# Patient Record
Sex: MALE | Race: WHITE | Employment: OTHER | URBAN - METROPOLITAN AREA
[De-identification: names, ages, dates, MRNs, and addresses within clinical notes are randomized per-mention and may not be internally consistent; named-entity substitution may affect disease eponyms.]

---

## 2017-03-29 PROBLEM — M86.672 CHRONIC OSTEOMYELITIS OF LEFT FOOT (HCC): Chronic | Status: ACTIVE | Noted: 2017-03-29

## 2017-03-29 PROBLEM — L97.524 SKIN ULCER OF LEFT FOOT WITH NECROSIS OF BONE (HCC): Chronic | Status: ACTIVE | Noted: 2017-03-29

## 2017-03-29 PROBLEM — L97.529 DIABETIC ULCER OF LEFT FOOT ASSOCIATED WITH TYPE 2 DIABETES MELLITUS (HCC): Chronic | Status: ACTIVE | Noted: 2017-03-29

## 2017-03-29 PROBLEM — I70.25 ATHEROSCLEROSIS OF NATIVE ARTERY OF LOWER EXTREMITY WITH ULCERATION OF MIDFOOT (HCC): Chronic | Status: ACTIVE | Noted: 2017-03-29

## 2017-03-29 PROBLEM — E11.621 DIABETIC ULCER OF LEFT FOOT ASSOCIATED WITH TYPE 2 DIABETES MELLITUS (HCC): Chronic | Status: ACTIVE | Noted: 2017-03-29

## 2017-05-02 PROBLEM — I70.244 ATHEROSCLEROSIS OF NATIVE ARTERY OF LEFT LOWER EXTREMITY WITH ULCERATION OF MIDFOOT (HCC): Chronic | Status: ACTIVE | Noted: 2017-03-29

## 2018-03-20 ENCOUNTER — TELEPHONE (OUTPATIENT)
Dept: FAMILY MEDICINE CLINIC | Age: 73
End: 2018-03-20

## 2018-03-20 DIAGNOSIS — L97.529 DIABETIC ULCER OF OTHER PART OF LEFT FOOT ASSOCIATED WITH TYPE 2 DIABETES MELLITUS, UNSPECIFIED ULCER STAGE (HCC): Primary | Chronic | ICD-10-CM

## 2018-03-20 DIAGNOSIS — E11.621 DIABETIC ULCER OF OTHER PART OF LEFT FOOT ASSOCIATED WITH TYPE 2 DIABETES MELLITUS, UNSPECIFIED ULCER STAGE (HCC): Primary | Chronic | ICD-10-CM

## 2018-03-21 RX ORDER — LANCETS 30 GAUGE
EACH MISCELLANEOUS
Qty: 100 EACH | Refills: 3 | Status: SHIPPED | OUTPATIENT
Start: 2018-03-21 | End: 2019-06-18 | Stop reason: SDUPTHER

## 2018-03-21 RX ORDER — BLOOD-GLUCOSE METER
1 KIT MISCELLANEOUS DAILY
Qty: 1 KIT | Refills: 0 | Status: SHIPPED
Start: 2018-03-21 | End: 2020-06-10

## 2018-03-21 RX ORDER — GLUCOSAMINE HCL/CHONDROITIN SU 500-400 MG
CAPSULE ORAL
Qty: 100 STRIP | Refills: 11 | Status: SHIPPED | OUTPATIENT
Start: 2018-03-21 | End: 2018-11-12 | Stop reason: SDUPTHER

## 2018-03-22 RX ORDER — PEN NEEDLE, DIABETIC 30 GX5/16"
1 NEEDLE, DISPOSABLE MISCELLANEOUS 2 TIMES DAILY
Qty: 200 EACH | Refills: 3 | Status: SHIPPED
Start: 2018-03-22 | End: 2020-05-20 | Stop reason: SDUPTHER

## 2018-03-27 DIAGNOSIS — G47.00 INSOMNIA, UNSPECIFIED TYPE: ICD-10-CM

## 2018-03-27 RX ORDER — TRAZODONE HYDROCHLORIDE 50 MG/1
50 TABLET ORAL NIGHTLY
Qty: 90 TABLET | Refills: 0 | Status: SHIPPED | OUTPATIENT
Start: 2018-03-27 | End: 2018-08-03 | Stop reason: SDUPTHER

## 2018-03-28 RX ORDER — GLIPIZIDE 10 MG/1
10 TABLET ORAL
Qty: 180 TABLET | Refills: 2 | Status: SHIPPED | OUTPATIENT
Start: 2018-03-28 | End: 2018-11-12 | Stop reason: SDUPTHER

## 2018-05-11 DIAGNOSIS — Z79.4 TYPE 2 DIABETES MELLITUS WITH OTHER CIRCULATORY COMPLICATION, WITH LONG-TERM CURRENT USE OF INSULIN (HCC): ICD-10-CM

## 2018-05-11 DIAGNOSIS — Z11.59 NEED FOR HEPATITIS C SCREENING TEST: ICD-10-CM

## 2018-05-11 DIAGNOSIS — E11.59 TYPE 2 DIABETES MELLITUS WITH OTHER CIRCULATORY COMPLICATION, WITH LONG-TERM CURRENT USE OF INSULIN (HCC): ICD-10-CM

## 2018-05-17 LAB
ALBUMIN SERPL-MCNC: 3.7 G/DL
ALP BLD-CCNC: 84 U/L
ALT SERPL-CCNC: 15 U/L
ANION GAP SERPL CALCULATED.3IONS-SCNC: 16 MMOL/L
AST SERPL-CCNC: 24 U/L
AVERAGE GLUCOSE: 174.3
BASOPHILS ABSOLUTE: NORMAL /ΜL
BASOPHILS RELATIVE PERCENT: NORMAL %
BILIRUB SERPL-MCNC: 0.7 MG/DL (ref 0.1–1.4)
BUN BLDV-MCNC: 24 MG/DL
CALCIUM SERPL-MCNC: 9.9 MG/DL
CHLORIDE BLD-SCNC: 105 MMOL/L
CHOLESTEROL, TOTAL: 161 MG/DL
CHOLESTEROL/HDL RATIO: 3.2
CO2: 27 MMOL/L
CREAT SERPL-MCNC: 1.08 MG/DL
EOSINOPHILS ABSOLUTE: NORMAL /ΜL
EOSINOPHILS RELATIVE PERCENT: NORMAL %
GFR CALCULATED: >60
GLUCOSE BLD-MCNC: 121 MG/DL
HBA1C MFR BLD: 7.7 %
HCT VFR BLD CALC: 41.5 % (ref 41–53)
HDLC SERPL-MCNC: 50 MG/DL (ref 35–70)
HEMOGLOBIN: 13.8 G/DL (ref 13.5–17.5)
LDL CHOLESTEROL CALCULATED: 94 MG/DL (ref 0–160)
LYMPHOCYTES ABSOLUTE: NORMAL /ΜL
LYMPHOCYTES RELATIVE PERCENT: NORMAL %
MCH RBC QN AUTO: 33.3 PG
MCHC RBC AUTO-ENTMCNC: 92.2 G/DL
MCV RBC AUTO: 30.7 FL
MONOCYTES ABSOLUTE: NORMAL /ΜL
MONOCYTES RELATIVE PERCENT: NORMAL %
NEUTROPHILS ABSOLUTE: NORMAL /ΜL
NEUTROPHILS RELATIVE PERCENT: NORMAL %
PLATELET # BLD: 195 K/ΜL
PMV BLD AUTO: 11.3 FL
POTASSIUM SERPL-SCNC: 4.6 MMOL/L
RBC # BLD: 4.5 10^6/ΜL
SODIUM BLD-SCNC: 143 MMOL/L
TOTAL PROTEIN: 7.2
TRIGL SERPL-MCNC: 84 MG/DL
TSH SERPL DL<=0.05 MIU/L-ACNC: 2.13 UIU/ML
VLDLC SERPL CALC-MCNC: NORMAL MG/DL
WBC # BLD: 8 10^3/ML

## 2018-06-11 ENCOUNTER — OFFICE VISIT (OUTPATIENT)
Dept: FAMILY MEDICINE CLINIC | Age: 73
End: 2018-06-11
Payer: COMMERCIAL

## 2018-06-11 ENCOUNTER — TELEPHONE (OUTPATIENT)
Dept: FAMILY MEDICINE CLINIC | Age: 73
End: 2018-06-11

## 2018-06-11 VITALS
OXYGEN SATURATION: 97 % | RESPIRATION RATE: 18 BRPM | HEIGHT: 69 IN | DIASTOLIC BLOOD PRESSURE: 78 MMHG | BODY MASS INDEX: 22.36 KG/M2 | HEART RATE: 68 BPM | SYSTOLIC BLOOD PRESSURE: 138 MMHG | WEIGHT: 151 LBS

## 2018-06-11 DIAGNOSIS — Z79.4 DRUG OR CHEMICAL INDUCED DIABETES MELLITUS WITH HYPERGLYCEMIA, WITH LONG-TERM CURRENT USE OF INSULIN (HCC): Primary | ICD-10-CM

## 2018-06-11 DIAGNOSIS — Z23 NEED FOR TDAP VACCINATION: ICD-10-CM

## 2018-06-11 DIAGNOSIS — N52.9 ERECTILE DYSFUNCTION, UNSPECIFIED ERECTILE DYSFUNCTION TYPE: ICD-10-CM

## 2018-06-11 DIAGNOSIS — E09.65 DRUG OR CHEMICAL INDUCED DIABETES MELLITUS WITH HYPERGLYCEMIA, WITH LONG-TERM CURRENT USE OF INSULIN (HCC): Primary | ICD-10-CM

## 2018-06-11 DIAGNOSIS — I10 HYPERTENSION, UNSPECIFIED TYPE: ICD-10-CM

## 2018-06-11 DIAGNOSIS — L98.9 SKIN LESION: ICD-10-CM

## 2018-06-11 DIAGNOSIS — Z23 NEED FOR SHINGLES VACCINE: ICD-10-CM

## 2018-06-11 PROCEDURE — 99214 OFFICE O/P EST MOD 30 MIN: CPT | Performed by: FAMILY MEDICINE

## 2018-08-03 ENCOUNTER — HOSPITAL ENCOUNTER (OUTPATIENT)
Age: 73
Discharge: HOME | End: 2018-08-05
Payer: COMMERCIAL

## 2018-08-03 DIAGNOSIS — G47.00 INSOMNIA, UNSPECIFIED TYPE: ICD-10-CM

## 2018-08-03 LAB — PROSTATE SPECIFIC ANTIGEN: 16.13 NG/ML (ref 0–4)

## 2018-08-03 PROCEDURE — G0103 PSA SCREENING: HCPCS

## 2018-08-03 RX ORDER — TRAZODONE HYDROCHLORIDE 50 MG/1
50 TABLET ORAL NIGHTLY
Qty: 90 TABLET | Refills: 0 | Status: SHIPPED | OUTPATIENT
Start: 2018-08-03 | End: 2018-09-26 | Stop reason: SDUPTHER

## 2018-09-24 ENCOUNTER — TELEPHONE (OUTPATIENT)
Dept: FAMILY MEDICINE CLINIC | Age: 73
End: 2018-09-24

## 2018-09-24 NOTE — TELEPHONE ENCOUNTER
appt scheduled 10/19. Offered sooner appt with one of the residents but wanted one with Dr. Raoul Brito.

## 2018-09-24 NOTE — TELEPHONE ENCOUNTER
Pt states he has had several episodes of vomiting over the last 5-6 months, seems to be getting more frequent. No appts available in the next  3-4 weeks.   Pt call was transferred to clinical line

## 2018-09-26 DIAGNOSIS — G47.00 INSOMNIA, UNSPECIFIED TYPE: ICD-10-CM

## 2018-09-26 RX ORDER — TRAZODONE HYDROCHLORIDE 50 MG/1
50 TABLET ORAL NIGHTLY
Qty: 90 TABLET | Refills: 0 | Status: SHIPPED | OUTPATIENT
Start: 2018-09-26 | End: 2019-03-26 | Stop reason: SDUPTHER

## 2018-10-01 ENCOUNTER — TELEPHONE (OUTPATIENT)
Dept: FAMILY MEDICINE CLINIC | Age: 73
End: 2018-10-01

## 2018-10-01 ENCOUNTER — APPOINTMENT (OUTPATIENT)
Dept: GENERAL RADIOLOGY | Age: 73
DRG: 293 | End: 2018-10-01
Payer: MEDICARE

## 2018-10-01 ENCOUNTER — HOSPITAL ENCOUNTER (INPATIENT)
Age: 73
LOS: 2 days | Discharge: HOME | DRG: 293 | End: 2018-10-03
Attending: EMERGENCY MEDICINE | Admitting: FAMILY MEDICINE
Payer: MEDICARE

## 2018-10-01 DIAGNOSIS — I50.9 ACUTE CONGESTIVE HEART FAILURE, UNSPECIFIED HEART FAILURE TYPE (HCC): Primary | ICD-10-CM

## 2018-10-01 DIAGNOSIS — R06.01 ORTHOPNEA: ICD-10-CM

## 2018-10-01 DIAGNOSIS — Z79.4 TYPE 2 DIABETES MELLITUS WITH DIABETIC PERIPHERAL ANGIOPATHY WITHOUT GANGRENE, WITH LONG-TERM CURRENT USE OF INSULIN (HCC): ICD-10-CM

## 2018-10-01 DIAGNOSIS — I25.810 CORONARY ARTERY DISEASE INVOLVING CORONARY BYPASS GRAFT OF NATIVE HEART WITHOUT ANGINA PECTORIS: ICD-10-CM

## 2018-10-01 DIAGNOSIS — E11.51 TYPE 2 DIABETES MELLITUS WITH DIABETIC PERIPHERAL ANGIOPATHY WITHOUT GANGRENE, WITH LONG-TERM CURRENT USE OF INSULIN (HCC): ICD-10-CM

## 2018-10-01 PROBLEM — I50.33 ACUTE ON CHRONIC CONGESTIVE HEART FAILURE WITH LEFT VENTRICULAR DIASTOLIC DYSFUNCTION (HCC): Status: ACTIVE | Noted: 2018-10-01

## 2018-10-01 PROBLEM — E11.9 TYPE 2 DIABETES MELLITUS, WITH LONG-TERM CURRENT USE OF INSULIN (HCC): Status: ACTIVE | Noted: 2018-10-01

## 2018-10-01 LAB
ALBUMIN SERPL-MCNC: 3.6 G/DL (ref 3.5–5.2)
ALP BLD-CCNC: 104 U/L (ref 40–129)
ALT SERPL-CCNC: 14 U/L (ref 0–40)
ANION GAP SERPL CALCULATED.3IONS-SCNC: 15 MMOL/L (ref 7–16)
APTT: 29 SEC (ref 24.5–35.1)
AST SERPL-CCNC: 35 U/L (ref 0–39)
BILIRUB SERPL-MCNC: 1.2 MG/DL (ref 0–1.2)
BUN BLDV-MCNC: 24 MG/DL (ref 8–23)
CALCIUM SERPL-MCNC: 8.9 MG/DL (ref 8.6–10.2)
CHLORIDE BLD-SCNC: 101 MMOL/L (ref 98–107)
CO2: 21 MMOL/L (ref 22–29)
CREAT SERPL-MCNC: 1 MG/DL (ref 0.7–1.2)
GFR AFRICAN AMERICAN: >60
GFR NON-AFRICAN AMERICAN: >60 ML/MIN/1.73
GLUCOSE BLD-MCNC: 232 MG/DL (ref 74–109)
HCT VFR BLD CALC: 41.6 % (ref 37–54)
HEMOGLOBIN: 13.6 G/DL (ref 12.5–16.5)
INR BLD: 1.2
LIPASE: 28 U/L (ref 13–60)
MCH RBC QN AUTO: 30.4 PG (ref 26–35)
MCHC RBC AUTO-ENTMCNC: 32.7 % (ref 32–34.5)
MCV RBC AUTO: 93.1 FL (ref 80–99.9)
METER GLUCOSE: 162 MG/DL (ref 70–110)
METER GLUCOSE: 281 MG/DL (ref 70–110)
PDW BLD-RTO: 13.4 FL (ref 11.5–15)
PLATELET # BLD: 178 E9/L (ref 130–450)
PMV BLD AUTO: 11.4 FL (ref 7–12)
POTASSIUM SERPL-SCNC: 4.3 MMOL/L (ref 3.5–5)
PRO-BNP: 6660 PG/ML (ref 0–125)
PROTHROMBIN TIME: 13.2 SEC (ref 9.3–12.4)
RBC # BLD: 4.47 E12/L (ref 3.8–5.8)
SODIUM BLD-SCNC: 137 MMOL/L (ref 132–146)
TOTAL PROTEIN: 7.3 G/DL (ref 6.4–8.3)
TROPONIN: 0.01 NG/ML (ref 0–0.03)
WBC # BLD: 7.9 E9/L (ref 4.5–11.5)

## 2018-10-01 PROCEDURE — 80053 COMPREHEN METABOLIC PANEL: CPT

## 2018-10-01 PROCEDURE — 85610 PROTHROMBIN TIME: CPT

## 2018-10-01 PROCEDURE — 82962 GLUCOSE BLOOD TEST: CPT

## 2018-10-01 PROCEDURE — 6360000002 HC RX W HCPCS: Performed by: FAMILY MEDICINE

## 2018-10-01 PROCEDURE — 71045 X-RAY EXAM CHEST 1 VIEW: CPT

## 2018-10-01 PROCEDURE — 99285 EMERGENCY DEPT VISIT HI MDM: CPT

## 2018-10-01 PROCEDURE — 6370000000 HC RX 637 (ALT 250 FOR IP): Performed by: FAMILY MEDICINE

## 2018-10-01 PROCEDURE — 83690 ASSAY OF LIPASE: CPT

## 2018-10-01 PROCEDURE — 2580000003 HC RX 258: Performed by: FAMILY MEDICINE

## 2018-10-01 PROCEDURE — 94761 N-INVAS EAR/PLS OXIMETRY MLT: CPT

## 2018-10-01 PROCEDURE — 6360000002 HC RX W HCPCS: Performed by: EMERGENCY MEDICINE

## 2018-10-01 PROCEDURE — 85730 THROMBOPLASTIN TIME PARTIAL: CPT

## 2018-10-01 PROCEDURE — 1200000000 HC SEMI PRIVATE

## 2018-10-01 PROCEDURE — 99222 1ST HOSP IP/OBS MODERATE 55: CPT | Performed by: FAMILY MEDICINE

## 2018-10-01 PROCEDURE — 83880 ASSAY OF NATRIURETIC PEPTIDE: CPT

## 2018-10-01 PROCEDURE — 84484 ASSAY OF TROPONIN QUANT: CPT

## 2018-10-01 PROCEDURE — 85027 COMPLETE CBC AUTOMATED: CPT

## 2018-10-01 RX ORDER — DEXTROSE MONOHYDRATE 25 G/50ML
12.5 INJECTION, SOLUTION INTRAVENOUS PRN
Status: DISCONTINUED | OUTPATIENT
Start: 2018-10-01 | End: 2018-10-03 | Stop reason: HOSPADM

## 2018-10-01 RX ORDER — TRAZODONE HYDROCHLORIDE 50 MG/1
50 TABLET ORAL NIGHTLY
Status: DISCONTINUED | OUTPATIENT
Start: 2018-10-01 | End: 2018-10-03 | Stop reason: HOSPADM

## 2018-10-01 RX ORDER — FUROSEMIDE 10 MG/ML
40 INJECTION INTRAMUSCULAR; INTRAVENOUS DAILY
Status: DISCONTINUED | OUTPATIENT
Start: 2018-10-02 | End: 2018-10-03 | Stop reason: HOSPADM

## 2018-10-01 RX ORDER — HYDRALAZINE HYDROCHLORIDE 20 MG/ML
10 INJECTION INTRAMUSCULAR; INTRAVENOUS EVERY 6 HOURS PRN
Status: CANCELLED | OUTPATIENT
Start: 2018-10-01

## 2018-10-01 RX ORDER — INSULIN GLARGINE 100 [IU]/ML
5 INJECTION, SOLUTION SUBCUTANEOUS 2 TIMES DAILY
Status: DISCONTINUED | OUTPATIENT
Start: 2018-10-01 | End: 2018-10-03 | Stop reason: HOSPADM

## 2018-10-01 RX ORDER — GLIPIZIDE 5 MG/1
10 TABLET ORAL
Status: DISCONTINUED | OUTPATIENT
Start: 2018-10-01 | End: 2018-10-02

## 2018-10-01 RX ORDER — DEXTROSE MONOHYDRATE 50 MG/ML
100 INJECTION, SOLUTION INTRAVENOUS PRN
Status: DISCONTINUED | OUTPATIENT
Start: 2018-10-01 | End: 2018-10-03 | Stop reason: HOSPADM

## 2018-10-01 RX ORDER — ONDANSETRON 2 MG/ML
4 INJECTION INTRAMUSCULAR; INTRAVENOUS EVERY 6 HOURS PRN
Status: DISCONTINUED | OUTPATIENT
Start: 2018-10-01 | End: 2018-10-03 | Stop reason: HOSPADM

## 2018-10-01 RX ORDER — NICOTINE POLACRILEX 4 MG
15 LOZENGE BUCCAL PRN
Status: DISCONTINUED | OUTPATIENT
Start: 2018-10-01 | End: 2018-10-03 | Stop reason: HOSPADM

## 2018-10-01 RX ORDER — SODIUM CHLORIDE 9 MG/ML
INJECTION, SOLUTION INTRAVENOUS CONTINUOUS
Status: DISCONTINUED | OUTPATIENT
Start: 2018-10-01 | End: 2018-10-01

## 2018-10-01 RX ORDER — FUROSEMIDE 10 MG/ML
40 INJECTION INTRAMUSCULAR; INTRAVENOUS ONCE
Status: COMPLETED | OUTPATIENT
Start: 2018-10-01 | End: 2018-10-01

## 2018-10-01 RX ORDER — VALSARTAN 80 MG/1
80 TABLET ORAL EVERY MORNING
Status: DISCONTINUED | OUTPATIENT
Start: 2018-10-02 | End: 2018-10-02

## 2018-10-01 RX ADMIN — INSULIN LISPRO 1 UNITS: 100 INJECTION, SOLUTION INTRAVENOUS; SUBCUTANEOUS at 20:44

## 2018-10-01 RX ADMIN — SODIUM CHLORIDE: 9 INJECTION, SOLUTION INTRAVENOUS at 16:49

## 2018-10-01 RX ADMIN — INSULIN LISPRO 6 UNITS: 100 INJECTION, SOLUTION INTRAVENOUS; SUBCUTANEOUS at 17:28

## 2018-10-01 RX ADMIN — FUROSEMIDE 40 MG: 10 INJECTION, SOLUTION INTRAMUSCULAR; INTRAVENOUS at 13:30

## 2018-10-01 RX ADMIN — TRAZODONE HYDROCHLORIDE 50 MG: 50 TABLET ORAL at 22:30

## 2018-10-01 RX ADMIN — LINAGLIPTIN 5 MG: 5 TABLET, FILM COATED ORAL at 16:55

## 2018-10-01 RX ADMIN — INSULIN GLARGINE 5 UNITS: 100 INJECTION, SOLUTION SUBCUTANEOUS at 20:44

## 2018-10-01 RX ADMIN — GLIPIZIDE 10 MG: 5 TABLET ORAL at 16:55

## 2018-10-01 ASSESSMENT — PAIN SCALES - GENERAL
PAINLEVEL_OUTOF10: 0
PAINLEVEL_OUTOF10: 0

## 2018-10-01 NOTE — PLAN OF CARE
Problem: Fluid Volume - Excess  Goal: Control of fluid volume excess will improve  Control of fluid volume excess will improve    Outcome: Ongoing

## 2018-10-02 LAB
ANION GAP SERPL CALCULATED.3IONS-SCNC: 12 MMOL/L (ref 7–16)
BASOPHILS ABSOLUTE: 0.09 E9/L (ref 0–0.2)
BASOPHILS RELATIVE PERCENT: 1.4 % (ref 0–2)
BUN BLDV-MCNC: 23 MG/DL (ref 8–23)
CALCIUM SERPL-MCNC: 8.6 MG/DL (ref 8.6–10.2)
CHLORIDE BLD-SCNC: 104 MMOL/L (ref 98–107)
CO2: 25 MMOL/L (ref 22–29)
CREAT SERPL-MCNC: 1.2 MG/DL (ref 0.7–1.2)
EOSINOPHILS ABSOLUTE: 0.34 E9/L (ref 0.05–0.5)
EOSINOPHILS RELATIVE PERCENT: 5.2 % (ref 0–6)
GFR AFRICAN AMERICAN: >60
GFR NON-AFRICAN AMERICAN: 59 ML/MIN/1.73
GLUCOSE BLD-MCNC: 70 MG/DL (ref 74–109)
HCT VFR BLD CALC: 40.2 % (ref 37–54)
HEMOGLOBIN: 13.2 G/DL (ref 12.5–16.5)
IMMATURE GRANULOCYTES #: 0.01 E9/L
IMMATURE GRANULOCYTES %: 0.2 % (ref 0–5)
LV EF: 35 %
LVEF MODALITY: NORMAL
LYMPHOCYTES ABSOLUTE: 1.37 E9/L (ref 1.5–4)
LYMPHOCYTES RELATIVE PERCENT: 20.8 % (ref 20–42)
MCH RBC QN AUTO: 30.2 PG (ref 26–35)
MCHC RBC AUTO-ENTMCNC: 32.8 % (ref 32–34.5)
MCV RBC AUTO: 92 FL (ref 80–99.9)
METER GLUCOSE: 279 MG/DL (ref 70–110)
METER GLUCOSE: 307 MG/DL (ref 70–110)
METER GLUCOSE: 74 MG/DL (ref 70–110)
METER GLUCOSE: 95 MG/DL (ref 70–110)
MONOCYTES ABSOLUTE: 0.59 E9/L (ref 0.1–0.95)
MONOCYTES RELATIVE PERCENT: 9 % (ref 2–12)
NEUTROPHILS ABSOLUTE: 4.19 E9/L (ref 1.8–7.3)
NEUTROPHILS RELATIVE PERCENT: 63.4 % (ref 43–80)
PDW BLD-RTO: 13.3 FL (ref 11.5–15)
PLATELET # BLD: 158 E9/L (ref 130–450)
PMV BLD AUTO: 12.1 FL (ref 7–12)
POTASSIUM REFLEX MAGNESIUM: 3.6 MMOL/L (ref 3.5–5)
RBC # BLD: 4.37 E12/L (ref 3.8–5.8)
SODIUM BLD-SCNC: 141 MMOL/L (ref 132–146)
WBC # BLD: 6.6 E9/L (ref 4.5–11.5)

## 2018-10-02 PROCEDURE — 6370000000 HC RX 637 (ALT 250 FOR IP): Performed by: FAMILY MEDICINE

## 2018-10-02 PROCEDURE — 1200000000 HC SEMI PRIVATE

## 2018-10-02 PROCEDURE — 85025 COMPLETE CBC W/AUTO DIFF WBC: CPT

## 2018-10-02 PROCEDURE — 82962 GLUCOSE BLOOD TEST: CPT

## 2018-10-02 PROCEDURE — 99224 PR SBSQ OBSERVATION CARE/DAY 15 MINUTES: CPT | Performed by: FAMILY MEDICINE

## 2018-10-02 PROCEDURE — 6360000002 HC RX W HCPCS: Performed by: FAMILY MEDICINE

## 2018-10-02 PROCEDURE — 80048 BASIC METABOLIC PNL TOTAL CA: CPT

## 2018-10-02 PROCEDURE — 36415 COLL VENOUS BLD VENIPUNCTURE: CPT

## 2018-10-02 PROCEDURE — 93306 TTE W/DOPPLER COMPLETE: CPT

## 2018-10-02 RX ORDER — LOSARTAN POTASSIUM 50 MG/1
50 TABLET ORAL DAILY
Status: DISCONTINUED | OUTPATIENT
Start: 2018-10-02 | End: 2018-10-03 | Stop reason: HOSPADM

## 2018-10-02 RX ORDER — GLIPIZIDE 5 MG/1
5 TABLET ORAL
Status: DISCONTINUED | OUTPATIENT
Start: 2018-10-02 | End: 2018-10-03 | Stop reason: HOSPADM

## 2018-10-02 RX ORDER — METOPROLOL SUCCINATE 25 MG/1
25 TABLET, EXTENDED RELEASE ORAL DAILY
Status: DISCONTINUED | OUTPATIENT
Start: 2018-10-02 | End: 2018-10-03 | Stop reason: HOSPADM

## 2018-10-02 RX ADMIN — METOPROLOL SUCCINATE 25 MG: 25 TABLET, EXTENDED RELEASE ORAL at 09:35

## 2018-10-02 RX ADMIN — LOSARTAN POTASSIUM 50 MG: 50 TABLET, FILM COATED ORAL at 09:35

## 2018-10-02 RX ADMIN — INSULIN GLARGINE 5 UNITS: 100 INJECTION, SOLUTION SUBCUTANEOUS at 14:34

## 2018-10-02 RX ADMIN — TRAZODONE HYDROCHLORIDE 50 MG: 50 TABLET ORAL at 20:45

## 2018-10-02 RX ADMIN — INSULIN LISPRO 6 UNITS: 100 INJECTION, SOLUTION INTRAVENOUS; SUBCUTANEOUS at 14:34

## 2018-10-02 RX ADMIN — GLIPIZIDE 5 MG: 5 TABLET ORAL at 18:07

## 2018-10-02 RX ADMIN — INSULIN LISPRO 8 UNITS: 100 INJECTION, SOLUTION INTRAVENOUS; SUBCUTANEOUS at 18:07

## 2018-10-02 RX ADMIN — FUROSEMIDE 40 MG: 10 INJECTION, SOLUTION INTRAMUSCULAR; INTRAVENOUS at 09:41

## 2018-10-02 RX ADMIN — LINAGLIPTIN 5 MG: 5 TABLET, FILM COATED ORAL at 09:35

## 2018-10-02 ASSESSMENT — PAIN SCALES - GENERAL
PAINLEVEL_OUTOF10: 0
PAINLEVEL_OUTOF10: 0

## 2018-10-02 NOTE — PROGRESS NOTES
[facility]  Progress Note    Subjective:    Pt feels markedly better. Was able to lay down a little flatter in his bed with less perceived shortness of breath. No dyspnea with ambulation. Denies chest pain, pressure or heart palpitations. Past medical, surgical, family and social history were reviewed, non-contributory, and unchanged unless otherwise stated. Objective:    BP (!) 145/74   Pulse 81   Temp 98 °F (36.7 °C) (Oral)   Resp 16   Ht 5' 9\" (1.753 m)   Wt 153 lb 12.8 oz (69.8 kg)   SpO2 94%   BMI 22.71 kg/m²     Heart:  RRR, no murmurs, gallops, or rubs. Lungs:  CTA bilaterally, no wheeze, rales or rhonchi  Abd: bowel sounds present, nontender, nondistended, no masses  Extrem:  No clubbing, cyanosis. Lower extremity edema decreased.  Trace edema today    Recent Results (from the past 24 hour(s))   EKG 12 Lead    Collection Time: 10/01/18  9:44 AM   Result Value Ref Range    Ventricular Rate 85 BPM    Atrial Rate 85 BPM    P-R Interval 202 ms    QRS Duration 148 ms    Q-T Interval 428 ms    QTc Calculation (Bazett) 509 ms    P Axis -2 degrees    R Axis 174 degrees    T Axis -15 degrees   Troponin    Collection Time: 10/01/18 10:01 AM   Result Value Ref Range    Troponin 0.01 0.00 - 0.03 ng/mL   Brain Natriuretic Peptide    Collection Time: 10/01/18 10:01 AM   Result Value Ref Range    Pro-BNP 6,660 (H) 0 - 125 pg/mL   CBC    Collection Time: 10/01/18 10:01 AM   Result Value Ref Range    WBC 7.9 4.5 - 11.5 E9/L    RBC 4.47 3.80 - 5.80 E12/L    Hemoglobin 13.6 12.5 - 16.5 g/dL    Hematocrit 41.6 37.0 - 54.0 %    MCV 93.1 80.0 - 99.9 fL    MCH 30.4 26.0 - 35.0 pg    MCHC 32.7 32.0 - 34.5 %    RDW 13.4 11.5 - 15.0 fL    Platelets 901 261 - 049 E9/L    MPV 11.4 7.0 - 12.0 fL   Comprehensive Metabolic Panel    Collection Time: 10/01/18 10:01 AM   Result Value Ref Range    Sodium 137 132 - 146 mmol/L    Potassium 4.3 3.5 - 5.0 mmol/L    Chloride 101 98 - 107 mmol/L

## 2018-10-03 ENCOUNTER — TELEPHONE (OUTPATIENT)
Dept: ADMINISTRATIVE | Age: 73
End: 2018-10-03

## 2018-10-03 VITALS
TEMPERATURE: 97.8 F | BODY MASS INDEX: 21.88 KG/M2 | HEIGHT: 69 IN | OXYGEN SATURATION: 95 % | RESPIRATION RATE: 16 BRPM | HEART RATE: 70 BPM | WEIGHT: 147.7 LBS | SYSTOLIC BLOOD PRESSURE: 131 MMHG | DIASTOLIC BLOOD PRESSURE: 66 MMHG

## 2018-10-03 PROBLEM — I25.10 CAD (CORONARY ARTERY DISEASE): Status: ACTIVE | Noted: 2018-10-03

## 2018-10-03 LAB — METER GLUCOSE: 122 MG/DL (ref 70–110)

## 2018-10-03 PROCEDURE — 6360000002 HC RX W HCPCS: Performed by: FAMILY MEDICINE

## 2018-10-03 PROCEDURE — 6370000000 HC RX 637 (ALT 250 FOR IP): Performed by: FAMILY MEDICINE

## 2018-10-03 PROCEDURE — 99217 PR OBSERVATION CARE DISCHARGE MANAGEMENT: CPT | Performed by: FAMILY MEDICINE

## 2018-10-03 PROCEDURE — 82962 GLUCOSE BLOOD TEST: CPT

## 2018-10-03 RX ORDER — LOSARTAN POTASSIUM 50 MG/1
25 TABLET ORAL DAILY
Qty: 30 TABLET | Refills: 3 | Status: SHIPPED | OUTPATIENT
Start: 2018-10-03 | End: 2018-10-03

## 2018-10-03 RX ORDER — ATORVASTATIN CALCIUM 40 MG/1
40 TABLET, FILM COATED ORAL NIGHTLY
Status: DISCONTINUED | OUTPATIENT
Start: 2018-10-03 | End: 2018-10-03 | Stop reason: HOSPADM

## 2018-10-03 RX ORDER — ATORVASTATIN CALCIUM 40 MG/1
40 TABLET, FILM COATED ORAL NIGHTLY
Qty: 30 TABLET | Refills: 3 | Status: SHIPPED | OUTPATIENT
Start: 2018-10-03 | End: 2018-10-08 | Stop reason: SINTOL

## 2018-10-03 RX ORDER — LOSARTAN POTASSIUM 25 MG/1
25 TABLET ORAL DAILY
Qty: 30 TABLET | Refills: 1 | Status: SHIPPED | OUTPATIENT
Start: 2018-10-03 | End: 2018-11-12 | Stop reason: SDUPTHER

## 2018-10-03 RX ORDER — SPIRONOLACTONE 25 MG/1
25 TABLET ORAL DAILY
Qty: 30 TABLET | Refills: 3 | Status: SHIPPED | OUTPATIENT
Start: 2018-10-03 | End: 2018-10-08 | Stop reason: SDUPTHER

## 2018-10-03 RX ORDER — FUROSEMIDE 20 MG/1
20 TABLET ORAL DAILY
Qty: 30 TABLET | Refills: 3 | Status: SHIPPED | OUTPATIENT
Start: 2018-10-03 | End: 2018-11-12 | Stop reason: SDUPTHER

## 2018-10-03 RX ORDER — METOPROLOL SUCCINATE 25 MG/1
25 TABLET, EXTENDED RELEASE ORAL DAILY
Qty: 30 TABLET | Refills: 3 | Status: SHIPPED | OUTPATIENT
Start: 2018-10-03 | End: 2018-11-12 | Stop reason: SDUPTHER

## 2018-10-03 RX ADMIN — GLIPIZIDE 5 MG: 5 TABLET ORAL at 06:27

## 2018-10-03 RX ADMIN — FUROSEMIDE 40 MG: 10 INJECTION, SOLUTION INTRAMUSCULAR; INTRAVENOUS at 09:21

## 2018-10-03 RX ADMIN — LOSARTAN POTASSIUM 50 MG: 50 TABLET, FILM COATED ORAL at 09:21

## 2018-10-03 RX ADMIN — INSULIN GLARGINE 5 UNITS: 100 INJECTION, SOLUTION SUBCUTANEOUS at 09:21

## 2018-10-03 RX ADMIN — METOPROLOL SUCCINATE 25 MG: 25 TABLET, EXTENDED RELEASE ORAL at 09:20

## 2018-10-03 RX ADMIN — LINAGLIPTIN 5 MG: 5 TABLET, FILM COATED ORAL at 09:20

## 2018-10-03 ASSESSMENT — PAIN SCALES - GENERAL
PAINLEVEL_OUTOF10: 0
PAINLEVEL_OUTOF10: 0

## 2018-10-03 NOTE — PROGRESS NOTES
[facility]  [facility]    Subjective:    Pt has no complaints this morning. He is ready to go home. Family wanted results of the echo before discharge. Past medical, surgical, family and social history were reviewed, non-contributory, and unchanged unless otherwise stated. Objective:    /70   Pulse 71   Temp 98 °F (36.7 °C) (Oral)   Resp 14   Ht 5' 9\" (1.753 m)   Wt 147 lb 11.2 oz (67 kg)   SpO2 95%   BMI 21.81 kg/m²     Heart:  RRR, no murmurs, gallops, or rubs. Lungs:  CTA bilaterally, no wheeze, rales or rhonchi  Abd: bowel sounds present, nontender, nondistended, no masses  Extrem:  No clubbing, cyanosis.  Edema of the bilateral lower extremities has completely resolved    Recent Results (from the past 24 hour(s))   POCT Glucose    Collection Time: 10/02/18 12:13 PM   Result Value Ref Range    Meter Glucose 279 (H) 70 - 110 mg/dL   POCT Glucose    Collection Time: 10/02/18  6:06 PM   Result Value Ref Range    Meter Glucose 307 (H) 70 - 110 mg/dL   POCT Glucose    Collection Time: 10/02/18  8:43 PM   Result Value Ref Range    Meter Glucose 95 70 - 110 mg/dL   POCT Glucose    Collection Time: 10/03/18  6:27 AM   Result Value Ref Range    Meter Glucose 122 (H) 70 - 110 mg/dL         The 10-year ASCVD risk score (Angel Jon et al., 2013) is: 40.2%    Values used to calculate the score:      Age: 68 years      Sex: Male      Is Non- : No      Diabetic: Yes      Tobacco smoker: No      Systolic Blood Pressure: 331 mmHg      Is BP treated: Yes      HDL Cholesterol: 50 mg/dL      Total Cholesterol: 161 mg/dL      Assessment:    Active Hospital Problems    Diagnosis Date Noted    CAD (coronary artery disease) [I25.10] 10/03/2018    Acute congestive heart failure ([address] [I50.9] 10/01/2018    Type 2 diabetes mellitus, with long-term current use of insulin ([facility] [address] [E11.9, Z79.4] 10/01/2018    Hypertension [I10] 06/11/2018

## 2018-10-04 ENCOUNTER — CARE COORDINATION (OUTPATIENT)
Dept: CASE MANAGEMENT | Age: 73
End: 2018-10-04

## 2018-10-04 DIAGNOSIS — I50.9 ACUTE CONGESTIVE HEART FAILURE, UNSPECIFIED HEART FAILURE TYPE (HCC): Primary | ICD-10-CM

## 2018-10-05 ENCOUNTER — TELEPHONE (OUTPATIENT)
Dept: FAMILY MEDICINE CLINIC | Age: 73
End: 2018-10-05

## 2018-10-05 ENCOUNTER — HOSPITAL ENCOUNTER (OUTPATIENT)
Age: 73
Discharge: HOME | End: 2018-10-05
Payer: MEDICARE

## 2018-10-05 DIAGNOSIS — E11.51 TYPE 2 DIABETES MELLITUS WITH DIABETIC PERIPHERAL ANGIOPATHY WITHOUT GANGRENE, WITH LONG-TERM CURRENT USE OF INSULIN (HCC): ICD-10-CM

## 2018-10-05 DIAGNOSIS — I50.9 ACUTE CONGESTIVE HEART FAILURE, UNSPECIFIED HEART FAILURE TYPE (HCC): ICD-10-CM

## 2018-10-05 DIAGNOSIS — Z79.4 TYPE 2 DIABETES MELLITUS WITH DIABETIC PERIPHERAL ANGIOPATHY WITHOUT GANGRENE, WITH LONG-TERM CURRENT USE OF INSULIN (HCC): ICD-10-CM

## 2018-10-05 LAB
ANION GAP SERPL CALCULATED.3IONS-SCNC: 9 MMOL/L (ref 7–16)
BUN BLDV-MCNC: 27 MG/DL (ref 8–23)
CALCIUM SERPL-MCNC: 9.4 MG/DL (ref 8.6–10.2)
CHLORIDE BLD-SCNC: 103 MMOL/L (ref 98–107)
CO2: 26 MMOL/L (ref 22–29)
CREAT SERPL-MCNC: 1.2 MG/DL (ref 0.7–1.2)
EKG ATRIAL RATE: 85 BPM
EKG P AXIS: -2 DEGREES
EKG P-R INTERVAL: 202 MS
EKG Q-T INTERVAL: 428 MS
EKG QRS DURATION: 148 MS
EKG QTC CALCULATION (BAZETT): 509 MS
EKG R AXIS: 174 DEGREES
EKG T AXIS: -15 DEGREES
EKG VENTRICULAR RATE: 85 BPM
GFR AFRICAN AMERICAN: >60
GFR NON-AFRICAN AMERICAN: 59 ML/MIN/1.73
GLUCOSE BLD-MCNC: 168 MG/DL (ref 74–109)
POTASSIUM SERPL-SCNC: 4.4 MMOL/L (ref 3.5–5)
SODIUM BLD-SCNC: 138 MMOL/L (ref 132–146)

## 2018-10-05 PROCEDURE — 80048 BASIC METABOLIC PNL TOTAL CA: CPT

## 2018-10-05 PROCEDURE — 36415 COLL VENOUS BLD VENIPUNCTURE: CPT

## 2018-10-06 PROBLEM — I51.89 LEFT VENTRICULAR SYSTOLIC DYSFUNCTION: Status: ACTIVE | Noted: 2018-10-06

## 2018-10-06 PROBLEM — I51.9 LEFT VENTRICULAR SYSTOLIC DYSFUNCTION: Status: ACTIVE | Noted: 2018-10-06

## 2018-10-06 PROBLEM — E78.2 MIXED HYPERLIPIDEMIA: Status: ACTIVE | Noted: 2018-10-06

## 2018-10-08 ENCOUNTER — OFFICE VISIT (OUTPATIENT)
Dept: FAMILY MEDICINE CLINIC | Age: 73
End: 2018-10-08
Payer: MEDICARE

## 2018-10-08 ENCOUNTER — HOSPITAL ENCOUNTER (OUTPATIENT)
Age: 73
Discharge: HOME | End: 2018-10-08
Payer: MEDICARE

## 2018-10-08 VITALS
WEIGHT: 149 LBS | RESPIRATION RATE: 18 BRPM | HEIGHT: 69 IN | BODY MASS INDEX: 22.07 KG/M2 | SYSTOLIC BLOOD PRESSURE: 118 MMHG | OXYGEN SATURATION: 95 % | DIASTOLIC BLOOD PRESSURE: 56 MMHG | HEART RATE: 83 BPM

## 2018-10-08 DIAGNOSIS — I50.43 ACUTE ON CHRONIC COMBINED SYSTOLIC AND DIASTOLIC CONGESTIVE HEART FAILURE (HCC): ICD-10-CM

## 2018-10-08 DIAGNOSIS — M79.10 MYALGIA: ICD-10-CM

## 2018-10-08 DIAGNOSIS — Z09 HOSPITAL DISCHARGE FOLLOW-UP: Primary | ICD-10-CM

## 2018-10-08 DIAGNOSIS — I95.2 HYPOTENSION DUE TO DRUGS: ICD-10-CM

## 2018-10-08 LAB
ANION GAP SERPL CALCULATED.3IONS-SCNC: 13 MMOL/L (ref 7–16)
BUN BLDV-MCNC: 30 MG/DL (ref 8–23)
CALCIUM SERPL-MCNC: 9.5 MG/DL (ref 8.6–10.2)
CHLORIDE BLD-SCNC: 94 MMOL/L (ref 98–107)
CO2: 26 MMOL/L (ref 22–29)
CREAT SERPL-MCNC: 1.2 MG/DL (ref 0.7–1.2)
GFR AFRICAN AMERICAN: >60
GFR NON-AFRICAN AMERICAN: 59 ML/MIN/1.73
GLUCOSE BLD-MCNC: 200 MG/DL (ref 74–109)
POTASSIUM SERPL-SCNC: 4.5 MMOL/L (ref 3.5–5)
SODIUM BLD-SCNC: 133 MMOL/L (ref 132–146)
TOTAL CK: 207 U/L (ref 20–200)

## 2018-10-08 PROCEDURE — 82550 ASSAY OF CK (CPK): CPT

## 2018-10-08 PROCEDURE — 36415 COLL VENOUS BLD VENIPUNCTURE: CPT

## 2018-10-08 PROCEDURE — 80048 BASIC METABOLIC PNL TOTAL CA: CPT

## 2018-10-08 PROCEDURE — 99495 TRANSJ CARE MGMT MOD F2F 14D: CPT | Performed by: FAMILY MEDICINE

## 2018-10-08 PROCEDURE — 1111F DSCHRG MED/CURRENT MED MERGE: CPT | Performed by: FAMILY MEDICINE

## 2018-10-08 RX ORDER — SPIRONOLACTONE 25 MG/1
12.5 TABLET ORAL DAILY
Qty: 30 TABLET | Refills: 3 | Status: SHIPPED | OUTPATIENT
Start: 2018-10-08 | End: 2018-11-12 | Stop reason: SDUPTHER

## 2018-10-08 RX ORDER — PRAVASTATIN SODIUM 40 MG
40 TABLET ORAL DAILY
Qty: 30 TABLET | Refills: 3 | Status: SHIPPED | OUTPATIENT
Start: 2018-10-08 | End: 2018-11-12 | Stop reason: SDUPTHER

## 2018-10-08 ASSESSMENT — PATIENT HEALTH QUESTIONNAIRE - PHQ9
SUM OF ALL RESPONSES TO PHQ QUESTIONS 1-9: 0
2. FEELING DOWN, DEPRESSED OR HOPELESS: 0
SUM OF ALL RESPONSES TO PHQ QUESTIONS 1-9: 0
SUM OF ALL RESPONSES TO PHQ9 QUESTIONS 1 & 2: 0
1. LITTLE INTEREST OR PLEASURE IN DOING THINGS: 0

## 2018-10-08 ASSESSMENT — ENCOUNTER SYMPTOMS
SINUS PRESSURE: 0
SHORTNESS OF BREATH: 0
EYE REDNESS: 0
DIARRHEA: 0
ABDOMINAL PAIN: 0
COUGH: 0
VOMITING: 0
EYE PAIN: 0
SORE THROAT: 0
BACK PAIN: 0
EYE DISCHARGE: 0
WHEEZING: 0
NAUSEA: 0

## 2018-10-08 NOTE — PROGRESS NOTES
Transition of Care:  hospital follow-up visit with Primary Care Physician   Patient is here today for follow up after recent hospitalization. Date of face-to-face encounter:  10/8/2018    Date of Nurse Care Coordination interactive contact (within 2 business days of discharge):  Yes  Contact made by:  See transitional care telephone encounter. Pt is complaining of diarrhea and myalgia.     Reports no sob, no palpitations. Taking all meds as prescribed. Cardiology appt is tomorrow, with Dr. Lemuel Kanner.      Reports BM x 2 loose watery stools. No fever, chills. No abdominal cramping associated with it. Denies weakness, dizziness. BP has been 115/55-65, asymptomatic.      Persons joining patient at todays visit: Wife    Discharge date: 10/3/2018    Primary diagnosis on discharge: CHF exacerbation    Other associated diagnoses:  Hyperlipidemia, Coronary Artery Disease, HTN, Congestive Heart Failure, Diabetes Mellitus    Discharge medications:     Gerhard Huizar   Home Medication Instructions SHAILESH:    Printed on:10/08/18 9775   Medication Information                      Bromelains 500 MG TABS  Take 1 tablet by mouth every morning              furosemide (LASIX) 20 MG tablet  Take 1 tablet by mouth daily             glipiZIDE (GLUCOTROL) 10 MG tablet  Take 1 tablet by mouth 2 times daily (before meals)             Glucose Blood (BLOOD GLUCOSE TEST STRIPS) STRP  Four timed daily             glucose monitoring kit (FREESTYLE) monitoring kit  1 kit by Does not apply route daily             Insulin Pen Needle (PEN NEEDLES 3/16\") 31G X 5 MM MISC  1 each by Does not apply route 2 times daily             Insulin Pen Needle 31G X 5 MM MISC  1 each by Does not apply route daily             Lancets MISC  Four times daily             LANTUS SOLOSTAR 100 UNIT/ML injection pen  INJECT 5 UNITS SUB-Q TWICE A DAY (TO CHECK PM INSULIN DOSE WITH PCP)             losartan (COZAAR) 25 MG tablet  Take 1 tablet by mouth (PRAVACHOL) 40 MG tablet; Take 1 tablet by mouth daily  -     spironolactone (ALDACTONE) 25 MG tablet; Take 0.5 tablets by mouth daily    Myalgia  -     pravastatin (PRAVACHOL) 40 MG tablet; Take 1 tablet by mouth daily  -     CK; Future  -     Basic Metabolic Panel; Future    Hypotension due to drugs  -     spironolactone (ALDACTONE) 25 MG tablet;  Take 0.5 tablets by mouth daily      Transitional Care Management Level of Service:  [x]    43226 Moderate complexity    [x]    09367 High complexity

## 2018-10-08 NOTE — PROGRESS NOTES
SOLOSTAR 100 UNIT/ML injection pen INJECT 5 UNITS SUB-Q TWICE A DAY (TO CHECK PM INSULIN DOSE WITH PCP)  Patient taking differently: INJECT 5 UNITS SUB-Q TWICE A DAY Yes Meenakshi Crane MD   metFORMIN (GLUCOPHAGE) 1000 MG tablet Take 1 tablet by mouth 2 times daily (with meals) Yes Meenakshi Crane MD   glipiZIDE (GLUCOTROL) 10 MG tablet Take 1 tablet by mouth 2 times daily (before meals) Yes Nora Livingston MD   Insulin Pen Needle (PEN NEEDLES 3/16\") 31G X 5 MM MISC 1 each by Does not apply route 2 times daily Yes Meenakshi Crane MD   Glucose Blood (BLOOD GLUCOSE TEST STRIPS) STRP Four timed daily Yes Meenakshi Crane MD   Lancets MISC Four times daily Yes Meenakshi Crane MD   Magnesium Oxide 500 MG TABS Take 0.5 tablets by mouth every morning  Yes Historical Provider, MD   SITagliptin (JANUVIA) 100 MG tablet Take 1 tablet by mouth daily  Patient taking differently: Take 100 mg by mouth every morning  Yes Meenakshi Crane MD   Insulin Pen Needle 31G X 5 MM MISC 1 each by Does not apply route daily Yes Meenakshi Crane MD   Bromelains 500 MG TABS Take 1 tablet by mouth every morning  Yes Historical Provider, MD   Specialty Vitamins Products (COLLAGEN ULTRA PO) Take 2 tablets by mouth every morning  Yes Historical Provider, MD   glucose monitoring kit (FREESTYLE) monitoring kit 1 kit by Does not apply route daily  Meenakshi Crane MD        Allergies   Allergen Reactions    Pcn [Penicillins] Hives    Sulfa Antibiotics Hives       Past Medical History:   Diagnosis Date    Atherosclerosis of native artery of lower extremity with ulceration of [facility]foot [facility] 3/29/2017    CAD (coronary artery disease) 2009    history of    Chronic osteomyelitis of left foot ([facility] 3/29/2017    Diabetes mellitus ([facility]     Diabetic neuropathy ([facility]     Diabetic ulcer of left foot associated with type 2 diabetes mellitus ([facility] [facility] 03/29/2017    healed 6-2017 per pt    Skin

## 2018-10-08 NOTE — PROGRESS NOTES
[facility]  Precepting Note    Subjective:  Transitional visit  Admitted for CHF exacerbation  Has been having diarrhea  No fever, chills  No abdominal cramping  Also has myalgias    ROS otherwise negative     Past medical, surgical, family and social history were reviewed, non-contributory, and unchanged unless otherwise stated. Objective:    BP (!) 118/56   Pulse 83   Resp 18   Ht 5' 9\" (1.753 m)   Wt 149 lb (67.6 kg)   SpO2 95%   BMI 22.00 kg/m²     Exam is as noted by resident with the following changes, additions or corrections:    General:  NAD; alert & oriented x 3   Heart:  RRR, no murmurs, gallops, or rubs. Lungs:  CTA bilaterally, no wheeze, rales or rhonchi  Abd: bowel sounds present, nontender, nondistended, no masses  Extrem:  No clubbing, cyanosis, or edema    Assessment/Plan:    CHF: stable, follow with cardio  Hyperlipidemia: change to Pravachol, check CK  HTN: decrease dose of Aldactone  F/u as instructed     Attending Physician Statement  I have reviewed the chart, including any radiology or labs. I have discussed the case, including pertinent history and exam findings with the resident. I agree with the assessment, plan and orders as documented by the resident. Please refer to the resident note for additional information.       Electronically signed by Ravin Donald MD on 10/8/2018 at 2:48 PM

## 2018-10-09 ENCOUNTER — OFFICE VISIT (OUTPATIENT)
Dept: CARDIOLOGY CLINIC | Age: 73
End: 2018-10-09
Payer: MEDICARE

## 2018-10-09 VITALS
BODY MASS INDEX: 21.62 KG/M2 | SYSTOLIC BLOOD PRESSURE: 142 MMHG | HEIGHT: 69 IN | WEIGHT: 146 LBS | DIASTOLIC BLOOD PRESSURE: 80 MMHG | RESPIRATION RATE: 16 BRPM | HEART RATE: 77 BPM

## 2018-10-09 DIAGNOSIS — I51.9 LEFT VENTRICULAR SYSTOLIC DYSFUNCTION: Primary | ICD-10-CM

## 2018-10-09 DIAGNOSIS — I25.10 CORONARY ARTERY DISEASE INVOLVING NATIVE CORONARY ARTERY OF NATIVE HEART WITHOUT ANGINA PECTORIS: ICD-10-CM

## 2018-10-09 DIAGNOSIS — E78.2 MIXED HYPERLIPIDEMIA: ICD-10-CM

## 2018-10-09 DIAGNOSIS — I45.10 RBBB: ICD-10-CM

## 2018-10-09 PROBLEM — I50.43 ACUTE ON CHRONIC COMBINED SYSTOLIC AND DIASTOLIC CONGESTIVE HEART FAILURE (HCC): Status: ACTIVE | Noted: 2018-10-09

## 2018-10-09 PROCEDURE — 3017F COLORECTAL CA SCREEN DOC REV: CPT | Performed by: INTERNAL MEDICINE

## 2018-10-09 PROCEDURE — 93000 ELECTROCARDIOGRAM COMPLETE: CPT | Performed by: INTERNAL MEDICINE

## 2018-10-09 PROCEDURE — 1036F TOBACCO NON-USER: CPT | Performed by: INTERNAL MEDICINE

## 2018-10-09 PROCEDURE — 99205 OFFICE O/P NEW HI 60 MIN: CPT | Performed by: INTERNAL MEDICINE

## 2018-10-09 PROCEDURE — 4040F PNEUMOC VAC/ADMIN/RCVD: CPT | Performed by: INTERNAL MEDICINE

## 2018-10-09 PROCEDURE — G8427 DOCREV CUR MEDS BY ELIG CLIN: HCPCS | Performed by: INTERNAL MEDICINE

## 2018-10-09 PROCEDURE — G8598 ASA/ANTIPLAT THER USED: HCPCS | Performed by: INTERNAL MEDICINE

## 2018-10-09 PROCEDURE — 1111F DSCHRG MED/CURRENT MED MERGE: CPT | Performed by: INTERNAL MEDICINE

## 2018-10-09 PROCEDURE — 1101F PT FALLS ASSESS-DOCD LE1/YR: CPT | Performed by: INTERNAL MEDICINE

## 2018-10-09 PROCEDURE — G8420 CALC BMI NORM PARAMETERS: HCPCS | Performed by: INTERNAL MEDICINE

## 2018-10-09 PROCEDURE — 1123F ACP DISCUSS/DSCN MKR DOCD: CPT | Performed by: INTERNAL MEDICINE

## 2018-10-09 PROCEDURE — G8482 FLU IMMUNIZE ORDER/ADMIN: HCPCS | Performed by: INTERNAL MEDICINE

## 2018-10-09 ASSESSMENT — ENCOUNTER SYMPTOMS
RESPIRATORY NEGATIVE: 1
NAUSEA: 0
VOMITING: 0
BACK PAIN: 1
ABDOMINAL PAIN: 0
DIARRHEA: 1
BLOOD IN STOOL: 0

## 2018-10-09 NOTE — PROGRESS NOTES
kit 1 kit by Does not apply route daily 1 kit 0    Glucose Blood (BLOOD GLUCOSE TEST STRIPS) STRP Four timed daily 100 strip 11    Lancets MISC Four times daily 100 each 3    Magnesium Oxide 500 MG TABS Take 0.5 tablets by mouth every morning       SITagliptin (JANUVIA) 100 MG tablet Take 1 tablet by mouth daily (Patient taking differently: Take 100 mg by mouth every morning ) 90 tablet 1    Insulin Pen Needle 31G X 5 MM MISC 1 each by Does not apply route daily 100 each 3    Bromelains 500 MG TABS Take 1 tablet by mouth every morning       Specialty Vitamins Products (COLLAGEN ULTRA PO) Take 2 tablets by mouth every morning        No current facility-administered medications for this visit. Allergies   Allergen Reactions    Pcn [Penicillins] Hives    Sulfa Antibiotics Hives       Vitals:    10/09/18 1134   BP: (!) 142/80   Pulse: 77   Resp: 16   Weight: 146 lb (66.2 kg)   Height: 5' 9\" (1.753 m)       Social History     Social History    Marital status:      Spouse name: N/A    Number of children: N/A    Years of education: N/A     Occupational History    Not on file. Social History Main Topics    Smoking status: Never Smoker    Smokeless tobacco: Never Used    Alcohol use 1.8 oz/week     1 Standard drinks or equivalent, 2 Shots of liquor per week    Drug use: No    Sexual activity: Not Currently     Partners: Female     Other Topics Concern    Not on file     Social History Narrative    No narrative on file       Family History   Problem Relation Age of Onset    High Blood Pressure Mother     COPD Father     Diabetes Father     Heart Disease Brother         chf    Seizures Brother          SUBJECTIVE: Lex Leonard presents to the office today for consult for new chf. Hx ACB. I reviewed old records and recent hospital stay. Recently hospitalized for chf , no cards involvement.   He complains of no symtpoms now and denies   chest pain, chest pressure/discomfort,

## 2018-10-12 ENCOUNTER — CARE COORDINATION (OUTPATIENT)
Dept: CASE MANAGEMENT | Age: 73
End: 2018-10-12

## 2018-10-18 DIAGNOSIS — Z12.11 COLON CANCER SCREENING: Primary | ICD-10-CM

## 2018-10-18 LAB
CONTROL: NORMAL
HEMOCCULT STL QL: NEGATIVE

## 2018-10-18 PROCEDURE — 82274 ASSAY TEST FOR BLOOD FECAL: CPT | Performed by: FAMILY MEDICINE

## 2018-10-19 ENCOUNTER — OFFICE VISIT (OUTPATIENT)
Dept: FAMILY MEDICINE CLINIC | Age: 73
End: 2018-10-19
Payer: MEDICARE

## 2018-10-19 VITALS
DIASTOLIC BLOOD PRESSURE: 64 MMHG | RESPIRATION RATE: 18 BRPM | HEIGHT: 69 IN | BODY MASS INDEX: 21.77 KG/M2 | OXYGEN SATURATION: 95 % | SYSTOLIC BLOOD PRESSURE: 126 MMHG | HEART RATE: 65 BPM | WEIGHT: 147 LBS

## 2018-10-19 DIAGNOSIS — R19.7 DIARRHEA, UNSPECIFIED TYPE: ICD-10-CM

## 2018-10-19 DIAGNOSIS — I70.244 ATHEROSCLEROSIS OF NATIVE ARTERY OF LEFT LOWER EXTREMITY WITH ULCERATION OF MIDFOOT (HCC): ICD-10-CM

## 2018-10-19 DIAGNOSIS — Z79.4 TYPE 2 DIABETES MELLITUS WITH DIABETIC PERIPHERAL ANGIOPATHY WITHOUT GANGRENE, WITH LONG-TERM CURRENT USE OF INSULIN (HCC): Primary | ICD-10-CM

## 2018-10-19 DIAGNOSIS — I10 HYPERTENSION, UNSPECIFIED TYPE: ICD-10-CM

## 2018-10-19 DIAGNOSIS — E78.2 MIXED HYPERLIPIDEMIA: ICD-10-CM

## 2018-10-19 DIAGNOSIS — E11.51 TYPE 2 DIABETES MELLITUS WITH DIABETIC PERIPHERAL ANGIOPATHY WITHOUT GANGRENE, WITH LONG-TERM CURRENT USE OF INSULIN (HCC): Primary | ICD-10-CM

## 2018-10-19 DIAGNOSIS — I50.42 CHRONIC COMBINED SYSTOLIC AND DIASTOLIC CONGESTIVE HEART FAILURE (HCC): ICD-10-CM

## 2018-10-19 LAB — HBA1C MFR BLD: 8.5 %

## 2018-10-19 PROCEDURE — 1036F TOBACCO NON-USER: CPT | Performed by: FAMILY MEDICINE

## 2018-10-19 PROCEDURE — 4040F PNEUMOC VAC/ADMIN/RCVD: CPT | Performed by: FAMILY MEDICINE

## 2018-10-19 PROCEDURE — G8420 CALC BMI NORM PARAMETERS: HCPCS | Performed by: FAMILY MEDICINE

## 2018-10-19 PROCEDURE — G8482 FLU IMMUNIZE ORDER/ADMIN: HCPCS | Performed by: FAMILY MEDICINE

## 2018-10-19 PROCEDURE — 3017F COLORECTAL CA SCREEN DOC REV: CPT | Performed by: FAMILY MEDICINE

## 2018-10-19 PROCEDURE — G8427 DOCREV CUR MEDS BY ELIG CLIN: HCPCS | Performed by: FAMILY MEDICINE

## 2018-10-19 PROCEDURE — 1111F DSCHRG MED/CURRENT MED MERGE: CPT | Performed by: FAMILY MEDICINE

## 2018-10-19 PROCEDURE — 3045F PR MOST RECENT HEMOGLOBIN A1C LEVEL 7.0-9.0%: CPT | Performed by: FAMILY MEDICINE

## 2018-10-19 PROCEDURE — 2022F DILAT RTA XM EVC RTNOPTHY: CPT | Performed by: FAMILY MEDICINE

## 2018-10-19 PROCEDURE — G8598 ASA/ANTIPLAT THER USED: HCPCS | Performed by: FAMILY MEDICINE

## 2018-10-19 PROCEDURE — 83036 HEMOGLOBIN GLYCOSYLATED A1C: CPT | Performed by: FAMILY MEDICINE

## 2018-10-19 PROCEDURE — 1123F ACP DISCUSS/DSCN MKR DOCD: CPT | Performed by: FAMILY MEDICINE

## 2018-10-19 PROCEDURE — 1101F PT FALLS ASSESS-DOCD LE1/YR: CPT | Performed by: FAMILY MEDICINE

## 2018-10-19 PROCEDURE — 99214 OFFICE O/P EST MOD 30 MIN: CPT | Performed by: FAMILY MEDICINE

## 2018-10-19 NOTE — PROGRESS NOTES
100 each, Rfl: 3    Magnesium Oxide 500 MG TABS, Take 0.5 tablets by mouth every morning , Disp: , Rfl:     SITagliptin (JANUVIA) 100 MG tablet, Take 1 tablet by mouth daily (Patient taking differently: Take 100 mg by mouth every morning ), Disp: 90 tablet, Rfl: 1    Insulin Pen Needle 31G X 5 MM MISC, 1 each by Does not apply route daily, Disp: 100 each, Rfl: 3    Bromelains 500 MG TABS, Take 1 tablet by mouth every morning , Disp: , Rfl:     Specialty Vitamins Products (COLLAGEN ULTRA PO), Take 2 tablets by mouth every morning , Disp: , Rfl:     glucose monitoring kit (FREESTYLE) monitoring kit, 1 kit by Does not apply route daily, Disp: 1 kit, Rfl: 0    FHX:   Family History   Problem Relation Age of Onset    High Blood Pressure Mother     COPD Father     Diabetes Father     Heart Disease Brother         chf    Seizures Brother      PHX:  reports that he has never smoked. He has never used smokeless tobacco. He reports that he drinks about 1.8 oz of alcohol per week . He reports that he does not use drugs. Review Of Systems:    Constitutional: No fever, chills   HEENT: no sore throat, no running nose. Respiratory: no pleuritic chest pain, negative for shortness of breath   Cardiology: no palpitation, no leg swelling. Gastroenterology: no constipation or diarrhea. No blood in stool. Genitourinary: No dysuria, no frequency. Musculoskeletal:no myalgia   Neurology: no focal weakness in extremities, no slurred speech.    Endocrinology: no temperature intolerance, no polyphagia   Hematology: no bruising, no lymphadenopathy     Physical Exam:  Vitals:    10/19/18 1343   BP: 126/64   Pulse: 65   Resp: 18   SpO2: 95%   Weight: 147 lb (66.7 kg)   Height: 5' 9\" (1.753 m)     General:  Patient alert and oriented x 3, NAD, pleasant  HEENT:  Atraumatic, normocephalic, clear conjunctiva,nose-clear, throat - no erythema  Neck:  Supple, no goiter,   Lungs:  CTA BL  Heart:  RRR,  murmurs, gallops or

## 2018-10-23 ENCOUNTER — CARE COORDINATION (OUTPATIENT)
Dept: CASE MANAGEMENT | Age: 73
End: 2018-10-23

## 2018-11-06 ENCOUNTER — CARE COORDINATION (OUTPATIENT)
Dept: CARE COORDINATION | Age: 73
End: 2018-11-06

## 2018-11-12 DIAGNOSIS — I50.43 ACUTE ON CHRONIC COMBINED SYSTOLIC AND DIASTOLIC CONGESTIVE HEART FAILURE (HCC): ICD-10-CM

## 2018-11-12 DIAGNOSIS — E11.621 DIABETIC ULCER OF OTHER PART OF LEFT FOOT ASSOCIATED WITH TYPE 2 DIABETES MELLITUS, UNSPECIFIED ULCER STAGE (HCC): Chronic | ICD-10-CM

## 2018-11-12 DIAGNOSIS — E09.65 DRUG OR CHEMICAL INDUCED DIABETES MELLITUS WITH HYPERGLYCEMIA, WITH LONG-TERM CURRENT USE OF INSULIN (HCC): ICD-10-CM

## 2018-11-12 DIAGNOSIS — I95.2 HYPOTENSION DUE TO DRUGS: ICD-10-CM

## 2018-11-12 DIAGNOSIS — M79.10 MYALGIA: ICD-10-CM

## 2018-11-12 DIAGNOSIS — L97.529 DIABETIC ULCER OF OTHER PART OF LEFT FOOT ASSOCIATED WITH TYPE 2 DIABETES MELLITUS, UNSPECIFIED ULCER STAGE (HCC): Chronic | ICD-10-CM

## 2018-11-12 DIAGNOSIS — Z79.4 DRUG OR CHEMICAL INDUCED DIABETES MELLITUS WITH HYPERGLYCEMIA, WITH LONG-TERM CURRENT USE OF INSULIN (HCC): ICD-10-CM

## 2018-11-12 RX ORDER — GLIPIZIDE 10 MG/1
10 TABLET ORAL
Qty: 180 TABLET | Refills: 2 | Status: SHIPPED | OUTPATIENT
Start: 2018-11-12 | End: 2019-07-28 | Stop reason: SDUPTHER

## 2018-11-12 RX ORDER — LOSARTAN POTASSIUM 25 MG/1
25 TABLET ORAL 2 TIMES DAILY
Qty: 180 TABLET | Refills: 1 | Status: SHIPPED | OUTPATIENT
Start: 2018-11-12 | End: 2019-02-15 | Stop reason: SDUPTHER

## 2018-11-12 RX ORDER — PRAVASTATIN SODIUM 40 MG
40 TABLET ORAL DAILY
Qty: 90 TABLET | Refills: 3 | Status: SHIPPED | OUTPATIENT
Start: 2018-11-12 | End: 2019-02-15 | Stop reason: SDUPTHER

## 2018-11-12 RX ORDER — GLUCOSAMINE HCL/CHONDROITIN SU 500-400 MG
CAPSULE ORAL
Qty: 100 STRIP | Refills: 11 | Status: SHIPPED | OUTPATIENT
Start: 2018-11-12 | End: 2019-06-26 | Stop reason: SDUPTHER

## 2018-11-12 RX ORDER — SPIRONOLACTONE 25 MG/1
12.5 TABLET ORAL DAILY
Qty: 45 TABLET | Refills: 1 | Status: SHIPPED | OUTPATIENT
Start: 2018-11-12 | End: 2019-05-09 | Stop reason: SDUPTHER

## 2018-11-12 RX ORDER — FUROSEMIDE 20 MG/1
20 TABLET ORAL DAILY
Qty: 90 TABLET | Refills: 1 | Status: SHIPPED | OUTPATIENT
Start: 2018-11-12 | End: 2019-05-09 | Stop reason: SDUPTHER

## 2018-11-12 RX ORDER — METOPROLOL SUCCINATE 25 MG/1
25 TABLET, EXTENDED RELEASE ORAL 2 TIMES DAILY
Qty: 180 TABLET | Refills: 1 | Status: SHIPPED | OUTPATIENT
Start: 2018-11-12 | End: 2019-04-29 | Stop reason: SDUPTHER

## 2018-11-21 ENCOUNTER — CARE COORDINATION (OUTPATIENT)
Dept: CASE MANAGEMENT | Age: 73
End: 2018-11-21

## 2018-11-21 RX ORDER — LOSARTAN POTASSIUM 25 MG/1
TABLET ORAL
Qty: 30 TABLET | Refills: 1 | Status: SHIPPED | OUTPATIENT
Start: 2018-11-21 | End: 2019-04-29 | Stop reason: SDUPTHER

## 2018-12-26 ENCOUNTER — CARE COORDINATION (OUTPATIENT)
Dept: CASE MANAGEMENT | Age: 73
End: 2018-12-26

## 2019-01-14 ENCOUNTER — TELEPHONE (OUTPATIENT)
Dept: FAMILY MEDICINE CLINIC | Age: 74
End: 2019-01-14

## 2019-01-14 ENCOUNTER — OFFICE VISIT (OUTPATIENT)
Dept: CARDIOLOGY CLINIC | Age: 74
End: 2019-01-14
Payer: MEDICARE

## 2019-01-14 VITALS
RESPIRATION RATE: 16 BRPM | HEIGHT: 69 IN | WEIGHT: 150 LBS | DIASTOLIC BLOOD PRESSURE: 70 MMHG | HEART RATE: 67 BPM | BODY MASS INDEX: 22.22 KG/M2 | SYSTOLIC BLOOD PRESSURE: 122 MMHG

## 2019-01-14 DIAGNOSIS — I70.244 ATHEROSCLEROSIS OF NATIVE ARTERY OF LEFT LOWER EXTREMITY WITH ULCERATION OF MIDFOOT (HCC): Primary | Chronic | ICD-10-CM

## 2019-01-14 PROCEDURE — 93000 ELECTROCARDIOGRAM COMPLETE: CPT | Performed by: INTERNAL MEDICINE

## 2019-01-14 PROCEDURE — 99214 OFFICE O/P EST MOD 30 MIN: CPT | Performed by: INTERNAL MEDICINE

## 2019-01-18 ENCOUNTER — CARE COORDINATION (OUTPATIENT)
Dept: CARE COORDINATION | Age: 74
End: 2019-01-18

## 2019-02-15 ENCOUNTER — OFFICE VISIT (OUTPATIENT)
Dept: FAMILY MEDICINE CLINIC | Age: 74
End: 2019-02-15
Payer: MEDICARE

## 2019-02-15 VITALS
SYSTOLIC BLOOD PRESSURE: 120 MMHG | RESPIRATION RATE: 16 BRPM | HEIGHT: 69 IN | BODY MASS INDEX: 22.81 KG/M2 | DIASTOLIC BLOOD PRESSURE: 60 MMHG | WEIGHT: 154 LBS | HEART RATE: 63 BPM

## 2019-02-15 DIAGNOSIS — M79.10 MYALGIA: ICD-10-CM

## 2019-02-15 DIAGNOSIS — E11.51 TYPE 2 DIABETES MELLITUS WITH DIABETIC PERIPHERAL ANGIOPATHY WITHOUT GANGRENE, WITH LONG-TERM CURRENT USE OF INSULIN (HCC): Primary | ICD-10-CM

## 2019-02-15 DIAGNOSIS — I50.43 ACUTE ON CHRONIC COMBINED SYSTOLIC AND DIASTOLIC CONGESTIVE HEART FAILURE (HCC): ICD-10-CM

## 2019-02-15 DIAGNOSIS — E55.9 VITAMIN D INSUFFICIENCY: ICD-10-CM

## 2019-02-15 DIAGNOSIS — Z79.4 TYPE 2 DIABETES MELLITUS WITH DIABETIC PERIPHERAL ANGIOPATHY WITHOUT GANGRENE, WITH LONG-TERM CURRENT USE OF INSULIN (HCC): Primary | ICD-10-CM

## 2019-02-15 LAB — HBA1C MFR BLD: 9.9 %

## 2019-02-15 PROCEDURE — 83036 HEMOGLOBIN GLYCOSYLATED A1C: CPT | Performed by: FAMILY MEDICINE

## 2019-02-15 PROCEDURE — 99214 OFFICE O/P EST MOD 30 MIN: CPT | Performed by: FAMILY MEDICINE

## 2019-02-15 RX ORDER — PRAVASTATIN SODIUM 40 MG
40 TABLET ORAL DAILY
Qty: 90 TABLET | Refills: 3 | Status: SHIPPED
Start: 2019-02-15 | End: 2020-03-11

## 2019-02-15 ASSESSMENT — PATIENT HEALTH QUESTIONNAIRE - PHQ9
2. FEELING DOWN, DEPRESSED OR HOPELESS: 0
SUM OF ALL RESPONSES TO PHQ9 QUESTIONS 1 & 2: 0
SUM OF ALL RESPONSES TO PHQ QUESTIONS 1-9: 0
SUM OF ALL RESPONSES TO PHQ QUESTIONS 1-9: 0
1. LITTLE INTEREST OR PLEASURE IN DOING THINGS: 0

## 2019-03-26 DIAGNOSIS — G47.00 INSOMNIA, UNSPECIFIED TYPE: ICD-10-CM

## 2019-03-26 RX ORDER — TRAZODONE HYDROCHLORIDE 50 MG/1
50 TABLET ORAL NIGHTLY
Qty: 90 TABLET | Refills: 0 | Status: SHIPPED | OUTPATIENT
Start: 2019-03-26 | End: 2019-06-19 | Stop reason: SDUPTHER

## 2019-04-29 RX ORDER — METOPROLOL SUCCINATE 25 MG/1
TABLET, EXTENDED RELEASE ORAL
Qty: 180 TABLET | Refills: 1 | Status: SHIPPED | OUTPATIENT
Start: 2019-04-29 | End: 2019-10-26 | Stop reason: SDUPTHER

## 2019-04-29 RX ORDER — LOSARTAN POTASSIUM 25 MG/1
TABLET ORAL
Qty: 180 TABLET | Refills: 1 | Status: SHIPPED | OUTPATIENT
Start: 2019-04-29 | End: 2019-10-26 | Stop reason: SDUPTHER

## 2019-05-09 DIAGNOSIS — I50.43 ACUTE ON CHRONIC COMBINED SYSTOLIC AND DIASTOLIC CONGESTIVE HEART FAILURE (HCC): ICD-10-CM

## 2019-05-09 DIAGNOSIS — I95.2 HYPOTENSION DUE TO DRUGS: ICD-10-CM

## 2019-05-09 RX ORDER — SPIRONOLACTONE 25 MG/1
TABLET ORAL
Qty: 45 TABLET | Refills: 1 | Status: SHIPPED | OUTPATIENT
Start: 2019-05-09 | End: 2019-11-05 | Stop reason: SDUPTHER

## 2019-05-09 RX ORDER — FUROSEMIDE 20 MG/1
TABLET ORAL
Qty: 90 TABLET | Refills: 1 | Status: SHIPPED | OUTPATIENT
Start: 2019-05-09 | End: 2019-11-05 | Stop reason: SDUPTHER

## 2019-06-14 ENCOUNTER — OFFICE VISIT (OUTPATIENT)
Dept: FAMILY MEDICINE CLINIC | Age: 74
End: 2019-06-14
Payer: MEDICARE

## 2019-06-14 VITALS
BODY MASS INDEX: 22.07 KG/M2 | OXYGEN SATURATION: 95 % | WEIGHT: 149 LBS | DIASTOLIC BLOOD PRESSURE: 65 MMHG | HEIGHT: 69 IN | HEART RATE: 72 BPM | RESPIRATION RATE: 18 BRPM | SYSTOLIC BLOOD PRESSURE: 138 MMHG

## 2019-06-14 DIAGNOSIS — E11.51 TYPE 2 DIABETES MELLITUS WITH DIABETIC PERIPHERAL ANGIOPATHY WITHOUT GANGRENE, WITH LONG-TERM CURRENT USE OF INSULIN (HCC): Primary | ICD-10-CM

## 2019-06-14 DIAGNOSIS — M62.838 MUSCLE SPASM: ICD-10-CM

## 2019-06-14 DIAGNOSIS — Z79.4 TYPE 2 DIABETES MELLITUS WITH DIABETIC PERIPHERAL ANGIOPATHY WITHOUT GANGRENE, WITH LONG-TERM CURRENT USE OF INSULIN (HCC): Primary | ICD-10-CM

## 2019-06-14 LAB — HBA1C MFR BLD: 12.5 %

## 2019-06-14 PROCEDURE — 99214 OFFICE O/P EST MOD 30 MIN: CPT | Performed by: FAMILY MEDICINE

## 2019-06-14 PROCEDURE — 83036 HEMOGLOBIN GLYCOSYLATED A1C: CPT | Performed by: FAMILY MEDICINE

## 2019-06-14 RX ORDER — CYCLOBENZAPRINE HCL 5 MG
5 TABLET ORAL 2 TIMES DAILY PRN
Qty: 30 TABLET | Refills: 0 | Status: SHIPPED | OUTPATIENT
Start: 2019-06-14 | End: 2019-06-24

## 2019-06-14 NOTE — PROGRESS NOTES
Chief Complaint   Patient presents with    Diabetes    Back Pain    Other     will obtain eye and foot appt 6/20; declined tdap, shingles       HPI:  The patient is a 69 y.o. male    Diabetes Mellitus:  A1C is elevated. States he has not been taking his meds as prescribed. He has not been taking metformin, Glipizide and lantus 20 mg at night. Hx of toe amputations and calcaneal/MTH resection. hx of osteomyelitis of L foot. Hemoglobin A1C (%)   Date Value   06/14/2019 12.5     CHF: stable on current meds. Denies any cp, sob, leg edema    Hyperlipidemia:  on Pravastatin    Back pain x 2 weeks, lower back, had mechanical fall few weeks ago. 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 Lia Barrientos Left 2009    2nd and 5th toe    TONSILLECTOMY         ALLERGIES: Pcn [penicillins] and Sulfa antibiotics  Prior to Admission medications    Medication Sig Start Date End Date Taking?  Authorizing Provider   spironolactone (ALDACTONE) 25 MG tablet TAKE ONE-HALF (1/2) TABLET DAILY 5/9/19   Abilio Montiel MD   furosemide (LASIX) 20 MG tablet TAKE 1 TABLET DAILY 5/9/19   Abilio Montiel MD   losartan (COZAAR) 25 MG tablet TAKE 1 TABLET TWICE A DAY 4/29/19   Abilio Montiel MD   metoprolol succinate (TOPROL XL) 25 MG extended release tablet TAKE 1 TABLET TWICE A DAY 4/29/19   Abilio Montiel MD   traZODone (DESYREL) 50 MG tablet Take 1 tablet by mouth nightly 3/26/19   Abilio Montiel MD   SITagliptin (JANUVIA) 100 MG tablet Take 1 tablet by mouth daily 3/18/19   Abilio Montiel MD   pravastatin (PRAVACHOL) 40 MG tablet Take 1 tablet by mouth daily 2/15/19   Abilio Montiel MD   insulin glargine (LANTUS SOLOSTAR) 100 UNIT/ML injection pen 20 units at night 2/15/19   Abilio Montiel MD   NONFORMULARY     Historical Provider, MD   glipiZIDE (GLUCOTROL) 10 MG tablet Take 1 tablet by mouth 2 times daily (before meals) 11/12/18   Abilio Montiel MD   blood glucose monitor strips Four timed daily 11/12/18   Abilio Montiel MD   metFORMIN (GLUCOPHAGE) 1000 MG tablet Take 1 tablet by mouth 2 times daily (with meals) 11/12/18   Abilio Montiel MD   Insulin Pen Needle (PEN NEEDLES 3/16\") 31G X 5 MM MISC 1 each by Does not apply route 2 times daily 3/22/18   Abilio Montiel MD   glucose monitoring kit (FREESTYLE) monitoring kit 1 kit by Does not apply route daily 3/21/18   Abilio Montiel MD   Lancets MISC Four times daily 3/21/18   Abilio Montiel MD   Magnesium Oxide 500 MG TABS Take 0.5 tablets by mouth every morning     Historical Provider, MD   Insulin Pen Needle 31G X 5 MM MISC 1 each by Does not apply route daily 8/29/17   Abilio Montiel MD   Bromelains 500 MG TABS Take 1 tablet by mouth every morning     Historical Provider, MD   Specialty Vitamins Products (COLLAGEN ULTRA PO) Take 2 tablets by mouth every morning Historical Provider, MD       Current Outpatient Medications:     spironolactone (ALDACTONE) 25 MG tablet, TAKE ONE-HALF (1/2) TABLET DAILY, Disp: 45 tablet, Rfl: 1    furosemide (LASIX) 20 MG tablet, TAKE 1 TABLET DAILY, Disp: 90 tablet, Rfl: 1    losartan (COZAAR) 25 MG tablet, TAKE 1 TABLET TWICE A DAY, Disp: 180 tablet, Rfl: 1    metoprolol succinate (TOPROL XL) 25 MG extended release tablet, TAKE 1 TABLET TWICE A DAY, Disp: 180 tablet, Rfl: 1    traZODone (DESYREL) 50 MG tablet, Take 1 tablet by mouth nightly, Disp: 90 tablet, Rfl: 0    SITagliptin (JANUVIA) 100 MG tablet, Take 1 tablet by mouth daily, Disp: 90 tablet, Rfl: 1    pravastatin (PRAVACHOL) 40 MG tablet, Take 1 tablet by mouth daily, Disp: 90 tablet, Rfl: 3    insulin glargine (LANTUS SOLOSTAR) 100 UNIT/ML injection pen, 20 units at night, Disp: 5 pen, Rfl: 1    NONFORMULARY, , Disp: , Rfl:     glipiZIDE (GLUCOTROL) 10 MG tablet, Take 1 tablet by mouth 2 times daily (before meals), Disp: 180 tablet, Rfl: 2    blood glucose monitor strips, Four timed daily, Disp: 100 strip, Rfl: 11    metFORMIN (GLUCOPHAGE) 1000 MG tablet, Take 1 tablet by mouth 2 times daily (with meals), Disp: 180 tablet, Rfl: 3    Insulin Pen Needle (PEN NEEDLES 3/16\") 31G X 5 MM MISC, 1 each by Does not apply route 2 times daily, Disp: 200 each, Rfl: 3    glucose monitoring kit (FREESTYLE) monitoring kit, 1 kit by Does not apply route daily, Disp: 1 kit, Rfl: 0    Lancets MISC, Four times daily, Disp: 100 each, Rfl: 3    Magnesium Oxide 500 MG TABS, Take 0.5 tablets by mouth every morning , Disp: , Rfl:     Insulin Pen Needle 31G X 5 MM MISC, 1 each by Does not apply route daily, Disp: 100 each, Rfl: 3    Bromelains 500 MG TABS, Take 1 tablet by mouth every morning , Disp: , Rfl:     Specialty Vitamins Products (COLLAGEN ULTRA PO), Take 2 tablets by mouth every morning , Disp: , Rfl:     FHX:   Family History   Problem Relation Age of Onset    High Blood prn for pain    Counseled regarding above diagnosis, including possible risks and complications,  especially if left uncontrolled. Counseled regarding the possible side effects, risks, benefits and alternatives to treatment; patient and/or guardian verbalizes understanding, agrees, feels comfortable with and wishes to proceed with above treatment plan. Discussed any signs and symptoms that would warrant immediate ED evaluation    Deena Christina was instructed to call if any new symptoms develop prior to next visit.        Gonzalo Chappell M.D

## 2019-06-18 DIAGNOSIS — E11.621 DIABETIC ULCER OF OTHER PART OF LEFT FOOT ASSOCIATED WITH TYPE 2 DIABETES MELLITUS, UNSPECIFIED ULCER STAGE (HCC): Chronic | ICD-10-CM

## 2019-06-18 DIAGNOSIS — L97.529 DIABETIC ULCER OF OTHER PART OF LEFT FOOT ASSOCIATED WITH TYPE 2 DIABETES MELLITUS, UNSPECIFIED ULCER STAGE (HCC): Chronic | ICD-10-CM

## 2019-06-18 RX ORDER — LANCETS 28 GAUGE
EACH MISCELLANEOUS
Qty: 100 EACH | Refills: 3 | Status: SHIPPED
Start: 2019-06-18 | End: 2020-06-10

## 2019-06-19 DIAGNOSIS — G47.00 INSOMNIA, UNSPECIFIED TYPE: ICD-10-CM

## 2019-06-19 RX ORDER — TRAZODONE HYDROCHLORIDE 50 MG/1
TABLET ORAL
Qty: 90 TABLET | Refills: 0 | Status: SHIPPED | OUTPATIENT
Start: 2019-06-19 | End: 2019-09-11 | Stop reason: SDUPTHER

## 2019-07-19 ENCOUNTER — OFFICE VISIT (OUTPATIENT)
Dept: FAMILY MEDICINE CLINIC | Age: 74
End: 2019-07-19
Payer: MEDICARE

## 2019-07-19 VITALS
BODY MASS INDEX: 21.77 KG/M2 | WEIGHT: 147 LBS | DIASTOLIC BLOOD PRESSURE: 66 MMHG | SYSTOLIC BLOOD PRESSURE: 121 MMHG | RESPIRATION RATE: 14 BRPM | HEART RATE: 69 BPM | HEIGHT: 69 IN

## 2019-07-19 DIAGNOSIS — E11.51 TYPE 2 DIABETES MELLITUS WITH DIABETIC PERIPHERAL ANGIOPATHY WITHOUT GANGRENE, WITH LONG-TERM CURRENT USE OF INSULIN (HCC): Primary | ICD-10-CM

## 2019-07-19 DIAGNOSIS — Z79.4 TYPE 2 DIABETES MELLITUS WITH DIABETIC PERIPHERAL ANGIOPATHY WITHOUT GANGRENE, WITH LONG-TERM CURRENT USE OF INSULIN (HCC): Primary | ICD-10-CM

## 2019-07-19 LAB
CHP ED QC CHECK: NORMAL
GLUCOSE BLD-MCNC: 163 MG/DL

## 2019-07-19 PROCEDURE — 99213 OFFICE O/P EST LOW 20 MIN: CPT | Performed by: FAMILY MEDICINE

## 2019-07-19 PROCEDURE — 82962 GLUCOSE BLOOD TEST: CPT | Performed by: FAMILY MEDICINE

## 2019-07-19 NOTE — PROGRESS NOTES
SUBJECTIVE:        Patient ID: Sanam Gonzales is a 69 y.o. male who presents for   Chief Complaint   Patient presents with    Diabetes     F/u DM. She is now compliant with taking his meds. Tolerating his meds. Blood sugar readings have improved. No visual changes. No significant weight changes. No lightheadedness or syncope. No exertional chest pain or dyspnea. No intermittent claudication. No foot pain or numbness.         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 ([address] 3/29/2017       Patient Active Problem List   Diagnosis    Chronic osteomyelitis of left foot ([address]    Hypertension    Type 2 diabetes mellitus, with long-term current use of insulin ([address]    Left ventricular systolic dysfunction    Mixed hyperlipidemia    RBBB    Coronary artery disease involving native coronary artery of native heart without angina pectoris    Chronic combined systolic and diastolic congestive heart failure ([address]    Atherosclerosis of native artery of left lower extremity with ulceration of midfoot ([facility])    Type 2 diabetes mellitus with diabetic peripheral angiopathy without gangrene, with long-term current use of insulin ([address]       Past Surgical History:   Procedure Laterality Date    CARDIAC SURGERY  2004    double bypass    ENDOSCOPY, COLON, DIAGNOSTIC Bilateral     cataracts    FOOT SURGERY Left     multiple    OTHER SURGICAL HISTORY  03/10/2017    left foot application integra graft, application of wound vac, delayed primax closure    OTHER SURGICAL HISTORY      fracture of left temporal

## 2019-07-26 DIAGNOSIS — Z79.4 DRUG OR CHEMICAL INDUCED DIABETES MELLITUS WITH HYPERGLYCEMIA, WITH LONG-TERM CURRENT USE OF INSULIN (HCC): ICD-10-CM

## 2019-07-26 DIAGNOSIS — E09.65 DRUG OR CHEMICAL INDUCED DIABETES MELLITUS WITH HYPERGLYCEMIA, WITH LONG-TERM CURRENT USE OF INSULIN (HCC): ICD-10-CM

## 2019-07-29 RX ORDER — GLIPIZIDE 10 MG/1
TABLET ORAL
Qty: 180 TABLET | Refills: 2 | Status: SHIPPED
Start: 2019-07-29 | End: 2020-09-14

## 2019-07-30 ENCOUNTER — OFFICE VISIT (OUTPATIENT)
Dept: CARDIOLOGY CLINIC | Age: 74
End: 2019-07-30
Payer: MEDICARE

## 2019-07-30 VITALS
HEART RATE: 69 BPM | HEIGHT: 69 IN | DIASTOLIC BLOOD PRESSURE: 70 MMHG | SYSTOLIC BLOOD PRESSURE: 120 MMHG | BODY MASS INDEX: 22.48 KG/M2 | WEIGHT: 151.8 LBS | RESPIRATION RATE: 16 BRPM

## 2019-07-30 DIAGNOSIS — I70.244 ATHEROSCLEROSIS OF NATIVE ARTERY OF LEFT LOWER EXTREMITY WITH ULCERATION OF MIDFOOT (HCC): Primary | ICD-10-CM

## 2019-07-30 PROCEDURE — 93000 ELECTROCARDIOGRAM COMPLETE: CPT | Performed by: INTERNAL MEDICINE

## 2019-07-30 PROCEDURE — 99214 OFFICE O/P EST MOD 30 MIN: CPT | Performed by: INTERNAL MEDICINE

## 2019-08-30 RX ORDER — SITAGLIPTIN 100 MG/1
TABLET, FILM COATED ORAL
Qty: 90 TABLET | Refills: 4 | Status: SHIPPED
Start: 2019-08-30 | End: 2020-11-20

## 2019-09-10 ENCOUNTER — OFFICE VISIT (OUTPATIENT)
Dept: FAMILY MEDICINE CLINIC | Age: 74
End: 2019-09-10
Payer: MEDICARE

## 2019-09-10 VITALS
HEIGHT: 69 IN | HEART RATE: 70 BPM | SYSTOLIC BLOOD PRESSURE: 132 MMHG | WEIGHT: 154 LBS | RESPIRATION RATE: 18 BRPM | OXYGEN SATURATION: 96 % | BODY MASS INDEX: 22.81 KG/M2 | DIASTOLIC BLOOD PRESSURE: 62 MMHG

## 2019-09-10 DIAGNOSIS — E11.51 TYPE 2 DIABETES MELLITUS WITH DIABETIC PERIPHERAL ANGIOPATHY WITHOUT GANGRENE, WITH LONG-TERM CURRENT USE OF INSULIN (HCC): Primary | ICD-10-CM

## 2019-09-10 DIAGNOSIS — E11.628 TYPE 2 DIABETES MELLITUS WITH PRESSURE CALLUS (HCC): ICD-10-CM

## 2019-09-10 DIAGNOSIS — L98.9 SKIN LESION: ICD-10-CM

## 2019-09-10 DIAGNOSIS — L84 TYPE 2 DIABETES MELLITUS WITH PRESSURE CALLUS (HCC): ICD-10-CM

## 2019-09-10 DIAGNOSIS — Z79.4 TYPE 2 DIABETES MELLITUS WITH DIABETIC PERIPHERAL ANGIOPATHY WITHOUT GANGRENE, WITH LONG-TERM CURRENT USE OF INSULIN (HCC): Primary | ICD-10-CM

## 2019-09-10 LAB — HBA1C MFR BLD: 8.3 %

## 2019-09-10 PROCEDURE — 83036 HEMOGLOBIN GLYCOSYLATED A1C: CPT | Performed by: FAMILY MEDICINE

## 2019-09-10 PROCEDURE — 99213 OFFICE O/P EST LOW 20 MIN: CPT | Performed by: FAMILY MEDICINE

## 2019-09-11 DIAGNOSIS — G47.00 INSOMNIA, UNSPECIFIED TYPE: ICD-10-CM

## 2019-09-11 NOTE — PROGRESS NOTES
JANUVIA 100 MG tablet TAKE 1 TABLET DAILY 90 tablet 4    aspirin 81 MG tablet Take 81 mg by mouth daily      glipiZIDE (GLUCOTROL) 10 MG tablet TAKE 1 TABLET TWICE A DAY BEFORE MEALS 180 tablet 2    blood glucose test strips (FREESTYLE TEST STRIPS) strip 1 each by In Vitro route daily Dx:E11.9 100 each 0    Insulin Pen Needle 31G X 5 MM MISC 1 each by Does not apply route daily 100 each 3    traZODone (DESYREL) 50 MG tablet TAKE 1 TABLET BY MOUTH EVERY DAY AT NIGHT 90 tablet 0    FREESTYLE LANCETS MISC FOUR TIMES DAILY 100 each 3    spironolactone (ALDACTONE) 25 MG tablet TAKE ONE-HALF (1/2) TABLET DAILY 45 tablet 1    furosemide (LASIX) 20 MG tablet TAKE 1 TABLET DAILY 90 tablet 1    losartan (COZAAR) 25 MG tablet TAKE 1 TABLET TWICE A  tablet 1    metoprolol succinate (TOPROL XL) 25 MG extended release tablet TAKE 1 TABLET TWICE A  tablet 1    pravastatin (PRAVACHOL) 40 MG tablet Take 1 tablet by mouth daily 90 tablet 3    insulin glargine (LANTUS SOLOSTAR) 100 UNIT/ML injection pen 20 units at night 5 pen 1    NONFORMULARY       metFORMIN (GLUCOPHAGE) 1000 MG tablet Take 1 tablet by mouth 2 times daily (with meals) 180 tablet 3    Insulin Pen Needle (PEN NEEDLES 3/16\") 31G X 5 MM MISC 1 each by Does not apply route 2 times daily 200 each 3    glucose monitoring kit (FREESTYLE) monitoring kit 1 kit by Does not apply route daily 1 kit 0    Magnesium Oxide 500 MG TABS Take 0.5 tablets by mouth every morning       Bromelains 500 MG TABS Take 1 tablet by mouth every morning       Specialty Vitamins Products (COLLAGEN ULTRA PO) Take 2 tablets by mouth every morning       metFORMIN (GLUCOPHAGE) 1000 MG tablet TAKE 1 TABLET BY MOUTH TWICE A DAY WITH FOOD 180 tablet 3     No current facility-administered medications on file prior to visit.         Past medical, surgical, family and social history were reviewed and updated if stated    OBJECTIVE:     VS: /62   Pulse 70   Resp 18 Ht 5' 9\" (1.753 m)   Wt 154 lb (69.9 kg)   SpO2 96%   BMI 22.74 kg/m²   Wt Readings from Last 3 Encounters:   09/10/19 154 lb (69.9 kg)   07/30/19 151 lb 12.8 oz (68.9 kg)   07/19/19 147 lb (66.7 kg)     Temp Readings from Last 3 Encounters:   10/03/18 97.8 °F (36.6 °C) (Oral)   02/26/18 98 °F (36.7 °C) (Oral)   12/06/17 98 °F (36.7 °C)     BP Readings from Last 3 Encounters:   09/10/19 132/62   07/30/19 120/70   07/19/19 121/66        General assesment: Alert, Awake, Oriented times 3, no distress  Chest - clear to auscultation, no wheezes, rales or rhonchi, symmetric air entry  Heart - RRR, S1, S2, negative murmurs  Abdomen - soft, nontender, nondistended, no masses or organomegaly  Left forearm: 1x2 cm round, erythematous scaly leison on l forearm  Neurological - alert, oriented, normal speech, no focal findings or movement disorder noted  Extremities -  no pedal edema, no clubbing or cyanosis, callus on plantar aspect of right foot with ark discoloration in he center. ASSESSMENT / PLAN :     Gregory Alains was seen today for referral - general, diabetes and other. Diagnoses and all orders for this visit:    Type 2 diabetes mellitus with diabetic peripheral angiopathy without gangrene, with long-term current use of insulin ([facility])  -     POCT glycosylated hemoglobin (Hb A1C)    Skin lesion  -     Ina Chicas MD, Dermatology, [place] ([facility])    Type 2 diabetes mellitus with pressure callus ([facility] [address]  -     Kirk Cordero MD, Orthopaedics, [facility] ([facility])        Counseled regarding above diagnosis, including possible risks and complications,  especially if left uncontrolled. Counseled regarding the possible side effects, risks, benefits and alternatives to treatment; patient and/or guardian verbalizes understanding, agrees, feels comfortable with and wishes to proceed with above treatment plan.     Discussed any signs and symptoms that would warrant immediate ED evaluation    Sudhakar was instructed to call if

## 2019-09-12 RX ORDER — TRAZODONE HYDROCHLORIDE 50 MG/1
TABLET ORAL
Qty: 90 TABLET | Refills: 0 | Status: SHIPPED | OUTPATIENT
Start: 2019-09-12 | End: 2020-01-07 | Stop reason: SDUPTHER

## 2019-09-30 RX ORDER — BLOOD SUGAR DIAGNOSTIC
STRIP MISCELLANEOUS
Qty: 100 STRIP | Refills: 0 | Status: SHIPPED
Start: 2019-09-30 | End: 2020-06-10

## 2019-10-07 ENCOUNTER — OFFICE VISIT (OUTPATIENT)
Dept: VASCULAR SURGERY | Age: 74
End: 2019-10-07
Payer: MEDICARE

## 2019-10-07 ENCOUNTER — TELEPHONE (OUTPATIENT)
Dept: VASCULAR SURGERY | Age: 74
End: 2019-10-07

## 2019-10-07 VITALS — DIASTOLIC BLOOD PRESSURE: 82 MMHG | SYSTOLIC BLOOD PRESSURE: 138 MMHG

## 2019-10-07 DIAGNOSIS — L89.899 PRESSURE INJURY OF SKIN OF RIGHT FOOT, UNSPECIFIED INJURY STAGE: ICD-10-CM

## 2019-10-07 DIAGNOSIS — I73.9 PVD (PERIPHERAL VASCULAR DISEASE) (HCC): ICD-10-CM

## 2019-10-07 DIAGNOSIS — I70.234 ATHEROSCLEROSIS OF NATIVE ARTERY OF RIGHT LOWER EXTREMITY WITH ULCERATION OF MIDFOOT (HCC): Primary | ICD-10-CM

## 2019-10-07 DIAGNOSIS — E11.621 DIABETIC ULCER OF RIGHT MIDFOOT ASSOCIATED WITH TYPE 2 DIABETES MELLITUS, LIMITED TO BREAKDOWN OF SKIN (HCC): ICD-10-CM

## 2019-10-07 DIAGNOSIS — L97.411 DIABETIC ULCER OF RIGHT MIDFOOT ASSOCIATED WITH TYPE 2 DIABETES MELLITUS, LIMITED TO BREAKDOWN OF SKIN (HCC): ICD-10-CM

## 2019-10-07 PROCEDURE — 99214 OFFICE O/P EST MOD 30 MIN: CPT | Performed by: SURGERY

## 2019-10-17 ENCOUNTER — HOSPITAL ENCOUNTER (OUTPATIENT)
Dept: INTERVENTIONAL RADIOLOGY/VASCULAR | Age: 74
Discharge: HOME | End: 2019-10-19
Payer: MEDICARE

## 2019-10-17 DIAGNOSIS — I73.9 PVD (PERIPHERAL VASCULAR DISEASE) (HCC): ICD-10-CM

## 2019-10-17 PROCEDURE — 93923 UPR/LXTR ART STDY 3+ LVLS: CPT

## 2019-10-21 ENCOUNTER — TELEPHONE (OUTPATIENT)
Dept: VASCULAR SURGERY | Age: 74
End: 2019-10-21

## 2019-10-24 ENCOUNTER — TELEPHONE (OUTPATIENT)
Dept: FAMILY MEDICINE CLINIC | Age: 74
End: 2019-10-24

## 2019-10-25 ENCOUNTER — TELEPHONE (OUTPATIENT)
Dept: CARDIOLOGY CLINIC | Age: 74
End: 2019-10-25

## 2019-10-25 ENCOUNTER — TELEPHONE (OUTPATIENT)
Dept: FAMILY MEDICINE CLINIC | Age: 74
End: 2019-10-25

## 2019-10-28 RX ORDER — LOSARTAN POTASSIUM 25 MG/1
TABLET ORAL
Qty: 180 TABLET | Refills: 4 | Status: SHIPPED
Start: 2019-10-28 | End: 2022-06-23

## 2019-10-28 RX ORDER — METOPROLOL SUCCINATE 25 MG/1
TABLET, EXTENDED RELEASE ORAL
Qty: 180 TABLET | Refills: 4 | Status: SHIPPED
Start: 2019-10-28 | End: 2022-06-23 | Stop reason: SDUPTHER

## 2019-11-05 DIAGNOSIS — I50.43 ACUTE ON CHRONIC COMBINED SYSTOLIC AND DIASTOLIC CONGESTIVE HEART FAILURE (HCC): ICD-10-CM

## 2019-11-05 DIAGNOSIS — I95.2 HYPOTENSION DUE TO DRUGS: ICD-10-CM

## 2019-11-05 RX ORDER — FUROSEMIDE 20 MG/1
TABLET ORAL
Qty: 90 TABLET | Refills: 4 | Status: SHIPPED
Start: 2019-11-05 | End: 2021-06-04 | Stop reason: ALTCHOICE

## 2019-11-05 RX ORDER — SPIRONOLACTONE 25 MG/1
TABLET ORAL
Qty: 45 TABLET | Refills: 4 | Status: SHIPPED
Start: 2019-11-05 | End: 2021-01-25 | Stop reason: SDUPTHER

## 2020-01-07 RX ORDER — TRAZODONE HYDROCHLORIDE 50 MG/1
TABLET ORAL
Qty: 90 TABLET | Refills: 0 | Status: SHIPPED
Start: 2020-01-07 | End: 2020-05-08

## 2020-02-04 ENCOUNTER — NURSE ONLY (OUTPATIENT)
Dept: FAMILY MEDICINE CLINIC | Age: 75
End: 2020-02-04
Payer: MEDICARE

## 2020-02-04 ENCOUNTER — HOSPITAL ENCOUNTER (OUTPATIENT)
Age: 75
Discharge: HOME | End: 2020-02-06
Payer: MEDICARE

## 2020-02-04 LAB
ALBUMIN SERPL-MCNC: 3.9 G/DL (ref 3.5–5.2)
ALP BLD-CCNC: 66 U/L (ref 40–129)
ALT SERPL-CCNC: 7 U/L (ref 0–40)
ANION GAP SERPL CALCULATED.3IONS-SCNC: 17 MMOL/L (ref 7–16)
AST SERPL-CCNC: 22 U/L (ref 0–39)
BILIRUB SERPL-MCNC: 0.6 MG/DL (ref 0–1.2)
BUN BLDV-MCNC: 28 MG/DL (ref 8–23)
CALCIUM SERPL-MCNC: 10 MG/DL (ref 8.6–10.2)
CHLORIDE BLD-SCNC: 103 MMOL/L (ref 98–107)
CHOLESTEROL, TOTAL: 144 MG/DL (ref 0–199)
CO2: 21 MMOL/L (ref 22–29)
CREAT SERPL-MCNC: 1.3 MG/DL (ref 0.7–1.2)
CREATININE URINE: 173 MG/DL (ref 40–278)
GFR AFRICAN AMERICAN: >60
GFR NON-AFRICAN AMERICAN: 54 ML/MIN/1.73
GLUCOSE BLD-MCNC: 84 MG/DL (ref 74–99)
HBA1C MFR BLD: 8.3 % (ref 4–5.6)
HCT VFR BLD CALC: 41.4 % (ref 37–54)
HDLC SERPL-MCNC: 43 MG/DL
HEMOGLOBIN: 12.7 G/DL (ref 12.5–16.5)
LDL CHOLESTEROL CALCULATED: 80 MG/DL (ref 0–99)
MCH RBC QN AUTO: 29.1 PG (ref 26–35)
MCHC RBC AUTO-ENTMCNC: 30.7 % (ref 32–34.5)
MCV RBC AUTO: 94.7 FL (ref 80–99.9)
MICROALBUMIN UR-MCNC: 102 MG/L
MICROALBUMIN/CREAT UR-RTO: 59 (ref 0–30)
PDW BLD-RTO: 13.8 FL (ref 11.5–15)
PLATELET # BLD: 206 E9/L (ref 130–450)
PMV BLD AUTO: 11.2 FL (ref 7–12)
POTASSIUM SERPL-SCNC: 4.6 MMOL/L (ref 3.5–5)
RBC # BLD: 4.37 E12/L (ref 3.8–5.8)
SODIUM BLD-SCNC: 141 MMOL/L (ref 132–146)
TOTAL PROTEIN: 7.5 G/DL (ref 6.4–8.3)
TRIGL SERPL-MCNC: 104 MG/DL (ref 0–149)
TSH SERPL DL<=0.05 MIU/L-ACNC: 3.21 UIU/ML (ref 0.27–4.2)
VLDLC SERPL CALC-MCNC: 21 MG/DL
WBC # BLD: 9 E9/L (ref 4.5–11.5)

## 2020-02-04 PROCEDURE — 36415 COLL VENOUS BLD VENIPUNCTURE: CPT | Performed by: FAMILY MEDICINE

## 2020-02-04 PROCEDURE — 80061 LIPID PANEL: CPT

## 2020-02-04 PROCEDURE — 85027 COMPLETE CBC AUTOMATED: CPT

## 2020-02-04 PROCEDURE — 82044 UR ALBUMIN SEMIQUANTITATIVE: CPT

## 2020-02-04 PROCEDURE — 83036 HEMOGLOBIN GLYCOSYLATED A1C: CPT

## 2020-02-04 PROCEDURE — 84443 ASSAY THYROID STIM HORMONE: CPT

## 2020-02-04 PROCEDURE — 80053 COMPREHEN METABOLIC PANEL: CPT

## 2020-02-04 PROCEDURE — 82570 ASSAY OF URINE CREATININE: CPT

## 2020-03-10 ENCOUNTER — OFFICE VISIT (OUTPATIENT)
Dept: FAMILY MEDICINE CLINIC | Age: 75
End: 2020-03-10
Payer: MEDICARE

## 2020-03-10 VITALS
DIASTOLIC BLOOD PRESSURE: 62 MMHG | OXYGEN SATURATION: 95 % | BODY MASS INDEX: 23.55 KG/M2 | HEART RATE: 68 BPM | HEIGHT: 69 IN | SYSTOLIC BLOOD PRESSURE: 126 MMHG | WEIGHT: 159 LBS | RESPIRATION RATE: 18 BRPM

## 2020-03-10 PROBLEM — L97.411 DIABETIC ULCER OF RIGHT MIDFOOT ASSOCIATED WITH TYPE 2 DIABETES MELLITUS, LIMITED TO BREAKDOWN OF SKIN (HCC): Status: RESOLVED | Noted: 2019-10-07 | Resolved: 2020-03-10

## 2020-03-10 PROBLEM — Z79.4 TYPE 2 DIABETES MELLITUS WITH DIABETIC PERIPHERAL ANGIOPATHY WITHOUT GANGRENE, WITH LONG-TERM CURRENT USE OF INSULIN (HCC): Status: RESOLVED | Noted: 2018-10-19 | Resolved: 2020-03-10

## 2020-03-10 PROBLEM — I70.244 ATHEROSCLEROSIS OF NATIVE ARTERY OF LEFT LOWER EXTREMITY WITH ULCERATION OF MIDFOOT (HCC): Status: RESOLVED | Noted: 2018-10-19 | Resolved: 2020-03-10

## 2020-03-10 PROBLEM — I50.22 CHRONIC SYSTOLIC HEART FAILURE (HCC): Status: ACTIVE | Noted: 2020-03-10

## 2020-03-10 PROBLEM — E11.51 TYPE 2 DIABETES MELLITUS WITH DIABETIC PERIPHERAL ANGIOPATHY WITHOUT GANGRENE, WITH LONG-TERM CURRENT USE OF INSULIN (HCC): Status: RESOLVED | Noted: 2018-10-19 | Resolved: 2020-03-10

## 2020-03-10 PROBLEM — E11.621 DIABETIC ULCER OF RIGHT MIDFOOT ASSOCIATED WITH TYPE 2 DIABETES MELLITUS, LIMITED TO BREAKDOWN OF SKIN (HCC): Status: RESOLVED | Noted: 2019-10-07 | Resolved: 2020-03-10

## 2020-03-10 PROCEDURE — 3052F HG A1C>EQUAL 8.0%<EQUAL 9.0%: CPT | Performed by: FAMILY MEDICINE

## 2020-03-10 PROCEDURE — 99214 OFFICE O/P EST MOD 30 MIN: CPT | Performed by: FAMILY MEDICINE

## 2020-03-10 RX ORDER — INSULIN GLARGINE 100 [IU]/ML
15 INJECTION, SOLUTION SUBCUTANEOUS NIGHTLY
Qty: 5 PEN | Refills: 1 | Status: SHIPPED
Start: 2020-03-10 | End: 2020-03-18 | Stop reason: DRUGHIGH

## 2020-03-10 ASSESSMENT — PATIENT HEALTH QUESTIONNAIRE - PHQ9
2. FEELING DOWN, DEPRESSED OR HOPELESS: 0
SUM OF ALL RESPONSES TO PHQ QUESTIONS 1-9: 0
SUM OF ALL RESPONSES TO PHQ QUESTIONS 1-9: 0
1. LITTLE INTEREST OR PLEASURE IN DOING THINGS: 0
SUM OF ALL RESPONSES TO PHQ9 QUESTIONS 1 & 2: 0

## 2020-03-10 NOTE — PROGRESS NOTES
SUBJECTIVE:        Patient ID: Raffi Pedraza is a 69 y.o. male who presents for   Chief Complaint   Patient presents with    Diabetes     FBS 67    Other     will obtain foot, eye report; due for tdap, AWV; FIT test given    Results     F/u DM. A1c improved from 12.5  to 8. 3. He is compliant with taking his meds. Tolerating his meds. Blood sugar readings have improved. No visual changes. No significant weight changes. No lightheadedness or syncope. No exertional chest pain or dyspnea. No intermittent claudication  Hx of diabetic foot ulcer. Is on ARB, statin and Aspirin. HFrEF: on GDMT- BB, ARB, Spironolactone and lasix, asymptomatic at this time.       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 ([address] 3/29/2017       Patient Active Problem List   Diagnosis    Chronic osteomyelitis of left foot ([address]    Hypertension    Left ventricular systolic dysfunction    Mixed hyperlipidemia    RBBB    Coronary artery disease involving native coronary artery of native heart without angina pectoris    PVD (peripheral vascular disease) ([address]    Pressure injury of skin of right foot       Past Surgical History:   Procedure Laterality Date    CARDIAC SURGERY  2004    double bypass    ENDOSCOPY, COLON, DIAGNOSTIC Bilateral     cataracts    FOOT SURGERY Left     multiple    OTHER SURGICAL HISTORY  03/10/2017    left foot application integra graft, application of wound vac, delayed primax closure    OTHER SURGICAL HISTORY      fracture of left temporal part of skull    OTHER SURGICAL HISTORY Left 06/16/2017    delayed closure with bone debridement and biopsy, application of hemovac with Dr. Elpidio Engel Left 05/2017    SFA, pop atherectomy, Dr. Breana Bae Left 2009    2nd and 5th toe    TONSILLECTOMY         Family History   Problem Relation Age of Onset    High Blood Pressure Mother     COPD Father     Diabetes Father     Heart Disease Brother         chf    Seizures Brother        Social History     Socioeconomic History    Marital status:      Spouse name: None    Number of children: None    Years of education: None    Highest education level: None   Occupational History    None   Social Needs    Financial resource strain: None    Food insecurity     Worry: None     Inability: None    Transportation needs     Medical: None     Non-medical: None   Tobacco Use    Smoking status: Never Smoker    Smokeless tobacco: Never Used   Substance and Sexual Activity    Alcohol use:  Yes     Alcohol/week: 3.0 standard drinks     Types: 2 Shots of liquor, 1 Standard drinks or equivalent per week    Drug use: No    Sexual activity: Not Currently     Partners: Female   Lifestyle    Physical activity     Days per week: None     Minutes per session: None    Stress: None   Relationships    Social connections     Talks on phone: None     Gets together: None     Attends Yarsani service: None     Active member of club or organization: None     Attends meetings of clubs or organizations: None     Relationship status: None    Intimate partner violence     Fear of current or ex partner: None     Emotionally abused: None     Physically abused: None     Forced sexual activity: None   Other Topics Concern    None   Social History Narrative    None       Allergies   Allergen Reactions    Pcn [Penicillins] Hives    Sulfa Antibiotics Hives       Current Outpatient Medications on File Prior to Visit Resp 18   Ht 5' 9\" (1.753 m)   Wt 159 lb (72.1 kg)   SpO2 95%   BMI 23.48 kg/m²   Wt Readings from Last 3 Encounters:   03/10/20 159 lb (72.1 kg)   09/10/19 154 lb (69.9 kg)   07/30/19 151 lb 12.8 oz (68.9 kg)     Temp Readings from Last 3 Encounters:   10/03/18 97.8 °F (36.6 °C) (Oral)   02/26/18 98 °F (36.7 °C) (Oral)   12/06/17 98 °F (36.7 °C)     BP Readings from Last 3 Encounters:   03/10/20 126/62   10/07/19 138/82   09/10/19 132/62        General assesment: Alert, Awake, Oriented times 3, no distress  HEENT: Nc/AT  Chest - clear to auscultation, no wheezes, rales or rhonchi, symmetric air entry  Heart - RRR, S1, S2, negative murmurs  Abdomen - soft, nontender, nondistended, no masses or organomegaly  Neurological - alert, oriented, normal speech, no focal findings or movement disorder noted  Skin: warm,dry    ASSESSMENT / PLAN :     Adrian Huston was seen today for diabetes, other and results. Diagnoses and all orders for this visit:    Type 2 diabetes mellitus with diabetic peripheral angiopathy without gangrene, with long-term current use of insulin (HCC)  -     insulin glargine (LANTUS SOLOSTAR) 100 UNIT/ML injection pen; Inject 15 Units into the skin nightly 20 units at night    Chronic systolic heart failure ([address]  -  On GDMT    Essential hypertension  -  Controlled. Monitor BP    Mixed hyperlipidemia  -  On statin      Counseled regarding above diagnosis, including possible risks and complications,  especially if left uncontrolled. Counseled regarding the possible side effects, risks, benefits and alternatives to treatment; patient and/or guardian verbalizes understanding, agrees, feels comfortable with and wishes to proceed with above treatment plan. Discussed any signs and symptoms that would warrant immediate ED evaluation    Adrianvargas Huston was instructed to call if any new symptoms develop prior to next visit.          F/U as instructed    Kimmy Palomino M.D

## 2020-03-11 RX ORDER — ROSUVASTATIN CALCIUM 20 MG/1
20 TABLET, COATED ORAL DAILY
Qty: 30 TABLET | Refills: 2 | Status: SHIPPED
Start: 2020-03-11 | End: 2020-04-06

## 2020-03-17 ENCOUNTER — TELEPHONE (OUTPATIENT)
Dept: FAMILY MEDICINE CLINIC | Age: 75
End: 2020-03-17

## 2020-03-18 LAB
CONTROL: NORMAL
HEMOCCULT STL QL: NORMAL

## 2020-03-18 RX ORDER — INSULIN GLARGINE 100 [IU]/ML
12 INJECTION, SOLUTION SUBCUTANEOUS NIGHTLY
Qty: 5 PEN | Refills: 1 | Status: SHIPPED
Start: 2020-03-18 | End: 2020-04-02

## 2020-03-19 ENCOUNTER — TELEPHONE (OUTPATIENT)
Dept: FAMILY MEDICINE CLINIC | Age: 75
End: 2020-03-19
Payer: MEDICARE

## 2020-03-19 PROCEDURE — 82274 ASSAY TEST FOR BLOOD FECAL: CPT | Performed by: FAMILY MEDICINE

## 2020-03-24 ENCOUNTER — TELEPHONE (OUTPATIENT)
Dept: FAMILY MEDICINE CLINIC | Age: 75
End: 2020-03-24

## 2020-04-02 RX ORDER — INSULIN GLARGINE 100 [IU]/ML
INJECTION, SOLUTION SUBCUTANEOUS
Qty: 5 PEN | Refills: 1 | Status: SHIPPED
Start: 2020-04-02 | End: 2020-06-15 | Stop reason: SDUPTHER

## 2020-04-06 RX ORDER — ROSUVASTATIN CALCIUM 20 MG/1
TABLET, COATED ORAL
Qty: 30 TABLET | Refills: 2 | Status: SHIPPED
Start: 2020-04-06 | End: 2020-04-10 | Stop reason: SDUPTHER

## 2020-04-10 RX ORDER — ROSUVASTATIN CALCIUM 20 MG/1
TABLET, COATED ORAL
Qty: 90 TABLET | Refills: 1 | Status: SHIPPED
Start: 2020-04-10 | End: 2020-10-22

## 2020-05-08 RX ORDER — TRAZODONE HYDROCHLORIDE 50 MG/1
TABLET ORAL
Qty: 90 TABLET | Refills: 0 | Status: SHIPPED
Start: 2020-05-08 | End: 2020-07-30

## 2020-05-20 NOTE — TELEPHONE ENCOUNTER
Requested Prescriptions     Pending Prescriptions Disp Refills    Insulin Pen Needle (PEN NEEDLES 3/16\") 31G X 5 MM MISC 100 each 3     Si each by Does not apply route 2 times daily

## 2020-05-21 RX ORDER — PEN NEEDLE, DIABETIC 30 GX5/16"
1 NEEDLE, DISPOSABLE MISCELLANEOUS 2 TIMES DAILY
Qty: 100 EACH | Refills: 3 | Status: SHIPPED
Start: 2020-05-21 | End: 2020-06-10

## 2020-06-10 ENCOUNTER — OFFICE VISIT (OUTPATIENT)
Dept: CARDIOLOGY CLINIC | Age: 75
End: 2020-06-10
Payer: MEDICARE

## 2020-06-10 VITALS
WEIGHT: 151.4 LBS | SYSTOLIC BLOOD PRESSURE: 110 MMHG | DIASTOLIC BLOOD PRESSURE: 64 MMHG | HEIGHT: 69 IN | BODY MASS INDEX: 22.42 KG/M2 | HEART RATE: 66 BPM | TEMPERATURE: 97.4 F | RESPIRATION RATE: 18 BRPM

## 2020-06-10 PROCEDURE — 93000 ELECTROCARDIOGRAM COMPLETE: CPT | Performed by: INTERNAL MEDICINE

## 2020-06-10 PROCEDURE — 99214 OFFICE O/P EST MOD 30 MIN: CPT | Performed by: INTERNAL MEDICINE

## 2020-06-10 NOTE — PROGRESS NOTES
Specialty Vitamins Products (COLLAGEN ULTRA PO) Take 2 tablets by mouth every morning        Current Facility-Administered Medications   Medication Dose Route Frequency Provider Last Rate Last Dose    perflutren lipid microspheres (DEFINITY) injection 1.65 mg  1.5 mL Intravenous ONCE PRN Rosa M Carrington MD            Allergies   Allergen Reactions    Pcn [Penicillins] Hives    Sulfa Antibiotics Hives       Vitals:    06/10/20 0926   BP: 110/64   Pulse: 66   Resp: 18   Temp: 97.4 °F (36.3 °C)   Weight: 151 lb 6.4 oz (68.7 kg)   Height: 5' 9\" (1.753 m)       Social History     Socioeconomic History    Marital status:      Spouse name: Not on file    Number of children: Not on file    Years of education: Not on file    Highest education level: Not on file   Occupational History    Not on file   Social Needs    Financial resource strain: Not on file    Food insecurity     Worry: Not on file     Inability: Not on file    Transportation needs     Medical: Not on file     Non-medical: Not on file   Tobacco Use    Smoking status: Never Smoker    Smokeless tobacco: Never Used   Substance and Sexual Activity    Alcohol use:  Yes     Alcohol/week: 3.0 standard drinks     Types: 2 Shots of liquor, 1 Standard drinks or equivalent per week    Drug use: No    Sexual activity: Not Currently     Partners: Female   Lifestyle    Physical activity     Days per week: Not on file     Minutes per session: Not on file    Stress: Not on file   Relationships    Social connections     Talks on phone: Not on file     Gets together: Not on file     Attends [employer] service: Not on file     Active member of club or organization: Not on file     Attends meetings of clubs or organizations: Not on file     Relationship status: Not on file    Intimate partner violence     Fear of current or ex partner: Not on file     Emotionally abused: Not on file     Physically abused: Not on file     Forced sexual activity: Not on file normal and breath sounds normal. No respiratory distress. No wheezes. No rales. No tenderness. Abdominal: Soft. Bowel sounds are normal. No distension and no mass. No tenderness. No rebound and no guarding. Musculoskeletal: Normal range of motion. No edema and no tenderness. Neurological: Alert and oriented to person, place, and time. Skin: Skin is warm and dry. No rash noted. Not diaphoretic. No erythema. Psychiatric: Normal mood and affect. Behavior is normal.     EKG:  normal sinus rhythm, RBBB, infero-lateral MI, no change    ASSESSMENT AND PLAN:  Patient Active Problem List   Diagnosis        Atherosclerosis of native artery of left lower extremity    Hypertension    Type 2 diabetes mellitus,     ICM:  On GDMT  . Stage C. NYHA I . Will go to qod loop diuretic. Will assess EF with echo, consider dropping aldactone if EF > 40%    Mixed hyperlipidemia: on  high intensity statin Target LDL < 70      RBBB: no symtosm of high grade AVB    Coronary artery disease :  3 vessel ACB 2004, likely with graft occlusion. No angina.             Francesca Tang M.D  [id number] [facility]

## 2020-06-15 ENCOUNTER — OFFICE VISIT (OUTPATIENT)
Dept: FAMILY MEDICINE CLINIC | Age: 75
End: 2020-06-15
Payer: MEDICARE

## 2020-06-15 ENCOUNTER — HOSPITAL ENCOUNTER (OUTPATIENT)
Age: 75
Discharge: HOME | End: 2020-06-17
Payer: MEDICARE

## 2020-06-15 ENCOUNTER — HOSPITAL ENCOUNTER (OUTPATIENT)
Dept: GENERAL RADIOLOGY | Age: 75
Discharge: HOME | End: 2020-06-17
Payer: MEDICARE

## 2020-06-15 VITALS
OXYGEN SATURATION: 94 % | HEIGHT: 69 IN | WEIGHT: 152 LBS | TEMPERATURE: 97.6 F | HEART RATE: 67 BPM | RESPIRATION RATE: 18 BRPM | BODY MASS INDEX: 22.51 KG/M2 | DIASTOLIC BLOOD PRESSURE: 68 MMHG | SYSTOLIC BLOOD PRESSURE: 139 MMHG

## 2020-06-15 LAB — HBA1C MFR BLD: 8.9 %

## 2020-06-15 PROCEDURE — 3052F HG A1C>EQUAL 8.0%<EQUAL 9.0%: CPT | Performed by: FAMILY MEDICINE

## 2020-06-15 PROCEDURE — 83036 HEMOGLOBIN GLYCOSYLATED A1C: CPT | Performed by: FAMILY MEDICINE

## 2020-06-15 PROCEDURE — 99214 OFFICE O/P EST MOD 30 MIN: CPT | Performed by: FAMILY MEDICINE

## 2020-06-15 PROCEDURE — 72220 X-RAY EXAM SACRUM TAILBONE: CPT

## 2020-06-15 RX ORDER — INSULIN GLARGINE 100 [IU]/ML
20 INJECTION, SOLUTION SUBCUTANEOUS NIGHTLY
Qty: 5 PEN | Refills: 5 | Status: SHIPPED
Start: 2020-06-15 | End: 2021-02-08 | Stop reason: DRUGHIGH

## 2020-07-30 RX ORDER — TRAZODONE HYDROCHLORIDE 50 MG/1
TABLET ORAL
Qty: 90 TABLET | Refills: 0 | Status: SHIPPED
Start: 2020-07-30 | End: 2020-11-10

## 2020-08-24 ENCOUNTER — TELEPHONE (OUTPATIENT)
Dept: CARDIOLOGY | Age: 75
End: 2020-08-24

## 2020-09-08 ENCOUNTER — TELEPHONE (OUTPATIENT)
Dept: CARDIOLOGY | Age: 75
End: 2020-09-08

## 2020-09-09 ENCOUNTER — HOSPITAL ENCOUNTER (OUTPATIENT)
Dept: CARDIOLOGY | Age: 75
Discharge: HOME | End: 2020-09-09
Payer: MEDICARE

## 2020-09-09 ENCOUNTER — TELEPHONE (OUTPATIENT)
Dept: CARDIOLOGY CLINIC | Age: 75
End: 2020-09-09

## 2020-09-09 LAB
LV EF: 43 %
LVEF MODALITY: NORMAL

## 2020-09-09 PROCEDURE — 93306 TTE W/DOPPLER COMPLETE: CPT | Performed by: PSYCHIATRY & NEUROLOGY

## 2020-09-09 NOTE — TELEPHONE ENCOUNTER
----- Message from Rosario Barnes MD sent at 9/9/2020 12:34 PM EDT -----  Heart stronger than last time  Stay on same meds  Ov 6 months

## 2020-09-10 ENCOUNTER — OFFICE VISIT (OUTPATIENT)
Dept: FAMILY MEDICINE CLINIC | Age: 75
End: 2020-09-10
Payer: MEDICARE

## 2020-09-10 VITALS
HEIGHT: 69 IN | SYSTOLIC BLOOD PRESSURE: 125 MMHG | RESPIRATION RATE: 18 BRPM | WEIGHT: 154 LBS | BODY MASS INDEX: 22.81 KG/M2 | DIASTOLIC BLOOD PRESSURE: 60 MMHG | TEMPERATURE: 98.9 F | HEART RATE: 66 BPM | OXYGEN SATURATION: 98 %

## 2020-09-10 PROBLEM — M86.672 CHRONIC OSTEOMYELITIS OF LEFT FOOT (HCC): Chronic | Status: RESOLVED | Noted: 2017-03-29 | Resolved: 2020-09-10

## 2020-09-10 PROBLEM — E11.9 DIABETES MELLITUS TYPE 2 IN NONOBESE (HCC): Status: ACTIVE | Noted: 2020-09-10

## 2020-09-10 PROBLEM — I50.22 CHRONIC SYSTOLIC HEART FAILURE (HCC): Status: ACTIVE | Noted: 2020-09-10

## 2020-09-10 PROCEDURE — 3052F HG A1C>EQUAL 8.0%<EQUAL 9.0%: CPT | Performed by: FAMILY MEDICINE

## 2020-09-10 PROCEDURE — 99214 OFFICE O/P EST MOD 30 MIN: CPT | Performed by: FAMILY MEDICINE

## 2020-09-10 RX ORDER — LIDOCAINE 50 MG/G
1 PATCH TOPICAL DAILY
Qty: 30 PATCH | Refills: 0 | Status: SHIPPED | OUTPATIENT
Start: 2020-09-10 | End: 2020-10-10

## 2020-09-10 NOTE — TELEPHONE ENCOUNTER
Patient notified and instructed on echo results and follow-up per Dr. Laz Camara. He stated understanding.

## 2020-09-10 NOTE — PROGRESS NOTES
SUBJECTIVE:        Patient ID: Silvio Saini is a 77 y.o. male who presents for   Chief Complaint   Patient presents with    Back Pain     referral to Dr. Louie Prasad Diabetes         Other     will obtain foot exam, eye exam; due AWV     F/u DM.  today, usually below 150. Dose of Lantus was increased at last visit. Tolerating medications. Following with ophthalmology. No significant weight changes. No lightheadedness or syncope. No exertional chest pain or dyspnea. No intermittent claudication    HFrEF: on GDMT- BB, ARB, Spironolactone and lasix, asymptomatic at this time. following with cardiologist. Recent Echo on 9/9/20 showed EF of 40-45%, moderate concentric hypertrophy and moderate mitral regurgitation. BP is controlled. Denies any chest pain, SOB at rest.  No cough. No abdominal pain, nausea, vomiting. Chronic back pain. Hx of DDD and anterolisthesis . Pain is worse with walking. Denies any radiation, saddle anaesthesia. CKD: asymptomatic.       Review of Systems - General ROS: negative for - chills, fever, night sweats, weight gain or weight loss  ENT ROS: negative for - hearing change, nasal discharge,  sore throat or visual changes  Endocrine ROS: negative for - hair pattern changes, mood swings, palpitations, polydipsia/polyuria, temperature intolerance or unexpected weight changes  Respiratory ROS: no cough, shortness of breath, or wheezing  Cardiovascular ROS: no chest pain or dyspnea on exertion  Gastrointestinal ROS: no abdominal pain, change in bowel habits  Genito-Urinary ROS: no dysuria, trouble voiding, or hematuria  Neurological ROS: negative for - dizziness, headaches, numbness/tingling or weakness  Dermatological ROS: negative for - pruritus, rash or skin lesion changes      Past Medical History:   Diagnosis Date    Atherosclerosis of native artery of lower extremity with ulceration of midfoot ([address] 3/29/2017    CAD (coronary artery disease) 2009    history of TWICE A DAY BEFORE MEALS 180 tablet 2    metFORMIN (GLUCOPHAGE) 1000 MG tablet TAKE 1 TABLET BY MOUTH TWICE A DAY WITH FOOD 180 tablet 3    NONFORMULARY       Magnesium Oxide 500 MG TABS Take 0.5 tablets by mouth every morning       Bromelains 500 MG TABS Take 1 tablet by mouth every morning       Specialty Vitamins Products (COLLAGEN ULTRA PO) Take 2 tablets by mouth every morning        Current Facility-Administered Medications on File Prior to Visit   Medication Dose Route Frequency Provider Last Rate Last Dose    perflutren lipid microspheres (DEFINITY) injection 1.65 mg  1.5 mL Intravenous ONCE PRN Radha Vance MD           Past medical, surgical, family and social history were reviewed and updated if stated    OBJECTIVE:     VS: /60   Pulse 66   Temp 98.9 °F (37.2 °C) (Temporal)   Resp 18   Ht 5' 9\" (1.753 m)   Wt 154 lb (69.9 kg)   SpO2 98%   BMI 22.74 kg/m²   Wt Readings from Last 3 Encounters:   09/10/20 154 lb (69.9 kg)   06/15/20 152 lb (68.9 kg)   06/10/20 151 lb 6.4 oz (68.7 kg)     Temp Readings from Last 3 Encounters:   09/10/20 98.9 °F (37.2 °C) (Temporal)   06/15/20 97.6 °F (36.4 °C) (Temporal)   06/10/20 97.4 °F (36.3 °C)     BP Readings from Last 3 Encounters:   09/10/20 125/60   06/15/20 139/68   06/10/20 110/64        General assesment: Alert, Awake, Oriented times 3, no distress  HEENT: NC/AT  Chest - clear to auscultation, no wheezes, rales or rhonchi, symmetric air entry  Heart - RRR, S1, S2, negative murmurs  Abdomen - soft, nontender, nondistended, no masses or organomegaly  Neurological - alert, oriented, normal speech, no focal findings or movement disorder noted  Skin: warm,dry  Back exam : moves all extremities, no deformities, no swelling or edema  Motor exam is symmetrical 5 out of 5 in lower extremities bilaterally  Normal ROM at waist  + muscular tightness noted in paravertebral muscles.   Mental status: Normal mood      ASSESSMENT / PLAN :     Autumn Tyler was seen today for back pain, diabetes and other. Diagnoses and all orders for this visit:    Chronic midline back pain, unspecified back location  -     External Referral To Orthopedic Surgery  -     lidocaine (LIDODERM) 5 %; Place 1 patch onto the skin daily 12 hours on, 12 hours off.  -     diclofenac sodium (VOLTAREN) 1 % GEL; Apply 2 g topically 4 times daily    Chronic systolic heart failure (HCC)  -  Continue current medication  -  Follow with cardiology    Essential hypertension  -  Controlled, continue the same    Diabetes mellitus type 2 in nonobese (HCC)  -  Blood sugars improved, continue the same        Counseled regarding above diagnosis, including possible risks and complications,  especially if left uncontrolled. Counseled regarding the possible side effects, risks, benefits and alternatives to treatment; patient and/or guardian verbalizes understanding, agrees, feels comfortable with and wishes to proceed with above treatment plan. Discussed any signs and symptoms that would warrant immediate ED evaluation    Latia Rosas was instructed to call if any new symptoms develop prior to next visit.          F/U as instructed    Brendan Real M.D

## 2020-09-21 PROBLEM — M54.16 LUMBAR RADICULOPATHY: Status: ACTIVE | Noted: 2020-09-21

## 2020-10-22 RX ORDER — ROSUVASTATIN CALCIUM 20 MG/1
TABLET, COATED ORAL
Qty: 90 TABLET | Refills: 3 | Status: SHIPPED
Start: 2020-10-22 | End: 2021-10-18

## 2020-11-10 RX ORDER — TRAZODONE HYDROCHLORIDE 50 MG/1
TABLET ORAL
Qty: 90 TABLET | Refills: 0 | Status: SHIPPED
Start: 2020-11-10 | End: 2021-01-25 | Stop reason: SDUPTHER

## 2020-11-20 RX ORDER — SITAGLIPTIN 100 MG/1
TABLET, FILM COATED ORAL
Qty: 90 TABLET | Refills: 3 | Status: SHIPPED
Start: 2020-11-20 | End: 2021-11-15

## 2020-12-15 RX ORDER — GLIPIZIDE 10 MG/1
TABLET ORAL
Qty: 180 TABLET | Refills: 3 | Status: SHIPPED
Start: 2020-12-15 | End: 2022-06-13

## 2021-01-25 ENCOUNTER — OFFICE VISIT (OUTPATIENT)
Dept: FAMILY MEDICINE CLINIC | Age: 76
End: 2021-01-25
Payer: MEDICARE

## 2021-01-25 VITALS
HEIGHT: 69 IN | OXYGEN SATURATION: 97 % | RESPIRATION RATE: 18 BRPM | WEIGHT: 149 LBS | HEART RATE: 69 BPM | BODY MASS INDEX: 22.07 KG/M2 | DIASTOLIC BLOOD PRESSURE: 72 MMHG | SYSTOLIC BLOOD PRESSURE: 136 MMHG | TEMPERATURE: 97.3 F

## 2021-01-25 DIAGNOSIS — E11.9 DIABETES MELLITUS TYPE 2 IN NONOBESE (HCC): ICD-10-CM

## 2021-01-25 DIAGNOSIS — E78.2 MIXED HYPERLIPIDEMIA: ICD-10-CM

## 2021-01-25 DIAGNOSIS — K59.00 CONSTIPATION, UNSPECIFIED CONSTIPATION TYPE: ICD-10-CM

## 2021-01-25 DIAGNOSIS — G47.00 INSOMNIA, UNSPECIFIED TYPE: ICD-10-CM

## 2021-01-25 DIAGNOSIS — K21.9 GASTROESOPHAGEAL REFLUX DISEASE, UNSPECIFIED WHETHER ESOPHAGITIS PRESENT: ICD-10-CM

## 2021-01-25 DIAGNOSIS — Z00.00 ROUTINE GENERAL MEDICAL EXAMINATION AT A HEALTH CARE FACILITY: Primary | ICD-10-CM

## 2021-01-25 DIAGNOSIS — I50.22 CHRONIC SYSTOLIC HEART FAILURE (HCC): ICD-10-CM

## 2021-01-25 DIAGNOSIS — I10 ESSENTIAL HYPERTENSION: ICD-10-CM

## 2021-01-25 DIAGNOSIS — E55.9 VITAMIN D DEFICIENCY: ICD-10-CM

## 2021-01-25 LAB
CREATININE URINE: 158 MG/DL (ref 40–278)
MICROALBUMIN UR-MCNC: 404.1 MG/L
MICROALBUMIN/CREAT UR-RTO: 255.8 (ref 0–30)

## 2021-01-25 PROCEDURE — 99214 OFFICE O/P EST MOD 30 MIN: CPT | Performed by: FAMILY MEDICINE

## 2021-01-25 RX ORDER — TRAZODONE HYDROCHLORIDE 50 MG/1
TABLET ORAL
Qty: 90 TABLET | Refills: 3 | Status: SHIPPED
Start: 2021-01-25 | End: 2022-01-19

## 2021-01-25 RX ORDER — SPIRONOLACTONE 25 MG/1
TABLET ORAL
Qty: 45 TABLET | Refills: 4 | Status: SHIPPED
Start: 2021-01-25 | End: 2022-02-22

## 2021-01-25 RX ORDER — DOCUSATE SODIUM 100 MG/1
100 CAPSULE, LIQUID FILLED ORAL 2 TIMES DAILY
Qty: 60 CAPSULE | Refills: 0 | Status: SHIPPED
Start: 2021-01-25 | End: 2021-05-03

## 2021-01-25 RX ORDER — FAMOTIDINE 20 MG/1
20 TABLET, FILM COATED ORAL 2 TIMES DAILY
Qty: 60 TABLET | Refills: 3 | Status: SHIPPED
Start: 2021-01-25 | End: 2021-06-02

## 2021-01-25 ASSESSMENT — LIFESTYLE VARIABLES
HOW OFTEN DURING THE LAST YEAR HAVE YOU FOUND THAT YOU WERE NOT ABLE TO STOP DRINKING ONCE YOU HAD STARTED: NEVER
HOW OFTEN DURING THE LAST YEAR HAVE YOU FAILED TO DO WHAT WAS NORMALLY EXPECTED FROM YOU BECAUSE OF DRINKING: NEVER
HAS A RELATIVE, FRIEND, DOCTOR, OR ANOTHER HEALTH PROFESSIONAL EXPRESSED CONCERN ABOUT YOUR DRINKING OR SUGGESTED YOU CUT DOWN: 0
HOW MANY STANDARD DRINKS CONTAINING ALCOHOL DO YOU HAVE ON A TYPICAL DAY: ONE OR TWO
HOW OFTEN DO YOU HAVE SIX OR MORE DRINKS ON ONE OCCASION: 0
AUDIT-C TOTAL SCORE: 1
HOW OFTEN DURING THE LAST YEAR HAVE YOU HAD A FEELING OF GUILT OR REMORSE AFTER DRINKING: NEVER
AUDIT-C TOTAL SCORE: 0
HOW MANY STANDARD DRINKS CONTAINING ALCOHOL DO YOU HAVE ON A TYPICAL DAY: 0
HAVE YOU OR SOMEONE ELSE BEEN INJURED AS A RESULT OF YOUR DRINKING: NO
HOW OFTEN DO YOU HAVE A DRINK CONTAINING ALCOHOL: MONTHLY OR LESS
AUDIT TOTAL SCORE: 0
HOW OFTEN DURING THE LAST YEAR HAVE YOU NEEDED AN ALCOHOLIC DRINK FIRST THING IN THE MORNING TO GET YOURSELF GOING AFTER A NIGHT OF HEAVY DRINKING: NEVER
HOW OFTEN DURING THE LAST YEAR HAVE YOU BEEN UNABLE TO REMEMBER WHAT HAPPENED THE NIGHT BEFORE BECAUSE YOU HAD BEEN DRINKING: 0
HOW OFTEN DURING THE LAST YEAR HAVE YOU NEEDED AN ALCOHOLIC DRINK FIRST THING IN THE MORNING TO GET YOURSELF GOING AFTER A NIGHT OF HEAVY DRINKING: 0
HOW OFTEN DO YOU HAVE SIX OR MORE DRINKS ON ONE OCCASION: NEVER
HAS A RELATIVE, FRIEND, DOCTOR, OR ANOTHER HEALTH PROFESSIONAL EXPRESSED CONCERN ABOUT YOUR DRINKING OR SUGGESTED YOU CUT DOWN: NO
HOW OFTEN DURING THE LAST YEAR HAVE YOU BEEN UNABLE TO REMEMBER WHAT HAPPENED THE NIGHT BEFORE BECAUSE YOU HAD BEEN DRINKING: NEVER

## 2021-01-25 ASSESSMENT — PATIENT HEALTH QUESTIONNAIRE - PHQ9
2. FEELING DOWN, DEPRESSED OR HOPELESS: 0
SUM OF ALL RESPONSES TO PHQ QUESTIONS 1-9: 0

## 2021-01-25 NOTE — PATIENT INSTRUCTIONS
Personalized Preventive Plan for Yaya Shane - 1/25/2021  Medicare offers a range of preventive health benefits. Some of the tests and screenings are paid in full while other may be subject to a deductible, co-insurance, and/or copay. Some of these benefits include a comprehensive review of your medical history including lifestyle, illnesses that may run in your family, and various assessments and screenings as appropriate. After reviewing your medical record and screening and assessments performed today your provider may have ordered immunizations, labs, imaging, and/or referrals for you. A list of these orders (if applicable) as well as your Preventive Care list are included within your After Visit Summary for your review. Other Preventive Recommendations:    · A preventive eye exam performed by an eye specialist is recommended every 1-2 years to screen for glaucoma; cataracts, macular degeneration, and other eye disorders. · A preventive dental visit is recommended every 6 months. · Try to get at least 150 minutes of exercise per week or 10,000 steps per day on a pedometer . · Order or download the FREE \"Exercise & Physical Activity: Your Everyday Guide\" from [employer]. Call 9-252.263.2405 or search [employer] online. · You need 0549-9095 mg of calcium and 8480-4095 IU of vitamin D per day. It is possible to meet your calcium requirement with diet alone, but a vitamin D supplement is usually necessary to meet this goal.  · When exposed to the sun, use a sunscreen that protects against both UVA and UVB radiation with an SPF of 30 or greater. Reapply every 2 to 3 hours or after sweating, drying off with a towel, or swimming. · Always wear a seat belt when traveling in a car. Always wear a helmet when riding a bicycle or motorcycle.

## 2021-01-25 NOTE — PROGRESS NOTES
Medicare Annual Wellness Visit  Name: Brooks Vincent Date: 2021   MRN: [id number] Sex: Male   Age: 77 y.o. Ethnicity: Non-/Non    : 1945 Race: Conchita Sawant is here for Medicare AWV    Screenings for behavioral, psychosocial and functional/safety risks, and cognitive dysfunction are all negative except as indicated below. These results, as well as other patient data from the [email or web address] form, are documented in Flowsheets linked to this Encounter. Diabetes Mellitus: complaint with dietNo visual changes. No significant weight changes. No lightheadedness or syncope. No exertional chest pain or dyspnea. No intermittent claudication. No foot pain or numbness. Recent labs reviewed and discussed with patient. Hemoglobin A1C (%)   Date Value   06/15/2020 8.9       BP is controlled    HFrEF: controlled, following with cardiology    Has heartburn symptoms, no bleeding, abdominal pain, nausea, vomiting. Has intermittent constipation    Hyperlipidemia:Doing well. Watching diet . No weakness or myalgias. No chest pain or dyspnea. Allergies   Allergen Reactions    Ciprofloxacin      Hives      Pcn [Penicillins] Hives    Sulfa Antibiotics Hives       Prior to Visit Medications    Medication Sig Taking?  Authorizing Provider   traZODone (DESYREL) 50 MG tablet TAKE 1 TABLET BY MOUTH EVERY DAY EVERY NIGHT Yes Pillo Quan MD   spironolactone (ALDACTONE) 25 MG tablet TAKE ONE-HALF (1/2) TABLET DAILY Yes Pillo Quan MD   docusate sodium (COLACE) 100 MG capsule Take 1 capsule by mouth 2 times daily Yes Pillo Quan MD   famotidine (PEPCID) 20 MG tablet Take 1 tablet by mouth 2 times daily Yes Pillo Quan MD   glipiZIDE (GLUCOTROL) 10 MG tablet TAKE 1 TABLET TWICE A DAY BEFORE MEALS Yes Pillo Quan MD   JANUVIA 100 MG tablet TAKE 1 TABLET DAILY Yes Pillo Quan MD   rosuvastatin (CRESTOR) 20 MG tablet TAKE 1 TABLET DAILY Yes Azalea De La Cruz MD   diclofenac sodium (VOLTAREN) 1 % GEL Apply 2 g topically 4 times daily Yes Azalea De La Cruz MD   insulin glargine (LANTUS SOLOSTAR) 100 UNIT/ML injection pen Inject 20 Units into the skin nightly Yes Azalea De La Cruz MD   furosemide (LASIX) 20 MG tablet TAKE 1 TABLET DAILY  Patient taking differently: every 48 hours  Yes Azalea De La Cruz MD   losartan (COZAAR) 25 MG tablet TAKE 1 TABLET TWICE A DAY Yes Azalea De La Cruz MD   metoprolol succinate (TOPROL XL) 25 MG extended release tablet TAKE 1 TABLET TWICE A DAY Yes Azalea De La Cruz MD   aspirin 81 MG tablet Take 81 mg by mouth daily Yes Historical Provider, MD   metFORMIN (GLUCOPHAGE) 1000 MG tablet TAKE 1 TABLET BY MOUTH TWICE A DAY WITH FOOD Yes Azalea De La Cruz MD   NONFORMULARY  Yes Historical Provider, MD   Magnesium Oxide 500 MG TABS Take 0.5 tablets by mouth every morning  Yes Historical Provider, MD   Bromelains 500 MG TABS Take 1 tablet by mouth every morning  Yes Historical Provider, MD   Specialty Vitamins Products (COLLAGEN ULTRA PO) Take 2 tablets by mouth every morning  Yes Historical Provider, MD       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 Renaldo Banda Left 05/2017    SFA, pop atherectomy, Dr. Konrad Owen Left 2009    2nd and 5th toe    TONSILLECTOMY         Family History   Problem Relation Age of Onset    High Blood Pressure Mother     COPD Father     Diabetes Father     Heart Disease Brother         chf    Seizures Brother        CareTeam (Including outside providers/suppliers regularly involved in providing care):   Patient Care Team:  Ramos Alicia MD as PCP - General (Family Medicine)  Ramos Alicia MD as PCP - [facility] Empaneled Provider  Brant Dudley MD as Surgeon (Podiatry)  Madelyn Carmona MD as Consulting Physician (Cardiology)    Wt Readings from Last 3 Encounters:   01/25/21 149 lb (67.6 kg)   09/10/20 154 lb (69.9 kg)   06/15/20 152 lb (68.9 kg)     Vitals:    01/25/21 0958 01/25/21 1009 01/25/21 1032   BP: (!) 169/81 (!) 184/76 136/72   Pulse: 69     Resp: 18     Temp: 97.3 °F (36.3 °C)     TempSrc: Temporal     SpO2: 97%     Weight: 149 lb (67.6 kg)     Height: 5' 9\" (1.753 m)       Body mass index is 22 kg/m². Based upon direct observation of the patient, evaluation of cognition reveals recent and remote memory intact.     General Appearance: alert and oriented to person, place and time, well-developed and well-nourished, in no acute distress  Skin: warm and dry, no rash or erythema  Head: normocephalic and atraumatic  Eyes: conjunctivae normal  Pulmonary/Chest: clear to auscultation bilaterally- no wheezes, rales or rhonchi, normal air movement, no respiratory distress  Cardiovascular: normal rate, normal S1 and S2 and no gallops  Neurologic: gait and daily      Added Pepcid and Colace  Continue other medications

## 2021-01-27 ENCOUNTER — NURSE ONLY (OUTPATIENT)
Dept: FAMILY MEDICINE CLINIC | Age: 76
End: 2021-01-27

## 2021-01-27 ENCOUNTER — HOSPITAL ENCOUNTER (OUTPATIENT)
Age: 76
Discharge: HOME | End: 2021-01-27
Payer: MEDICARE

## 2021-01-27 DIAGNOSIS — E11.9 DIABETES MELLITUS TYPE 2 IN NONOBESE (HCC): ICD-10-CM

## 2021-01-27 DIAGNOSIS — E55.9 VITAMIN D DEFICIENCY: ICD-10-CM

## 2021-01-27 DIAGNOSIS — E78.2 MIXED HYPERLIPIDEMIA: ICD-10-CM

## 2021-01-27 DIAGNOSIS — I10 ESSENTIAL HYPERTENSION: ICD-10-CM

## 2021-01-27 LAB
ALBUMIN SERPL-MCNC: 4.1 G/DL (ref 3.5–5.2)
ALP BLD-CCNC: 97 U/L (ref 40–129)
ALT SERPL-CCNC: 9 U/L (ref 0–40)
ANION GAP SERPL CALCULATED.3IONS-SCNC: 10 MMOL/L (ref 7–16)
AST SERPL-CCNC: 23 U/L (ref 0–39)
BILIRUB SERPL-MCNC: 0.7 MG/DL (ref 0–1.2)
BUN BLDV-MCNC: 23 MG/DL (ref 8–23)
CALCIUM SERPL-MCNC: 9.7 MG/DL (ref 8.6–10.2)
CHLORIDE BLD-SCNC: 98 MMOL/L (ref 98–107)
CHOLESTEROL, TOTAL: 121 MG/DL (ref 0–199)
CO2: 27 MMOL/L (ref 22–29)
CREAT SERPL-MCNC: 1.1 MG/DL (ref 0.7–1.2)
GFR AFRICAN AMERICAN: >60
GFR NON-AFRICAN AMERICAN: >60 ML/MIN/1.73
GLUCOSE BLD-MCNC: 234 MG/DL (ref 74–99)
HBA1C MFR BLD: 11.9 % (ref 4–5.6)
HCT VFR BLD CALC: 44.8 % (ref 37–54)
HDLC SERPL-MCNC: 48 MG/DL
HEMOGLOBIN: 14.3 G/DL (ref 12.5–16.5)
LDL CHOLESTEROL CALCULATED: 50 MG/DL (ref 0–99)
MCH RBC QN AUTO: 29.7 PG (ref 26–35)
MCHC RBC AUTO-ENTMCNC: 31.9 % (ref 32–34.5)
MCV RBC AUTO: 92.9 FL (ref 80–99.9)
PDW BLD-RTO: 13 FL (ref 11.5–15)
PLATELET # BLD: 200 E9/L (ref 130–450)
PMV BLD AUTO: 10.4 FL (ref 7–12)
POTASSIUM SERPL-SCNC: 4.8 MMOL/L (ref 3.5–5)
RBC # BLD: 4.82 E12/L (ref 3.8–5.8)
SODIUM BLD-SCNC: 135 MMOL/L (ref 132–146)
TOTAL PROTEIN: 7.9 G/DL (ref 6.4–8.3)
TRIGL SERPL-MCNC: 115 MG/DL (ref 0–149)
TSH SERPL DL<=0.05 MIU/L-ACNC: 3.98 UIU/ML (ref 0.27–4.2)
VITAMIN D 25-HYDROXY: 31 NG/ML (ref 30–100)
VLDLC SERPL CALC-MCNC: 23 MG/DL
WBC # BLD: 8.2 E9/L (ref 4.5–11.5)

## 2021-01-27 PROCEDURE — 80053 COMPREHEN METABOLIC PANEL: CPT

## 2021-01-27 PROCEDURE — 83036 HEMOGLOBIN GLYCOSYLATED A1C: CPT

## 2021-01-27 PROCEDURE — 85027 COMPLETE CBC AUTOMATED: CPT

## 2021-01-27 PROCEDURE — 36415 COLL VENOUS BLD VENIPUNCTURE: CPT

## 2021-01-27 PROCEDURE — 84443 ASSAY THYROID STIM HORMONE: CPT

## 2021-01-27 PROCEDURE — 82306 VITAMIN D 25 HYDROXY: CPT

## 2021-01-27 PROCEDURE — 80061 LIPID PANEL: CPT

## 2021-02-05 ENCOUNTER — TELEPHONE (OUTPATIENT)
Dept: FAMILY MEDICINE CLINIC | Age: 76
End: 2021-02-05

## 2021-02-05 DIAGNOSIS — Z79.4 TYPE 2 DIABETES MELLITUS WITH DIABETIC PERIPHERAL ANGIOPATHY WITHOUT GANGRENE, WITH LONG-TERM CURRENT USE OF INSULIN (HCC): ICD-10-CM

## 2021-02-05 DIAGNOSIS — E11.51 TYPE 2 DIABETES MELLITUS WITH DIABETIC PERIPHERAL ANGIOPATHY WITHOUT GANGRENE, WITH LONG-TERM CURRENT USE OF INSULIN (HCC): ICD-10-CM

## 2021-02-05 NOTE — TELEPHONE ENCOUNTER
Blood sugars this week    2/2  80 at 8 am, 130 @ 5:30 pm  2/3 75 at 8 am, 190 @ 5:30 pm  2/4 59 @ 8 am, 220 @ 5;30 pm  2/5 70 @ 8 am    Any new changes?

## 2021-02-06 RX ORDER — LANCETS 28 GAUGE
1 EACH MISCELLANEOUS DAILY
Qty: 100 EACH | Refills: 3 | Status: SHIPPED
Start: 2021-02-06 | End: 2022-06-27

## 2021-02-08 RX ORDER — INSULIN GLARGINE 100 [IU]/ML
15 INJECTION, SOLUTION SUBCUTANEOUS NIGHTLY
Qty: 5 PEN | Refills: 5 | Status: SHIPPED
Start: 2021-02-08 | End: 2021-02-15 | Stop reason: DRUGHIGH

## 2021-02-08 NOTE — TELEPHONE ENCOUNTER
Will decrease dose of Lantus to 15 units nightly. Please notify the patient and advise him to call with readings on Wednesday or sooner of sugars are less than 80. Thanks !

## 2021-02-10 ENCOUNTER — TELEPHONE (OUTPATIENT)
Dept: FAMILY MEDICINE CLINIC | Age: 76
End: 2021-02-10

## 2021-02-10 NOTE — TELEPHONE ENCOUNTER
Patient's recent blood sugars:     Saturday 2/5/21  8 am - 63 5:30 pm - 210    Monday 2/8/21  8 am - 130 5:30 pm - 150    Tuesday 2/9/21   8 am - 68 5:30 pm - 199    Wednesday 2/10/21  8 am - 95

## 2021-02-10 NOTE — TELEPHONE ENCOUNTER
Blood sugar readings are mostly low. Please advise him to decrease dose of lantus to 10 units and monitor blood sugars and call us on Friday.  Thanks          Documentation

## 2021-02-10 NOTE — TELEPHONE ENCOUNTER
Blood sugar readings are mostly low. Please advise him to decrease dose of lantus to 10 units and monitor blood sugars and call us on Friday.  Thanks

## 2021-02-12 ENCOUNTER — TELEPHONE (OUTPATIENT)
Dept: FAMILY MEDICINE CLINIC | Age: 76
End: 2021-02-12

## 2021-02-12 DIAGNOSIS — E11.51 TYPE 2 DIABETES MELLITUS WITH DIABETIC PERIPHERAL ANGIOPATHY WITHOUT GANGRENE, WITH LONG-TERM CURRENT USE OF INSULIN (HCC): ICD-10-CM

## 2021-02-12 DIAGNOSIS — Z79.4 TYPE 2 DIABETES MELLITUS WITH DIABETIC PERIPHERAL ANGIOPATHY WITHOUT GANGRENE, WITH LONG-TERM CURRENT USE OF INSULIN (HCC): ICD-10-CM

## 2021-02-12 NOTE — TELEPHONE ENCOUNTER
Patient called with recent blood sugars     Date:   9 am   5:30 pm   2/10/21 95  196  2/11/21 92  174  2/12/21 94    Thank you!

## 2021-02-15 RX ORDER — INSULIN GLARGINE 100 [IU]/ML
10 INJECTION, SOLUTION SUBCUTANEOUS NIGHTLY
Qty: 5 PEN | Refills: 5 | Status: SHIPPED
Start: 2021-02-15 | End: 2022-10-25 | Stop reason: SDUPTHER

## 2021-02-15 NOTE — TELEPHONE ENCOUNTER
Continue current regimen and Lantus 10 units.  Advise him keep monitoring his blood sugars and I would like to see him (virtual visit is ok) in 1-2 weeks

## 2021-02-15 NOTE — TELEPHONE ENCOUNTER
appt made for 2/23/21 - patient advised to monitor blood sugars and bring with him to his appt.  Patient understands and agrees

## 2021-04-18 DIAGNOSIS — E09.65 DRUG OR CHEMICAL INDUCED DIABETES MELLITUS WITH HYPERGLYCEMIA, WITH LONG-TERM CURRENT USE OF INSULIN (HCC): ICD-10-CM

## 2021-04-18 DIAGNOSIS — Z79.4 DRUG OR CHEMICAL INDUCED DIABETES MELLITUS WITH HYPERGLYCEMIA, WITH LONG-TERM CURRENT USE OF INSULIN (HCC): ICD-10-CM

## 2021-05-03 DIAGNOSIS — K59.00 CONSTIPATION, UNSPECIFIED CONSTIPATION TYPE: ICD-10-CM

## 2021-05-03 RX ORDER — DOCUSATE SODIUM 100 MG/1
CAPSULE, LIQUID FILLED ORAL
Qty: 60 CAPSULE | Refills: 0 | Status: SHIPPED
Start: 2021-05-03 | End: 2022-01-05

## 2021-05-13 ENCOUNTER — OFFICE VISIT (OUTPATIENT)
Dept: FAMILY MEDICINE CLINIC | Age: 76
End: 2021-05-13
Payer: MEDICARE

## 2021-05-13 ENCOUNTER — OFFICE VISIT (OUTPATIENT)
Dept: CARDIOLOGY CLINIC | Age: 76
End: 2021-05-13
Payer: MEDICARE

## 2021-05-13 ENCOUNTER — TELEPHONE (OUTPATIENT)
Dept: CARDIOLOGY | Age: 76
End: 2021-05-13

## 2021-05-13 VITALS
DIASTOLIC BLOOD PRESSURE: 91 MMHG | HEART RATE: 69 BPM | HEIGHT: 69 IN | RESPIRATION RATE: 18 BRPM | SYSTOLIC BLOOD PRESSURE: 154 MMHG | WEIGHT: 147 LBS | BODY MASS INDEX: 21.77 KG/M2

## 2021-05-13 VITALS
TEMPERATURE: 98 F | SYSTOLIC BLOOD PRESSURE: 179 MMHG | WEIGHT: 145 LBS | BODY MASS INDEX: 21.48 KG/M2 | DIASTOLIC BLOOD PRESSURE: 87 MMHG | RESPIRATION RATE: 20 BRPM | OXYGEN SATURATION: 99 % | HEART RATE: 71 BPM | HEIGHT: 69 IN

## 2021-05-13 DIAGNOSIS — E11.9 DIABETES MELLITUS TYPE 2 IN NONOBESE (HCC): ICD-10-CM

## 2021-05-13 DIAGNOSIS — Z01.818 PRE-OP EVALUATION: ICD-10-CM

## 2021-05-13 DIAGNOSIS — Z01.818 PRE-OP EVALUATION: Primary | ICD-10-CM

## 2021-05-13 DIAGNOSIS — Z01.810 PREOP CARDIOVASCULAR EXAM: Primary | ICD-10-CM

## 2021-05-13 DIAGNOSIS — M54.16 LUMBAR RADICULOPATHY: ICD-10-CM

## 2021-05-13 DIAGNOSIS — I50.22 CHRONIC SYSTOLIC HEART FAILURE (HCC): ICD-10-CM

## 2021-05-13 DIAGNOSIS — I25.10 CORONARY ARTERY DISEASE INVOLVING NATIVE CORONARY ARTERY OF NATIVE HEART WITHOUT ANGINA PECTORIS: ICD-10-CM

## 2021-05-13 LAB
ANION GAP SERPL CALCULATED.3IONS-SCNC: 14 MMOL/L (ref 7–16)
BASOPHILS ABSOLUTE: 0.1 E9/L (ref 0–0.2)
BASOPHILS RELATIVE PERCENT: 1.3 % (ref 0–2)
BUN BLDV-MCNC: 24 MG/DL (ref 6–23)
CALCIUM SERPL-MCNC: 9.7 MG/DL (ref 8.6–10.2)
CHLORIDE BLD-SCNC: 103 MMOL/L (ref 98–107)
CO2: 22 MMOL/L (ref 22–29)
CREAT SERPL-MCNC: 1.1 MG/DL (ref 0.7–1.2)
EOSINOPHILS ABSOLUTE: 0.37 E9/L (ref 0.05–0.5)
EOSINOPHILS RELATIVE PERCENT: 4.9 % (ref 0–6)
GFR AFRICAN AMERICAN: >60
GFR NON-AFRICAN AMERICAN: >60 ML/MIN/1.73
GLUCOSE BLD-MCNC: 195 MG/DL (ref 74–99)
HBA1C MFR BLD: 8 %
HCT VFR BLD CALC: 40.1 % (ref 37–54)
HEMOGLOBIN: 13 G/DL (ref 12.5–16.5)
IMMATURE GRANULOCYTES #: 0.02 E9/L
IMMATURE GRANULOCYTES %: 0.3 % (ref 0–5)
LYMPHOCYTES ABSOLUTE: 1.2 E9/L (ref 1.5–4)
LYMPHOCYTES RELATIVE PERCENT: 15.8 % (ref 20–42)
MCH RBC QN AUTO: 30 PG (ref 26–35)
MCHC RBC AUTO-ENTMCNC: 32.4 % (ref 32–34.5)
MCV RBC AUTO: 92.4 FL (ref 80–99.9)
MONOCYTES ABSOLUTE: 0.71 E9/L (ref 0.1–0.95)
MONOCYTES RELATIVE PERCENT: 9.4 % (ref 2–12)
NEUTROPHILS ABSOLUTE: 5.19 E9/L (ref 1.8–7.3)
NEUTROPHILS RELATIVE PERCENT: 68.3 % (ref 43–80)
PDW BLD-RTO: 12.7 FL (ref 11.5–15)
PLATELET # BLD: 243 E9/L (ref 130–450)
PMV BLD AUTO: 11.3 FL (ref 7–12)
POTASSIUM SERPL-SCNC: 4.6 MMOL/L (ref 3.5–5)
RBC # BLD: 4.34 E12/L (ref 3.8–5.8)
SODIUM BLD-SCNC: 139 MMOL/L (ref 132–146)
WBC # BLD: 7.6 E9/L (ref 4.5–11.5)

## 2021-05-13 PROCEDURE — 83036 HEMOGLOBIN GLYCOSYLATED A1C: CPT | Performed by: STUDENT IN AN ORGANIZED HEALTH CARE EDUCATION/TRAINING PROGRAM

## 2021-05-13 PROCEDURE — 99215 OFFICE O/P EST HI 40 MIN: CPT | Performed by: INTERNAL MEDICINE

## 2021-05-13 PROCEDURE — 3052F HG A1C>EQUAL 8.0%<EQUAL 9.0%: CPT | Performed by: STUDENT IN AN ORGANIZED HEALTH CARE EDUCATION/TRAINING PROGRAM

## 2021-05-13 PROCEDURE — 93000 ELECTROCARDIOGRAM COMPLETE: CPT | Performed by: INTERNAL MEDICINE

## 2021-05-13 PROCEDURE — 99213 OFFICE O/P EST LOW 20 MIN: CPT | Performed by: STUDENT IN AN ORGANIZED HEALTH CARE EDUCATION/TRAINING PROGRAM

## 2021-05-13 RX ORDER — EMPAGLIFLOZIN 10 MG/1
1 TABLET, FILM COATED ORAL DAILY
Qty: 90 TABLET | Refills: 1 | Status: SHIPPED
Start: 2021-05-13 | End: 2021-06-04 | Stop reason: ALTCHOICE

## 2021-05-13 ASSESSMENT — ENCOUNTER SYMPTOMS
COUGH: 0
EYE REDNESS: 0
EYES NEGATIVE: 1
ALLERGIC/IMMUNOLOGIC NEGATIVE: 1
SHORTNESS OF BREATH: 0
GASTROINTESTINAL NEGATIVE: 1
CHEST TIGHTNESS: 0

## 2021-05-13 NOTE — TELEPHONE ENCOUNTER
Scheduled nuclear. Reviewed Covid-19 checklist with the patient. Electronically signed by Mayco David on 5/18/2021 at 7:37 AM    LM to schedule Bryanna.     Electronically signed by Mayco David on 5/13/2021 at 10:22 AM

## 2021-05-13 NOTE — PROGRESS NOTES
[address] [place] [address]  Family Medicine Residency Program  Phone: [phone]  Fax: [phone]    Patient:  Ramiro Apple 77 y.o. male                                 Date of Service: 5/13/21                            Chiefcomplaint:   Chief Complaint   Patient presents with    Pre-op Exam     surgery date: 5/25/21 - Decompression l4-l5         History of Present Illness: The patient is a 77 y.o. male  presented to the clinic : preop evaluation for low back surgery later this month  -He follows up with Dr Yasemin Dillard his PCP , already visited cardiologist this morning  -Hx of surgery in the past , no known complication noted  - not on blood thinner  -Willing to do labs today  -EKG done today in cardiology office  BP; 179/87 , pulse 71  Hx of DM on insulin Latest A1C; 11.9, today's A1C 8.0, will star Jardiace today.  _ No hx of chest pain, SOB, Dizziness,nausea- vomiting , headache. Cardiac recommendation : Hx of ACB 17 years ago with likely graft occlusion HFrEF ,RCRI Class III or IV - cardiac risk of complications 5.1-32% in this elective surgery ,Will get Lexiscan for further risk stratification Would recommend procedure be done in a medical setting with on-site cardiology backup. Would recommend addition of SGLT2 inhibitor Maya Pope or Esteban to lower risk of cardiac death or heart failure   Mets score > 4    Review of Systems:   Review of Systems   Constitutional: Negative for chills, fatigue and fever. HENT: Negative. Negative for congestion and nosebleeds. Eyes: Negative. Negative for redness. Respiratory: Negative for cough, chest tightness and shortness of breath. Cardiovascular: Negative. Negative for chest pain, palpitations and leg swelling. Gastrointestinal: Negative. Endocrine: Negative. Genitourinary: Negative. Negative for dysuria. Musculoskeletal: Negative. Skin: Negative. Negative for rash. Allergic/Immunologic: Negative. Neurological: Negative. Hematological: Negative. Psychiatric/Behavioral: Negative.         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 Alex Sweeney Left 05/2017    SFA, pop atherectomy, Dr. Yuki Lagos Left 2009    2nd and 5th toe    TONSILLECTOMY         Allergies:    Ciprofloxacin, Pcn [penicillins], and Sulfa antibiotics    Social History:   Social History     Socioeconomic History    Marital status:      Spouse name: Not on file    Number of children: Not on file    Years of education: Not on file    Highest education level: Not on file   Occupational History    Not on file   Social Needs    Financial resource strain: Not on file    Food insecurity     Worry: Not on file     Inability: Not on file    Transportation needs     Medical: Not on file     Non-medical: Not on file   Tobacco Use    Smoking status: Never Smoker    Smokeless tobacco: Never Used   Substance and Sexual Activity    Alcohol use: Yes     Alcohol/week: 3.0 standard drinks     Types: 2 Shots of liquor, 1 Standard drinks or equivalent per week    Drug use: No    Sexual activity: Not Currently     Partners: Female   Lifestyle    Physical activity     Days per week: Not on file     Minutes per session: Not on file    Stress: Not on file   Relationships    Social connections     Talks on phone: Not on file     Gets together: Not on file     Attends Spiritism service: Not on file     Active member of club or organization: Not on file     Attends meetings of clubs or organizations: Not on file     Relationship status: Not on file    Intimate partner violence     Fear of current or ex partner: Not on file     Emotionally abused: Not on file     Physically abused: Not on file     Forced sexual activity: Not on file   Other Topics Concern    Not on file   Social History Narrative    Not on file        Family History:       Problem Relation Age of Onset    High Blood Pressure Mother     COPD Father     Diabetes Father     Heart Disease Brother         chf    Seizures Brother        BP Readings from Last 3 Encounters:   05/13/21 (!) 179/87   05/13/21 (!) 154/91   01/25/21 136/72       Physical Exam:    Vitals: BP (!) 179/87   Pulse 71   Temp 98 °F (36.7 °C) (Temporal)   Resp 20   Ht 5' 9\" (1.753 m)   Wt 145 lb (65.8 kg)   SpO2 99%   BMI 21.41 kg/m²   General Appearance: Well developed, awake, alert, oriented, no acute distress  HEENT: Normocephalic,atraumatic. PERRL, EOM's intact, EAC without erythemaor swelling, no pallor or icterus. Neck: Supple, symmetrical, trachea midline. No JVD. Chest wall/Lung: Clear to auscultation bilaterally,  respirations unlabored. No ronchi/wheezing/rales  Heart[de-identified]  Regular rate and rhythm, S1and S2 normal, no murmur, rub or gallop.   Abdomen: Soft, non-tender, bowel sounds normoactive, no masses, no organomegaly  Extremities: Extremities normal, atraumatic, no cyanosis. no edema. Skin: Skin color, texture, turgor normal, no rashes or lesions  Musculokeletal: ROM grossly normal in all joints of extremities, no obvious joint swelling. Lymph nodes: no lymph node enlargement appreciated  Neurologic:   Alert&Oriented. Normal gait and coordination  No focal neurological deficits appreaciated         Psychiatric: has a normal mood and affect. Behavior is normal.       Assessment and Plan:       Preop evaluation  -Patient visited cardiologist today, did EKG there and waiting for cardiac stress test before proceeding to surgery  -His back surgery has been scheduled later this month, he says that he comes under intermediate risk group  -We have ordered some basic labs BMP, CBC, A1c  -His A1c done today was 8.0  -We have added Jardiance 10 mg as per cardiology recommendation  -We will give clearance after he got cardiac stress test and we get all labs, labs has been back and looks okay, we have added Jardiance 10 mg for elevated A1c.  -We have recommended to visit cardiology before proceeding to surgery  -Otherwise , he  is stable    Pre-Operative Risk assessment using 2014 ACC/AHA guidelines     Emergent procedure No  Active Cardiac Condition Yes (decompensated HF, Arrhythmia, MI <3 weeks, severe valve disease)  Risk Level of Procedure Intermediate Risk (intraperitoneal, intrathoracic, HENT, orthopedic, or carotid endarterectomy, etc.)  Revised Cardiac Risk Index Risk factors: History of ischemic heart disease  Measurement of Exercise Tolerance before Surgery >4 Yes    According to the 2014 ACC/AHA pre-operative risk assessment guidelines Johana Rascon is a low risk for major cardiac complications during a intermediate risk procedure and may continue as planned. Specific medication recommendations are listed below. Medications recommended to continue should be taken with a sip of water even when NPO.      Further recommendations from consultants: Cardiology, cardiology is planning cardiac stress test before surgery    Medication Recommendations:  - Continued meds   Medical Decision Making  Number and Complexity of Problems Addressed:   Stable Chronic Illnesses: Diabetes, CAD, hypertension  Level of Problems Addressed: Moderate (1+ chronic illness with exacerbation, progression, or side effects of treatment / 2+ stable chronic illnesses / 1 undiagnosed new problem with uncertain prognosis / 1 acute illness with systemic symptoms / 1 acute complicated injury)  Amount and/or Complexity of Data to be Reviewed and Analyzed: Moderate (1 of 3 Categories) Category 1 (Any three of the following, can duplicate except historian) Same as Limited Category 1 plus Assessment Requiring Independent Historian / Category 2 Independent Interpretation of Tests (not separately reported) / Category 3 Discussion of Management or Test Interpretation with External Physician  Risk of Complications and/or Morbidity or Mortality of Patient Management:   Moderate risk of morbidity from additional diagnostic testing or treatment (ex. prescription drug management / minor surgery with risk factors / elective major surgery without risk factors / diagnosis or treatment significantly limited by social determinants of health)  Today's visit has a medical decision making level of Moderate. I encourage further reading and education about your health conditions. Information on many healthconditions is provided by the American Academy of Family [employer]icians: https://familydoctor. org/  Please bring any questions to me at your next visit. Return to Office: Return for Routine Follow up with PCP.     Medication List:    Current Outpatient Medications   Medication Sig Dispense Refill    empagliflozin (JARDIANCE) 10 MG tablet Take 1 tablet by mouth daily 90 tablet 1    docusate sodium (COLACE) 100 MG capsule TAKE 1 CAPSULE BY MOUTH TWICE A DAY 60 capsule 0    metFORMIN (GLUCOPHAGE) 1000 MG tablet TAKE 1 TABLET TWICE A DAY WITH MEALS 180 tablet 3    insulin glargine (LANTUS SOLOSTAR) 100 UNIT/ML injection pen Inject 10 Units into the skin nightly 5 pen 5    FreeStyle Lancets MISC 1 each by Does not apply route daily 100 each 3    blood glucose test strips (ASCENSIA AUTODISC VI;ONE TOUCH ULTRA TEST VI) strip 1 each by In Vitro route 2 times daily As needed.  100 each 3    traZODone (DESYREL) 50 MG tablet TAKE 1 TABLET BY MOUTH EVERY DAY EVERY NIGHT 90 tablet 3    spironolactone (ALDACTONE) 25 MG tablet TAKE ONE-HALF (1/2) TABLET DAILY 45 tablet 4    famotidine (PEPCID) 20 MG tablet Take 1 tablet by mouth 2 times daily 60 tablet 3    glipiZIDE (GLUCOTROL) 10 MG tablet TAKE 1 TABLET TWICE A DAY BEFORE MEALS 180 tablet 3    JANUVIA 100 MG tablet TAKE 1 TABLET DAILY 90 tablet 3    rosuvastatin (CRESTOR) 20 MG tablet TAKE 1 TABLET DAILY 90 tablet 3    diclofenac sodium (VOLTAREN) 1 % GEL Apply 2 g topically 4 times daily 2 Tube 1    furosemide (LASIX) 20 MG tablet TAKE 1 TABLET DAILY 90 tablet 4    losartan (COZAAR) 25 MG tablet TAKE 1 TABLET TWICE A  tablet 4    metoprolol succinate (TOPROL XL) 25 MG extended release tablet TAKE 1 TABLET TWICE A  tablet 4    aspirin 81 MG tablet Take 81 mg by mouth daily      NONFORMULARY       Magnesium Oxide 500 MG TABS Take 0.5 tablets by mouth every morning       Bromelains 500 MG TABS Take 1 tablet by mouth every morning       Specialty Vitamins Products (COLLAGEN ULTRA PO) Take 2 tablets by mouth every morning        Current Facility-Administered Medications   Medication Dose Route Frequency Provider Last Rate Last Admin    regadenoson (LEXISCAN) injection 0.4 mg  0.4 mg Intravenous ONCE PRN Miguel Guido MD        perflutren lipid microspheres (DEFINITY) injection 1.65 mg  1.5 mL Intravenous ONCE PRN MD Naren Devine MD       This document may have been prepared at least partiallythrough the use of voice recognition software. Although effort is taken to assure the accuracy of this document, it is possible that grammatical, syntax,  or spelling errors may occur.

## 2021-05-13 NOTE — PROGRESS NOTES
Chief Complaint   Patient presents with    Cardiac Clearance    Congestive Heart Failure    Coronary Artery Disease       Patient Active Problem List    Diagnosis Date Noted    Lumbar radiculopathy 09/21/2020    Chronic systolic heart failure ([facility] [address] 09/10/2020     Overview Note:     YURI. Echo 9/9/2020: EF 40-45%, moderate MR      Diabetes mellitus type 2 in nonobese ([facility] [address] 09/10/2020    PVD (peripheral vascular disease) ([facility] [address] 10/07/2019    RBBB 10/09/2018    Coronary artery disease involving native coronary artery of native heart without angina pectoris 10/09/2018     Overview Note:     YURI. ACB 2004 T[facility] Toth): 3 vessel, no other details       Left ventricular systolic dysfunction 58/26/4071     Overview Note:     A. Echo 10/2/2018 ([facility]): EF 35%, inferolateral akinesis, RV systolic dysfunction,       Mixed hyperlipidemia 10/06/2018    Hypertension 06/11/2018       Current Outpatient Medications   Medication Sig Dispense Refill    docusate sodium (COLACE) 100 MG capsule TAKE 1 CAPSULE BY MOUTH TWICE A DAY 60 capsule 0    metFORMIN (GLUCOPHAGE) 1000 MG tablet TAKE 1 TABLET TWICE A DAY WITH MEALS 180 tablet 3    insulin glargine (LANTUS SOLOSTAR) 100 UNIT/ML injection pen Inject 10 Units into the skin nightly 5 pen 5    FreeStyle Lancets MISC 1 each by Does not apply route daily 100 each 3    blood glucose test strips (ASCENSIA AUTODISC VI;ONE TOUCH ULTRA TEST VI) strip 1 each by In Vitro route 2 times daily As needed.  100 each 3    traZODone (DESYREL) 50 MG tablet TAKE 1 TABLET BY MOUTH EVERY DAY EVERY NIGHT 90 tablet 3    spironolactone (ALDACTONE) 25 MG tablet TAKE ONE-HALF (1/2) TABLET DAILY 45 tablet 4    glipiZIDE (GLUCOTROL) 10 MG tablet TAKE 1 TABLET TWICE A DAY BEFORE MEALS 180 tablet 3    JANUVIA 100 MG tablet TAKE 1 TABLET DAILY 90 tablet 3    rosuvastatin (CRESTOR) 20 MG tablet TAKE 1 TABLET DAILY 90 tablet 3    diclofenac sodium (VOLTAREN) 1 % GEL Apply 2 g topically 4 times daily 2 Tube 1  furosemide (LASIX) 20 MG tablet TAKE 1 TABLET DAILY 90 tablet 4    losartan (COZAAR) 25 MG tablet TAKE 1 TABLET TWICE A  tablet 4    metoprolol succinate (TOPROL XL) 25 MG extended release tablet TAKE 1 TABLET TWICE A  tablet 4    aspirin 81 MG tablet Take 81 mg by mouth daily      Magnesium Oxide 500 MG TABS Take 0.5 tablets by mouth every morning       Bromelains 500 MG TABS Take 1 tablet by mouth every morning       Specialty Vitamins Products (COLLAGEN ULTRA PO) Take 2 tablets by mouth every morning       famotidine (PEPCID) 20 MG tablet Take 1 tablet by mouth 2 times daily (Patient not taking: Reported on 5/13/2021) 60 tablet 3    NONFORMULARY        Current Facility-Administered Medications   Medication Dose Route Frequency Provider Last Rate Last Admin    regadenoson (LEXISCAN) injection 0.4 mg  0.4 mg Intravenous ONCE PRN Tequila Wadsworth MD        perflutren lipid microspheres (DEFINITY) injection 1.65 mg  1.5 mL Intravenous ONCE PRN Tequila Wadsworth MD            Allergies   Allergen Reactions    Ciprofloxacin      Hives      Pcn [Penicillins] Hives    Sulfa Antibiotics Hives       Vitals:    05/13/21 0748   BP: (!) 154/91   Pulse: 69   Resp: 18   Weight: 147 lb (66.7 kg)   Height: 5' 9\" (1.753 m)       Social History     Socioeconomic History    Marital status:      Spouse name: Not on file    Number of children: Not on file    Years of education: Not on file    Highest education level: Not on file   Occupational History    Not on file   Social Needs    Financial resource strain: Not on file    Food insecurity     Worry: Not on file     Inability: Not on file    Transportation needs     Medical: Not on file     Non-medical: Not on file   Tobacco Use    Smoking status: Never Smoker    Smokeless tobacco: Never Used   Substance and Sexual Activity    Alcohol use:  Yes     Alcohol/week: 3.0 standard drinks     Types: 2 Shots of liquor, 1 Standard drinks or equivalent per week    Drug use: No    Sexual activity: Not Currently     Partners: Female   Lifestyle    Physical activity     Days per week: Not on file     Minutes per session: Not on file    Stress: Not on file   Relationships    Social connections     Talks on phone: Not on file     Gets together: Not on file     Attends Yazdanism service: Not on file     Active member of club or organization: Not on file     Attends meetings of clubs or organizations: Not on file     Relationship status: Not on file    Intimate partner violence     Fear of current or ex partner: Not on file     Emotionally abused: Not on file     Physically abused: Not on file     Forced sexual activity: Not on file   Other Topics Concern    Not on file   Social History Narrative    Not on file       Family History   Problem Relation Age of Onset    High Blood Pressure Mother     COPD Father     Diabetes Father     Heart Disease Brother         chf    Seizures Brother          SUBJECTIVE: An Heart presents to the office today for followup of chronic cardiac diagnoses and pre-op consult prior to back surgery with dr Toyin Pedro [facility] 5/25. Kaylen Crane He complains of no unstable cardiac symptoms and denies   chest pain, chest pressure/discomfort, claudication, dyspnea, exertional chest pressure/discomfort, fatigue, irregular heart beat, lower extremity edema, near-syncope, orthopnea, palpitations, paroxysmal nocturnal dyspnea, syncope and tachypnea. His functional capacity has decreased due to back pain         Review of Systems:   Heart: as above   Lungs: as above   Eyes: denies changes in vision or discharge. Ears: denies changes in hearing or pain. Nose: denies epistaxis or masses   Throat: denies sore throat or trouble swallowing. Neuro: denies numbness, tingling, tremors. Skin: denies rashes or itching.    : denies hematuria, dysuria   GI: denies vomiting, diarrhea   Psych: denies mood changed, anxiety, depression. all others negative. Physical Exam   BP (!) 154/91   Pulse 69   Resp 18   Ht 5' 9\" (1.753 m)   Wt 147 lb (66.7 kg)   BMI 21.71 kg/m²   Constitutional: Oriented to person, place, and time. Well-developed and well-nourished. No distress. Head: Normocephalic and atraumatic. Eyes: EOM are normal. Pupils are equal, round, and reactive to light. Neck: Normal range of motion. Neck supple. No hepatojugular reflux and no JVD present. Carotid bruit is not present. No tracheal deviation present. No thyromegaly present. Cardiovascular: Normal rate, regular rhythm, normal heart sounds and intact distal pulses. Exam reveals no gallop and no friction rub. No murmur heard. Pulmonary/Chest: Effort normal and breath sounds normal. No respiratory distress. No wheezes. No rales. No tenderness. Abdominal: Soft. Bowel sounds are normal. No distension and no mass. No tenderness. No rebound and no guarding. Musculoskeletal: Normal range of motion. No edema and no tenderness. Neurological: Alert and oriented to person, place, and time. Skin: Skin is warm and dry. No rash noted. Not diaphoretic. No erythema. Psychiatric: Normal mood and affect. Behavior is normal.     EKG:  normal sinus rhythm, rate 69, axis -58RBBB, infero-lateral MI, no change    ASSESSMENT AND PLAN:  Patient Active Problem List   Diagnosis        Atherosclerosis of native artery of left lower extremity    Hypertension    Type 2 diabetes mellitus,     ICM:  On GDMT  . Stage C, HFrEF. NYHA I     Mixed hyperlipidemia: on  high intensity statin Target LDL < 70. Last LDL 50      RBBB: no symptoms or signs of high grade AVB    Coronary artery disease :  3 vessel ACB 2004, likely with graft occlusion. No angina.       Hx of ACB 17 years ago with likely graft occlusion  HFrEF  RCRI Class III or IV - cardiac risk of complications 4.0-60% in this elective surgery  Will get Lexiscan for further risk stratification  Would recommend procedure be done in a medical setting with on-site cardiology backup  Would recommend addition of SGLT2 inhibitor Hany Shahid or Merna) to lower risk of cardiac death or heart failure      Nickie Arvizu M.D  [id number] [facility]

## 2021-05-19 ENCOUNTER — TELEPHONE (OUTPATIENT)
Dept: CARDIOLOGY | Age: 76
End: 2021-05-19

## 2021-05-19 NOTE — TELEPHONE ENCOUNTER
Left message on voice mail reminding patient of Lexiscan stress test appointment on May 21, 2021 at 0730. Instructions for test left on voice mail. Instructed patient to call stress lab with any questions or if unable to keep appointment.

## 2021-05-21 ENCOUNTER — HOSPITAL ENCOUNTER (OUTPATIENT)
Dept: CARDIOLOGY | Age: 76
Discharge: HOME | End: 2021-05-21
Payer: MEDICARE

## 2021-05-21 VITALS
HEART RATE: 66 BPM | HEIGHT: 69 IN | SYSTOLIC BLOOD PRESSURE: 130 MMHG | RESPIRATION RATE: 16 BRPM | WEIGHT: 145 LBS | DIASTOLIC BLOOD PRESSURE: 70 MMHG | BODY MASS INDEX: 21.48 KG/M2

## 2021-05-21 DIAGNOSIS — M54.16 LUMBAR RADICULOPATHY: ICD-10-CM

## 2021-05-21 DIAGNOSIS — I50.22 CHRONIC SYSTOLIC HEART FAILURE (HCC): ICD-10-CM

## 2021-05-21 DIAGNOSIS — I25.10 CORONARY ARTERY DISEASE INVOLVING NATIVE CORONARY ARTERY OF NATIVE HEART WITHOUT ANGINA PECTORIS: ICD-10-CM

## 2021-05-21 DIAGNOSIS — Z01.810 PREOP CARDIOVASCULAR EXAM: ICD-10-CM

## 2021-05-21 PROCEDURE — A9500 TC99M SESTAMIBI: HCPCS | Performed by: INTERNAL MEDICINE

## 2021-05-21 PROCEDURE — 93017 CV STRESS TEST TRACING ONLY: CPT

## 2021-05-21 PROCEDURE — 78452 HT MUSCLE IMAGE SPECT MULT: CPT

## 2021-05-21 PROCEDURE — 3430000000 HC RX DIAGNOSTIC RADIOPHARMACEUTICAL: Performed by: INTERNAL MEDICINE

## 2021-05-21 PROCEDURE — 6360000002 HC RX W HCPCS: Performed by: INTERNAL MEDICINE

## 2021-05-21 PROCEDURE — 2580000003 HC RX 258: Performed by: INTERNAL MEDICINE

## 2021-05-21 RX ORDER — SODIUM CHLORIDE 0.9 % (FLUSH) 0.9 %
10 SYRINGE (ML) INJECTION PRN
Status: DISCONTINUED | OUTPATIENT
Start: 2021-05-21 | End: 2021-05-22 | Stop reason: HOSPADM

## 2021-05-21 RX ADMIN — Medication 10.3 MILLICURIE: at 07:38

## 2021-05-21 RX ADMIN — SODIUM CHLORIDE, PRESERVATIVE FREE 10 ML: 5 INJECTION INTRAVENOUS at 07:39

## 2021-05-21 RX ADMIN — REGADENOSON 0.4 MG: 0.08 INJECTION, SOLUTION INTRAVENOUS at 09:05

## 2021-05-21 RX ADMIN — SODIUM CHLORIDE, PRESERVATIVE FREE 10 ML: 5 INJECTION INTRAVENOUS at 09:05

## 2021-05-21 RX ADMIN — Medication 30.6 MILLICURIE: at 09:05

## 2021-05-21 RX ADMIN — SODIUM CHLORIDE, PRESERVATIVE FREE 10 ML: 5 INJECTION INTRAVENOUS at 09:06

## 2021-05-21 NOTE — PROCEDURES
[address] [facility] [address]  [phone]                Pharmacologic Stress Nuclear Gated SPECT Study    Name: [address] Account Number: [de-identified]    :  1945          Sex: male         Date of Study:  2021    Height: 5' 9\" (175.3 cm)         Weight: 145 lb (65.8 kg)     Ordering Provider: Mortimer Hams, MD          PCP: Rahat Mendoza MD      Cardiologist: Mortimer Hams, MD           Interpreting Physician: Mortimer Hams, MD   _________________________________________________________________________________    Indication:   Preoperative Risk Stratification    Clinical History:   Patient has prior history of coronary artery disease. Procedure:   Pharmacologic stress testing was performed with regadenoson 0.4 mg for 15 seconds. The heart rate was 69 at baseline and jean pierre to 83 beats during the infusion. This corresponds to 57% of maximum predicted heart rate. The blood pressure at baseline was 130/70 and blood pressure at the end of infusion was 122/78. Blood pressure response was normal during the stress procedure. The patient tolerated the infusion well. ECG during the infusion did not change. IMAGING: Myocardial perfusion imaging was performed at rest 30-35 minutes following the intravenous injection of 10.3 mCi of (Tc-Sestamibi) followed by 10 ml of Normal Saline. As per infusion protocol, the patient was injected intravenously with 30.6 mCi of (Tc-Sestamibi) followed by 10 ml of Normal Saline. Gated post-stress tomographic imaging was performed 20-25 minutes after stress. FINDINGS: The overall quality of the study was excellent. Left ventricular cavity size was noted to be enlarged on both rest and stress studies. Rotational analog analysis demonstrated no patient motion or abnormal extracardiac radioactivity.         A severe defect was present in the basal inferolateral, basal inferior, mid inferolateral, mid inferior and apex lateral wall(s) that was  large sized by quantification. The resting images show no change. Gated SPECT left ventricular ejection fraction was calculated to be 23%, with akinesis of the inferior and lateral walls wall. Impression:    1. ECG during the infusion did not change. 2. The myocardial perfusion imaging was abnormal.  SEE ABOVE  3. Overall left ventricular systolic function was abnormal with regional wall motion abnormalities. 4. High risk pre-operative pharmacologic nuclear stress test due to severe LV dysfunction. Thank you for sending your patient to this [facility].      Electronically signed by Yumi Arreola MD on 5/21/21 at 11:07 AM EDT

## 2021-06-01 ENCOUNTER — TELEPHONE (OUTPATIENT)
Dept: FAMILY MEDICINE CLINIC | Age: 76
End: 2021-06-01

## 2021-06-01 NOTE — TELEPHONE ENCOUNTER
Stress test was completed on 5/21/21. Can you please addend note and state if patient is cleared for surgery.   - form given to  to be completed

## 2021-06-01 NOTE — PROGRESS NOTES
Left voice message for Obdulia Chase at Dr. Marilynn Juares office patient is scheduled for PAT on 6/4 am.  Need orders prior to PAT visit.

## 2021-06-02 ENCOUNTER — PREP FOR PROCEDURE (OUTPATIENT)
Dept: ORTHOPEDIC SURGERY | Age: 76
End: 2021-06-02

## 2021-06-02 RX ORDER — SODIUM CHLORIDE 0.9 % (FLUSH) 0.9 %
5-40 SYRINGE (ML) INJECTION EVERY 12 HOURS SCHEDULED
Status: CANCELLED | OUTPATIENT
Start: 2021-06-02

## 2021-06-02 RX ORDER — SODIUM CHLORIDE 9 MG/ML
25 INJECTION, SOLUTION INTRAVENOUS PRN
Status: CANCELLED | OUTPATIENT
Start: 2021-06-02

## 2021-06-02 RX ORDER — SODIUM CHLORIDE 0.9 % (FLUSH) 0.9 %
5-40 SYRINGE (ML) INJECTION PRN
Status: CANCELLED | OUTPATIENT
Start: 2021-06-02

## 2021-06-02 NOTE — PROGRESS NOTES
[address] [facility] PRE-ADMISSION TESTING GENERAL INSTRUCTIONS- [facility]-phone number:[phone]    GENERAL INSTRUCTIONS  [x] Antibacterial Soap shower Night before of Surgery  [x] Jose wipe instruction sheet and wipes given. [x] Nothing by mouth after midnight, including gum, candy, mints, or water. [x] You may brush your teeth, gargle, but do NOT swallow water. [x]No smoking, chewing tobacco, illegal drugs, or alcohol within 24 hours of your surgery. [x] Jewelry, valuables or body piercing's should not be brought to the hospital. All body and/or tongue piercing's must be removed prior to arriving to hospital.  ALL hair pins must be removed. [x] Do not wear makeup, lotions, powders, deodorant. Nail polish as directed by the nurse. [x] Arrange transportation with a responsible adult  to and from the hospital. If you do not have a responsible adult  to transport you, you will need to make arrangements with a medical transportation company (i.e. [employer]. A Uber/taxi/bus is not appropriate unless you are accompanied by a responsible adult ). Arrange for someone to be with you for the remainder of the day and for 24 hours after your procedure due to having had anesthesia. [x] Bring insurance card and photo ID. [x] Transfusion Bracelet: Please bring with you to hospital, day of surgery     PARKING INSTRUCTIONS:   [x] Arrival Time: 8:30 am. One person can accompany, wear mask  · [x] Parking lot '\"I\"  is located on [facility] (the corner of [facility]). To enter, press the button and the gate will lift. A free token will be provided to exit the lot. One car per patient is allowed to park in this lot. All other cars are to park on [address] [place] [address] Street either in the parking garage or the handicap lot.       EDUCATION INSTRUCTIONS:         [x] Pre-admission Testing educational folder given  [x] Incentive Spirometry,coughing & deep breathing exercises reviewed. [x]Medication information sheet(s)   [x]Fluoroscopy-Xray used in surgery reviewed with patient. Educational pamphlet placed in chart. [x]Pain: Post-op pain is normal and to be expected. You will be asked to rate your pain from 0-10(a zero is not acceptable-education is needed). Your post-op pain goal is:  [x] Ask your nurse for your pain medication. MEDICATION INSTRUCTIONS:   [x]Bring a complete list of your medications, please write the last time you took the medicine, give this list to the nurse. [x] Take the following medications the morning of surgery with 1-2 ounces of water: see med list  [x] Stop herbal supplements and vitamins 5 days before your surgery. [x] DO NOT take any diabetic medicine the morning of surgery. Follow instructions for insulin the day before surgery. [x] If you are diabetic and your blood sugar is low or you feel symptomatic, you may drink 1-2 ounces of apple juice or take a glucose tablet. The morning of your procedure, you may call the pre-op area if you have concerns about your blood sugar [phone]. [x] Follow physician instructions regarding any blood thinners you may be taking. WHAT TO EXPECT:  [x] The day of surgery you will be greeted and checked in by the [employer].  In addition, you will be registered in the [facility] by a Patient Access Representative. Please bring your photo ID and insurance card. A nurse will greet you in accordance to the time you are needed in the pre-op area to prepare you for surgery. Please do not be discouraged if you are not greeted in the order you arrive as there are many variables that are involved in patient preparation. Your patience is greatly appreciated as you wait for your nurse. Please bring in items such as: books, magazines, newspapers, electronics, or any other items  to occupy your time in the waiting area.     [x]  Delays may occur with surgery and staff will make a sincere effort to keep you informed of delays. If any delays occur with your procedure, we apologize ahead of time for your inconvenience as we recognize the value of your time.

## 2021-06-03 ENCOUNTER — TELEPHONE (OUTPATIENT)
Dept: CARDIOLOGY CLINIC | Age: 76
End: 2021-06-03

## 2021-06-03 NOTE — TELEPHONE ENCOUNTER
Cardiac risk of complications 1-11%  I had recommended in my initial consult it be done   At a hospital with cardiology presence

## 2021-06-04 ENCOUNTER — HOSPITAL ENCOUNTER (OUTPATIENT)
Dept: PREADMISSION TESTING | Age: 76
Discharge: HOME | End: 2021-06-04
Payer: MEDICARE

## 2021-06-04 VITALS
OXYGEN SATURATION: 96 % | TEMPERATURE: 98 F | RESPIRATION RATE: 16 BRPM | DIASTOLIC BLOOD PRESSURE: 65 MMHG | HEART RATE: 63 BPM | SYSTOLIC BLOOD PRESSURE: 141 MMHG

## 2021-06-04 DIAGNOSIS — Z01.812 PRE-OPERATIVE LABORATORY EXAMINATION: ICD-10-CM

## 2021-06-04 LAB
ABO/RH: NORMAL
ANION GAP SERPL CALCULATED.3IONS-SCNC: 10 MMOL/L (ref 7–16)
ANTIBODY SCREEN: NORMAL
BUN BLDV-MCNC: 28 MG/DL (ref 6–23)
CALCIUM SERPL-MCNC: 9.7 MG/DL (ref 8.6–10.2)
CHLORIDE BLD-SCNC: 103 MMOL/L (ref 98–107)
CO2: 25 MMOL/L (ref 22–29)
CREAT SERPL-MCNC: 1.2 MG/DL (ref 0.7–1.2)
GFR AFRICAN AMERICAN: >60
GFR NON-AFRICAN AMERICAN: 59 ML/MIN/1.73
GLUCOSE BLD-MCNC: 166 MG/DL (ref 74–99)
HCT VFR BLD CALC: 39 % (ref 37–54)
HEMOGLOBIN: 12.4 G/DL (ref 12.5–16.5)
MCH RBC QN AUTO: 29.5 PG (ref 26–35)
MCHC RBC AUTO-ENTMCNC: 31.8 % (ref 32–34.5)
MCV RBC AUTO: 92.6 FL (ref 80–99.9)
PDW BLD-RTO: 13 FL (ref 11.5–15)
PLATELET # BLD: 204 E9/L (ref 130–450)
PMV BLD AUTO: 10.3 FL (ref 7–12)
POTASSIUM SERPL-SCNC: 5.3 MMOL/L (ref 3.5–5)
RBC # BLD: 4.21 E12/L (ref 3.8–5.8)
SODIUM BLD-SCNC: 138 MMOL/L (ref 132–146)
WBC # BLD: 8.7 E9/L (ref 4.5–11.5)

## 2021-06-04 PROCEDURE — 36415 COLL VENOUS BLD VENIPUNCTURE: CPT

## 2021-06-04 PROCEDURE — 87081 CULTURE SCREEN ONLY: CPT

## 2021-06-04 PROCEDURE — 86901 BLOOD TYPING SEROLOGIC RH(D): CPT

## 2021-06-04 PROCEDURE — 85027 COMPLETE CBC AUTOMATED: CPT

## 2021-06-04 PROCEDURE — 80048 BASIC METABOLIC PNL TOTAL CA: CPT

## 2021-06-04 PROCEDURE — 86850 RBC ANTIBODY SCREEN: CPT

## 2021-06-04 PROCEDURE — 86900 BLOOD TYPING SEROLOGIC ABO: CPT

## 2021-06-04 ASSESSMENT — PAIN DESCRIPTION - ORIENTATION: ORIENTATION: LOWER

## 2021-06-04 ASSESSMENT — PAIN DESCRIPTION - FREQUENCY: FREQUENCY: CONTINUOUS

## 2021-06-04 ASSESSMENT — PAIN SCALES - GENERAL: PAINLEVEL_OUTOF10: 4

## 2021-06-04 ASSESSMENT — PAIN DESCRIPTION - LOCATION: LOCATION: BACK

## 2021-06-04 NOTE — PROGRESS NOTES
Message left with Lewis at dr Lawrence's office regarding need to stop baby asa. . Requested they call pt with information.

## 2021-06-05 LAB — MRSA CULTURE ONLY: NORMAL

## 2021-06-10 ENCOUNTER — ANESTHESIA EVENT (OUTPATIENT)
Dept: OPERATING ROOM | Age: 76
End: 2021-06-10
Payer: MEDICARE

## 2021-06-11 ENCOUNTER — HOSPITAL ENCOUNTER (OUTPATIENT)
Age: 76
Setting detail: SURGERY ADMIT
Discharge: HOME | End: 2021-06-11
Attending: ORTHOPAEDIC SURGERY | Admitting: ORTHOPAEDIC SURGERY
Payer: MEDICARE

## 2021-06-11 ENCOUNTER — ANESTHESIA (OUTPATIENT)
Dept: OPERATING ROOM | Age: 76
End: 2021-06-11
Payer: MEDICARE

## 2021-06-11 VITALS
BODY MASS INDEX: 21.48 KG/M2 | HEIGHT: 69 IN | OXYGEN SATURATION: 94 % | WEIGHT: 145 LBS | TEMPERATURE: 97.7 F | SYSTOLIC BLOOD PRESSURE: 184 MMHG | RESPIRATION RATE: 16 BRPM | HEART RATE: 64 BPM | DIASTOLIC BLOOD PRESSURE: 80 MMHG

## 2021-06-11 DIAGNOSIS — Z01.812 PRE-OPERATIVE LABORATORY EXAMINATION: Primary | ICD-10-CM

## 2021-06-11 LAB
ANION GAP SERPL CALCULATED.3IONS-SCNC: 12 MMOL/L (ref 7–16)
BUN BLDV-MCNC: 31 MG/DL (ref 6–23)
CALCIUM SERPL-MCNC: 9.7 MG/DL (ref 8.6–10.2)
CHLORIDE BLD-SCNC: 105 MMOL/L (ref 98–107)
CO2: 22 MMOL/L (ref 22–29)
CREAT SERPL-MCNC: 1.3 MG/DL (ref 0.7–1.2)
GFR AFRICAN AMERICAN: >60
GFR NON-AFRICAN AMERICAN: 54 ML/MIN/1.73
GLUCOSE BLD-MCNC: 125 MG/DL (ref 74–99)
METER GLUCOSE: 126 MG/DL (ref 74–99)
POTASSIUM SERPL-SCNC: 5.8 MMOL/L (ref 3.5–5)
SODIUM BLD-SCNC: 139 MMOL/L (ref 132–146)

## 2021-06-11 PROCEDURE — 2580000003 HC RX 258: Performed by: ORTHOPAEDIC SURGERY

## 2021-06-11 PROCEDURE — 80048 BASIC METABOLIC PNL TOTAL CA: CPT

## 2021-06-11 PROCEDURE — 82962 GLUCOSE BLOOD TEST: CPT

## 2021-06-11 PROCEDURE — 36415 COLL VENOUS BLD VENIPUNCTURE: CPT

## 2021-06-11 PROCEDURE — 6360000002 HC RX W HCPCS: Performed by: ORTHOPAEDIC SURGERY

## 2021-06-11 RX ORDER — MEPERIDINE HYDROCHLORIDE 25 MG/ML
12.5 INJECTION INTRAMUSCULAR; INTRAVENOUS; SUBCUTANEOUS EVERY 5 MIN PRN
Status: DISCONTINUED | OUTPATIENT
Start: 2021-06-11 | End: 2021-06-11 | Stop reason: HOSPADM

## 2021-06-11 RX ORDER — PROMETHAZINE HYDROCHLORIDE 25 MG/ML
25 INJECTION, SOLUTION INTRAMUSCULAR; INTRAVENOUS PRN
Status: DISCONTINUED | OUTPATIENT
Start: 2021-06-11 | End: 2021-06-11 | Stop reason: HOSPADM

## 2021-06-11 RX ORDER — CLINDAMYCIN PHOSPHATE 900 MG/50ML
900 INJECTION INTRAVENOUS
Status: DISCONTINUED | OUTPATIENT
Start: 2021-06-11 | End: 2021-06-11 | Stop reason: HOSPADM

## 2021-06-11 RX ORDER — SODIUM CHLORIDE 0.9 % (FLUSH) 0.9 %
5-40 SYRINGE (ML) INJECTION PRN
Status: DISCONTINUED | OUTPATIENT
Start: 2021-06-11 | End: 2021-06-11 | Stop reason: HOSPADM

## 2021-06-11 RX ORDER — LABETALOL HYDROCHLORIDE 5 MG/ML
5 INJECTION, SOLUTION INTRAVENOUS EVERY 10 MIN PRN
Status: DISCONTINUED | OUTPATIENT
Start: 2021-06-11 | End: 2021-06-11 | Stop reason: HOSPADM

## 2021-06-11 RX ORDER — SODIUM CHLORIDE 9 MG/ML
25 INJECTION, SOLUTION INTRAVENOUS PRN
Status: DISCONTINUED | OUTPATIENT
Start: 2021-06-11 | End: 2021-06-11 | Stop reason: HOSPADM

## 2021-06-11 RX ORDER — SODIUM CHLORIDE 0.9 % (FLUSH) 0.9 %
5-40 SYRINGE (ML) INJECTION EVERY 12 HOURS SCHEDULED
Status: DISCONTINUED | OUTPATIENT
Start: 2021-06-11 | End: 2021-06-11 | Stop reason: HOSPADM

## 2021-06-11 RX ADMIN — DEXAMETHASONE SODIUM PHOSPHATE 12 MG: 4 INJECTION, SOLUTION INTRAMUSCULAR; INTRAVENOUS at 10:23

## 2021-06-11 ASSESSMENT — LIFESTYLE VARIABLES: SMOKING_STATUS: 0

## 2021-06-11 ASSESSMENT — PAIN - FUNCTIONAL ASSESSMENT: PAIN_FUNCTIONAL_ASSESSMENT: 0-10

## 2021-06-11 NOTE — PROGRESS NOTES
was here to speak to patient , surgery is cancelled d/t abnormal bloodwork, patient instructed to follow-up with PCP

## 2021-06-11 NOTE — ANESTHESIA PRE PROCEDURE
Department of Anesthesiology  Preprocedure Note       Name:  Layo Ruelas   Age:  77 y.o.  :  1945                                          MRN:  [id number]         Date:  2021      Surgeon: Dee Chinchilla):  Velia Simon DO    Procedure: Procedure(s):  360 FUSION L5- S1 , SEGMENTAL DECOMPRESSION L4- L5 ---SPINAL ELEMENTS    Medications prior to admission:   Prior to Admission medications    Medication Sig Start Date End Date Taking?  Authorizing Provider   docusate sodium (COLACE) 100 MG capsule TAKE 1 CAPSULE BY MOUTH TWICE A DAY  Patient taking differently: Patient takes as needed 5/3/21  Yes Chema Colvin MD   metFORMIN (GLUCOPHAGE) 1000 MG tablet TAKE 1 TABLET TWICE A DAY WITH MEALS 21  Yes Chema Colvin MD   insulin glargine (LANTUS SOLOSTAR) 100 UNIT/ML injection pen Inject 10 Units into the skin nightly 2/15/21  Yes Chema Colvin MD   traZODone (DESYREL) 50 MG tablet TAKE 1 TABLET BY MOUTH EVERY DAY EVERY NIGHT 21  Yes Chema Colvin MD   spironolactone (ALDACTONE) 25 MG tablet TAKE ONE-HALF (1/2) TABLET DAILY 21  Yes Chema Colvin MD   glipiZIDE (GLUCOTROL) 10 MG tablet TAKE 1 TABLET TWICE A DAY BEFORE MEALS 12/15/20  Yes Chema Colvin MD   JANUVIA 100 MG tablet TAKE 1 TABLET DAILY 20  Yes Chema Colvin MD   rosuvastatin (CRESTOR) 20 MG tablet TAKE 1 TABLET DAILY 10/22/20  Yes Chema Colvin MD   diclofenac sodium (VOLTAREN) 1 % GEL Apply 2 g topically 4 times daily 9/10/20  Yes Chema Colvin MD   losartan (COZAAR) 25 MG tablet TAKE 1 TABLET TWICE A DAY 10/28/19  Yes Chema Colvin MD   metoprolol succinate (TOPROL XL) 25 MG extended release tablet TAKE 1 TABLET TWICE A DAY 10/28/19  Yes Chema Colvin MD   FreeStyle Lancets [address] [facility] 1 each by Does not apply route daily 21   Chema Colvin MD   blood glucose test strips (ASCENSIA AUTODISC VI;ONE TOUCH ULTRA TEST VI) strip 1 each by In Vitro route 2 times daily As needed. 2/6/21   Gene Franco MD   aspirin 81 MG tablet Take 81 mg by mouth daily    Historical Provider, MD   NONFORMULARY     Historical Provider, MD   Magnesium Oxide 500 MG TABS Take 0.5 tablets by mouth every morning     Historical Provider, MD   Specialty Vitamins Products (COLLAGEN ULTRA PO) Take 2 tablets by mouth every morning     Historical Provider, MD       Current medications:    Current Facility-Administered Medications   Medication Dose Route Frequency Provider Last Rate Last Admin    0.9 % sodium chloride infusion  25 mL Intravenous PRN Euell Lenin, DO        clindamycin (CLEOCIN) 900 mg in dextrose 5 % 50 mL IVPB  900 mg Intravenous On Call to [facility], DO        sodium chloride flush 0.9 % injection 5-40 mL  5-40 mL Intravenous 2 times per day Euell Quinton, DO        sodium chloride flush 0.9 % injection 5-40 mL  5-40 mL Intravenous PRN Katherin Lawrence,         dexamethasone (DECADRON) 12 mg in sodium chloride 0.9 % IVPB  12 mg Intravenous Once Euell Quinton, DO           Allergies:     Allergies   Allergen Reactions    Ciprofloxacin      Hives      Pcn [Penicillins] Hives    Sulfa Antibiotics Hives       Problem List:    Patient Active Problem List   Diagnosis Code    Hypertension I10    Left ventricular systolic dysfunction E28.4    Mixed hyperlipidemia E78.2    RBBB I45.10    Coronary artery disease involving native coronary artery of native heart without angina pectoris I25.10    PVD (peripheral vascular disease) ([facility]) I73.9    Chronic systolic heart failure ([facility]) I50.22    Diabetes mellitus type 2 in nonobese ([facility]) E11.9    Lumbar radiculopathy M54.16       Past Medical History:        Diagnosis Date    Atherosclerosis of native artery of lower extremity with ulceration of midfoot ([facility] [address] 3/29/2017    CAD (coronary artery disease) 2009    history of    Chronic osteomyelitis of left foot ([facility] [address] 3/29/2017    Diabetes mellitus ([facility] [address]     Diabetic neuropathy ([facility] [address]     Diabetic ulcer of left foot associated with type 2 diabetes mellitus ([facility] [address] 03/29/2017    healed 6-2017 per pt    Peripheral vascular disease ([facility] [address]     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 Meeta Mcgee Left 05/2017    SFA, pop atherectomy, Dr. Herminia Pitts Left 2009    2nd and 5th toe    TONSILLECTOMY         Social History:    Social History     Tobacco Use    Smoking status: Never Smoker    Smokeless tobacco: Never Used   Substance Use Topics    Alcohol use:  Yes     Alcohol/week: 3.0 standard drinks     Types: 2 Shots of liquor, 1 Standard drinks or equivalent per week                                Counseling given: Not Answered      Vital Signs (Current):   Vitals:    06/11/21 0851   BP: (!) 184/80   Pulse: 64   Resp: 16   Temp: 36.5 °C (97.7 °F)   TempSrc: Temporal   SpO2: 94%   Weight: 145 lb (65.8 kg)   Height: 5' 9\" (1.753 m)                                              BP Readings from Last 3 Encounters:   06/11/21 (!) 184/80   06/04/21 (!) 141/65   05/21/21 130/70       NPO Status: Time of last liquid consumption: 2300                        Time of last solid consumption: 2300                        Date of last liquid consumption: 06/10/21                        Date of last solid food consumption: 06/10/21    BMI:   Wt Readings from Last 3 Encounters:   06/11/21 145 lb (65.8 kg)   05/21/21 145 lb (65.8 kg)   05/13/21 145 lb (65.8 kg)     Body mass index is 21.41 kg/m². CBC:   Lab Results   Component Value Date    WBC 8.7 06/04/2021    RBC 4.21 06/04/2021    HGB 12.4 06/04/2021    HCT 39.0 06/04/2021    MCV 92.6 06/04/2021    RDW 13.0 06/04/2021     06/04/2021       CMP:   Lab Results   Component Value Date     06/04/2021    K 5.3 06/04/2021    K 3.6 10/02/2018     06/04/2021    CO2 25 06/04/2021    BUN 28 06/04/2021    CREATININE 1.2 06/04/2021    GFRAA >60 06/04/2021    LABGLOM 59 06/04/2021    GLUCOSE 166 06/04/2021    PROT 7.9 01/27/2021    CALCIUM 9.7 06/04/2021    BILITOT 0.7 01/27/2021    ALKPHOS 97 01/27/2021    AST 23 01/27/2021    ALT 9 01/27/2021       POC Tests: No results for input(s): POCGLU, POCNA, POCK, POCCL, POCBUN, POCHEMO, POCHCT in the last 72 hours. Coags:   Lab Results   Component Value Date    PROTIME 13.2 10/01/2018    INR 1.2 10/01/2018    APTT 29.0 10/01/2018       HCG (If Applicable): No results found for: PREGTESTUR, PREGSERUM, HCG, HCGQUANT     ABGs: No results found for: PHART, PO2ART, CPT1SDB, JJL6SBG, BEART, J2JYALRD     Type & Screen (If Applicable):  No results found for: LABABO, LABRH    Drug/Infectious Status (If Applicable):  No results found for: HIV, HEPCAB    COVID-19 Screening (If Applicable): No results found for: COVID19    EKG- [id number] \"Sinus  Rhythm  rate 69  axis -58    -First degree A-V block   -Right bundle branch block with left axis -bifascicular block. - Extensive infero-lateral infarct \"  QTc WNL    TTE- [id number] \" Summary   Normal left ventricle size. Moderate concentric left ventricular hypertrophy. Ejection fraction is visually estimated at 40-45% with inferior akinesis. Moderate mitral regurgitation is present.    Mild aortic regurgitation\"      Anesthesia Evaluation  Patient summary reviewed no history of anesthetic complications:   Airway: Mallampati: IV  TM distance: >3 FB   Neck ROM: full  Mouth opening: > = 3 FB Dental:          Pulmonary:normal exam  breath sounds clear to auscultation      (-) not a current smoker                           Cardiovascular:    (+) hypertension:, CAD:, CHF (EF 23%):, hyperlipidemia      ECG reviewed  Rhythm: regular  Rate: normal  Echocardiogram reviewed  Stress test reviewed       Beta Blocker:  Dose within 24 Hrs         Neuro/Psych:   (+) neuromuscular disease ( Lumbar radiculopathy ):,              ROS comment: Lumbar Radiculopathy  Diabetic neuropathy  GI/Hepatic/Renal: Neg GI/Hepatic/Renal ROS            Endo/Other:    (+) DiabetesType II DM, using insulin, . ROS comment: Right foot drop Abdominal:           Vascular:   + PVD, aortic or cerebral, . Anesthesia Plan      general     ASA 4       Induction: intravenous. BIS and arterial line  MIPS: Postoperative opioids intended. Anesthetic plan and risks discussed with patient. Use of blood products discussed with patient whom consented to blood products. Plan discussed with CRNA and attending. Karolina Gale RN   6/11/2021    Pt seen, examined, chart reviewed, plan discussed.   Yobany Rashid MD  6/11/2021  10:29 AM

## 2021-06-14 ENCOUNTER — HOSPITAL ENCOUNTER (OUTPATIENT)
Age: 76
Discharge: HOME | End: 2021-06-14
Payer: MEDICARE

## 2021-06-14 ENCOUNTER — OFFICE VISIT (OUTPATIENT)
Dept: FAMILY MEDICINE CLINIC | Age: 76
End: 2021-06-14
Payer: MEDICARE

## 2021-06-14 VITALS
HEIGHT: 69 IN | SYSTOLIC BLOOD PRESSURE: 138 MMHG | OXYGEN SATURATION: 100 % | RESPIRATION RATE: 18 BRPM | BODY MASS INDEX: 21.48 KG/M2 | TEMPERATURE: 98 F | WEIGHT: 145 LBS | HEART RATE: 62 BPM | DIASTOLIC BLOOD PRESSURE: 68 MMHG

## 2021-06-14 DIAGNOSIS — E87.5 HYPERKALEMIA: ICD-10-CM

## 2021-06-14 DIAGNOSIS — E87.5 HYPERKALEMIA: Primary | ICD-10-CM

## 2021-06-14 DIAGNOSIS — Z79.4 TYPE 2 DIABETES MELLITUS WITH DIABETIC PERIPHERAL ANGIOPATHY WITHOUT GANGRENE, WITH LONG-TERM CURRENT USE OF INSULIN (HCC): ICD-10-CM

## 2021-06-14 DIAGNOSIS — Z12.11 SCREENING FOR COLON CANCER: ICD-10-CM

## 2021-06-14 DIAGNOSIS — E11.51 TYPE 2 DIABETES MELLITUS WITH DIABETIC PERIPHERAL ANGIOPATHY WITHOUT GANGRENE, WITH LONG-TERM CURRENT USE OF INSULIN (HCC): ICD-10-CM

## 2021-06-14 LAB
ANION GAP SERPL CALCULATED.3IONS-SCNC: 11 MMOL/L (ref 7–16)
BUN BLDV-MCNC: 38 MG/DL (ref 6–23)
CALCIUM SERPL-MCNC: 9.5 MG/DL (ref 8.6–10.2)
CHLORIDE BLD-SCNC: 104 MMOL/L (ref 98–107)
CO2: 21 MMOL/L (ref 22–29)
CREAT SERPL-MCNC: 1.3 MG/DL (ref 0.7–1.2)
GFR AFRICAN AMERICAN: >60
GFR NON-AFRICAN AMERICAN: 54 ML/MIN/1.73
GLUCOSE BLD-MCNC: 215 MG/DL (ref 74–99)
POTASSIUM SERPL-SCNC: 4.9 MMOL/L (ref 3.5–5)
SODIUM BLD-SCNC: 136 MMOL/L (ref 132–146)

## 2021-06-14 PROCEDURE — 3052F HG A1C>EQUAL 8.0%<EQUAL 9.0%: CPT | Performed by: FAMILY MEDICINE

## 2021-06-14 PROCEDURE — 80048 BASIC METABOLIC PNL TOTAL CA: CPT

## 2021-06-14 PROCEDURE — 99214 OFFICE O/P EST MOD 30 MIN: CPT | Performed by: FAMILY MEDICINE

## 2021-06-14 PROCEDURE — 36415 COLL VENOUS BLD VENIPUNCTURE: CPT

## 2021-06-14 NOTE — PROGRESS NOTES
A/P:    Brandi Head was seen today for diabetes and health maintenance. Diagnoses and all orders for this visit:    Hyperkalemia  -     Specimen was hemolyzed. Recheck BASIC METABOLIC PANEL; Future    Type 2 diabetes mellitus with diabetic peripheral angiopathy without gangrene, with long-term current use of insulin (HCC)  - Continue the same. Monitor sugars  -  Diabetic foot exam    Screening for colon cancer  -     Cologuard;  Future      F/u as isntructed

## 2021-07-06 ENCOUNTER — TELEPHONE (OUTPATIENT)
Dept: FAMILY MEDICINE CLINIC | Age: 76
End: 2021-07-06

## 2021-07-06 DIAGNOSIS — M54.16 LUMBAR RADICULOPATHY: ICD-10-CM

## 2021-07-06 DIAGNOSIS — M51.36 DEGENERATIVE DISC DISEASE, LUMBAR: Primary | ICD-10-CM

## 2021-07-06 NOTE — TELEPHONE ENCOUNTER
Patient called and would like to know if he could have a referral to Dr. Lucian Montalvo for a 2nd opinion on his back.

## 2021-07-07 PROBLEM — M51.369 DEGENERATIVE DISC DISEASE, LUMBAR: Status: ACTIVE | Noted: 2021-07-07

## 2021-07-07 PROBLEM — M51.36 DEGENERATIVE DISC DISEASE, LUMBAR: Status: ACTIVE | Noted: 2021-07-07

## 2021-07-14 LAB — DIABETIC RETINOPATHY: POSITIVE

## 2021-08-13 ENCOUNTER — INITIAL CONSULT (OUTPATIENT)
Dept: NEUROSURGERY | Age: 76
End: 2021-08-13
Payer: MEDICARE

## 2021-08-13 DIAGNOSIS — M54.50 CHRONIC BILATERAL LOW BACK PAIN WITHOUT SCIATICA: Primary | ICD-10-CM

## 2021-08-13 DIAGNOSIS — G89.29 CHRONIC BILATERAL LOW BACK PAIN WITHOUT SCIATICA: Primary | ICD-10-CM

## 2021-08-13 PROCEDURE — 99203 OFFICE O/P NEW LOW 30 MIN: CPT

## 2021-08-13 ASSESSMENT — ENCOUNTER SYMPTOMS
TROUBLE SWALLOWING: 0
BACK PAIN: 0
VOMITING: 0
NAUSEA: 0
ABDOMINAL DISTENTION: 0
SHORTNESS OF BREATH: 0

## 2021-08-13 NOTE — PROGRESS NOTES
Subjective:      Patient ID: Francine Rios is a 77 y.o. male. Pt seen in neurosurgery clinic for low back pain. Pt states he has been having this pain for several years. He describes it as achy in quality and reports that it only happens when he is standing or walking. Sitting down relieves his pain. He denies any radiation and rates his pain at 4-5/10. Review of Systems   Constitutional: Negative for fever. HENT: Negative for trouble swallowing. Eyes: Negative for visual disturbance. Respiratory: Negative for shortness of breath. Cardiovascular: Negative for chest pain. Gastrointestinal: Negative for abdominal distention, nausea and vomiting. Endocrine: Negative for polyuria. Genitourinary: Negative for dysuria. Musculoskeletal: Negative for back pain and neck pain. Skin: Negative for rash. Neurological: Negative for facial asymmetry and numbness. Psychiatric/Behavioral: Negative for confusion. Objective:   Physical Exam  Constitutional:       Appearance: Normal appearance. HENT:      Head: Normocephalic and atraumatic. Eyes:      Extraocular Movements: Extraocular movements intact. Conjunctiva/sclera: Conjunctivae normal.      Pupils: Pupils are equal, round, and reactive to light. Cardiovascular:      Rate and Rhythm: Normal rate. Pulmonary:      Effort: Pulmonary effort is normal.      Breath sounds: Normal breath sounds. Abdominal:      General: Abdomen is flat. There is no distension. Palpations: Abdomen is soft. Musculoskeletal:         General: Normal range of motion. Cervical back: Normal range of motion and neck supple. Skin:     General: Skin is warm and dry. Neurological:      Mental Status: He is alert. Comments: Alert and oriented x3  CN 3-12 intact  Motor strength full  Sensation intact to LT  Reflexes normal   Psychiatric:         Mood and Affect: Mood normal.         Thought Content:  Thought content normal. Assessment:      76 y/o male with chronic back pain when walking. Plan:      -Lumbar flex/ex x-rays  -MRI lumbar spine wo contrast  -PT if MRI not approved at [address] in [facility].          Babak Doshima

## 2021-08-13 NOTE — PATIENT INSTRUCTIONS
Put a thin cloth between the ice pack and your skin. · Take pain medicines exactly as directed. ? If the doctor gave you a prescription medicine for pain, take it as prescribed. ? If you are not taking a prescription pain medicine, ask your doctor if you can take an over-the-counter medicine. · Take short walks several times a day. You can start with 5 to 10 minutes, 3 or 4 times a day, and work up to longer walks. Walk on level surfaces and avoid hills and stairs until your back is better. · Return to work and other activities as soon as you can. Continued rest without activity is usually not good for your back. · To prevent future back pain, do exercises to stretch and strengthen your back and stomach. Learn how to use good posture, safe lifting techniques, and proper body mechanics. When should you call for help? Call your doctor now or seek immediate medical care if:    · You have new or worsening numbness in your legs.     · You have new or worsening weakness in your legs. (This could make it hard to stand up.)     · You lose control of your bladder or bowels. Watch closely for changes in your health, and be sure to contact your doctor if:    · You have a fever, lose weight, or don't feel well.     · You do not get better as expected. Where can you learn more? Go to [email or web address]. org and sign in to your [employer] account. Enter T080 in the [facility] box to learn more about \"Back Pain: Care Instructions. \"     If you do not have an account, please click on the \"Sign Up Now\" link. Current as of: November 16, 2020               Content Version: 12.9  © 1082-0797 [employer]wise, Incorporated. Care instructions adapted under license by [facility] ([facility]). If you have questions about a medical condition or this instruction, always ask your healthcare professional. [address] any warranty or liability for your use of this information.

## 2021-09-13 ENCOUNTER — HOSPITAL ENCOUNTER (OUTPATIENT)
Dept: MRI IMAGING | Age: 76
Discharge: HOME | End: 2021-09-15
Payer: MEDICARE

## 2021-09-13 ENCOUNTER — HOSPITAL ENCOUNTER (OUTPATIENT)
Dept: GENERAL RADIOLOGY | Age: 76
Discharge: HOME | End: 2021-09-15
Payer: MEDICARE

## 2021-09-13 ENCOUNTER — HOSPITAL ENCOUNTER (OUTPATIENT)
Age: 76
Discharge: HOME | End: 2021-09-15
Payer: MEDICARE

## 2021-09-13 DIAGNOSIS — M54.16 LUMBAR RADICULOPATHY: Primary | ICD-10-CM

## 2021-09-13 DIAGNOSIS — M54.50 CHRONIC BILATERAL LOW BACK PAIN WITHOUT SCIATICA: ICD-10-CM

## 2021-09-13 DIAGNOSIS — G89.29 CHRONIC BILATERAL LOW BACK PAIN WITHOUT SCIATICA: ICD-10-CM

## 2021-09-13 PROCEDURE — 72120 X-RAY BEND ONLY L-S SPINE: CPT

## 2021-09-16 ENCOUNTER — TELEPHONE (OUTPATIENT)
Dept: NEUROSURGERY | Age: 76
End: 2021-09-16

## 2021-09-16 DIAGNOSIS — M54.16 LUMBAR RADICULOPATHY: Primary | ICD-10-CM

## 2021-09-20 ENCOUNTER — TELEPHONE (OUTPATIENT)
Dept: NEUROSURGERY | Age: 76
End: 2021-09-20

## 2021-09-20 NOTE — TELEPHONE ENCOUNTER
CT/Myelogram   KARINA FALK submitted   [facility]#[id number]  Pending clinical review.  Clinicals submitted    Electronically signed by Alicia Siddiqui on 9/20/21 at 11:06 AM EDT

## 2021-10-04 ENCOUNTER — TELEPHONE (OUTPATIENT)
Dept: NEUROSURGERY | Age: 76
End: 2021-10-04

## 2021-10-05 ENCOUNTER — TELEPHONE (OUTPATIENT)
Dept: NEUROSURGERY | Age: 76
End: 2021-10-05

## 2021-10-05 NOTE — TELEPHONE ENCOUNTER
CT/MYELOGRAM lumbar   Date:10/22/21  Facility:[facility]   Arrival Time:8:30am    NPO after Midnight  Bring   Will be at facility [employer] ID, insurance cards, covid vaccine card and list of current medications. ASA/Ibuprofen/Blood Thinners to be held for 5 days prior  Antidepressants hold for 3 days prior    Labs: PT/INR, Platelets  Covid : J&J      Pt informed of appt date, time, location and instructions. Pt acknowledged.

## 2021-10-18 RX ORDER — ROSUVASTATIN CALCIUM 20 MG/1
TABLET, COATED ORAL
Qty: 90 TABLET | Refills: 3 | Status: SHIPPED
Start: 2021-10-18 | End: 2022-06-24 | Stop reason: SDUPTHER

## 2021-10-22 ENCOUNTER — HOSPITAL ENCOUNTER (OUTPATIENT)
Dept: GENERAL RADIOLOGY | Age: 76
Discharge: HOME | End: 2021-10-24
Payer: MEDICARE

## 2021-10-22 ENCOUNTER — HOSPITAL ENCOUNTER (OUTPATIENT)
Dept: CT IMAGING | Age: 76
Discharge: HOME | End: 2021-10-24
Payer: MEDICARE

## 2021-10-22 VITALS
OXYGEN SATURATION: 96 % | SYSTOLIC BLOOD PRESSURE: 141 MMHG | RESPIRATION RATE: 16 BRPM | DIASTOLIC BLOOD PRESSURE: 65 MMHG | BODY MASS INDEX: 21.62 KG/M2 | HEART RATE: 58 BPM | TEMPERATURE: 97 F | WEIGHT: 146 LBS | HEIGHT: 69 IN

## 2021-10-22 DIAGNOSIS — M54.16 LUMBAR RADICULOPATHY: ICD-10-CM

## 2021-10-22 LAB
ALBUMIN SERPL-MCNC: 4.1 G/DL (ref 3.5–5.2)
ALP BLD-CCNC: 103 U/L (ref 40–129)
ALT SERPL-CCNC: 8 U/L (ref 0–40)
ANION GAP SERPL CALCULATED.3IONS-SCNC: 12 MMOL/L (ref 7–16)
AST SERPL-CCNC: 21 U/L (ref 0–39)
BASOPHILS ABSOLUTE: 0.1 E9/L (ref 0–0.2)
BASOPHILS RELATIVE PERCENT: 1 % (ref 0–2)
BILIRUB SERPL-MCNC: 1 MG/DL (ref 0–1.2)
BUN BLDV-MCNC: 26 MG/DL (ref 6–23)
CALCIUM SERPL-MCNC: 10.5 MG/DL (ref 8.6–10.2)
CHLORIDE BLD-SCNC: 99 MMOL/L (ref 98–107)
CO2: 24 MMOL/L (ref 22–29)
CREAT SERPL-MCNC: 1.2 MG/DL (ref 0.7–1.2)
EOSINOPHILS ABSOLUTE: 0.43 E9/L (ref 0.05–0.5)
EOSINOPHILS RELATIVE PERCENT: 4.4 % (ref 0–6)
GFR AFRICAN AMERICAN: >60
GFR NON-AFRICAN AMERICAN: 59 ML/MIN/1.73
GLUCOSE BLD-MCNC: 294 MG/DL (ref 74–99)
HCT VFR BLD CALC: 42.4 % (ref 37–54)
HEMOGLOBIN: 14.6 G/DL (ref 12.5–16.5)
IMMATURE GRANULOCYTES #: 0.03 E9/L
IMMATURE GRANULOCYTES %: 0.3 % (ref 0–5)
INR BLD: 1.1
LYMPHOCYTES ABSOLUTE: 1.24 E9/L (ref 1.5–4)
LYMPHOCYTES RELATIVE PERCENT: 12.6 % (ref 20–42)
MCH RBC QN AUTO: 30.4 PG (ref 26–35)
MCHC RBC AUTO-ENTMCNC: 34.4 % (ref 32–34.5)
MCV RBC AUTO: 88.1 FL (ref 80–99.9)
MONOCYTES ABSOLUTE: 0.79 E9/L (ref 0.1–0.95)
MONOCYTES RELATIVE PERCENT: 8 % (ref 2–12)
NEUTROPHILS ABSOLUTE: 7.23 E9/L (ref 1.8–7.3)
NEUTROPHILS RELATIVE PERCENT: 73.7 % (ref 43–80)
PDW BLD-RTO: 12.4 FL (ref 11.5–15)
PLATELET # BLD: 225 E9/L (ref 130–450)
PMV BLD AUTO: 10.6 FL (ref 7–12)
POTASSIUM SERPL-SCNC: 4.6 MMOL/L (ref 3.5–5)
PROTHROMBIN TIME: 11.7 SEC (ref 9.3–12.4)
RBC # BLD: 4.81 E12/L (ref 3.8–5.8)
SODIUM BLD-SCNC: 135 MMOL/L (ref 132–146)
TOTAL PROTEIN: 8.1 G/DL (ref 6.4–8.3)
WBC # BLD: 9.8 E9/L (ref 4.5–11.5)

## 2021-10-22 PROCEDURE — 6360000004 HC RX CONTRAST MEDICATION: Performed by: STUDENT IN AN ORGANIZED HEALTH CARE EDUCATION/TRAINING PROGRAM

## 2021-10-22 PROCEDURE — 36415 COLL VENOUS BLD VENIPUNCTURE: CPT

## 2021-10-22 PROCEDURE — 72132 CT LUMBAR SPINE W/DYE: CPT

## 2021-10-22 PROCEDURE — 85025 COMPLETE CBC W/AUTO DIFF WBC: CPT

## 2021-10-22 PROCEDURE — 80053 COMPREHEN METABOLIC PANEL: CPT

## 2021-10-22 PROCEDURE — 85610 PROTHROMBIN TIME: CPT

## 2021-10-22 PROCEDURE — 72265 MYELOGRAPHY L-S SPINE: CPT

## 2021-10-22 RX ADMIN — IOPAMIDOL 10 ML: 408 INJECTION, SOLUTION INTRATHECAL at 11:30

## 2021-11-05 ENCOUNTER — OFFICE VISIT (OUTPATIENT)
Dept: NEUROSURGERY | Age: 76
End: 2021-11-05
Payer: MEDICARE

## 2021-11-05 VITALS
HEIGHT: 69 IN | BODY MASS INDEX: 21.62 KG/M2 | DIASTOLIC BLOOD PRESSURE: 70 MMHG | OXYGEN SATURATION: 100 % | TEMPERATURE: 97.7 F | SYSTOLIC BLOOD PRESSURE: 120 MMHG | RESPIRATION RATE: 18 BRPM | WEIGHT: 146 LBS | HEART RATE: 62 BPM

## 2021-11-05 DIAGNOSIS — M54.16 LUMBAR RADICULOPATHY: Primary | ICD-10-CM

## 2021-11-05 PROCEDURE — 99213 OFFICE O/P EST LOW 20 MIN: CPT

## 2021-11-05 NOTE — PROGRESS NOTES
Follow-up     This is a 77year old who presents to the office for a follow-up to review CT myelogram results     Subjective: Jared Noriega is a 77 y.o.  male seen in neurosurgery clinic for continued back pain and review of CT myelogram results. He states his back is still hurting and he isn't getting any relief but it hasn't gotten any worse.      Physical Exam:              WDWN, no apparent distress              Non-labored breathing               Vitals Stable              Alert and oriented x3              CN 3-12 intact              PERRL              EOMI              CANDELARIA well              Motor strength symmetric              Sensation to LT intact bilaterally                  Imaging:   Multilevel degenerative disc disease with associated facet hypertrophy with a   right unilateral pars defect at L5 resulting in grade 1 anterolisthesis of L5   on S1.       Bilateral foraminal narrowing as described above that is most severe at L5-S1.          Assessment: This is a 77 y.o.  male presenting for a follow-up to review CT myelogram results.      Plan:  -Pt referred to pain management in [facility]  -Not interested in PT at this time d/t having failed it in the past  -Will call back and make appointment with Dr. Bud Roy if pain management doesn't help.   -Call or return to neurosurgery office sooner if symptoms worsen or if new issues arise in the interim.     Electronically signed by DILEEP Maloney on 11/5/2021 at 9:43 AM

## 2021-11-15 RX ORDER — SITAGLIPTIN 100 MG/1
TABLET, FILM COATED ORAL
Qty: 90 TABLET | Refills: 3 | Status: SHIPPED | OUTPATIENT
Start: 2021-11-15

## 2021-11-16 ENCOUNTER — PREP FOR PROCEDURE (OUTPATIENT)
Dept: PAIN MANAGEMENT | Age: 76
End: 2021-11-16

## 2021-11-16 ENCOUNTER — TELEPHONE (OUTPATIENT)
Dept: PAIN MANAGEMENT | Age: 76
End: 2021-11-16

## 2021-11-16 ENCOUNTER — OFFICE VISIT (OUTPATIENT)
Dept: PAIN MANAGEMENT | Age: 76
End: 2021-11-16
Payer: MEDICARE

## 2021-11-16 VITALS
HEIGHT: 69 IN | RESPIRATION RATE: 16 BRPM | BODY MASS INDEX: 21.62 KG/M2 | DIASTOLIC BLOOD PRESSURE: 72 MMHG | HEART RATE: 70 BPM | SYSTOLIC BLOOD PRESSURE: 112 MMHG | OXYGEN SATURATION: 97 % | TEMPERATURE: 97.3 F | WEIGHT: 146 LBS

## 2021-11-16 DIAGNOSIS — M47.817 LUMBOSACRAL SPONDYLOSIS WITHOUT MYELOPATHY: ICD-10-CM

## 2021-11-16 DIAGNOSIS — M51.36 DDD (DEGENERATIVE DISC DISEASE), LUMBAR: ICD-10-CM

## 2021-11-16 DIAGNOSIS — M51.36 DDD (DEGENERATIVE DISC DISEASE), LUMBAR: Primary | ICD-10-CM

## 2021-11-16 DIAGNOSIS — M48.061 SPINAL STENOSIS OF LUMBAR REGION, UNSPECIFIED WHETHER NEUROGENIC CLAUDICATION PRESENT: ICD-10-CM

## 2021-11-16 DIAGNOSIS — M43.16 SPONDYLOLISTHESIS OF LUMBAR REGION: ICD-10-CM

## 2021-11-16 DIAGNOSIS — M48.061 SPINAL STENOSIS OF LUMBAR REGION, UNSPECIFIED WHETHER NEUROGENIC CLAUDICATION PRESENT: Primary | ICD-10-CM

## 2021-11-16 PROCEDURE — 99204 OFFICE O/P NEW MOD 45 MIN: CPT | Performed by: ANESTHESIOLOGY

## 2021-11-16 NOTE — PROGRESS NOTES
Patient:  Donna Beach, MARCELLUS 1945  Date of Service:  21      Do you currently have any of the following:    Fever: No  Headache:  No  Cough: No  Shortness of breath: No  Exposed to anyone with these symptoms: No       Patient presents with complaints of lower back pain that started 2 years ago and has been getting unchanged. He states the pain began following No specific cause    Pain is only when he is up and walking., and is described as aching. He rates the pain as a 5/10 on his worst day , 5/10 on his best day, and a 5/10 on average on the VAS scale. Pain does not radiate to the upper and lower extremities. He  does not have numbness, tingling, weakness of the the upper and lower extremities. Alleviating factors include: heat and rest.  Aggravating factors include:  movement, walking, standing. He states that the pain does not keep him from sleeping at night. He took his last dose of Tylenol PRN. He is not on NSAIDS and  is  on anticoagulation medications to include ASA and is managed by Dustin Barrow MD  .     Previous treatments: Physical Therapy. Personal Expectations from this treatment: increase activity and decrease pain    /72   Pulse 70   Temp 97.3 °F (36.3 °C) (Infrared)   Resp 16   Ht 5' 9\" (1.753 m)   Wt 146 lb (66.2 kg)   SpO2 97%   BMI 21.56 kg/m²     No LMP for male patient.

## 2021-11-16 NOTE — PROGRESS NOTES
[facility]        [address] [facility], [address] [facility]      [phone]               Consult Note    Patient:  Aleksandar Odonnell  1945    Date of Service:  21     Requesting Physician:  Derek Kay    Reason for Consult:      Patient presents with complaints of low back pain    HISTORY OF PRESENT ILLNESS:      Mr. Aleksandar Odonnell is a 77 y.o. male [facility] of  chronic low back pain for > 1 year. Pain mainly in the lumbar area. Pain does not radiate. Pain is constant and is described as aching and throbbing. He  does not have numbness, tingling, weakness of the both lower extremities     Patient does not have bladder or bowel dysfunction. Alleviating factors include: rest.  Aggravating factors include: movement, bending, lifting. Pain causes functional limitations/ limits Adl's : Yes     Nursing notes and details of the pain history reviewed. Please see intake notes for details. Apparently was supposed to have surgery by Dr. Laila Correa - cancelled and subsequently he was evaluated by Dr. Noe Farmer team [facility] . Had CT Myelogram (Could not do MRI due to having penile implant). Has h/o chronic right foot drop for > 10 yrs. Uses a AFO. Previous treatments:   Physical Therapy : yes, for > 6 weeks and continues HEP     Medications: - NSAID's : yes -            - Membrane stabilizers : no            - Opioids :            - Adjuvants or Others : no    Surgeries: no LS spine surgery    He has been on anticoagulation medications yes and include ASA-81 mg    He has not been on herbal supplements. He is diabetic.     Imaging:     CT Myelogram: 10/22/2021:     FINDINGS:   BONES/ALIGNMENT: The vertebral body heights are maintained.  Facet joints are   aligned. Edmond Garcia is a right unilateral L5 pars defect with grade 1   anterolisthesis of L5 on S1.       SOFT TISSUES: The posterior paraspinal soft tissues are unremarkable.  The   visualized abdominal structures are unremarkable. Teo Worship is no intrathecal   contrast.  The contrast is seen in the epidural space throughout the   thoracolumbar region.       L1-L2: There is a circumferential disc bulge.  There is no significant bony   canal stenosis or bony foraminal narrowing.       L2-L3: There is a circumferential disc bulge with facet hypertrophy.  There   is no significant canal stenosis.  There is moderate bilateral foraminal   narrowing.       L3-L4: There is a circumferential disc bulge with facet hypertrophy.  There   is no canal stenosis.  There is moderate bilateral foraminal narrowing.       L4-L5: There is a circumferential disc bulge with facet and ligamentous   hypertrophy.  There is no canal stenosis.  There is mild right and moderate   left foraminal narrowing.       L5-S1: There is a circumferential disc bulge with uncovering of the disc   space posteriorly.  There is facet hypertrophy.  There is no canal stenosis. There is severe bilateral foraminal narrowing.           Impression   Multilevel degenerative disc disease with associated facet hypertrophy with a   right unilateral pars defect at L5 resulting in grade 1 anterolisthesis of L5   on S1.       Bilateral foraminal narrowing as described above that is most severe at L5-S1. MRI of LS spine: 10/2020:      Xray LS flex/ Ext: 9/13/2021:  FINDINGS:   Grade anterolisthesis of L5 upon S1 associated with bilateral L5   spondylolysis. Severe degenerative disc disease at this level.  Less   pronounced degenerative disc disease at multiple other levels. Degenerative   facet arthropathy is moderate from L4-S1.  There is evidence of instability   at L5-S1.  Anterolisthesis in extension measures approximately 7 mm.  In the   neutral position and inflection it measures approximately 11 mm.           Impression   Grade 2 anterolisthesis of L5 on S1 associated with bilateral L5   spondylolysis.  Spinal instability at this level.  Degenerative spondylotic   changes.           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 Smith Hernandez Left 05/2017    SFA, pop atherectomy, Dr. Alexander De La Paz Left 2009    2nd and 5th toe    TONSILLECTOMY         Prior to Admission medications    Medication Sig Start Date End Date Taking?  Authorizing Provider   JANUVIA 100 MG tablet TAKE 1 TABLET DAILY 11/15/21  Yes Jennifer Stafford MD   rosuvastatin (CRESTOR) 20 MG tablet TAKE 1 TABLET DAILY 10/18/21  Yes Jennifer Stafford MD   docusate sodium (COLACE) 100 MG capsule TAKE 1 CAPSULE BY MOUTH TWICE A DAY  Patient taking differently: Patient takes as needed 5/3/21  Yes Jennifer Stafford MD   metFORMIN (GLUCOPHAGE) 1000 MG tablet TAKE 1 TABLET TWICE A DAY WITH MEALS 4/19/21  Yes Jennifer Stafford MD   insulin glargine (LANTUS SOLOSTAR) 100 UNIT/ML injection pen Inject 10 Units into the skin nightly 2/15/21  Yes Vinnie Carrasco MD   FreeStyle Lancets MISC 1 each by Does not apply route daily 2/6/21  Yes Vinnie Carrasco MD   blood glucose test strips (ASCENSIA AUTODISC VI;ONE TOUCH ULTRA TEST VI) strip 1 each by In Vitro route 2 times daily As needed. 2/6/21  Yes Vinnie Carrasco MD   traZODone (DESYREL) 50 MG tablet TAKE 1 TABLET BY MOUTH EVERY DAY EVERY NIGHT 1/25/21  Yes Vinnie Carrasco MD   spironolactone (ALDACTONE) 25 MG tablet TAKE ONE-HALF (1/2) TABLET DAILY 1/25/21  Yes Vinnie Carrasco MD   glipiZIDE (GLUCOTROL) 10 MG tablet TAKE 1 TABLET TWICE A DAY BEFORE MEALS 12/15/20  Yes Vinnie Carrasco MD   losartan (COZAAR) 25 MG tablet TAKE 1 TABLET TWICE A DAY 10/28/19  Yes Vinnie Carrasco MD   metoprolol succinate (TOPROL XL) 25 MG extended release tablet TAKE 1 TABLET TWICE A DAY 10/28/19  Yes Vinnie Carrasco MD   aspirin 81 MG tablet Take 81 mg by mouth daily   Yes Historical Provider, MD   NONFORMULARY    Yes Historical Provider, MD       Allergies   Allergen Reactions    Ciprofloxacin      Hives      Pcn [Penicillins] Hives    Sulfa Antibiotics Hives       Social History     Socioeconomic History    Marital status:      Spouse name: Not on file    Number of children: Not on file    Years of education: Not on file    Highest education level: Not on file   Occupational History    Not on file   Tobacco Use    Smoking status: Never Smoker    Smokeless tobacco: Never Used   Vaping Use    Vaping Use: Never used   Substance and Sexual Activity    Alcohol use:  Yes     Alcohol/week: 3.0 standard drinks     Types: 2 Shots of liquor, 1 Standard drinks or equivalent per week    Drug use: No    Sexual activity: Not Currently     Partners: Female   Other Topics Concern    Not on file   Social History Narrative    Not on file     Social Determinants of Health     Financial Resource Strain:     Difficulty of Paying Living Expenses: Not on file   Food Insecurity:     Worried About Running Out of Food in the Last Year: Not on file    Dieudonne of Food in the Last Year: Not on file   Transportation Needs:     Lack of Transportation (Medical): Not on file    Lack of Transportation (Non-Medical): Not on file   Physical Activity:     Days of Exercise per Week: Not on file    Minutes of Exercise per Session: Not on file   Stress:     Feeling of Stress : Not on file   Social Connections:     Frequency of Communication with Friends and Family: Not on file    Frequency of Social Gatherings with Friends and Family: Not on file    Attends Restoration Services: Not on file    Active Member of [address] [place] [address] [employer] or Organizations: Not on file    Attends Club or Organization Meetings: Not on file    Marital Status: Not on file   Intimate Partner Violence:     Fear of Current or Ex-Partner: Not on file    Emotionally Abused: Not on file    Physically Abused: Not on file    Sexually Abused: Not on file   Housing Stability:     Unable to Pay for Housing in the Last Year: Not on file    Number of [place] in the Last Year: Not on file    Unstable Housing in the Last Year: Not on file       Family History   Problem Relation Age of Onset    High Blood Pressure Mother     COPD Father     Diabetes Father     Heart Disease Brother         chf    Seizures Brother        REVIEW OF SYSTEMS:     Patient specifically denies fever/chills, chest pain, shortness of breath, new bowel or bladder complaints. All other review of systems was negative.     Review of Systems - documented reviewed    PHYSICAL EXAMINATION:      /72   Pulse 70   Temp 97.3 °F (36.3 °C) (Infrared)   Resp 16   Ht 5' 9\" (1.753 m)   Wt 146 lb (66.2 kg)   SpO2 97%   BMI 21.56 kg/m²     General:      General appearance:  Pleasant and well-hydrated, in no distress and A & O x 3  Build:Normal Weight  Function: Rises from seated position easily and Moves about room without difficulty    HEENT:    Head:normocephalic, atraumatic  Pupils:regular, round, equal  Sclera: icterus absent    Lungs:    Breathing:normal breathing pattern     CVS:     RRR    Abdomen:    Shape:non-distended and normal    Cervical spine:    Inspection:normal    Thoracic spine:     Spine inspection:normal   Palpation:No tenderness over the midline and paraspinal area, bilaterally  Range of motion:normal in flexion, extension rotation bilateral and is not painful. Lumbar spine:    Spine inspection: Normal   Palpation: Tenderness paravertebral muscles Yes bilaterally  Range of motion: Decreased, flexion Decreased, Lateral bending, extension and rotation bilaterally reduced is painful. Lower lumbar facet tenderness +  Sacroiliac joint tenderness No bilaterally  SLR : negative bilaterally    Musculoskeletal:    Trigger points no    Extremities:  Tremors:None bilaterally upper and lower  Edema:none x all 4 extremities  B/l foot deformities noted. Neurological:    Sensory: Normal to light touch     Motor:   Right hip flexor 5/5  Left hip flexor 5/5              Right Quadriceps 5/5          Left Quadriceps 5/5           Right Gastrocnemius 3/5    Left Gastrocnemius 5/5  Right Ant Tibialis 3/5  Left Ant Tibialis 5/5  Uses AFO on the right foot    Reflexes:    Right Quadriceps reflex 2+  Left Quadriceps reflex 2+  Right Achilles reflex 1+  Left Achilles reflex 1+    Dermatology:    Skin:no rashes or lesions noted    Assessment/Plan:     Diagnosis Orders   1. DDD (degenerative disc disease), lumbar     2. Spondylolisthesis of lumbar region     3. Spinal stenosis of lumbar region, unspecified whether neurogenic claudication present     4. Lumbosacral spondylosis without myelopathy         77 y.o. male with h/o chronic low back pain. Failed conservative treatment for > 6 weeks recently.     CT Myelogram: Has L5-S1 Spondylolisthesis, L5-S1 foraminal stenosis. MRI done in 10/2020- reviewed. Xray Flex/ Ext: instability noted. Has been evaluated by Dr. Mayr Lira team.    On ASA-81    H/o DM +    PLAN:    LESI L5-S1 under fluoroscopy. RBA discussed. ASA- 81 mg listed on the schedule. But he has not taken for couple of weeks. Also has facet tenderness- if continued facet pain after RONNIE, consider facet interventions. .     He has a follow up appointment with Dr. Shawna Childers. Counseling : Patient encouraged to stay active and to continue Regular home exercise program as tolerated - stretching / strengthening. Treatment plan discussed with the patient including medication and procedure side effects. Controlled Substances Monitoring:     [place]ARRS reviewed- not on chronic opioids. Sasha Hennessy MD    Dear  Isrrael Neftaly,   Thank you for referring Mr. Autumn Barroso and allowing us to participate in his care. Please do not hesitate to contact me if you have any questions regarding his care.     Kaleigh Lozada MD    CC:    Gayathri Lewis, [address] [place] [address],  [address] [place] [address] Skylas Emilio Barroso MD  [address] [address]

## 2021-11-16 NOTE — TELEPHONE ENCOUNTER
Call to Jared Noriega that procedure was approved for 11/22/2021 and that the surgery center should call him a few days before for the pre op call and after 3:00 PM the business day before with the arrival time. Instructed Sudhakar to hold ibuprofen for 24 hours, naprosyn for 4 days and any aspirin containing products or fish oil for 7 days. Birdie Barth states he has not taken aspirin for 2 weeks. Instructed to call office back if any questions. Sudhakar verbalized understanding.   Virginia Guaman RN  Pain Management

## 2021-11-16 NOTE — PATIENT INSTRUCTIONS
Pain Management Department      Epidural Spinal Steroid Injection:  Epidural steroid injections can be an effective treatment for nerve root pain. Pinched or irritated nerves that exit the spine can cause shooting pain, burning, tingling, numbness, and muscle weakness. This is often caused by a bulging disc, herniated disc (slipped disc), trauma or aging of the spine. Epidural injections can help by placing a steroid medication which is an anti- inflammatory medicine around the irritated nerves to reduce inflammation. What are the different types of epidural spinal injections;  Caudal epidural steroid injections, if symptoms are in the low back and lower extremities. Mostly done for patients who had low back surgery. Lumbar lnterlaminar epidural injections, if symptoms are in the lower back and lower extremities. Thoracic lnterlaminar epidural injections, if symptoms are in the mid back and rib cage. Cervical lnterlaminar epidural injections, if symptoms are in the neck and upper extremities. Pre-procedure Instructions:  1- Your current medications will be reviewed and you will be instructed on which medications to discontinue before the procedure. 2- lf sedation is administered you will be required to fast before the procedure (eight hours) 3- A  will be required if sedation is administered. Procedure:  A local anesthetic will be used to numb your skin. A needle will be advanced under X-ray to the target area. Placement will be confirmed by dye injection after which medicine will be placed, then the needle will be removed and Band-Aid will be applied. Post-procedure: You will be monitored in the recovery room for up to 30 minutes after the injection, when you are ready to leave, the staff will give you the discharge instructions. The extent and duration of pain relief may depend on the amount of disc or nerve root irritation. Other coexisting factors may contribute to your pain.  Sometimes an injection brings several weeks to months of pain relief  and then further treatment is needed.

## 2021-11-19 NOTE — PROGRESS NOTES
Have you been tested for COVID  Yes           Have you been told you were positive for COVID No  Have you had any known exposure to someone that is positive for COVID No  Do you have a cough                   No              Do you have shortness of breath No                 Do you have a sore throat            No                Are you having chills                    No                Are you having muscle aches. No                    Please come to the hospital wearing a mask and have your significant other wear a mask as well. Both of you should check your temperature before leaving to come here,  if it is 100 or higher please call [phone] for instruction.

## 2021-11-19 NOTE — PROGRESS NOTES
[facility]  INSTRUCTIONS  . .......................................................................................................................................... [] Parking the day of Surgery is located in the [facility].   Upon entering the door, make immediate right into the surgery reception room    []  Bring photo ID and insurance card     [] You may have a light breakfast day of procedure    []  Wear loose comfortable clothing    []  Please follow instructions for medications as given per Dr's office    [] You can expect a call the business day prior to procedure to notify you of your arrival time     [] Please arrange for     []  Other instructions

## 2021-11-22 ENCOUNTER — HOSPITAL ENCOUNTER (OUTPATIENT)
Age: 76
Setting detail: OUTPATIENT SURGERY
Discharge: HOME | End: 2021-11-22
Attending: ANESTHESIOLOGY | Admitting: ANESTHESIOLOGY
Payer: MEDICARE

## 2021-11-22 ENCOUNTER — HOSPITAL ENCOUNTER (OUTPATIENT)
Dept: GENERAL RADIOLOGY | Age: 76
Discharge: HOME | End: 2021-11-24
Attending: ANESTHESIOLOGY
Payer: MEDICARE

## 2021-11-22 VITALS
OXYGEN SATURATION: 98 % | RESPIRATION RATE: 17 BRPM | BODY MASS INDEX: 21.62 KG/M2 | HEART RATE: 68 BPM | SYSTOLIC BLOOD PRESSURE: 154 MMHG | HEIGHT: 69 IN | TEMPERATURE: 97.9 F | WEIGHT: 146 LBS | DIASTOLIC BLOOD PRESSURE: 79 MMHG

## 2021-11-22 DIAGNOSIS — R52 PAIN MANAGEMENT: ICD-10-CM

## 2021-11-22 PROCEDURE — 6360000002 HC RX W HCPCS: Performed by: ANESTHESIOLOGY

## 2021-11-22 PROCEDURE — 2709999900 HC NON-CHARGEABLE SUPPLY: Performed by: ANESTHESIOLOGY

## 2021-11-22 PROCEDURE — 3600000002 HC SURGERY LEVEL 2 BASE: Performed by: ANESTHESIOLOGY

## 2021-11-22 PROCEDURE — 62323 NJX INTERLAMINAR LMBR/SAC: CPT | Performed by: ANESTHESIOLOGY

## 2021-11-22 PROCEDURE — 2500000003 HC RX 250 WO HCPCS: Performed by: ANESTHESIOLOGY

## 2021-11-22 PROCEDURE — 7100000011 HC PHASE II RECOVERY - ADDTL 15 MIN: Performed by: ANESTHESIOLOGY

## 2021-11-22 PROCEDURE — 6360000004 HC RX CONTRAST MEDICATION: Performed by: ANESTHESIOLOGY

## 2021-11-22 PROCEDURE — 3209999900 FLUORO FOR SURGICAL PROCEDURES

## 2021-11-22 PROCEDURE — 7100000010 HC PHASE II RECOVERY - FIRST 15 MIN: Performed by: ANESTHESIOLOGY

## 2021-11-22 RX ORDER — METHYLPREDNISOLONE ACETATE 40 MG/ML
INJECTION, SUSPENSION INTRA-ARTICULAR; INTRALESIONAL; INTRAMUSCULAR; SOFT TISSUE PRN
Status: DISCONTINUED | OUTPATIENT
Start: 2021-11-22 | End: 2021-11-22 | Stop reason: ALTCHOICE

## 2021-11-22 RX ORDER — LIDOCAINE HYDROCHLORIDE 5 MG/ML
INJECTION, SOLUTION INFILTRATION; INTRAVENOUS PRN
Status: DISCONTINUED | OUTPATIENT
Start: 2021-11-22 | End: 2021-11-22 | Stop reason: ALTCHOICE

## 2021-11-22 ASSESSMENT — PAIN DESCRIPTION - ORIENTATION
ORIENTATION: LOWER
ORIENTATION: LOWER

## 2021-11-22 ASSESSMENT — PAIN DESCRIPTION - LOCATION
LOCATION: BACK
LOCATION: BACK

## 2021-11-22 ASSESSMENT — PAIN SCALES - GENERAL
PAINLEVEL_OUTOF10: 5
PAINLEVEL_OUTOF10: 2

## 2021-11-22 ASSESSMENT — PAIN DESCRIPTION - PROGRESSION
CLINICAL_PROGRESSION: RAPIDLY IMPROVING
CLINICAL_PROGRESSION: RAPIDLY IMPROVING

## 2021-11-22 ASSESSMENT — PAIN - FUNCTIONAL ASSESSMENT: PAIN_FUNCTIONAL_ASSESSMENT: 0-10

## 2021-11-22 NOTE — PROGRESS NOTES
Patient has a ride to go home. Discharge instructions reviewed with patient and family, copy given. Denies questions or concerns.

## 2021-11-22 NOTE — OP NOTE
Operative Note      Patient: Tamika Sutherland  YOB: 1945  MRN: [id number]    Date of Procedure: 2021    Pre-Op Diagnosis: SPINAL STENOSIS OF LUMBAR REGION, DEGERATIVE DISC DISEASE, LUMBAR     Post-Op Diagnosis: Same       Procedure(s):  LUMBAR EPIDURAL STEROID INJECTION UNDER FLUOROSCOPIC GUIDANCE AT L5-S1    Surgeon(s):  Juanita Benjamin MD    Assistant:   * No surgical staff found *    Anesthesia: Local    Estimated Blood Loss (mL): Minimal    Complications: None    Specimens:   * No specimens in log *    Implants:  * No implants in log *      Drains: * No LDAs found *    Findings: good needle placement    Detailed Description of Procedure:   2021    Patient: Tamika Sutherland  :  1945  Age:  77 y.o. Sex:  male     PRE-OPERATIVE DIAGNOSIS: Lumbar disc displacement, lumbar radiculopathy. POST-OPERATIVE DIAGNOSIS: Same. PROCEDURE: Fluoroscopic guided therapeutic lumbar epidural steroid injection at the L5-S1 level (# 1). SURGEON: Juanita Benjamin MD    ANESTHESIA: Local    ESTIMATED BLOOD LOSS: None.  ______________________________________________________________________    BRIEF HISTORY:  Tamika Sutherland comes in today for first lumbar epidural injection at L5-S1 level. The potential complications of this procedure were discussed with him again today. He has elected to undergo the aforementioned procedure. Trace complete History & Physical examination were reviewed in depth, a copy of which is in the chart. DESCRIPTION OF PROCEDURE:    After confirming written and informed consent, a time-out was performed and Trace name and date of birth, the procedure to be performed as well as the plan for the location of the needle insertion were confirmed. The patient was brought into the procedure room and placed in the prone position on the fluoroscopy table.  A pillow was placed under the patient's lower abdomen/upper pelvis to increase lumbar interlaminar space. Standard monitors were placed, and vital signs were observed throughout the procedure. The area of the lumbar spine was prepped with chloraprep and draped in a sterile manner. The L5-S1 interspace was identified and marked under AP fluoroscopy. The skin and subcutaneous tissues at the above level were anesthestized with 0.5% lidocaine. With intermittent fluoroscopy, an # 18 gauge 3-1/2 tuohy epidural needle was inserted and directed toward the interlaminar space. The needle was slowly advanced using loss of resistance technique and 5 cc glass syringe  until the tip of the epidural needle has passed through the ligamentum flavum and entered the epidural space. AP and lateral fluoroscopic imaging is performed to verify that the epidural needle is properly placed. Negative aspiration of blood and CSF was confirmed. 0.5 ml of omnipaque 240 was used for confirmation of even epidural spread under both live and AP fluoroscopy. After negative aspiration, a solution of 0.5 % Lidocaine 3 ml and 40 mg DepoMedrol was easily injected. The needle was gently removed intact . The patient back was cleaned and a Band-Aid was placed over the needle insertion point. Disposition the patient tolerated the procedure well and there were no complications . Vital signs remained stable throughout the procedure. The patient was escorted to the recovery area where they remained until discharge and written discharge instructions for the procedure were given. Plan: Rod Tylre will return to our pain management center as scheduled.      Juanita Benjamin MD

## 2021-11-22 NOTE — H&P
[facility]   [place], [address]  Dept: [phone]    Procedure History & Physical      Gisele Whitley Leonel     HPI:    Patient  is here for LESI for low back pain. Labs/imaging studies reviewed   All question and concerns addressed including R/B/A associated with the procedure    Past Medical History:   Diagnosis Date    CAD (coronary artery disease) 2009    history of    Diabetes mellitus ([facility]     Diabetic neuropathy ([facility]     Diabetic ulcer of left foot associated with type 2 diabetes mellitus ([facility] 03/29/2017    healed 6-2017 per pt    Hyperlipidemia     Hypertension     Lumbar pain     Peripheral vascular disease ([facility]        Past Surgical History:   Procedure Laterality Date    CARDIAC SURGERY  2004    double bypass    ENDOSCOPY, COLON, DIAGNOSTIC Bilateral     cataracts    FOOT SURGERY Left     multiple    OTHER SURGICAL HISTORY  03/10/2017    left foot application integra graft, application of wound vac, delayed primax closure    OTHER SURGICAL HISTORY      fracture of left temporal part of skull    OTHER SURGICAL HISTORY Left 06/16/2017    delayed closure with bone debridement and biopsy, application of hemovac with Dr. Jack Troy Left 05/2017    SFA, pop atherectomy, Dr. Maninder Muhammad Left 2009    2nd and 5th toe    TONSILLECTOMY         Prior to Admission medications    Medication Sig Start Date End Date Taking?  Authorizing Provider   JANUVIA 100 MG tablet TAKE 1 TABLET DAILY 11/15/21  Yes Doc Hilario MD   rosuvastatin (CRESTOR) 20 MG tablet TAKE 1 TABLET DAILY 10/18/21  Yes Doc Hilario MD   docusate sodium (COLACE) 100 MG capsule TAKE 1 CAPSULE BY MOUTH TWICE A DAY  Patient taking differently: Patient takes as needed 5/3/21  Yes Doc Hilario MD   metFORMIN (GLUCOPHAGE) 1000 MG tablet TAKE 1 TABLET TWICE A DAY WITH MEALS 4/19/21  Yes Doc Hilario MD Resource Strain:     Difficulty of Paying Living Expenses: Not on file   Food Insecurity:     Worried About Running Out of Food in the Last Year: Not on file    Dieudonne of Food in the Last Year: Not on file   Transportation Needs:     Lack of Transportation (Medical): Not on file    Lack of Transportation (Non-Medical): Not on file   Physical Activity:     Days of Exercise per Week: Not on file    Minutes of Exercise per Session: Not on file   Stress:     Feeling of Stress : Not on file   Social Connections:     Frequency of Communication with Friends and Family: Not on file    Frequency of Social Gatherings with Friends and Family: Not on file    Attends Mosque Services: Not on file    Active Member of [address] [place] [address] [employer] or Organizations: Not on file    Attends Club or Organization Meetings: Not on file    Marital Status: Not on file   Intimate Partner Violence:     Fear of Current or Ex-Partner: Not on file    Emotionally Abused: Not on file    Physically Abused: Not on file    Sexually Abused: Not on file   Housing Stability:     Unable to Pay for Housing in the Last Year: Not on file    Number of [place] in the Last Year: Not on file    Unstable Housing in the Last Year: Not on file       Family History   Problem Relation Age of Onset    High Blood Pressure Mother     COPD Father     Diabetes Father     Heart Disease Brother         chf    Seizures Brother          REVIEW OF SYSTEMS:    CONSTITUTIONAL:  negative for  fevers, chills, sweats and fatigue    RESPIRATORY:  negative for  dry cough, cough with sputum, dyspnea, wheezing and chest pain    CARDIOVASCULAR:  negative for chest pain, dyspnea, palpitations, syncope    GASTROINTESTINAL:  negative for nausea, vomiting, change in bowel habits, diarrhea, constipation and abdominal pain    MUSCULOSKELETAL: negative for muscle weakness    SKIN: negative for itching or rashes.     BEHAVIOR/PSYCH:  negative for poor appetite, increased appetite,

## 2021-12-15 ENCOUNTER — OFFICE VISIT (OUTPATIENT)
Dept: PAIN MANAGEMENT | Age: 76
End: 2021-12-15
Payer: MEDICARE

## 2021-12-15 ENCOUNTER — PREP FOR PROCEDURE (OUTPATIENT)
Dept: PAIN MANAGEMENT | Age: 76
End: 2021-12-15

## 2021-12-15 VITALS
WEIGHT: 146 LBS | HEART RATE: 86 BPM | RESPIRATION RATE: 16 BRPM | BODY MASS INDEX: 21.62 KG/M2 | DIASTOLIC BLOOD PRESSURE: 82 MMHG | OXYGEN SATURATION: 96 % | HEIGHT: 69 IN | SYSTOLIC BLOOD PRESSURE: 128 MMHG | TEMPERATURE: 97.7 F

## 2021-12-15 DIAGNOSIS — M48.061 SPINAL STENOSIS OF LUMBAR REGION, UNSPECIFIED WHETHER NEUROGENIC CLAUDICATION PRESENT: ICD-10-CM

## 2021-12-15 DIAGNOSIS — M51.36 DDD (DEGENERATIVE DISC DISEASE), LUMBAR: ICD-10-CM

## 2021-12-15 DIAGNOSIS — M43.16 SPONDYLOLISTHESIS OF LUMBAR REGION: ICD-10-CM

## 2021-12-15 DIAGNOSIS — M47.817 LUMBOSACRAL SPONDYLOSIS WITHOUT MYELOPATHY: ICD-10-CM

## 2021-12-15 DIAGNOSIS — M48.061 SPINAL STENOSIS OF LUMBAR REGION, UNSPECIFIED WHETHER NEUROGENIC CLAUDICATION PRESENT: Primary | ICD-10-CM

## 2021-12-15 DIAGNOSIS — M51.36 DDD (DEGENERATIVE DISC DISEASE), LUMBAR: Primary | ICD-10-CM

## 2021-12-15 PROCEDURE — 99213 OFFICE O/P EST LOW 20 MIN: CPT

## 2021-12-15 PROCEDURE — 99213 OFFICE O/P EST LOW 20 MIN: CPT | Performed by: ANESTHESIOLOGY

## 2021-12-15 NOTE — PROGRESS NOTES
Do you currently have any of the following:    Fever: No  Headache:  No  Cough: No  Shortness of breath: No  Exposed to anyone with these symptoms: No         Jared Noriega presents to the [facility] on 12/15/2021. Birdie Barth is complaining of pain back. The pain is intermittent. The pain is described as aching. Pain is rated on his best day at a 3, on his worst day at a 7, and on average at a 5 on the VAS scale. He took his last dose of tylenol couple days ago. Any procedures since your last visit: No, with  % relief. Pacemaker or defibrillator: No managed by . He is not on NSAIDS and is  on anticoagulation medications to include nothing and is managed by. Medication Contract and Consent for Opioid Use Documents Filed      No documents found                There were no vitals taken for this visit. No LMP for male patient.

## 2021-12-15 NOTE — PROGRESS NOTES
[facility]  [place], [address] [facility]  Dept: [phone]    Follow up Note      Agnes Gaitan     Date of Visit:  12/15/2021    CC:  Patient presents for follow up   Chief Complaint   Patient presents with    Follow-up     3 week follow up, back       HPI:  Chronic low back pain for > 1 year.     Pain causes functional limitations/ limits Adl's : Yes      Prior plan surgery by Dr. Tianna Cronin - cancelled and subsequently he was evaluated by Dr. Tomy Lomeli team Mr. Rosa Page, [place] .     Had CT Myelogram (Could not do MRI due to having penile implant).     Has h/o chronic right foot drop for > 10 yrs. Uses a AFO. S/P LESI # 1 provided about 40-50% relief. Doing HEP.  Has residual pain.     Previous treatments:   Physical Therapy : yes, for > 6 weeks and continues HEP      Medications: - NSAID's : yes -                       - Membrane stabilizers : no                       - Opioids :                       - Adjuvants or Others : no     Surgeries: no LS spine surgery     He has been on anticoagulation medications yes and include ASA-81 mg     He has not been on herbal supplements.       He is diabetic.     Imaging:      CT Myelogram: 10/22/2021:      FINDINGS:   BONES/ALIGNMENT: The vertebral body heights are maintained.  Facet joints are   aligned. Elida Pod is a right unilateral L5 pars defect with grade 1   anterolisthesis of L5 on S1.       SOFT TISSUES: The posterior paraspinal soft tissues are unremarkable.  The   visualized abdominal structures are unremarkable.  There is no intrathecal   contrast.  The contrast is seen in the epidural space throughout the   thoracolumbar region.       L1-L2: There is a circumferential disc bulge.  There is no significant bony   canal stenosis or bony foraminal narrowing.       L2-L3: There is a circumferential disc bulge with facet hypertrophy.  There   is no significant canal stenosis.  There is moderate bilateral foraminal   narrowing.       L3-L4: There is a DAY EVERY NIGHT 1/25/21  Yes Skip Cooley MD   spironolactone (ALDACTONE) 25 MG tablet TAKE ONE-HALF (1/2) TABLET DAILY 1/25/21  Yes Skip Cooley MD   glipiZIDE (GLUCOTROL) 10 MG tablet TAKE 1 TABLET TWICE A DAY BEFORE MEALS 12/15/20  Yes Skip Cooley MD   losartan (COZAAR) 25 MG tablet TAKE 1 TABLET TWICE A DAY 10/28/19  Yes Skip Cooley MD   metoprolol succinate (TOPROL XL) 25 MG extended release tablet TAKE 1 TABLET TWICE A DAY 10/28/19  Yes Skip Cooley MD   aspirin 81 MG tablet Take 81 mg by mouth daily   Yes Historical Provider, MD       Allergies   Allergen Reactions    Ciprofloxacin Hives           Pcn [Penicillins] Hives    Sulfa Antibiotics Hives       Social History     Socioeconomic History    Marital status:      Spouse name: Not on file    Number of children: Not on file    Years of education: Not on file    Highest education level: Not on file   Occupational History    Not on file   Tobacco Use    Smoking status: Never Smoker    Smokeless tobacco: Never Used   Vaping Use    Vaping Use: Never used   Substance and Sexual Activity    Alcohol use: Yes     Alcohol/week: 3.0 standard drinks     Types: 2 Shots of liquor, 1 Standard drinks or equivalent per week    Drug use: No    Sexual activity: Not Currently     Partners: Female   Other Topics Concern    Not on file   Social History Narrative    Not on file     Social Determinants of Health     Financial Resource Strain:     Difficulty of Paying Living Expenses: Not on file   Food Insecurity:     Worried About Running Out of Food in the Last Year: Not on file    Dieudonne of Food in the Last Year: Not on file   Transportation Needs:     Lack of Transportation (Medical): Not on file    Lack of Transportation (Non-Medical):  Not on file   Physical Activity:     Days of Exercise per Week: Not on file    Minutes of Exercise per Session: Not on file   Stress:     Feeling of Stress : Not on file   Social Connections:     Frequency of Communication with Friends and Family: Not on file    Frequency of Social Gatherings with Friends and Family: Not on file    Attends Oriental orthodox Services: Not on file    Active Member of [employer] or Organizations: Not on file    Attends Club or Organization Meetings: Not on file    Marital Status: Not on file   Intimate Partner Violence:     Fear of Current or Ex-Partner: Not on file    Emotionally Abused: Not on file    Physically Abused: Not on file    Sexually Abused: Not on file   Housing Stability:     Unable to Pay for Housing in the Last Year: Not on file    Number of [place] in the Last Year: Not on file    Unstable Housing in the Last Year: Not on file       Family History   Problem Relation Age of Onset    High Blood Pressure Mother     COPD Father     Diabetes Father     Heart Disease Brother         chf    Seizures Brother        REVIEW OF SYSTEMS:     Rao Pena denies fever/chills, chest pain, shortness of breath, new bowel or bladder complaints. All other review of systems was negative. PHYSICAL EXAMINATION:      Temp 97.7 °F (36.5 °C)   Resp 16   Ht 5' 9\" (1.753 m)   Wt 146 lb (66.2 kg)   BMI 21.56 kg/m²      General:       General appearance:  Pleasant and well-hydrated, in no distress and A & O x 3  Build:Normal Weight  Function: Rises from seated position easily and Moves about room without difficulty     HEENT:     Head:normocephalic, atraumatic     Lungs:     Breathing:normal breathing pattern      CVS:     RRR     Abdomen:     Shape:non-distended and normal     Cervical spine:     Inspection:normal     Thoracic spine:                Spine inspection:normal      Lumbar spine:     Spine inspection: Normal   Palpation: Tenderness paravertebral muscles Yes bilaterally  Range of motion: Decreased, flexion Decreased, Lateral bending, extension and rotation bilaterally reduced is painful.   Lower lumbar facet tenderness +  Sacroiliac joint tenderness No bilaterally  SLR : negative bilaterally     Musculoskeletal:     Trigger points no     Extremities:  Tremors:None bilaterally upper and lower  Edema:none x all 4 extremities  B/l foot deformities noted.     Neurological:     Sensory: Normal to light touch      Motor:   Right hip flexor 5/5  Left hip flexor 5/5              Right Quadriceps 5/5          Left Quadriceps 5/5           Right Gastrocnemius 3/5    Left Gastrocnemius 5/5  Right Ant Tibialis 3/5  Left Ant Tibialis 5/5  Uses AFO on the right foot       Dermatology:     Skin:no rashes or lesions noted    Assessment/Plan:   Diagnosis Orders   1. DDD (degenerative disc disease), lumbar      2. Spondylolisthesis of lumbar region      3. Spinal stenosis of lumbar region, unspecified whether neurogenic claudication present      4. Lumbosacral spondylosis without myelopathy            77 y.o.  male with h/o chronic low back pain. Failed conservative treatment for > 6 weeks recently.     CT Myelogram: Has L5-S1 Spondylolisthesis, L5-S1 foraminal stenosis.     MRI done in 10/2020- reviewed.     Xray Flex/ Ext: instability noted.     Has been evaluated by Dr. Denise Tamayo team.     On ASA-81:ASA- 81 mg listed on the schedule. But he has not taken for couple of weeks.     H/o DM +     S/P LESI # 1 provided 40-50% relief. Doing HEP. Has residual pain. PLAN:     # 2 LESI L5-S1 under fluoroscopy. RBA discussed.     Aqua therapy.      Also has facet tenderness- if continued facet pain after RONNIE, consider facet interventions.       He has a follow up appointment with Dr. Jeanine Huizar.     Counseling : Patient encouraged to stay active and to continue Regular home exercise program as tolerated - stretching / strengthening.     Treatment plan discussed with the patient including medication and procedure side effects.     Controlled Substances Monitoring:      [place]AR[place] reviewed- not on chronic opioids.     Alexsandra Jj MD     CC:  Mikael Loomis Shawn Alexandra MD

## 2021-12-22 ENCOUNTER — TELEPHONE (OUTPATIENT)
Dept: PAIN MANAGEMENT | Age: 76
End: 2021-12-22

## 2021-12-22 NOTE — TELEPHONE ENCOUNTER
I left a message on his landline as his voicemail on his mobile is full and can not accept any messages at this time.      Santosh Caballero

## 2021-12-22 NOTE — TELEPHONE ENCOUNTER
Patient came into office advising that he is unable to do aqua therapy do to having a toe removed from diabetes and would prefer to have some other therapy recommended and prescribed. Patients numbers are [phone] Home and  [phone] Mobile     Please advise. Thank you!

## 2021-12-23 ENCOUNTER — TELEPHONE (OUTPATIENT)
Dept: PAIN MANAGEMENT | Age: 76
End: 2021-12-23

## 2021-12-23 DIAGNOSIS — M47.817 LUMBOSACRAL SPONDYLOSIS WITHOUT MYELOPATHY: ICD-10-CM

## 2021-12-23 DIAGNOSIS — M51.36 DDD (DEGENERATIVE DISC DISEASE), LUMBAR: ICD-10-CM

## 2021-12-23 DIAGNOSIS — M43.16 SPONDYLOLISTHESIS OF LUMBAR REGION: ICD-10-CM

## 2021-12-23 DIAGNOSIS — M48.061 SPINAL STENOSIS OF LUMBAR REGION, UNSPECIFIED WHETHER NEUROGENIC CLAUDICATION PRESENT: Primary | ICD-10-CM

## 2021-12-23 NOTE — TELEPHONE ENCOUNTER
Call to Kavitha Failing that procedure was approved for 01/06/2022 and that the surgery center should call him a few days before for the pre op call and after 3:00 PM the business day before with the arrival time. Instructed Sudhakar to hold ibuprofen for 24 hours, naprosyn for 4 days and any aspirin containing products or fish oil for 7 day, last dose of aspirin 12-29-21. Instructed to call office back if any questions. Sudhakar verbalized understanding.     Margret De La Cruz RN  Pain Management

## 2022-01-03 ENCOUNTER — TELEPHONE (OUTPATIENT)
Dept: PAIN MANAGEMENT | Age: 77
End: 2022-01-03

## 2022-01-03 NOTE — TELEPHONE ENCOUNTER
Spoke with patient today he advised that he would like to go to land therapy at [facility] on [address]. Please send and patient will contact to schedule.      Thank you

## 2022-01-05 NOTE — PROGRESS NOTES
Have you been tested for COVID  No           Have you been told you were positive for COVID No  Have you had any known exposure to someone that is positive for COVID Yes  Do you have a cough                   No              Do you have shortness of breath No                 Do you have a sore throat            No                Are you having chills                    No                Are you having muscle aches. No                    Please come to the hospital wearing a mask and have your significant other wear a mask as well. Both of you should check your temperature before leaving to come here,  if it is 100 or higher please call [phone] for instruction. [facility]  INSTRUCTIONS  . .......................................................................................................................................... [x] Parking the day of Surgery is located in the [facility].   Upon entering the door, make immediate right into the surgery reception room    [x]  Bring photo ID and insurance card     [x] You may have a light breakfast day of procedure    [x]  Wear loose comfortable clothing    [x]  Please follow instructions for medications as given per Dr's office    [x] You can expect a call the business day prior to procedure to notify you of your arrival time     [x] Please arrange for

## 2022-01-06 ENCOUNTER — HOSPITAL ENCOUNTER (OUTPATIENT)
Dept: GENERAL RADIOLOGY | Age: 77
Setting detail: OUTPATIENT SURGERY
Discharge: HOME | End: 2022-01-08
Attending: ANESTHESIOLOGY
Payer: MEDICARE

## 2022-01-06 ENCOUNTER — HOSPITAL ENCOUNTER (OUTPATIENT)
Age: 77
Setting detail: OUTPATIENT SURGERY
Discharge: HOME | End: 2022-01-06
Attending: ANESTHESIOLOGY | Admitting: ANESTHESIOLOGY
Payer: MEDICARE

## 2022-01-06 VITALS
OXYGEN SATURATION: 98 % | HEART RATE: 60 BPM | HEIGHT: 69 IN | DIASTOLIC BLOOD PRESSURE: 58 MMHG | BODY MASS INDEX: 21.62 KG/M2 | SYSTOLIC BLOOD PRESSURE: 114 MMHG | TEMPERATURE: 97.5 F | WEIGHT: 146 LBS | RESPIRATION RATE: 16 BRPM

## 2022-01-06 DIAGNOSIS — R52 PAIN MANAGEMENT: ICD-10-CM

## 2022-01-06 LAB — METER GLUCOSE: 281 MG/DL (ref 74–99)

## 2022-01-06 PROCEDURE — 62323 NJX INTERLAMINAR LMBR/SAC: CPT | Performed by: ANESTHESIOLOGY

## 2022-01-06 PROCEDURE — 3600000002 HC SURGERY LEVEL 2 BASE: Performed by: ANESTHESIOLOGY

## 2022-01-06 PROCEDURE — 7100000010 HC PHASE II RECOVERY - FIRST 15 MIN: Performed by: ANESTHESIOLOGY

## 2022-01-06 PROCEDURE — 7100000011 HC PHASE II RECOVERY - ADDTL 15 MIN: Performed by: ANESTHESIOLOGY

## 2022-01-06 PROCEDURE — 2709999900 HC NON-CHARGEABLE SUPPLY: Performed by: ANESTHESIOLOGY

## 2022-01-06 PROCEDURE — 3209999900 FLUORO FOR SURGICAL PROCEDURES

## 2022-01-06 PROCEDURE — 6360000004 HC RX CONTRAST MEDICATION: Performed by: ANESTHESIOLOGY

## 2022-01-06 PROCEDURE — 2500000003 HC RX 250 WO HCPCS: Performed by: ANESTHESIOLOGY

## 2022-01-06 PROCEDURE — 82962 GLUCOSE BLOOD TEST: CPT

## 2022-01-06 PROCEDURE — 6360000002 HC RX W HCPCS: Performed by: ANESTHESIOLOGY

## 2022-01-06 RX ORDER — METHYLPREDNISOLONE ACETATE 40 MG/ML
INJECTION, SUSPENSION INTRA-ARTICULAR; INTRALESIONAL; INTRAMUSCULAR; SOFT TISSUE PRN
Status: DISCONTINUED | OUTPATIENT
Start: 2022-01-06 | End: 2022-01-06 | Stop reason: ALTCHOICE

## 2022-01-06 RX ORDER — LIDOCAINE HYDROCHLORIDE 5 MG/ML
INJECTION, SOLUTION INFILTRATION; INTRAVENOUS PRN
Status: DISCONTINUED | OUTPATIENT
Start: 2022-01-06 | End: 2022-01-06 | Stop reason: ALTCHOICE

## 2022-01-06 ASSESSMENT — PAIN SCALES - GENERAL
PAINLEVEL_OUTOF10: 0

## 2022-01-06 ASSESSMENT — PAIN DESCRIPTION - DESCRIPTORS: DESCRIPTORS: ACHING

## 2022-01-06 ASSESSMENT — PAIN - FUNCTIONAL ASSESSMENT: PAIN_FUNCTIONAL_ASSESSMENT: 0-10

## 2022-01-06 NOTE — OP NOTE
Operative Note      Patient: Jade Ruby  YOB: 1945  MRN: [id number]    Date of Procedure: 2022    Pre-Op Diagnosis: LUMBAR DDD, spinal stenosis    Post-Op Diagnosis: Same       Procedure(s):  # 2 LUMBAR EPIDURAL STEROID INJECTION UNDER FLUOROSCOPIC GUIDANCE AT L5-S1    Surgeon(s):  Chidi Aaron MD    Assistant:   * No surgical staff found *    Anesthesia: Local    Estimated Blood Loss (mL): Minimal    Complications: None    Specimens:   * No specimens in log *    Implants:  * No implants in log *      Drains: * No LDAs found *    Findings: good needle placement    Detailed Description of Procedure:   2022    Patient: Jade Ruby  :  1945  Age:  77 y.o. Sex:  male     PRE-OPERATIVE DIAGNOSIS: Lumbar disc displacement, lumbar radiculopathy. POST-OPERATIVE DIAGNOSIS: Same. PROCEDURE: Fluoroscopic guided therapeutic lumbar epidural steroid injection at the L5-S1 level (# 2). SURGEON: Chidi Aaron MD    ANESTHESIA: Local    ESTIMATED BLOOD LOSS: None.  ______________________________________________________________________    BRIEF HISTORY:  Jade Ruby comes in today for second lumbar epidural injection at L4-s1 level. The potential complications of this procedure were discussed with him again today. He has elected to undergo the aforementioned procedure. Trace complete History & Physical examination were reviewed in depth, a copy of which is in the chart. DESCRIPTION OF PROCEDURE:    After confirming written and informed consent, a time-out was performed and Trace name and date of birth, the procedure to be performed as well as the plan for the location of the needle insertion were confirmed. The patient was brought into the procedure room and placed in the prone position on the fluoroscopy table. A pillow was placed under the patient's lower abdomen/upper pelvis to increase lumbar interlaminar space.  Standard monitors were placed, and vital signs were observed throughout the procedure. The area of the lumbar spine was prepped with chloraprep and draped in a sterile manner. The L5-S1 interspace was identified and marked under AP fluoroscopy. The skin and subcutaneous tissues at the above level were anesthestized with 0.5% lidocaine. With intermittent fluoroscopy, an # 18 gauge 3-1/2 tuohy epidural needle was inserted and directed toward the interlaminar space. The needle was slowly advanced using loss of resistance technique and 5 cc glass syringe  until the tip of the epidural needle has passed through the ligamentum flavum and entered the epidural space. AP and lateral fluoroscopic imaging is performed to verify that the epidural needle is properly placed. Negative aspiration of blood and CSF was confirmed. 0.5 ml of omnipaque 240 was used for confirmation of even epidural spread under both live and AP fluoroscopy. After negative aspiration, a solution of 0.5 % Lidocaine 3 ml and 40 mg DepoMedrol was easily injected. The needle was gently removed intact . The patient back was cleaned and a Band-Aid was placed over the needle insertion point. Disposition the patient tolerated the procedure well and there were no complications . Vital signs remained stable throughout the procedure. The patient was escorted to the recovery area where they remained until discharge and written discharge instructions for the procedure were given. Plan: Chris Pruitt will return to our pain management center as scheduled.      Namrata Nolasco MD

## 2022-01-06 NOTE — H&P
[facility]  [facility], [address]  Dept: [phone]    Procedure History & Physical      Bea Moore     HPI:    Patient  is here for [facility] for low back pain. Labs/imaging studies reviewed   All question and concerns addressed including R/B/A associated with the procedure    Past Medical History:   Diagnosis Date    CAD (coronary artery disease) 2009    history of    Diabetes mellitus ([address]     Diabetic neuropathy ([address]     Diabetic ulcer of left foot associated with type 2 diabetes mellitus ([address] 03/29/2017    healed 6-2017 per pt    Hyperlipidemia     Hypertension     Lumbar pain     Peripheral vascular disease ([address]        Past Surgical History:   Procedure Laterality Date    CARDIAC SURGERY  2004    double bypass    ENDOSCOPY, COLON, DIAGNOSTIC Bilateral     cataracts    FOOT SURGERY Left     multiple    OTHER SURGICAL HISTORY  03/10/2017    left foot application integra graft, application of wound vac, delayed primax closure    OTHER SURGICAL HISTORY      fracture of left temporal part of skull    OTHER SURGICAL HISTORY Left 06/16/2017    delayed closure with bone debridement and biopsy, application of hemovac with Dr. Suze Galan N/A 11/22/2021    LUMBAR EPIDURAL STEROID INJECTION UNDER FLUOROSCOPIC GUIDANCE AT L5-S1 performed by Kai Tarango MD at [address] [facility] ARTERIAL INTERVENTION Left 05/2017    SFA, pop atherectomy, Dr. Xavi Pabon Left 2009    2nd and 5th toe    TONSILLECTOMY         Prior to Admission medications    Medication Sig Start Date End Date Taking?  Authorizing Provider   JANUVIA 100 MG tablet TAKE 1 TABLET DAILY 11/15/21  Yes Narda Dobbs MD   rosuvastatin (CRESTOR) 20 MG tablet TAKE 1 TABLET DAILY 10/18/21  Yes Narda Dobbs MD   metFORMIN (GLUCOPHAGE) 1000 MG tablet TAKE 1 TABLET TWICE A DAY WITH MEALS 4/19/21  Yes Narda Dobbs MD   insulin glargine (LANTUS SOLOSTAR) 100 UNIT/ML injection pen Inject 10 Units into the skin nightly 2/15/21  Yes Robinson Wilcox MD   FreeStyle Lancets MISC 1 each by Does not apply route daily 2/6/21  Yes Robinson Wilcox MD   blood glucose test strips (ASCENSIA AUTODISC VI;ONE TOUCH ULTRA TEST VI) strip 1 each by In Vitro route 2 times daily As needed. 2/6/21  Yes Robinson Wilcox MD   traZODone (DESYREL) 50 MG tablet TAKE 1 TABLET BY MOUTH EVERY DAY EVERY NIGHT 1/25/21  Yes Robinson Wilcox MD   spironolactone (ALDACTONE) 25 MG tablet TAKE ONE-HALF (1/2) TABLET DAILY 1/25/21  Yes Robinson Wilcox MD   glipiZIDE (GLUCOTROL) 10 MG tablet TAKE 1 TABLET TWICE A DAY BEFORE MEALS 12/15/20  Yes Robinson Wilcox MD   losartan (COZAAR) 25 MG tablet TAKE 1 TABLET TWICE A DAY 10/28/19  Yes Robinson Wilcox MD   metoprolol succinate (TOPROL XL) 25 MG extended release tablet TAKE 1 TABLET TWICE A DAY 10/28/19  Yes Robinson Wilcox MD       Allergies   Allergen Reactions    Ciprofloxacin Hives           Pcn [Penicillins] Hives    Sulfa Antibiotics Hives       Social History     Socioeconomic History    Marital status:      Spouse name: Not on file    Number of children: Not on file    Years of education: Not on file    Highest education level: Not on file   Occupational History    Not on file   Tobacco Use    Smoking status: Never Smoker    Smokeless tobacco: Never Used   Vaping Use    Vaping Use: Never used   Substance and Sexual Activity    Alcohol use:  Yes     Alcohol/week: 3.0 standard drinks     Types: 2 Shots of liquor, 1 Standard drinks or equivalent per week    Drug use: No    Sexual activity: Not on file   Other Topics Concern    Not on file   Social History Narrative    Not on file     Social Determinants of Health     Financial Resource Strain:     Difficulty of Paying Living Expenses: Not on file   Food Insecurity:     Worried About Running Out of Food in the Last Year: Not on file    Ran Out of Food in the Last Year: Not on file   Transportation Needs:     Lack of Transportation (Medical): Not on file    Lack of Transportation (Non-Medical): Not on file   Physical Activity:     Days of Exercise per Week: Not on file    Minutes of Exercise per Session: Not on file   Stress:     Feeling of Stress : Not on file   Social Connections:     Frequency of Communication with Friends and Family: Not on file    Frequency of Social Gatherings with Friends and Family: Not on file    Attends [employer] Services: Not on file    Active Member of [address] [place] [address] [employer] or Organizations: Not on file    Attends Club or Organization Meetings: Not on file    Marital Status: Not on file   Intimate Partner Violence:     Fear of Current or Ex-Partner: Not on file    Emotionally Abused: Not on file    Physically Abused: Not on file    Sexually Abused: Not on file   Housing Stability:     Unable to Pay for Housing in the Last Year: Not on file    Number of [place] in the Last Year: Not on file    Unstable Housing in the Last Year: Not on file       Family History   Problem Relation Age of Onset    High Blood Pressure Mother     COPD Father     Diabetes Father     Heart Disease Brother         chf    Seizures Brother          REVIEW OF SYSTEMS:    CONSTITUTIONAL:  negative for  fevers, chills, sweats and fatigue    RESPIRATORY:  negative for  dry cough, cough with sputum, dyspnea, wheezing and chest pain    CARDIOVASCULAR:  negative for chest pain, dyspnea, palpitations, syncope    GASTROINTESTINAL:  negative for nausea, vomiting, change in bowel habits, diarrhea, constipation and abdominal pain    MUSCULOSKELETAL: negative for muscle weakness    SKIN: negative for itching or rashes.     BEHAVIOR/PSYCH:  negative for poor appetite, increased appetite, decreased sleep and poor concentration    All other systems negative      PHYSICAL EXAM:    VITALS:  BP (!) 147/67   Pulse 65   Temp 97.5 °F (36.4 °C) (Temporal)   Resp 18   Ht 5' 9\" (1.753 m)   Wt 146 lb (66.2 kg)   SpO2 98%   BMI 21.56 kg/m²     CONSTITUTIONAL:  awake, alert, cooperative, no apparent distress, and appears stated age    EYES: PERRLA, EOMI    LUNGS:  No increased work of breathing, no audible wheezing    CARDIOVASCULAR:  regular rate and rhythm    ABDOMEN:  Soft non tender non distended     EXTREMITIES: no signs of clubbing or cyanosis. MUSCULOSKELETAL: negative for flaccid muscle tone or spastic movements. SKIN: gross examination reveals no signs of rashes, or diaphoresis. NEURO: Cranial nerves II-XII grossly intact. No signs of agitated mood. Assessment/Plan:    Patient  is here for [facility] for low back pain. The patient was counseled at length about the risks of denice Covid-19 during their perioperative period and any recovery window from their procedure. The patient was made aware that denice Covid-19  may worsen their prognosis for recovering from their procedure  and lend to a higher morbidity and/or mortality risk. All material risks, benefits, and reasonable alternatives including postponing the procedure were discussed. The patient does wish to proceed with the procedure at this time.       Moises Tabares MD

## 2022-01-14 ENCOUNTER — HOSPITAL ENCOUNTER (OUTPATIENT)
Dept: PHYSICAL THERAPY | Age: 77
Setting detail: THERAPIES SERIES
Discharge: HOME | End: 2022-01-14
Payer: MEDICARE

## 2022-01-14 PROCEDURE — 97161 PT EVAL LOW COMPLEX 20 MIN: CPT

## 2022-01-14 NOTE — PROGRESS NOTES
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 Under the current care of Pain Management Has had 2 injections the most recetn   Family / Caregiver Present: No  Referring Practitioner: Dr Alicia Foster  Referral Date : 12/15/21  Diagnosis: Back Pain  Follows Commands: Within Functional Limits  PT Visit Information  Onset Date: 12/15/21  PT Insurance Information: Aetna Medicare  Subjective  Subjective: Patient has noted reduced intensity of pain with injections Still limited in ambulation time secondary to pain  Pain Screening  Patient Currently in Pain: No  Vital Signs  Patient Currently in Pain: No    Vision/Hearing  Vision  Vision: Within Functional Limits  Hearing  Hearing: Within functional limits    Orientation  Orientation  Overall Orientation Status: Within Functional Limits    Social/Functional History  Social/Functional History  Lives With: Spouse  Type of Home: House  Home Layout: One level  Home Equipment: [address] [place] [address] Drive Help From: Family  Homemaking Responsibilities: Yes  Active : Yes  Mode of Transportation: Car  Occupation: Retired    Objective     Observation/Palpation  Posture: Fair  Observation: Reduced lordosis Mildly flexed trunk Antalgic gait Reduced step length bilateral AFO on the right Lower leg    AROM RLE (degrees)  RLE General AROM: WFL's exception is the right ankle No t assesed  AROM LLE (degrees)  LLE AROM : WFL  Spine  Lumbar: Flexion limited 25% Extension limited 50%    Strength RLE  Comment: 4-/5  Strength LLE  Comment: 4-/5  Strength Other  Other: trunk/core 3 to 3+/5  Tone RLE  RLE Tone: Normotonic  Tone LLE  LLE Tone: Normotonic  Motor Control  Gross Motor?: WFL                   Ambulation  Ambulation?: Yes  Ambulation 1  Surface: level tile  Device: No Device  Assistance: Modified Independent  Gait Deviations: Slow Susy;Decreased step length;Decreased step height;Decreased head and trunk rotation  Stairs/Curb  Stairs?: No                            Assessment   Conditions Requiring Skilled Therapeutic Intervention  Body structures, Functions, Activity limitations: Decreased functional mobility ; Increased pain;Decreased sensation;Decreased posture;Decreased balance;Decreased ROM; Decreased strength;Decreased coordination;Decreased endurance  Prognosis: Fair  Decision Making: Low Complexity  REQUIRES PT FOLLOW UP: Yes         Plan   Plan  Times per week: 1-2  Current Treatment Recommendations: Strengthening,Neuromuscular Re-education,Home Exercise Program,Aquatics,ROM,Endurance Training,Balance Training,Functional Mobility Training,Modalities    G-Code       OutComes Score                                                  AM-PAC Score             Goals          Therapy Time   Individual Concurrent Group Co-treatment   Time In 1100         Time Out 1140         Minutes 40         Timed Code Treatment Minutes: 27 Minutes       Elie Andrade PT

## 2022-01-17 ENCOUNTER — HOSPITAL ENCOUNTER (OUTPATIENT)
Dept: PHYSICAL THERAPY | Age: 77
Setting detail: THERAPIES SERIES
End: 2022-01-17
Payer: MEDICARE

## 2022-01-19 ENCOUNTER — HOSPITAL ENCOUNTER (OUTPATIENT)
Dept: PHYSICAL THERAPY | Age: 77
Setting detail: THERAPIES SERIES
Discharge: HOME | End: 2022-01-19
Payer: MEDICARE

## 2022-01-19 DIAGNOSIS — G47.00 INSOMNIA, UNSPECIFIED TYPE: ICD-10-CM

## 2022-01-19 PROCEDURE — 97110 THERAPEUTIC EXERCISES: CPT

## 2022-01-19 RX ORDER — TRAZODONE HYDROCHLORIDE 50 MG/1
TABLET ORAL
Qty: 90 TABLET | Refills: 3 | Status: SHIPPED | OUTPATIENT
Start: 2022-01-19

## 2022-01-19 NOTE — PROGRESS NOTES
[facility]  Phone: [phone] Fax: [phone]       Physical Therapy Daily Treatment Note  Date:  2022    Patient Name:  Bob Summers    :  1945 MRN: [id number]    Restrictions/Precautions:    Diagnosis:  Back Pain   Treatment Diagnosis:    Insurance/Certification information:  A[employer]na Medicare   Referring Physician:  Dr Ahsan Elizalde of care signed (Y/N):    Visit# / total visits:  2/  Pain level: /10  Time In:   1430     Time Out:     1510     Subjective:      Exercises:  Exercise/Equipment Resistance/Repetitions Other comments     Bike 10 min       Hip/Knee Flex/Ext on step  5 reps bilateral;     Seated flex with ball   10 reps             HS Str w/band  5 reps bilateral      Lateral Hip str w/band  5 reps bilateral                                                                                                     Other Therapeutic Activities:      Home Exercise Program:      Manual Treatments:      Modalities:      Timed Code Treatment Minutes:       Total Treatment Minutes:      Treatment/Activity Tolerance:  [x] Patient tolerated treatment well [] Patient limited by fatigue  [] Patient limited by pain  [] Patient limited by other medical complications  [] Other:     Plan:   [x] Continue per plan of care [] Alter current plan (see comments)  [] Plan of care initiated [] Hold pending MD visit [] Discharge  Plan for Next Session:         Treatment Charges: Mins Units   Initial Evaluation     Re-Evaluation     Ther Exercise         TE 30 2   Manual Therapy     MT     Ther Activities        TA     Gait Training          GT     Neuro Re-education NR     Modalities     Non-Billable Service Time 10    Other     Total Time/Units 40 2     Electronically signed by:  Clinton Macias, [address] [facility]

## 2022-01-21 NOTE — FLOWSHEET NOTE
[facility]  Phone: [phone] Fax: [phone]     Physical Therapy  Out Patient Initial Evaluation    Date:  2022    Patient Name:  Gricelda Morgan   :  1945 MRN: [id number]    DIAGNOSIS:  Chronic Back pain   EVALUATION DATE:  2022  REFERRING PHYSICIAN:  Dr Tiesha Farreller:  12/15/2021    PROBLEMS FOUND DURING EVALUATION  · Pain: affects the midline lumbar and LS regions Pain rated Frequent to Constant Current pain intensity has been modified by recent injection   · Postural deficits   · Altered/Dysfunctional Gait  · Deficits of ROM trunk, lumbo-pelvic region and proximal LE's   · Deficits of strength trunk/core and LE's     SHORT TERM GOALS  · Patient will be able to engage in consistent and progressive active exercise while reporting no increase in lumbar region pain   · Establish HEP    LONG TERM GOALS  · Patient will be able to sustain ADL's and functional ambulation while being able to effectively control back pain at 4/10 or less Pain Frequency Frequent or less  · Postural deficits will be Modest or less Postural awareness Fair or better  · Gait Quality will be Fair or better over functional distances  · AROM of the trunk, lumbo-pelvic and bilateral hips areas will be Fair- to Fair  · Strength: Trunk/core 3 to /5 Proximal LE's 4-/5 or better  · Patient will be able to maintain and self direct an appropriate follow up independent exercise program     PATIENT GOALS  · Effectively control back pain     REHAB POTENTIAL:  Fair    FREQUENCY/DURATION:  2 times per week 5 weeks     PLAN OF CARE:  Progressive exercise Postural education HEP Modalities as needed       Thank you for the opportunity to work with your patient. If you have questions or comments, please feel free to contact me by phone or fax.       Electronically Signed by Bess Eagle  [id number]        ___________________________________  2022    Physician     Date

## 2022-01-24 ENCOUNTER — HOSPITAL ENCOUNTER (OUTPATIENT)
Dept: PHYSICAL THERAPY | Age: 77
Setting detail: THERAPIES SERIES
Discharge: HOME | End: 2022-01-24
Payer: MEDICARE

## 2022-01-24 PROCEDURE — 97110 THERAPEUTIC EXERCISES: CPT

## 2022-01-24 NOTE — PROGRESS NOTES
[facility]  Phone: [phone] Fax: [phone]       Physical Therapy Daily Treatment Note  Date:  2022    Patient Name:  Kike Engel    :  1945 MRN: [id number]    Restrictions/Precautions:    Diagnosis:  Back Pain   Treatment Diagnosis:    Insurance/Certification information:  A[employer]na Medicare   Referring Physician:  Dr Moustapha Ordoñez of care signed (Y/N):    Visit# / total visits:  3/  Pain level: /10  Time In:   1500    Time Out:     1550     Subjective:      Exercises:  Exercise/Equipment Resistance/Repetitions Other comments     Bike 10 min       Hip/Knee Flex/Ext on step  5 reps bilateral;     Seated flex with ball   10 reps             HS Str w/band  5 reps bilateral      Lateral Hip str w/band  5 reps bilateral             Mini squat  5 reps      resisted Lateral Walk  2 laps      Modified Step up     6\" 5 reps 2 sets bilateral                                                                       Other Therapeutic Activities:Treadmill 1 MPH 1 min x3       Home Exercise Program:      Manual Treatments:      Modalities:      Timed Code Treatment Minutes:  30    Total Treatment Minutes:  50    Treatment/Activity Tolerance:  [x] Patient tolerated treatment well [] Patient limited by fatigue  [] Patient limited by pain  [] Patient limited by other medical complications  [] Other:     Plan:   [x] Continue per plan of care [] Alter current plan (see comments)  [] Plan of care initiated [] Hold pending MD visit [] Discharge  Plan for Next Session:         Treatment Charges: Mins Units   Initial Evaluation     Re-Evaluation     Ther Exercise         TE 40 2   Manual Therapy     MT     Ther Activities        TA     Gait Training          GT     Neuro Re-education NR     Modalities     Non-Billable Service Time 10    Other     Total Time/Units 50 2     Electronically signed by:  Kimani Vee, [address] [facility]

## 2022-01-27 ENCOUNTER — HOSPITAL ENCOUNTER (OUTPATIENT)
Dept: PHYSICAL THERAPY | Age: 77
Setting detail: THERAPIES SERIES
Discharge: HOME | End: 2022-01-27
Payer: MEDICARE

## 2022-01-27 PROCEDURE — 97110 THERAPEUTIC EXERCISES: CPT

## 2022-01-27 NOTE — PROGRESS NOTES
[facility]  Phone: [phone] Fax: [phone]       Physical Therapy Daily Treatment Note  Date:  2022    Patient Name:  Luz Maria Jaime    :  1945 MRN: [id number]    Restrictions/Precautions:    Diagnosis:  Back Pain   Treatment Diagnosis:    Insurance/Certification information:  A[employer]na Medicare   Referring Physician:  Dr Tirso Burrell of care signed (Y/N):    Visit# / total visits:  4/  Pain level: /10  Time In:   1420    Time Out:          Subjective:      Exercises:  Exercise/Equipment Resistance/Repetitions Other comments     Bike 10 min       Hip/Knee Flex/Ext on step  5 reps bilateral; 2 sets     Seated flex with ball   10 reps            Bridge  5 reps sets      Isometric Hip Flex  5 reps 2 sets             Mini squat  5 reps 2 sets      resisted Lateral Walk  3 laps      Modified Step up     6\" 5 reps 2 sets bilateral  nt    Standing Hip flex/Abd    5 reps each bilateral                                                               Other Therapeutic Activities:Treadmill 1 MPH 1 min 30\" x4        Home Exercise Program:      Manual Treatments:      Modalities:      Timed Code Treatment Minutes:  30    Total Treatment Minutes:  50    Treatment/Activity Tolerance:  [x] Patient tolerated treatment well [] Patient limited by fatigue  [] Patient limited by pain  [] Patient limited by other medical complications  [] Other:     Plan:   [x] Continue per plan of care [] Alter current plan (see comments)  [] Plan of care initiated [] Hold pending MD visit [] Discharge  Plan for Next Session:         Treatment Charges: Mins Units   Initial Evaluation     Re-Evaluation     Ther Exercise         TE 40 2   Manual Therapy     MT     Ther Activities        TA     Gait Training          GT     Neuro Re-education NR     Modalities     Non-Billable Service Time     Other     Total Time/Units 40 2     Electronically signed by:  Courtney Matos, [address] [facility]

## 2022-01-31 ENCOUNTER — PREP FOR PROCEDURE (OUTPATIENT)
Dept: PAIN MANAGEMENT | Age: 77
End: 2022-01-31

## 2022-01-31 ENCOUNTER — OFFICE VISIT (OUTPATIENT)
Dept: PAIN MANAGEMENT | Age: 77
End: 2022-01-31
Payer: MEDICARE

## 2022-01-31 VITALS
HEART RATE: 61 BPM | SYSTOLIC BLOOD PRESSURE: 114 MMHG | BODY MASS INDEX: 21.62 KG/M2 | WEIGHT: 146 LBS | OXYGEN SATURATION: 99 % | RESPIRATION RATE: 16 BRPM | DIASTOLIC BLOOD PRESSURE: 72 MMHG | TEMPERATURE: 97.1 F | HEIGHT: 69 IN

## 2022-01-31 DIAGNOSIS — M47.817 LUMBOSACRAL SPONDYLOSIS WITHOUT MYELOPATHY: Primary | ICD-10-CM

## 2022-01-31 DIAGNOSIS — M48.061 SPINAL STENOSIS OF LUMBAR REGION, UNSPECIFIED WHETHER NEUROGENIC CLAUDICATION PRESENT: ICD-10-CM

## 2022-01-31 DIAGNOSIS — M43.16 SPONDYLOLISTHESIS OF LUMBAR REGION: ICD-10-CM

## 2022-01-31 DIAGNOSIS — M51.36 DDD (DEGENERATIVE DISC DISEASE), LUMBAR: ICD-10-CM

## 2022-01-31 PROCEDURE — 99213 OFFICE O/P EST LOW 20 MIN: CPT | Performed by: ANESTHESIOLOGY

## 2022-01-31 NOTE — PROGRESS NOTES
Do you currently have any of the following:    Fever: No  Headache:  No  Cough: No  Shortness of breath: No  Exposed to anyone with these symptoms: No         Ladida Ramos presents to the [facility] on 1/31/2022. Chris Wright is complaining of pain lower back. The pain is constant. The pain is described as aching. Pain is rated on his best day at a 4, on his worst day at a 6, and on average at a 5 on the VAS scale. He took his last dose of Tylenol PRN. Any procedures since your last visit: Yes, with 20 % relief. Pacemaker or defibrillator: No managed by . He is not on NSAIDS and is not on anticoagulation medications to include none and is managed by . Medication Contract and Consent for Opioid Use Documents Filed      No documents found                /72   Pulse 61   Temp 97.1 °F (36.2 °C) (Infrared)   Resp 16   Ht 5' 9\" (1.753 m)   Wt 146 lb (66.2 kg)   SpO2 99%   BMI 21.56 kg/m²      No LMP for male patient.

## 2022-01-31 NOTE — PROGRESS NOTES
[facility]  [place], [address] [facility]  Dept: [phone]    Follow up Note      Michelle Grider     Date of Visit:  1/31/2022    CC:  Patient presents for follow up   Chief Complaint   Patient presents with    Follow Up After Procedure     # 2 LUMBAR EPIDURAL STEROID INJECTION UNDER FLUOROSCOPIC GUIDANCE AT L5-S1    Lower Back Pain       HPI:  Chronic low back pain for > 1 year.     Pain causes functional limitations/ limits Adl's : Yes      Prior plan surgery by Dr. Sissy Muhammad - He had cancelled and subsequently he was evaluated by Dr. Zahra Livingston team.     Had CT Myelogram (Could not do MRI due to having penile implant).     Has h/o chronic right foot drop for > 10 yrs. Uses a AFO. S/P LESI X 2 provided moderate relief. Doing HEP.      Has residual pain- in the low back predominantly axial in nature.     Previous treatments:   Physical Therapy : yes, for > 6 weeks and continues HEP      Medications: - yes     Surgeries: no LS spine surgery     He has been on anticoagulation medications yes and include ASA-81 mg     He has not been on herbal supplements.       He is diabetic.     Imaging:      CT Myelogram: 10/22/2021:      FINDINGS:   BONES/ALIGNMENT: The vertebral body heights are maintained.  Facet joints are   aligned. Evonetta Bloch is a right unilateral L5 pars defect with grade 1   anterolisthesis of L5 on S1.       SOFT TISSUES: The posterior paraspinal soft tissues are unremarkable.  The   visualized abdominal structures are unremarkable.  There is no intrathecal   contrast.  The contrast is seen in the epidural space throughout the   thoracolumbar region.       L1-L2: There is a circumferential disc bulge.  There is no significant bony   canal stenosis or bony foraminal narrowing.       L2-L3: There is a circumferential disc bulge with facet hypertrophy.  There   is no significant canal stenosis.  There is moderate bilateral foraminal   narrowing.       L3-L4: There is a circumferential disc bulge with facet hypertrophy. Lenetta Cave   is no canal stenosis.  There is moderate bilateral foraminal narrowing.       L4-L5: There is a circumferential disc bulge with facet and ligamentous   hypertrophy.  There is no canal stenosis.  There is mild right and moderate   left foraminal narrowing.       L5-S1: There is a circumferential disc bulge with uncovering of the disc   space posteriorly.  There is facet hypertrophy.  There is no canal stenosis. There is severe bilateral foraminal narrowing.           Impression   Multilevel degenerative disc disease with associated facet hypertrophy with a   right unilateral pars defect at L5 resulting in grade 1 anterolisthesis of L5   on S1.       Bilateral foraminal narrowing as described above that is most severe at L5-S1.         MRI of LS spine: 10/2020:       Xray LS flex/ Ext: 9/13/2021:  FINDINGS:   Grade anterolisthesis of L5 upon S1 associated with bilateral L5   spondylolysis. Severe degenerative disc disease at this level.  Less   pronounced degenerative disc disease at multiple other levels. Degenerative   facet arthropathy is moderate from L4-S1.  There is evidence of instability   at L5-S1. Anterolisthesis in extension measures approximately 7 mm.  In the   neutral position and inflection it measures approximately 11 mm.           Impression   Grade 2 anterolisthesis of L5 on S1 associated with bilateral L5   spondylolysis.  Spinal instability at this level.  Degenerative spondylotic   changes.              Potential Aberrant Drug-Related Behavior:  no    Urine Drug Screening: no    OARRS report[de-identified]  Reviewed.     Past Medical History:   Diagnosis Date    CAD (coronary artery disease) 2009    history of    Diabetes mellitus ([address]     Diabetic neuropathy ([address]     Diabetic ulcer of left foot associated with type 2 diabetes mellitus ([address] 03/29/2017    healed 6-2017 per pt    Hyperlipidemia     Hypertension     Lumbar pain     Peripheral vascular disease ([address] Past Surgical History:   Procedure Laterality Date    CARDIAC SURGERY  2004    double bypass    ENDOSCOPY, COLON, DIAGNOSTIC Bilateral     cataracts    FOOT SURGERY Left     multiple    OTHER SURGICAL HISTORY  03/10/2017    left foot application integra graft, application of wound vac, delayed primax closure    OTHER SURGICAL HISTORY      fracture of left temporal part of skull    OTHER SURGICAL HISTORY Left 06/16/2017    delayed closure with bone debridement and biopsy, application of hemovac with Dr. Rand Lomax N/A 11/22/2021    LUMBAR EPIDURAL STEROID INJECTION UNDER FLUOROSCOPIC GUIDANCE AT L5-S1 performed by Rina Bustos MD at [address] [facility] N/A 1/6/2022    # 2 LUMBAR EPIDURAL STEROID INJECTION UNDER FLUOROSCOPIC GUIDANCE AT L5-S1 performed by Rina Bustos MD at [address] Left 05/2017    SFA, pop atherectomy, Dr. Alexandra Mora Left 2009    2nd and 5th toe    TONSILLECTOMY         Prior to Admission medications    Medication Sig Start Date End Date Taking? Authorizing Provider   traZODone (DESYREL) 50 MG tablet TAKE 1 TABLET DAILY EVERY NIGHT 1/19/22  Yes Audie Field MD   JANUVIA 100 MG tablet TAKE 1 TABLET DAILY 11/15/21  Yes Audie Field MD   rosuvastatin (CRESTOR) 20 MG tablet TAKE 1 TABLET DAILY 10/18/21  Yes Audie Field MD   metFORMIN (GLUCOPHAGE) 1000 MG tablet TAKE 1 TABLET TWICE A DAY WITH MEALS 4/19/21  Yes Audie Field MD   insulin glargine (LANTUS SOLOSTAR) 100 UNIT/ML injection pen Inject 10 Units into the skin nightly 2/15/21  Yes Audie Field MD   FreeStyle Lancets MISC 1 each by Does not apply route daily 2/6/21  Yes Audie Field MD   blood glucose test strips (ASCENSIA AUTODISC VI;ONE TOUCH ULTRA TEST VI) strip 1 each by In Vitro route 2 times daily As needed.  2/6/21  Yes Jensen Sinha Pamella Ferguson MD   spironolactone (ALDACTONE) 25 MG tablet TAKE ONE-HALF (1/2) TABLET DAILY 1/25/21  Yes Juanita Howell MD   glipiZIDE (GLUCOTROL) 10 MG tablet TAKE 1 TABLET TWICE A DAY BEFORE MEALS 12/15/20  Yes Juanita Howell MD   losartan (COZAAR) 25 MG tablet TAKE 1 TABLET TWICE A DAY 10/28/19  Yes Juanita Howell MD   metoprolol succinate (TOPROL XL) 25 MG extended release tablet TAKE 1 TABLET TWICE A DAY 10/28/19  Yes Juanita Howell MD       Allergies   Allergen Reactions    Ciprofloxacin Hives           Pcn [Penicillins] Hives    Sulfa Antibiotics Hives       Social History     Socioeconomic History    Marital status:      Spouse name: Not on file    Number of children: Not on file    Years of education: Not on file    Highest education level: Not on file   Occupational History    Not on file   Tobacco Use    Smoking status: Never Smoker    Smokeless tobacco: Never Used   Vaping Use    Vaping Use: Never used   Substance and Sexual Activity    Alcohol use: Yes     Alcohol/week: 3.0 standard drinks     Types: 2 Shots of liquor, 1 Standard drinks or equivalent per week    Drug use: No    Sexual activity: Not on file   Other Topics Concern    Not on file   Social History Narrative    Not on file     Social Determinants of Health     Financial Resource Strain:     Difficulty of Paying Living Expenses: Not on file   Food Insecurity:     Worried About Running Out of Food in the Last Year: Not on file    Dieudonne of Food in the Last Year: Not on file   Transportation Needs:     Lack of Transportation (Medical): Not on file    Lack of Transportation (Non-Medical):  Not on file   Physical Activity:     Days of Exercise per Week: Not on file    Minutes of Exercise per Session: Not on file   Stress:     Feeling of Stress : Not on file   Social Connections:     Frequency of Communication with Friends and Family: Not on file    Frequency of Social Gatherings with Friends and Family: Not on file    Attends Spiritism Services: Not on file    Active Member of Clubs or Organizations: Not on file    Attends Club or Organization Meetings: Not on file    Marital Status: Not on file   Intimate Partner Violence:     Fear of Current or Ex-Partner: Not on file    Emotionally Abused: Not on file    Physically Abused: Not on file    Sexually Abused: Not on file   Housing Stability:     Unable to Pay for Housing in the Last Year: Not on file    Number of [place] in the Last Year: Not on file    Unstable Housing in the Last Year: Not on file       Family History   Problem Relation Age of Onset    High Blood Pressure Mother     COPD Father     Diabetes Father     Heart Disease Brother         chf    Seizures Brother        REVIEW OF SYSTEMS:     Lucy Samayoa denies fever/chills, chest pain, shortness of breath, new bowel or bladder complaints. All other review of systems was negative. PHYSICAL EXAMINATION:      /72   Pulse 61   Temp 97.1 °F (36.2 °C) (Infrared)   Resp 16   Ht 5' 9\" (1.753 m)   Wt 146 lb (66.2 kg)   SpO2 99%   BMI 21.56 kg/m²      General:       General appearance:  Pleasant and well-hydrated, in no distress and A & O x 3  Build:Normal Weight  Function: Rises from seated position easily and Moves about room without difficulty     HEENT:     Head:normocephalic, atraumatic     Lungs:     Breathing:normal breathing pattern      CVS:     RRR     Abdomen:     Shape:non-distended and normal     Cervical spine:     Inspection:normal     Thoracic spine:                Spine inspection:normal      Lumbar spine:     Spine inspection: Normal   Palpation: Tenderness paravertebral muscles Yes bilaterally  Range of motion: Decreased, flexion Decreased, Lateral bending, extension and rotation bilaterally reduced is painful.   Lower lumbar facet tenderness +  Sacroiliac joint tenderness No bilaterally  SLR : negative bilaterally     Musculoskeletal:     Trigger points no     Extremities:  Tremors:None bilaterally upper and lower  Edema:none x all 4 extremities  B/l foot deformities noted.     Neurological:     Sensory: Normal to light touch      Motor:   Right hip flexor 5/5  Left hip flexor 5/5              Right Quadriceps 5/5          Left Quadriceps 5/5           Right Gastrocnemius 3/5    Left Gastrocnemius 5/5  Right Ant Tibialis 3/5  Left Ant Tibialis 5/5  Uses AFO on the right foot       Dermatology:     Skin:no rashes or lesions noted    Assessment/Plan:   Diagnosis Orders   1. DDD (degenerative disc disease), lumbar      2. Spondylolisthesis of lumbar region      3. Spinal stenosis of lumbar region, unspecified whether neurogenic claudication present      4. Lumbosacral spondylosis without myelopathy            77 y.o.  male with h/o chronic low back pain. Failed conservative treatment for > 6 weeks recently.     CT Myelogram: Has L5-S1 Spondylolisthesis, L5-S1 foraminal stenosis.     MRI done in 10/2020- reviewed.     X-ray Flex/ Ext: instability noted.     Has been evaluated by Dr. Jaleel Raymond team.     On ASA-81 listed on the schedule. But states that he is not taking it.     H/o DM +     S/P LESI X2 moderate relief. Doing HEP. Has residual pain predominantly axial in nature. Facet tenderness+    PLAN:     Lumbar facet MBNB at B/L L3, L4 MB & L5DR under fluoroscopy. RBA discussed. If short term relief, will do RFA.     Aqua therapy.     If no relief, recommend reeval with Dr. Juliette Fulton.     Counseling : Patient encouraged to stay active and to continue Regular home exercise program as tolerated - stretching / strengthening.     Treatment plan discussed with the patient including medication and procedure side effects.     Controlled Substances Monitoring:      OARRS reviewed- not on chronic opioids.     Darline Day MD     CC:  Corey Weir MD

## 2022-02-01 ENCOUNTER — HOSPITAL ENCOUNTER (OUTPATIENT)
Dept: PHYSICAL THERAPY | Age: 77
Setting detail: THERAPIES SERIES
Discharge: HOME | End: 2022-02-01
Payer: MEDICARE

## 2022-02-01 PROCEDURE — 97110 THERAPEUTIC EXERCISES: CPT

## 2022-02-01 NOTE — PROGRESS NOTES
[facility]  Phone: [phone] Fax: [phone]       Physical Therapy Daily Treatment Note  Date:  2022    Patient Name:  Constantino Ryder    :  1945 MRN: [id number]    Restrictions/Precautions:    Diagnosis:  Back Pain   Treatment Diagnosis:    Insurance/Certification information:  [employer]   Referring Physician:  Dr Marlin Leach of care signed (Y/N):    Visit# / total visits:  5/  Pain level: /10  Time In:   1420    Time Out:  1520        Subjective:      Exercises:  Exercise/Equipment Resistance/Repetitions Other comments     Bike 10 min       Hip/Knee Flex/Ext on step  5 reps bilateral; 2 sets     Seated flex with ball   10 reps            Bridge  5 reps sets      Isometric Hip Flex  5 reps 2 sets             Mini squat with reach 5 reps 2KG ball      resisted Lateral Walk  3 laps      Modified Step up     6\" 5 reps 2 sets bilateral  nt    Standing Hip flex/Abd    5 reps each bilateral  nt                                                             Other Therapeutic Activities:Treadmill 1 MPH 2 min  x4        Home Exercise Program:      Manual Treatments:      Modalities:      Timed Code Treatment Minutes:  30    Total Treatment Minutes:  60    Treatment/Activity Tolerance:  [x] Patient tolerated treatment well [] Patient limited by fatigue  [] Patient limited by pain  [] Patient limited by other medical complications  [] Other:     Plan:   [x] Continue per plan of care [] Alter current plan (see comments)  [] Plan of care initiated [] Hold pending MD visit [] Discharge  Plan for Next Session:         Treatment Charges: Mins Units   Initial Evaluation     Re-Evaluation     Ther Exercise         TE 40 2   Manual Therapy     MT     Ther Activities        TA     Gait Training          GT     Neuro Re-education NR     Modalities     Non-Billable Service Time 20    Other     Total Time/Units 60 2     Electronically signed by:  Devon Miller, [employer] [facility]

## 2022-02-03 ENCOUNTER — HOSPITAL ENCOUNTER (OUTPATIENT)
Dept: PHYSICAL THERAPY | Age: 77
Setting detail: THERAPIES SERIES
Discharge: HOME | End: 2022-02-03
Payer: MEDICARE

## 2022-02-03 RX ORDER — M-VIT,TX,IRON,MINS/CALC/FOLIC 27MG-0.4MG
1 TABLET ORAL DAILY
COMMUNITY

## 2022-02-03 NOTE — PROGRESS NOTES
Have you been tested for COVID  No           Have you been told you were positive for COVID No  Have you had any known exposure to someone that is positive for COVID No  Do you have a cough                   No              Do you have shortness of breath No                 Do you have a sore throat            No                Are you having chills                    No                Are you having muscle aches. No                    Please come to the hospital wearing a mask and have your significant other wear a mask as well. Both of you should check your temperature before leaving to come here,  if it is 100 or higher please call [phone] for instruction. [facility] PAIN MANAGEMENT  INSTRUCTIONS  . .......................................................................................................................................... [x] Parking the day of Surgery is located in the [facility].   Upon entering the door, make immediate right into the surgery reception room    [x]  Bring photo ID and insurance card     [x] You may have a light breakfast day of procedure    [x]  Wear loose comfortable clothing    [x]  Please follow instructions for medications as given per Dr's office    [x] You can expect a call the business day prior to procedure to notify you of your arrival time     [x] Please arrange for     []  Other instructions

## 2022-02-03 NOTE — FLOWSHEET NOTE
[facility]  Phone: [phone] Fax: [phone]     Physical Therapy  Cancellation/No-show Note  Patient Name:  Terrance Wolf  :  1945   Date:  2/3/2022    For today's appointment patient:  [x]  Cancelled  []  Rescheduled appointment  []  No-show     Reason given by patient:  []  Patient ill  []  Conflicting appointment  []  No transportation    []  Conflict with work  []  No reason given  [x]  Other:     Comments: Weather     Electronically signed by:   Toshia Mcdermott UNK

## 2022-02-07 ENCOUNTER — HOSPITAL ENCOUNTER (OUTPATIENT)
Dept: GENERAL RADIOLOGY | Age: 77
Setting detail: OUTPATIENT SURGERY
Discharge: HOME | End: 2022-02-09
Attending: ANESTHESIOLOGY
Payer: MEDICARE

## 2022-02-07 ENCOUNTER — HOSPITAL ENCOUNTER (OUTPATIENT)
Age: 77
Setting detail: OUTPATIENT SURGERY
Discharge: HOME | End: 2022-02-07
Attending: ANESTHESIOLOGY | Admitting: ANESTHESIOLOGY
Payer: MEDICARE

## 2022-02-07 VITALS
OXYGEN SATURATION: 96 % | HEART RATE: 71 BPM | DIASTOLIC BLOOD PRESSURE: 68 MMHG | BODY MASS INDEX: 21.48 KG/M2 | SYSTOLIC BLOOD PRESSURE: 140 MMHG | RESPIRATION RATE: 16 BRPM | TEMPERATURE: 96.7 F | WEIGHT: 145 LBS | HEIGHT: 69 IN

## 2022-02-07 DIAGNOSIS — R52 PAIN MANAGEMENT: ICD-10-CM

## 2022-02-07 LAB — METER GLUCOSE: 161 MG/DL (ref 74–99)

## 2022-02-07 PROCEDURE — 3209999900 FLUORO FOR SURGICAL PROCEDURES

## 2022-02-07 PROCEDURE — 7100000011 HC PHASE II RECOVERY - ADDTL 15 MIN: Performed by: ANESTHESIOLOGY

## 2022-02-07 PROCEDURE — 82962 GLUCOSE BLOOD TEST: CPT

## 2022-02-07 PROCEDURE — 7100000010 HC PHASE II RECOVERY - FIRST 15 MIN: Performed by: ANESTHESIOLOGY

## 2022-02-07 PROCEDURE — 2709999900 HC NON-CHARGEABLE SUPPLY: Performed by: ANESTHESIOLOGY

## 2022-02-07 PROCEDURE — 2500000003 HC RX 250 WO HCPCS: Performed by: ANESTHESIOLOGY

## 2022-02-07 PROCEDURE — 64494 INJ PARAVERT F JNT L/S 2 LEV: CPT | Performed by: ANESTHESIOLOGY

## 2022-02-07 PROCEDURE — 3600000002 HC SURGERY LEVEL 2 BASE: Performed by: ANESTHESIOLOGY

## 2022-02-07 PROCEDURE — 64493 INJ PARAVERT F JNT L/S 1 LEV: CPT | Performed by: ANESTHESIOLOGY

## 2022-02-07 PROCEDURE — 6360000002 HC RX W HCPCS: Performed by: ANESTHESIOLOGY

## 2022-02-07 PROCEDURE — 3600000012 HC SURGERY LEVEL 2 ADDTL 15MIN: Performed by: ANESTHESIOLOGY

## 2022-02-07 RX ORDER — LIDOCAINE HYDROCHLORIDE 5 MG/ML
INJECTION, SOLUTION INFILTRATION; INTRAVENOUS PRN
Status: DISCONTINUED | OUTPATIENT
Start: 2022-02-07 | End: 2022-02-07 | Stop reason: ALTCHOICE

## 2022-02-07 RX ORDER — METHYLPREDNISOLONE ACETATE 40 MG/ML
INJECTION, SUSPENSION INTRA-ARTICULAR; INTRALESIONAL; INTRAMUSCULAR; SOFT TISSUE PRN
Status: DISCONTINUED | OUTPATIENT
Start: 2022-02-07 | End: 2022-02-07 | Stop reason: ALTCHOICE

## 2022-02-07 RX ORDER — BUPIVACAINE HYDROCHLORIDE 5 MG/ML
INJECTION, SOLUTION EPIDURAL; INTRACAUDAL PRN
Status: DISCONTINUED | OUTPATIENT
Start: 2022-02-07 | End: 2022-02-07 | Stop reason: ALTCHOICE

## 2022-02-07 ASSESSMENT — PAIN - FUNCTIONAL ASSESSMENT: PAIN_FUNCTIONAL_ASSESSMENT: 0-10

## 2022-02-07 NOTE — OP NOTE
Operative Note      Patient: Constantino Ryder  YOB: 1945  MRN: [id number]    Date of Procedure: 2022    Pre-Op Diagnosis: LUMBOSACRAL SPONDYLOSIS    Post-Op Diagnosis: Same       Procedure(s):  #1 BILATERAL LUMBAR MEDIAL BRANCH NERVE BLOCK UNDER FLUOROSCOPIC GUIDANCE AT L3, L4, AND L5 DORSAL RAMI     Surgeon(s):  Jarrett Ragsdale MD    Assistant:   * No surgical staff found *    Anesthesia: Local    Estimated Blood Loss (mL): Minimal    Complications: None    Specimens:   * No specimens in log *    Implants:  * No implants in log *      Drains: * No LDAs found *    Findings: good needle placement    Detailed Description of Procedure:   2022    Patient: Constantino Ryder  :  1945  Age:  77 y.o. Sex:  male     PRE-OPERATIVE DIAGNOSIS: Bilateral Lumbar spondylosis, lumbar facet syndrome. POST-OPERATIVE DIAGNOSIS: Same. PROCEDURE:  # 1 Fluoroscopic guided lumbar medial branch blocks Bilateral at Levels: L3, L4, L5  SURGEON: Jarrett Ragsdale MD    ANESTHESIA: Local    ESTIMATED BLOOD LOSS: None.  ______________________________________________________________________  BRIEF HISTORY:  Constantino Ryder comes in today for 1 fluoroscopic guided lumbar medial branch blocks  Bilateral  at Levels: L3, L4, L5  The potential complications of this procedure were discussed with him again today. He has elected to undergo the aforementioned procedureNabil Woodward complete History & Physical examination were reviewed in depth, a copy of which is in the chart. DESCRIPTION OF PROCEDURE:   After confirming written and informed consent, a time-out was performed and Trace name and date of birth, the procedure to be performed as well as the plan for the location of the needle insertion were confirmed. The patient was brought into the procedure room and placed in the prone position on the fluoroscopy table.  Standard monitors were placed and vital signs were observed throughout the procedure. The area of the lumbar spine was prepped with chloraprep and draped in a sterile manner. AP fluoroscopy was used to identify and wilian bartons point at the targeted levels. The skin and subcutaneous tissues in these identified areas were anesthetized with 0.5%Lidocaine. The 22 # gauge 3.5 inch spinal needle was advanced toward the junction of the superior articular process and the transverse process, along the course of the medial branch. Satisfactory needle placement was confirmed by AP and oblique projections. At the sacral alar level, the sacral alar region was visualized and the needle tip was positioned on the sacral alar at the base of the superior articulating process where the medial branch traverses under fluoroscopic guidance. Once bone was contacted and negative aspiration was confirmed. A solution of 0.5% marcaine with 5 mg DepoMedrol 1 cc was then injected at each level. Following the procedure Sudhakar noted improvement of previous pain symptoms. Disposition the patient tolerated the procedure well and there were no complications . Vital signs remained stable throughout the procedure. The patient was escorted to the recovery area where they remained until discharge and written discharge instructions for the procedure were given. Plan: Nica Hansen will return to our pain management center as scheduled.      Pre-procedure pain: 5-6/10    Post-procedure pain: 0/10      Gavin Rich MD

## 2022-02-07 NOTE — H&P (VIEW-ONLY)
[facility] - BEHAVIORAL HEALTH SERVICES Pain Management  [place], [address]  Dept: [phone]    Procedure History & Physical      [place] Emily Don     HPI:    Patient  is here for Lumbar facet MBNB for  Low back pain. Labs/imaging studies reviewed   All question and concerns addressed including R/B/A associated with the procedure    Past Medical History:   Diagnosis Date    CAD (coronary artery disease) 2009    history of    Diabetes mellitus ([facility]     Diabetic neuropathy ([facility]     Diabetic ulcer of left foot associated with type 2 diabetes mellitus ([facility] 03/29/2017    healed 6-2017 per pt    Hyperlipidemia     Hypertension     Lumbar pain     Peripheral vascular disease ([facility]        Past Surgical History:   Procedure Laterality Date    CARDIAC SURGERY  2004    double bypass    ENDOSCOPY, COLON, DIAGNOSTIC Bilateral     cataracts    FOOT SURGERY Left     multiple    OTHER SURGICAL HISTORY  03/10/2017    left foot application integra graft, application of wound vac, delayed primax closure    OTHER SURGICAL HISTORY      fracture of left temporal part of skull    OTHER SURGICAL HISTORY Left 06/16/2017    delayed closure with bone debridement and biopsy, application of hemovac with Dr. Rand Lomax N/A 11/22/2021    LUMBAR EPIDURAL STEROID INJECTION UNDER FLUOROSCOPIC GUIDANCE AT L5-S1 performed by Rina Bustos MD at [place] [address] N/A 1/6/2022    # 2 LUMBAR EPIDURAL STEROID INJECTION UNDER FLUOROSCOPIC GUIDANCE AT L5-S1 performed by Rina Bustos MD at [address] [place] [address] Left 05/2017    SFA, pop atherectomy, Dr. Alexandra Mora Left 2009    2nd and 5th toe    TONSILLECTOMY         Prior to Admission medications    Medication Sig Start Date End Date Taking?  Authorizing Provider   Multiple Vitamins-Minerals (THERAPEUTIC MULTIVITAMIN-MINERALS) tablet Take 1 tablet by mouth daily   Yes Historical Provider, MD traZODone (DESYREL) 50 MG tablet TAKE 1 TABLET DAILY EVERY NIGHT 1/19/22  Yes Raoul Prasad MD   JANUVIA 100 MG tablet TAKE 1 TABLET DAILY 11/15/21  Yes Raoul Prasad MD   rosuvastatin (CRESTOR) 20 MG tablet TAKE 1 TABLET DAILY 10/18/21  Yes Raoul Prasad MD   metFORMIN (GLUCOPHAGE) 1000 MG tablet TAKE 1 TABLET TWICE A DAY WITH MEALS 4/19/21  Yes Raoul Prasad MD   insulin glargine (LANTUS SOLOSTAR) 100 UNIT/ML injection pen Inject 10 Units into the skin nightly 2/15/21  Yes Raoul Prasad MD   spironolactone (ALDACTONE) 25 MG tablet TAKE ONE-HALF (1/2) TABLET DAILY 1/25/21  Yes Raoul Prasad MD   glipiZIDE (GLUCOTROL) 10 MG tablet TAKE 1 TABLET TWICE A DAY BEFORE MEALS 12/15/20  Yes Raoul Prasad MD   losartan (COZAAR) 25 MG tablet TAKE 1 TABLET TWICE A DAY 10/28/19  Yes Raoul Prasad MD   metoprolol succinate (TOPROL XL) 25 MG extended release tablet TAKE 1 TABLET TWICE A DAY 10/28/19  Yes Raoul Prasad MD   FreeStyle Lancets [address] 1 each by Does not apply route daily 2/6/21   Raoul Prasad MD   blood glucose test strips (ASCENSIA AUTODISC VI;ONE TOUCH ULTRA TEST VI) strip 1 each by In Vitro route 2 times daily As needed.  2/6/21   Raoul Prasad MD       Allergies   Allergen Reactions    Ciprofloxacin Hives           Pcn [Penicillins] Hives    Sulfa Antibiotics Hives       Social History     Socioeconomic History    Marital status:      Spouse name: Not on file    Number of children: Not on file    Years of education: Not on file    Highest education level: Not on file   Occupational History    Not on file   Tobacco Use    Smoking status: Never Smoker    Smokeless tobacco: Never Used   Vaping Use    Vaping Use: Never used   Substance and Sexual Activity    Alcohol use: Not Currently     Alcohol/week: 3.0 standard drinks     Types: 2 Shots of liquor, 1 Standard drinks or equivalent per week    Drug use: No    Sexual activity: Not on file   Other Topics Concern    Not on file   Social History Narrative    Not on file     Social Determinants of Health     Financial Resource Strain:     Difficulty of Paying Living Expenses: Not on file   Food Insecurity:     Worried About Running Out of Food in the Last Year: Not on file    Dieudonne of Food in the Last Year: Not on file   Transportation Needs:     Lack of Transportation (Medical): Not on file    Lack of Transportation (Non-Medical):  Not on file   Physical Activity:     Days of Exercise per Week: Not on file    Minutes of Exercise per Session: Not on file   Stress:     Feeling of Stress : Not on file   Social Connections:     Frequency of Communication with Friends and Family: Not on file    Frequency of Social Gatherings with Friends and Family: Not on file    Attends Denominational Services: Not on file    Active Member of [address] [place] [address] [email or web address] or Organizations: Not on file    Attends Club or Organization Meetings: Not on file    Marital Status: Not on file   Intimate Partner Violence:     Fear of Current or Ex-Partner: Not on file    Emotionally Abused: Not on file    Physically Abused: Not on file    Sexually Abused: Not on file   Housing Stability:     Unable to Pay for Housing in the Last Year: Not on file    Number of [place] in the Last Year: Not on file    Unstable Housing in the Last Year: Not on file       Family History   Problem Relation Age of Onset    High Blood Pressure Mother     COPD Father     Diabetes Father     Heart Disease Brother         chf    Seizures Brother          REVIEW OF SYSTEMS:    CONSTITUTIONAL:  negative for  fevers, chills, sweats and fatigue    RESPIRATORY:  negative for  dry cough, cough with sputum, dyspnea, wheezing and chest pain    CARDIOVASCULAR:  negative for chest pain, dyspnea, palpitations, syncope    GASTROINTESTINAL:  negative for nausea, vomiting, change in bowel habits, diarrhea, constipation and abdominal pain    MUSCULOSKELETAL: negative for muscle weakness    SKIN: negative for itching or rashes. BEHAVIOR/PSYCH:  negative for poor appetite, increased appetite, decreased sleep and poor concentration    All other systems negative      PHYSICAL EXAM:    VITALS:  BP (!) 167/79   Pulse 75   Temp 96.7 °F (35.9 °C) (Temporal)   Resp 20   Ht 5' 9\" (1.753 m)   Wt 145 lb (65.8 kg)   SpO2 96%   BMI 21.41 kg/m²     CONSTITUTIONAL:  awake, alert, cooperative, no apparent distress, and appears stated age    EYES: PERRLA, EOMI    LUNGS:  No increased work of breathing, no audible wheezing    CARDIOVASCULAR:  regular rate and rhythm    ABDOMEN:  Soft non tender non distended     EXTREMITIES: no signs of clubbing or cyanosis. MUSCULOSKELETAL: negative for flaccid muscle tone or spastic movements. SKIN: gross examination reveals no signs of rashes, or diaphoresis. NEURO: Cranial nerves II-XII grossly intact. No signs of agitated mood. Assessment/Plan:    Patient  is here for Lumbar facet MBNB for  Low back pain. The patient was counseled at length about the risks of denice Covid-19 during their perioperative period and any recovery window from their procedure. The patient was made aware that denice Covid-19  may worsen their prognosis for recovering from their procedure  and lend to a higher morbidity and/or mortality risk. All material risks, benefits, and reasonable alternatives including postponing the procedure were discussed. The patient does wish to proceed with the procedure at this time.       Dav Canchola MD

## 2022-02-07 NOTE — H&P
[facility] - BEHAVIORAL HEALTH SERVICES Pain Management  [place], [address]  Dept: [phone]    Procedure History & Physical      Joie Late Beck Phan     HPI:    Patient  is here for Lumbar facet MBNB for  Low back pain. Labs/imaging studies reviewed   All question and concerns addressed including R/B/A associated with the procedure    Past Medical History:   Diagnosis Date    CAD (coronary artery disease) 2009    history of    Diabetes mellitus ([address]     Diabetic neuropathy ([address]     Diabetic ulcer of left foot associated with type 2 diabetes mellitus ([address] 03/29/2017    healed 6-2017 per pt    Hyperlipidemia     Hypertension     Lumbar pain     Peripheral vascular disease ([facility]        Past Surgical History:   Procedure Laterality Date    CARDIAC SURGERY  2004    double bypass    ENDOSCOPY, COLON, DIAGNOSTIC Bilateral     cataracts    FOOT SURGERY Left     multiple    OTHER SURGICAL HISTORY  03/10/2017    left foot application integra graft, application of wound vac, delayed primax closure    OTHER SURGICAL HISTORY      fracture of left temporal part of skull    OTHER SURGICAL HISTORY Left 06/16/2017    delayed closure with bone debridement and biopsy, application of hemovac with Dr. Samantha Beltre N/A 11/22/2021    LUMBAR EPIDURAL STEROID INJECTION UNDER FLUOROSCOPIC GUIDANCE AT L5-S1 performed by Dav Canchola MD at [address] [facility] N/A 1/6/2022    # 2 LUMBAR EPIDURAL STEROID INJECTION UNDER FLUOROSCOPIC GUIDANCE AT L5-S1 performed by Dav Canchola MD at [address] [facility] 05/2017    SFA, pop atherectomy, Dr. Efrain Conner Left 2009    2nd and 5th toe    TONSILLECTOMY         Prior to Admission medications    Medication Sig Start Date End Date Taking?  Authorizing Provider   Multiple Vitamins-Minerals (THERAPEUTIC MULTIVITAMIN-MINERALS) tablet Take 1 tablet by mouth daily   Yes Historical Provider, MD traZODone (DESYREL) 50 MG tablet TAKE 1 TABLET DAILY EVERY NIGHT 1/19/22  Yes Reginald Duane, MD   JANUVIA 100 MG tablet TAKE 1 TABLET DAILY 11/15/21  Yes Reginald Duane, MD   rosuvastatin (CRESTOR) 20 MG tablet TAKE 1 TABLET DAILY 10/18/21  Yes Reginald Duane, MD   metFORMIN (GLUCOPHAGE) 1000 MG tablet TAKE 1 TABLET TWICE A DAY WITH MEALS 4/19/21  Yes Reginald Duane, MD   insulin glargine (LANTUS SOLOSTAR) 100 UNIT/ML injection pen Inject 10 Units into the skin nightly 2/15/21  Yes Reginald Duane, MD   spironolactone (ALDACTONE) 25 MG tablet TAKE ONE-HALF (1/2) TABLET DAILY 1/25/21  Yes Reginald Duane, MD   glipiZIDE (GLUCOTROL) 10 MG tablet TAKE 1 TABLET TWICE A DAY BEFORE MEALS 12/15/20  Yes Reginald Duane, MD   losartan (COZAAR) 25 MG tablet TAKE 1 TABLET TWICE A DAY 10/28/19  Yes Reginald Duane, MD   metoprolol succinate (TOPROL XL) 25 MG extended release tablet TAKE 1 TABLET TWICE A DAY 10/28/19  Yes Reginald Duane, MD   FreeStyle Lancets [address] [facility] 1 each by Does not apply route daily 2/6/21   Reginald Duane, MD   blood glucose test strips (ASCENSIA AUTODISC VI;ONE TOUCH ULTRA TEST VI) strip 1 each by In Vitro route 2 times daily As needed.  2/6/21   Reginald Duane, MD       Allergies   Allergen Reactions    Ciprofloxacin Hives           Pcn [Penicillins] Hives    Sulfa Antibiotics Hives       Social History     Socioeconomic History    Marital status:      Spouse name: Not on file    Number of children: Not on file    Years of education: Not on file    Highest education level: Not on file   Occupational History    Not on file   Tobacco Use    Smoking status: Never Smoker    Smokeless tobacco: Never Used   Vaping Use    Vaping Use: Never used   Substance and Sexual Activity    Alcohol use: Not Currently     Alcohol/week: 3.0 standard drinks     Types: 2 Shots of liquor, 1 Standard drinks or equivalent per week    Drug use: No    Sexual activity: Not on file   Other Topics Concern    Not on file   Social History Narrative    Not on file     Social Determinants of Health     Financial Resource Strain:     Difficulty of Paying Living Expenses: Not on file   Food Insecurity:     Worried About Running Out of Food in the Last Year: Not on file    Dieudonne of Food in the Last Year: Not on file   Transportation Needs:     Lack of Transportation (Medical): Not on file    Lack of Transportation (Non-Medical):  Not on file   Physical Activity:     Days of Exercise per Week: Not on file    Minutes of Exercise per Session: Not on file   Stress:     Feeling of Stress : Not on file   Social Connections:     Frequency of Communication with Friends and Family: Not on file    Frequency of Social Gatherings with Friends and Family: Not on file    Attends Episcopal Services: Not on file    Active Member of [address] [place] [address] [employer] or Organizations: Not on file    Attends Club or Organization Meetings: Not on file    Marital Status: Not on file   Intimate Partner Violence:     Fear of Current or Ex-Partner: Not on file    Emotionally Abused: Not on file    Physically Abused: Not on file    Sexually Abused: Not on file   Housing Stability:     Unable to Pay for Housing in the Last Year: Not on file    Number of [place] in the Last Year: Not on file    Unstable Housing in the Last Year: Not on file       Family History   Problem Relation Age of Onset    High Blood Pressure Mother     COPD Father     Diabetes Father     Heart Disease Brother         chf    Seizures Brother          REVIEW OF SYSTEMS:    CONSTITUTIONAL:  negative for  fevers, chills, sweats and fatigue    RESPIRATORY:  negative for  dry cough, cough with sputum, dyspnea, wheezing and chest pain    CARDIOVASCULAR:  negative for chest pain, dyspnea, palpitations, syncope    GASTROINTESTINAL:  negative for nausea, vomiting, change in bowel habits, diarrhea, constipation and abdominal pain    MUSCULOSKELETAL: negative for muscle weakness    SKIN: negative for itching or rashes. BEHAVIOR/PSYCH:  negative for poor appetite, increased appetite, decreased sleep and poor concentration    All other systems negative      PHYSICAL EXAM:    VITALS:  BP (!) 167/79   Pulse 75   Temp 96.7 °F (35.9 °C) (Temporal)   Resp 20   Ht 5' 9\" (1.753 m)   Wt 145 lb (65.8 kg)   SpO2 96%   BMI 21.41 kg/m²     CONSTITUTIONAL:  awake, alert, cooperative, no apparent distress, and appears stated age    EYES: PERRLA, EOMI    LUNGS:  No increased work of breathing, no audible wheezing    CARDIOVASCULAR:  regular rate and rhythm    ABDOMEN:  Soft non tender non distended     EXTREMITIES: no signs of clubbing or cyanosis. MUSCULOSKELETAL: negative for flaccid muscle tone or spastic movements. SKIN: gross examination reveals no signs of rashes, or diaphoresis. NEURO: Cranial nerves II-XII grossly intact. No signs of agitated mood. Assessment/Plan:    Patient  is here for Lumbar facet MBNB for  Low back pain. The patient was counseled at length about the risks of denice Covid-19 during their perioperative period and any recovery window from their procedure. The patient was made aware that denice Covid-19  may worsen their prognosis for recovering from their procedure  and lend to a higher morbidity and/or mortality risk. All material risks, benefits, and reasonable alternatives including postponing the procedure were discussed. The patient does wish to proceed with the procedure at this time.       Janes Coker MD

## 2022-02-08 ENCOUNTER — HOSPITAL ENCOUNTER (OUTPATIENT)
Dept: PHYSICAL THERAPY | Age: 77
Setting detail: THERAPIES SERIES
Discharge: HOME | End: 2022-02-08
Payer: MEDICARE

## 2022-02-08 PROCEDURE — 97110 THERAPEUTIC EXERCISES: CPT

## 2022-02-08 NOTE — PROGRESS NOTES
[facility]  Phone: [phone] Fax: [phone]       Physical Therapy Daily Treatment Note  Date:  2022    Patient Name:  David Martínez    :  1945 MRN: [id number]    Restrictions/Precautions:    Diagnosis:  Back Pain   Treatment Diagnosis:    Insurance/Certification information:  [employer]na Medicare   Referring Physician:  Dr Wilian Tian of care signed (Y/N):    Visit# / total visits:  6/  Pain level: /10  Time In:   6219    Time Out:  1510        Subjective:      Exercises:  Exercise/Equipment Resistance/Repetitions Other comments     Bike 10 min       Hip/Knee Flex/Ext on step  5 reps bilateral; 2 sets     Seated flex with ball   10 reps            Bridge  5 reps sets      Isometric Hip Flex  5 reps 2 sets             Mini squat with reach 5 reps 2KG ball      resisted Lateral Walk  3 laps  nt                                                                   Other Therapeutic Activities:Treadmill 1 MPH 2% incline 2 min  x4        Home Exercise Program:      Manual Treatments:      Modalities:      Timed Code Treatment Minutes:  30    Total Treatment Minutes:  50    Treatment/Activity Tolerance:  [x] Patient tolerated treatment well [] Patient limited by fatigue  [] Patient limited by pain  [] Patient limited by other medical complications  [] Other:     Plan:   [x] Continue per plan of care [] Alter current plan (see comments)  [] Plan of care initiated [] Hold pending MD visit [] Discharge  Plan for Next Session:         Treatment Charges: Mins Units   Initial Evaluation     Re-Evaluation     Ther Exercise         TE 40 2   Manual Therapy     MT     Ther Activities        TA     Gait Training          GT     Neuro Re-education NR     Modalities     Non-Billable Service Time 10    Other     Total Time/Units 50 2     Electronically signed by:  Anibal Amezquita, [address] [facility]

## 2022-02-10 ENCOUNTER — HOSPITAL ENCOUNTER (OUTPATIENT)
Dept: PHYSICAL THERAPY | Age: 77
Setting detail: THERAPIES SERIES
Discharge: HOME | End: 2022-02-10
Payer: MEDICARE

## 2022-02-10 PROCEDURE — 97110 THERAPEUTIC EXERCISES: CPT

## 2022-02-10 NOTE — PROGRESS NOTES
[facility]  Phone: [phone] Fax: [phone]       Physical Therapy Daily Treatment Note  Date:  2/10/2022    Patient Name:  Jade Ruby   :  1945 MRN: [id number]    Restrictions/Precautions:    Diagnosis:  Back Pain   Treatment Diagnosis:    Insurance/Certification information:  [employer]na Medicare   Referring Physician:  Dr Gerhard Thompson of care signed (Y/N):    Visit# / total visits:  7/  Pain level: /10  Time In:   2943    Time Out:  1510        Subjective:      Exercises:  Exercise/Equipment Resistance/Repetitions Other comments     Bike 10 min       Hip/Knee Flex/Ext on step  5 reps bilateral; 2 sets     Seated flex with ball   10 reps            Bridge  5 reps sets                  Mini squat with reach 10 reps 2KG ball      resisted Lateral Walk  3 laps                                                                     Other Therapeutic Activities:Treadmill 1 MPH 3% incline 2 min  x4        Home Exercise Program:      Manual Treatments:      Modalities:      Timed Code Treatment Minutes:  30    Total Treatment Minutes:  50    Treatment/Activity Tolerance:  [x] Patient tolerated treatment well [] Patient limited by fatigue  [] Patient limited by pain  [] Patient limited by other medical complications  [] Other:     Plan:   [x] Continue per plan of care [] Alter current plan (see comments)  [] Plan of care initiated [] Hold pending MD visit [] Discharge  Plan for Next Session:         Treatment Charges: Mins Units   Initial Evaluation     Re-Evaluation     Ther Exercise         TE 40 2   Manual Therapy     MT     Ther Activities        TA     Gait Training          GT     Neuro Re-education NR     Modalities     Non-Billable Service Time 10    Other     Total Time/Units 50 2     Electronically signed by:  Christen Dooley, [address] [facility] [employer] [facility]

## 2022-02-15 ENCOUNTER — HOSPITAL ENCOUNTER (OUTPATIENT)
Dept: PHYSICAL THERAPY | Age: 77
Setting detail: THERAPIES SERIES
Discharge: HOME | End: 2022-02-15
Payer: MEDICARE

## 2022-02-15 PROCEDURE — G0283 ELEC STIM OTHER THAN WOUND: HCPCS

## 2022-02-15 PROCEDURE — 97110 THERAPEUTIC EXERCISES: CPT

## 2022-02-15 NOTE — PROGRESS NOTES
[facility]  Phone: [phone] Fax: [phone]       Physical Therapy Daily Treatment Note  Date:  2/15/2022    Patient Name:  Kike Engel   :  1945 MRN: [id number]    Restrictions/Precautions:    Diagnosis:  Back Pain   Treatment Diagnosis:    Insurance/Certification information:  [employer] Medicare   Referring Physician:  Dr Moustapha Ordoñez of care signed (Y/N):    Visit# / total visits:  8/  Pain level: /10  Time In:   7381    Time Out:  1510        Subjective:      Exercises:  Exercise/Equipment Resistance/Repetitions Other comments     Bike 10 min       Hip/Knee Flex/Ext on step  5 reps bilateral; 2 sets     Seated flex with ball   10 reps            Bridge  5 reps sets                  Mini squat with reach 10 reps 2KG ball      resisted Lateral Walk  3 laps     Step up  6\" 5 reps bilateral                                                                Other Therapeutic Activities:Treadmill 1 MPH 3% incline 2 min  x4        Home Exercise Program:      Manual Treatments:      Modalities:      Timed Code Treatment Minutes:  30    Total Treatment Minutes:  50    Treatment/Activity Tolerance:  [x] Patient tolerated treatment well [] Patient limited by fatigue  [] Patient limited by pain  [] Patient limited by other medical complications  [] Other:     Plan:   [x] Continue per plan of care [] Alter current plan (see comments)  [] Plan of care initiated [] Hold pending MD visit [] Discharge  Plan for Next Session:         Treatment Charges: Mins Units   Initial Evaluation     Re-Evaluation     Ther Exercise         TE 40 2   Manual Therapy     MT     Ther Activities        TA     Gait Training          GT     Neuro Re-education NR     Modalities     Non-Billable Service Time 10    Other     Total Time/Units 50 2     Electronically signed by:  Kimani Vee, [address] [place] [address]

## 2022-02-17 ENCOUNTER — HOSPITAL ENCOUNTER (OUTPATIENT)
Dept: PHYSICAL THERAPY | Age: 77
Setting detail: THERAPIES SERIES
Discharge: HOME | End: 2022-02-17
Payer: MEDICARE

## 2022-02-17 PROCEDURE — 97110 THERAPEUTIC EXERCISES: CPT

## 2022-02-17 NOTE — PROGRESS NOTES
[facility]  Phone: [phone] Fax: [phone]       Physical Therapy Daily Treatment Note  Date:  2022    Patient Name:  Gisselle Small   :  1945 MRN: [id number]    Restrictions/Precautions:    Diagnosis:  Back Pain   Treatment Diagnosis:    Insurance/Certification information:  [employer]na Medicare   Referring Physician:  Dr Bowen Pac of care signed (Y/N):    Visit# / total visits:  9/  Pain level: /10  Time In:   1420    Time Out:  1510        Subjective:      Exercises:  Exercise/Equipment Resistance/Repetitions Other comments     Bike 10 min       Hip/Knee Flex/Ext on step  5 reps bilateral; 2 sets     Seated flex with ball   10 reps            Bridge  5 reps sets                  Mini squat with reach 10 reps 2KG ball      resisted Lateral Walk  3 laps     Step up  6\" 5 reps bilateral                                                                Other Therapeutic Activities:Treadmill 1.2 MPH 3% incline 2 min  x4        Home Exercise Program:      Manual Treatments:      Modalities:      Timed Code Treatment Minutes:  30    Total Treatment Minutes:  50    Treatment/Activity Tolerance:  [x] Patient tolerated treatment well [] Patient limited by fatigue  [] Patient limited by pain  [] Patient limited by other medical complications  [] Other:     Plan:   [x] Continue per plan of care [] Alter current plan (see comments)  [] Plan of care initiated [] Hold pending MD visit [] Discharge  Plan for Next Session:         Treatment Charges: Mins Units   Initial Evaluation     Re-Evaluation     Ther Exercise         TE 40 2   Manual Therapy     MT     Ther Activities        TA     Gait Training          GT     Neuro Re-education NR     Modalities     Non-Billable Service Time 10    Other     Total Time/Units 50 2     Electronically signed by:  Korey Perez, [address] [facility]

## 2022-02-21 DIAGNOSIS — I50.22 CHRONIC SYSTOLIC HEART FAILURE (HCC): ICD-10-CM

## 2022-02-21 DIAGNOSIS — I10 ESSENTIAL HYPERTENSION: ICD-10-CM

## 2022-02-21 NOTE — TELEPHONE ENCOUNTER
Last Appointment   6/14/2021  Next Appointment  Visit date not found    Left message for patient advising that he is overdue for routine follow up and that he needs to schedule ASAP to avoid delays in refills.

## 2022-02-22 ENCOUNTER — HOSPITAL ENCOUNTER (OUTPATIENT)
Dept: PHYSICAL THERAPY | Age: 77
Setting detail: THERAPIES SERIES
Discharge: HOME | End: 2022-02-22
Payer: MEDICARE

## 2022-02-22 RX ORDER — SPIRONOLACTONE 25 MG/1
TABLET ORAL
Qty: 45 TABLET | Refills: 3 | Status: SHIPPED | OUTPATIENT
Start: 2022-02-22

## 2022-02-22 NOTE — PROGRESS NOTES
[facility]  Phone: [phone] Fax: [phone]     Physical Therapy  Cancellation/No-show Note  Patient Name:  Nils Ngo  :  1945   Date:  2022    For today's appointment patient:  [x]  Cancelled  []  Rescheduled appointment  []  No-show     Reason given by patient:  []  Patient ill  []  Conflicting appointment  []  No transportation    []  Conflict with work  []  No reason given  [x]  Other:     Comments:  Family Commitment                                     Next PT session 2022    Electronically signed by:  Krystin Orozco, [address] [facility]

## 2022-02-24 ENCOUNTER — APPOINTMENT (OUTPATIENT)
Dept: PHYSICAL THERAPY | Age: 77
End: 2022-02-24
Payer: MEDICARE

## 2022-02-28 ENCOUNTER — OFFICE VISIT (OUTPATIENT)
Dept: PAIN MANAGEMENT | Age: 77
End: 2022-02-28
Payer: MEDICARE

## 2022-02-28 ENCOUNTER — PREP FOR PROCEDURE (OUTPATIENT)
Dept: PAIN MANAGEMENT | Age: 77
End: 2022-02-28

## 2022-02-28 VITALS
WEIGHT: 140 LBS | BODY MASS INDEX: 20.73 KG/M2 | HEART RATE: 70 BPM | SYSTOLIC BLOOD PRESSURE: 142 MMHG | HEIGHT: 69 IN | OXYGEN SATURATION: 93 % | DIASTOLIC BLOOD PRESSURE: 76 MMHG | TEMPERATURE: 97.5 F | RESPIRATION RATE: 16 BRPM

## 2022-02-28 DIAGNOSIS — M51.36 DDD (DEGENERATIVE DISC DISEASE), LUMBAR: Primary | ICD-10-CM

## 2022-02-28 DIAGNOSIS — M47.817 LUMBOSACRAL SPONDYLOSIS WITHOUT MYELOPATHY: Primary | ICD-10-CM

## 2022-02-28 DIAGNOSIS — M47.817 LUMBOSACRAL SPONDYLOSIS WITHOUT MYELOPATHY: ICD-10-CM

## 2022-02-28 DIAGNOSIS — M48.061 SPINAL STENOSIS OF LUMBAR REGION, UNSPECIFIED WHETHER NEUROGENIC CLAUDICATION PRESENT: ICD-10-CM

## 2022-02-28 DIAGNOSIS — M43.16 SPONDYLOLISTHESIS OF LUMBAR REGION: ICD-10-CM

## 2022-02-28 PROCEDURE — 99213 OFFICE O/P EST LOW 20 MIN: CPT | Performed by: ANESTHESIOLOGY

## 2022-02-28 NOTE — PROGRESS NOTES
[facility] - BEHAVIORAL HEALTH SERVICES Pain Management  Greyson Vogt  Dept: [phone]    Follow up Note      Yanet Meléndez     Date of Visit:  2/28/2022    CC:  Patient presents for follow up   Chief Complaint   Patient presents with    Follow Up After Procedure     #1 BILATERAL LUMBAR MEDIAL BRANCH NERVE BLOCK UNDER FLUOROSCOPIC GUIDANCE AT L3, L4, AND L5 DORSAL RAMI    Lower Back Pain       HPI:  Chronic low back pain for > 1 year.     Pain causes functional limitations/ limits Adl's : Yes       Had CT Myelogram (Could not do MRI due to having penile implant).     Has h/o chronic right foot drop for > 10 yrs. Uses a AFO. S/P LESI provided moderate relief. Doing HEP. Has residual pain- in the low back predominantly axial in nature. Nursing notes and details of the pain history reviewed.  Please see intake notes for details.     Previous treatments:   Physical Therapy : yes, for > 6 weeks and continues HEP      Medications: - yes     Surgeries: no LS spine surgery     He has been on anticoagulation medications yes and include ASA-81 mg ( he is off for few weeks)     He has not been on herbal supplements.       He is diabetic.     Imaging:      CT Myelogram: 10/22/2021:      FINDINGS:   BONES/ALIGNMENT: The vertebral body heights are maintained.  Facet joints are   aligned. Lei Doyle is a right unilateral L5 pars defect with grade 1   anterolisthesis of L5 on S1.       SOFT TISSUES: The posterior paraspinal soft tissues are unremarkable.  The   visualized abdominal structures are unremarkable.  There is no intrathecal   contrast.  The contrast is seen in the epidural space throughout the   thoracolumbar region.       L1-L2: There is a circumferential disc bulge.  There is no significant bony   canal stenosis or bony foraminal narrowing.       L2-L3: There is a circumferential disc bulge with facet hypertrophy.  There   is no significant canal stenosis.  There is moderate bilateral foraminal   narrowing.       L3-L4: There is a circumferential disc bulge with facet hypertrophy. Vickie Finders   is no canal stenosis.  There is moderate bilateral foraminal narrowing.       L4-L5: There is a circumferential disc bulge with facet and ligamentous   hypertrophy.  There is no canal stenosis.  There is mild right and moderate   left foraminal narrowing.       L5-S1: There is a circumferential disc bulge with uncovering of the disc   space posteriorly.  There is facet hypertrophy.  There is no canal stenosis. There is severe bilateral foraminal narrowing.           Impression   Multilevel degenerative disc disease with associated facet hypertrophy with a   right unilateral pars defect at L5 resulting in grade 1 anterolisthesis of L5   on S1.       Bilateral foraminal narrowing as described above that is most severe at L5-S1.         MRI of LS spine: 10/2020:       Xray LS flex/ Ext: 9/13/2021:  FINDINGS:   Grade anterolisthesis of L5 upon S1 associated with bilateral L5   spondylolysis. Severe degenerative disc disease at this level.  Less   pronounced degenerative disc disease at multiple other levels. Degenerative   facet arthropathy is moderate from L4-S1.  There is evidence of instability   at L5-S1. Anterolisthesis in extension measures approximately 7 mm.  In the   neutral position and inflection it measures approximately 11 mm.           Impression   Grade 2 anterolisthesis of L5 on S1 associated with bilateral L5   spondylolysis.  Spinal instability at this level.  Degenerative spondylotic   changes.              Potential Aberrant Drug-Related Behavior:  no    Urine Drug Screening: no    OARRS report[de-identified] Reviewed.     Past Medical History:   Diagnosis Date    CAD (coronary artery disease) 2009    history of    Diabetes mellitus ([address]     Diabetic neuropathy ([address]     Diabetic ulcer of left foot associated with type 2 diabetes mellitus ([address] 03/29/2017    healed 6-2017 per pt    Hyperlipidemia     Hypertension     Lumbar pain     TAKE 1 TABLET TWICE A DAY WITH MEALS 4/19/21  Yes Melita Neri MD   insulin glargine (LANTUS SOLOSTAR) 100 UNIT/ML injection pen Inject 10 Units into the skin nightly 2/15/21  Yes Melita Neri MD   FreeStyle Lancets MISC 1 each by Does not apply route daily 2/6/21  Yes Melita Neri MD   blood glucose test strips (ASCENSIA AUTODISC VI;ONE TOUCH ULTRA TEST VI) strip 1 each by In Vitro route 2 times daily As needed.  2/6/21  Yes Melita Neri MD   glipiZIDE (GLUCOTROL) 10 MG tablet TAKE 1 TABLET TWICE A DAY BEFORE MEALS 12/15/20  Yes Melita Neri MD   losartan (COZAAR) 25 MG tablet TAKE 1 TABLET TWICE A DAY 10/28/19  Yes Melita Neri MD   metoprolol succinate (TOPROL XL) 25 MG extended release tablet TAKE 1 TABLET TWICE A DAY 10/28/19  Yes Melita Neri MD       Allergies   Allergen Reactions    Ciprofloxacin Hives           Pcn [Penicillins] Hives    Sulfa Antibiotics Hives       Social History     Socioeconomic History    Marital status:      Spouse name: Not on file    Number of children: Not on file    Years of education: Not on file    Highest education level: Not on file   Occupational History    Not on file   Tobacco Use    Smoking status: Never Smoker    Smokeless tobacco: Never Used   Vaping Use    Vaping Use: Never used   Substance and Sexual Activity    Alcohol use: Not Currently     Alcohol/week: 3.0 standard drinks     Types: 2 Shots of liquor, 1 Standard drinks or equivalent per week    Drug use: No    Sexual activity: Not on file   Other Topics Concern    Not on file   Social History Narrative    Not on file     Social Determinants of Health     Financial Resource Strain:     Difficulty of Paying Living Expenses: Not on file   Food Insecurity:     Worried About Running Out of Food in the Last Year: Not on file    Dieudonne of Food in the Last Year: Not on file   Transportation Needs:     Lack of Transportation (Medical): Not on file    Lack of Transportation (Non-Medical): Not on file   Physical Activity:     Days of Exercise per Week: Not on file    Minutes of Exercise per Session: Not on file   Stress:     Feeling of Stress : Not on file   Social Connections:     Frequency of Communication with Friends and Family: Not on file    Frequency of Social Gatherings with Friends and Family: Not on file    Attends Roman Catholic Services: Not on file    Active Member of [address] [place] [address] [employer] or Organizations: Not on file    Attends Club or Organization Meetings: Not on file    Marital Status: Not on file   Intimate Partner Violence:     Fear of Current or Ex-Partner: Not on file    Emotionally Abused: Not on file    Physically Abused: Not on file    Sexually Abused: Not on file   Housing Stability:     Unable to Pay for Housing in the Last Year: Not on file    Number of [place] in the Last Year: Not on file    Unstable Housing in the Last Year: Not on file       Family History   Problem Relation Age of Onset    High Blood Pressure Mother     COPD Father     Diabetes Father     Heart Disease Brother         chf    Seizures Brother        REVIEW OF SYSTEMS:     Janette Ricketts denies fever/chills, chest pain, shortness of breath, new bowel or bladder complaints. All other review of systems was negative.     PHYSICAL EXAMINATION:      BP (!) 142/76   Pulse 70   Temp 97.5 °F (36.4 °C) (Infrared)   Resp 16   Ht 5' 9\" (1.753 m)   Wt 140 lb (63.5 kg)   SpO2 93%   BMI 20.67 kg/m²      General:       General appearance:  Pleasant and well-hydrated, in no distress and A & O x 3  Build:Normal Weight  Function: Rises from seated position easily and Moves about room without difficulty     HEENT:     Head:normocephalic, atraumatic     Lungs:     Breathing:normal breathing pattern      CVS:     RRR     Abdomen:     Shape:non-distended and normal     Cervical spine:     Inspection:normal     Thoracic spine: Spine inspection:normal      Lumbar spine:     Spine inspection: Normal   Palpation: Tenderness paravertebral muscles Yes bilaterally  Range of motion: Decreased, flexion Decreased, Lateral bending, extension and rotation bilaterally reduced is painful. Lower lumbar facet tenderness +  Sacroiliac joint tenderness No bilaterally  SLR : negative bilaterally     Musculoskeletal:     Trigger points no     Extremities:  Tremors:None bilaterally upper and lower  Edema:none x all 4 extremities  B/l foot deformities noted.     Neurological:     Sensory: Normal to light touch      Motor:   Right hip flexor 5/5  Left hip flexor 5/5              Right Quadriceps 5/5          Left Quadriceps 5/5           Right Gastrocnemius 3/5    Left Gastrocnemius 5/5  Right Ant Tibialis 3/5  Left Ant Tibialis 5/5  Uses AFO on the right foot     Dermatology:     Skin:no rashes or lesions noted    Assessment/Plan:   Diagnosis Orders   1. DDD (degenerative disc disease), lumbar      2. Spondylolisthesis of lumbar region      3. Spinal stenosis of lumbar region, unspecified whether neurogenic claudication present      4. Lumbosacral spondylosis without myelopathy            77 y.o.  male with h/o chronic low back pain. Failed conservative treatment for > 6 weeks recently.     CT Myelogram: Has L5-S1 Spondylolisthesis, L5-S1 foraminal stenosis.     MRI done in 10/2020- reviewed.     X-ray Flex/ Ext: instability noted.     Has been evaluated by Dr. Adalberto Barrientos team.     On ASA-81 listed on the Med list . But states that he is not taking it.     H/o DM +     Doing HEP. Has residual pain predominantly axial in nature. Facet tenderness+    S/P # 1 Lumbar MBNB with 75-80% pain relief for short duration. Now has recurrence of similar pain. Doing HEP. PLAN:     # 2 Lumbar facet MBNB at B/L L3, L4 MB & L5DR under fluoroscopy. RBA discussed. If short term relief, will do RFA.     [facility]qua therapy.     If no long term relief, recommend reeval with  Isaiah. He apparently has LE limb length discrepancy. Referred to orthotics for eval and correction.     Counseling : Patient encouraged to stay active and to continue Regular home exercise program as tolerated - stretching / strengthening.     Treatment plan discussed with the patient including medication and procedure side effects.     Controlled Substances Monitoring:      OARRS reviewed- not on chronic opioids. 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 The foot drop and neuropathy causes a decrease in activity and impaired balance and fear of falling. He is in need of a custom bilateral ankle foot orthosis to offset his ankle and foot deformities and improve proprioceptive/tactile feedback. The orthosis will improve gait, balance and reduce pain enabling the patient to safely and effectively achieve an increased level of physical activity and improve quality of life. We will also significantly improve ADLs.    Esther Matos MD     CC:  Singh Donnelly MD    NOTE: The above documentation was prepared using voice recognition software. Every attempt was made to ensure accuracy but there may be spelling, grammatical, and contextual errors.

## 2022-02-28 NOTE — PROGRESS NOTES
Do you currently have any of the following:    Fever: No  Headache:  No  Cough: No  Shortness of breath: No  Exposed to anyone with these symptoms: No         Zion Charles presents to the [facility] on 2/28/2022. Latosha Arce is complaining of pain lower back. The pain is constant. The pain is described as aching. Pain is rated on his best day at a 6, on his worst day at a 6, and on average at a 6 on the VAS scale. He took his last dose of Tylenol PRN. Any procedures since your last visit: Yes, with 0% relief. Pacemaker or defibrillator: No managed by . He is not on NSAIDS and is not on anticoagulation medications to include none and is managed by . Medication Contract and Consent for Opioid Use Documents Filed      No documents found                There were no vitals taken for this visit. No LMP for male patient.

## 2022-03-01 ENCOUNTER — HOSPITAL ENCOUNTER (OUTPATIENT)
Dept: PHYSICAL THERAPY | Age: 77
Setting detail: THERAPIES SERIES
End: 2022-03-01
Payer: MEDICARE

## 2022-03-01 NOTE — PROGRESS NOTES
[facility]  Phone: [phone] Fax: [phone]     Physical Therapy  Cancellation/No-show Note  Patient Name:  Constantino Ryder  :  1945   Date:  3/1/2022    For today's appointment patient:  [x]  Cancelled  []  Rescheduled appointment  []  No-show     Reason given by patient:  []  Patient ill  []  Conflicting appointment  []  No transportation    []  Conflict with work  []  No reason given  [x]  Other:     Comments:  Family Commitment                                     Next PT session 2022    Electronically signed by:  Devon Miller, [address] [facility]

## 2022-03-03 ENCOUNTER — HOSPITAL ENCOUNTER (OUTPATIENT)
Dept: PHYSICAL THERAPY | Age: 77
Setting detail: THERAPIES SERIES
Discharge: HOME | End: 2022-03-03
Payer: MEDICARE

## 2022-03-03 PROCEDURE — 97110 THERAPEUTIC EXERCISES: CPT

## 2022-03-03 NOTE — PROGRESS NOTES
[facility]  Phone: [phone] Fax: [phone]       Physical Therapy Daily Treatment Note  Date:  3/3/2022    Patient Name:  Temo Plummer   :  1945 MRN: [id number]    Restrictions/Precautions:    Diagnosis:  Back Pain   Treatment Diagnosis:    Insurance/Certification information:  A[employer]na Medicare   Referring Physician:  Dr Louie Hernandez of care signed (Y/N):    Visit# / total visits:  10  Pain level: /10  Time In:   9416    Time Out:  1510        Subjective:      Exercises:  Exercise/Equipment Resistance/Repetitions Other comments     Bike 10 min       Hip/Knee Flex/Ext on step  5 reps bilateral; 2 sets     Seated flex with ball   10 reps            Bridge  5 reps sets                  Mini squat with reach 10 reps 2KG ball      resisted Lateral Walk  3 laps     Step up  6\" 5 reps bilateral                                                                Other Therapeutic Activities:Treadmill 1.2 MPH 3% incline 2 min  x4   nt    Home Exercise Program:      Manual Treatments:      Modalities:      Timed Code Treatment Minutes:  30    Total Treatment Minutes:  50    Treatment/Activity Tolerance:  [x] Patient tolerated treatment well [] Patient limited by fatigue  [] Patient limited by pain  [] Patient limited by other medical complications  [] Other:     Plan:   [x] Continue per plan of care [] Alter current plan (see comments)  [] Plan of care initiated [] Hold pending MD visit [] Discharge  Plan for Next Session:         Treatment Charges: Mins Units   Initial Evaluation     Re-Evaluation     Ther Exercise         TE 40 2   Manual Therapy     MT     Ther Activities        TA     Gait Training          GT     Neuro Re-education NR     Modalities     Non-Billable Service Time 10    Other     Total Time/Units 50 2     Electronically signed by:  Edy Briones, [address] [place] [address]

## 2022-03-03 NOTE — PROGRESS NOTES
Have you been tested for COVID  No           Have you been told you were positive for COVID No  Have you had any known exposure to someone that is positive for COVID No  Do you have a cough                   No              Do you have shortness of breath No                 Do you have a sore throat            No                Are you having chills                    No                Are you having muscle aches. no                    Please come to the hospital wearing a mask and have your significant other wear a mask as well. Both of you should check your temperature before leaving to come here,  if it is 100 or higher please call [phone] for instruction. [facility] PAIN MANAGEMENT  INSTRUCTIONS  . .......................................................................................................................................... [x] Parking the day of Surgery is located in the [facility].   Upon entering the door, make immediate right into the surgery reception room    [x]  Bring photo ID and insurance card     [x] You may have a light breakfast day of procedure    [x]  Wear loose comfortable clothing    [x]  Please follow instructions for medications as given per Dr's office    [x] You can expect a call the business day prior to procedure to notify you of your arrival time     [x] Please arrange for

## 2022-03-07 ENCOUNTER — HOSPITAL ENCOUNTER (OUTPATIENT)
Dept: GENERAL RADIOLOGY | Age: 77
Discharge: HOME | End: 2022-03-09
Attending: ANESTHESIOLOGY
Payer: MEDICARE

## 2022-03-07 ENCOUNTER — HOSPITAL ENCOUNTER (OUTPATIENT)
Age: 77
Setting detail: OUTPATIENT SURGERY
Discharge: HOME | End: 2022-03-07
Attending: ANESTHESIOLOGY | Admitting: ANESTHESIOLOGY
Payer: MEDICARE

## 2022-03-07 VITALS
DIASTOLIC BLOOD PRESSURE: 61 MMHG | HEIGHT: 69 IN | TEMPERATURE: 98.6 F | SYSTOLIC BLOOD PRESSURE: 122 MMHG | WEIGHT: 140 LBS | RESPIRATION RATE: 15 BRPM | OXYGEN SATURATION: 96 % | HEART RATE: 68 BPM | BODY MASS INDEX: 20.73 KG/M2

## 2022-03-07 DIAGNOSIS — R52 PAIN MANAGEMENT: ICD-10-CM

## 2022-03-07 LAB — METER GLUCOSE: 264 MG/DL (ref 74–99)

## 2022-03-07 PROCEDURE — 3600000002 HC SURGERY LEVEL 2 BASE: Performed by: ANESTHESIOLOGY

## 2022-03-07 PROCEDURE — 3209999900 FLUORO FOR SURGICAL PROCEDURES

## 2022-03-07 PROCEDURE — 7100000011 HC PHASE II RECOVERY - ADDTL 15 MIN: Performed by: ANESTHESIOLOGY

## 2022-03-07 PROCEDURE — 2500000003 HC RX 250 WO HCPCS: Performed by: ANESTHESIOLOGY

## 2022-03-07 PROCEDURE — 7100000010 HC PHASE II RECOVERY - FIRST 15 MIN: Performed by: ANESTHESIOLOGY

## 2022-03-07 PROCEDURE — 2709999900 HC NON-CHARGEABLE SUPPLY: Performed by: ANESTHESIOLOGY

## 2022-03-07 PROCEDURE — 82962 GLUCOSE BLOOD TEST: CPT

## 2022-03-07 PROCEDURE — 64493 INJ PARAVERT F JNT L/S 1 LEV: CPT | Performed by: ANESTHESIOLOGY

## 2022-03-07 PROCEDURE — 64494 INJ PARAVERT F JNT L/S 2 LEV: CPT | Performed by: ANESTHESIOLOGY

## 2022-03-07 PROCEDURE — 6360000002 HC RX W HCPCS: Performed by: ANESTHESIOLOGY

## 2022-03-07 RX ORDER — BUPIVACAINE HYDROCHLORIDE 5 MG/ML
INJECTION, SOLUTION EPIDURAL; INTRACAUDAL PRN
Status: DISCONTINUED | OUTPATIENT
Start: 2022-03-07 | End: 2022-03-07 | Stop reason: ALTCHOICE

## 2022-03-07 RX ORDER — LIDOCAINE HYDROCHLORIDE 5 MG/ML
INJECTION, SOLUTION INFILTRATION; INTRAVENOUS PRN
Status: DISCONTINUED | OUTPATIENT
Start: 2022-03-07 | End: 2022-03-07 | Stop reason: ALTCHOICE

## 2022-03-07 RX ORDER — METHYLPREDNISOLONE ACETATE 40 MG/ML
INJECTION, SUSPENSION INTRA-ARTICULAR; INTRALESIONAL; INTRAMUSCULAR; SOFT TISSUE PRN
Status: DISCONTINUED | OUTPATIENT
Start: 2022-03-07 | End: 2022-03-07 | Stop reason: ALTCHOICE

## 2022-03-07 ASSESSMENT — PAIN - FUNCTIONAL ASSESSMENT: PAIN_FUNCTIONAL_ASSESSMENT: 0-10

## 2022-03-07 ASSESSMENT — PAIN SCALES - GENERAL
PAINLEVEL_OUTOF10: 0
PAINLEVEL_OUTOF10: 0

## 2022-03-07 NOTE — INTERVAL H&P NOTE
Update History & Physical    The patient's History and Physical of February 7, 2022 was reviewed with the patient and I examined the patient. There was no change. The surgical site was confirmed by the patient and me. Plan: For lumbar MBNB # 2 for low back pain. The risks, benefits, expected outcome, and alternative to the recommended procedure have been discussed with the patient. Patient understands and wants to proceed with the procedure.      Electronically signed by Wilfrido Munson MD on 3/7/2022

## 2022-03-07 NOTE — OP NOTE
Operative Note      Patient: Luz Maria Jaime  YOB: 1945  MRN: [id number]    Date of Procedure: 3/7/2022    Pre-Op Diagnosis: LUMBOSACRAL SPONDYLOSIS    Post-Op Diagnosis: Same       Procedure(s):  # 2 BILATERAL LUMBAR MEDIAL BRANCH NERVE BLOCK UNDER FLUORO AT L3 L4 L5 DORSAL RAMI       Surgeon(s):  Mary Guido MD    Assistant:   * No surgical staff found *    Anesthesia: Local    Estimated Blood Loss (mL): Minimal    Complications: None    Specimens:   * No specimens in log *    Implants:  * No implants in log *      Drains: * No LDAs found *    Findings: good needle placement    Detailed Description of Procedure:   3/7/2022    Patient: Luz Maria Jaime  :  1945  Age:  77 y.o. Sex:  male     PRE-OPERATIVE DIAGNOSIS: Bilateral Lumbar spondylosis, lumbar facet syndrome. POST-OPERATIVE DIAGNOSIS: Same. PROCEDURE:  # 2 Fluoroscopic guided lumbar medial branch blocks Bilateral at Levels: L3, L4, L5  SURGEON: Mary Guido MD    ANESTHESIA: Local    ESTIMATED BLOOD LOSS: None.  ______________________________________________________________________  BRIEF HISTORY:  Luz Maria Jaime comes in today for 2 fluoroscopic guided lumbar medial branch blocks  Bilateral  at Levels: L3, L4, L5  The potential complications of this procedure were discussed with him again today. He has elected to undergo the aforementioned procedureNabil Woodward complete History & Physical examination were reviewed in depth, a copy of which is in the chart. DESCRIPTION OF PROCEDURE:   After confirming written and informed consent, a time-out was performed and Trace name and date of birth, the procedure to be performed as well as the plan for the location of the needle insertion were confirmed. The patient was brought into the procedure room and placed in the prone position on the fluoroscopy table. Standard monitors were placed and vital signs were observed throughout the procedure. The area of the lumbar spine was prepped with chloraprep and draped in a sterile manner. AP fluoroscopy was used to identify and wilian bartons point at the targeted levels. The skin and subcutaneous tissues in these identified areas were anesthetized with 0.5%Lidocaine. The 22 # gauge 3.5 inch spinal needle was advanced toward the junction of the superior articular process and the transverse process, along the course of the medial branch. Satisfactory needle placement was confirmed by AP and oblique projections. At the sacral alar level, the sacral alar region was visualized and the needle tip was positioned on the sacral alar at the base of the superior articulating process where the medial branch traverses under fluoroscopic guidance. Once bone was contacted and negative aspiration was confirmed. A solution of 0.5% marcaine with 5 mg DepoMedrol 1 cc was then injected at each level. Following the procedure Sudhakar noted improvement of previous pain symptoms. Disposition the patient tolerated the procedure well and there were no complications . Vital signs remained stable throughout the procedure. The patient was escorted to the recovery area where they remained until discharge and written discharge instructions for the procedure were given. Plan: Jennifer Hyde will return to our pain management center as scheduled. Pre-procedure pain: 6-7/10    Post-procedure pain:  Significant improvement noted.     Pablo Matias MD

## 2022-03-10 ENCOUNTER — HOSPITAL ENCOUNTER (OUTPATIENT)
Dept: PHYSICAL THERAPY | Age: 77
Setting detail: THERAPIES SERIES
Discharge: HOME | End: 2022-03-10
Payer: MEDICARE

## 2022-03-10 PROCEDURE — 97110 THERAPEUTIC EXERCISES: CPT

## 2022-03-10 NOTE — PROGRESS NOTES
[facility]  Phone: [phone] Fax: [phone]       Physical Therapy Daily Treatment Note  Date:  3/10/2022    Patient Name:  Leodan Reyes   :  1945 MRN: [id number]    Restrictions/Precautions:    Diagnosis:  Back Pain   Treatment Diagnosis:    Insurance/Certification information:  Aetna Medicare   Referring Physician:  Dr Angus Mclaughlin of care signed (Y/N):    Visit# / total visits:  11  Pain level: /10  Time In:   7719    Time Out:  1510        Subjective:      Exercises:  Exercise/Equipment Resistance/Repetitions Other comments     Bike 10 min       Hip/Knee Flex/Ext on step  5 reps bilateral; 2 sets     Seated flex with ball   10 reps            Bridge  5 reps sets                  Mini squat with reach 10 reps 2KG ball      resisted Lateral Walk  3 laps     Step up  6\" 5 reps bilateral                                                                Other Therapeutic Activities:Treadmill 1.2 MPH 3% incline 2 min  x4   nt    Home Exercise Program:      Manual Treatments:      Modalities:      Timed Code Treatment Minutes:  30    Total Treatment Minutes:  50    Treatment/Activity Tolerance:  [x] Patient tolerated treatment well [] Patient limited by fatigue  [] Patient limited by pain  [] Patient limited by other medical complications  [] Other:     Plan:   [x] Continue per plan of care [] Alter current plan (see comments)  [] Plan of care initiated [] Hold pending MD visit [] Discharge  Plan for Next Session:         Treatment Charges: Mins Units   Initial Evaluation     Re-Evaluation     Ther Exercise         TE 40 2   Manual Therapy     MT     Ther Activities        TA     Gait Training          GT     Neuro Re-education NR     Modalities     Non-Billable Service Time 10    Other     Total Time/Units 50 2     Electronically signed by:  Karen Worthington, [address] [place] [address]

## 2022-03-15 ENCOUNTER — HOSPITAL ENCOUNTER (OUTPATIENT)
Dept: PHYSICAL THERAPY | Age: 77
Setting detail: THERAPIES SERIES
Discharge: HOME | End: 2022-03-15
Payer: MEDICARE

## 2022-03-15 PROCEDURE — 97110 THERAPEUTIC EXERCISES: CPT

## 2022-03-15 NOTE — PROGRESS NOTES
[facility]  Phone: [phone] Fax: [phone]       Physical Therapy Daily Treatment Note  Date:  3/15/2022    Patient Name:  Terrance Wolf   :  1945 MRN: [id number]    Restrictions/Precautions:    Diagnosis:  Back Pain   Treatment Diagnosis:    Insurance/Certification information:  A[employer]na Medicare   Referring Physician:  Dr Cindy Jose of care signed (Y/N):    Visit# / total visits:  12  Pain level: /10  Time In:   7133    Time Out:  1510        Subjective:      Exercises:  Exercise/Equipment Resistance/Repetitions Other comments     Bike 10 min       Hip/Knee Flex/Ext on step  5 reps bilateral; 2 sets     Seated flex with ball   10 reps            Bridge  5 reps sets                  Mini squat with reach 10 reps 2KG ball      resisted Lateral Walk  3 laps     Step up  6\" 5 reps bilateral                                                                Other Therapeutic Activities:Treadmill 1.2 MPH 3% incline 2 min  x4   nt    Home Exercise Program:      Manual Treatments:      Modalities:      Timed Code Treatment Minutes:  30    Total Treatment Minutes:  50    Treatment/Activity Tolerance:  [x] Patient tolerated treatment well [] Patient limited by fatigue  [] Patient limited by pain  [] Patient limited by other medical complications  [] Other:     Plan:   [x] Continue per plan of care [] Alter current plan (see comments)  [] Plan of care initiated [] Hold pending MD visit [] Discharge  Plan for Next Session:         Treatment Charges: Mins Units   Initial Evaluation     Re-Evaluation     Ther Exercise         TE 40 2   Manual Therapy     MT     Ther Activities        TA     Gait Training          GT     Neuro Re-education NR     Modalities     Non-Billable Service Time 10    Other     Total Time/Units 50 2     Electronically signed by:  Lita Hutton, [address] [place] [address]

## 2022-03-17 ENCOUNTER — HOSPITAL ENCOUNTER (OUTPATIENT)
Dept: PHYSICAL THERAPY | Age: 77
Setting detail: THERAPIES SERIES
Discharge: HOME | End: 2022-03-17
Payer: MEDICARE

## 2022-04-06 ENCOUNTER — PREP FOR PROCEDURE (OUTPATIENT)
Dept: PAIN MANAGEMENT | Age: 77
End: 2022-04-06

## 2022-04-06 ENCOUNTER — OFFICE VISIT (OUTPATIENT)
Dept: PAIN MANAGEMENT | Age: 77
End: 2022-04-06
Payer: MEDICARE

## 2022-04-06 VITALS
TEMPERATURE: 97.9 F | DIASTOLIC BLOOD PRESSURE: 76 MMHG | HEART RATE: 75 BPM | SYSTOLIC BLOOD PRESSURE: 158 MMHG | RESPIRATION RATE: 16 BRPM | OXYGEN SATURATION: 96 %

## 2022-04-06 DIAGNOSIS — M47.817 LUMBOSACRAL SPONDYLOSIS WITHOUT MYELOPATHY: Primary | ICD-10-CM

## 2022-04-06 DIAGNOSIS — M51.36 DDD (DEGENERATIVE DISC DISEASE), LUMBAR: ICD-10-CM

## 2022-04-06 DIAGNOSIS — M43.16 SPONDYLOLISTHESIS OF LUMBAR REGION: ICD-10-CM

## 2022-04-06 DIAGNOSIS — M48.061 SPINAL STENOSIS OF LUMBAR REGION, UNSPECIFIED WHETHER NEUROGENIC CLAUDICATION PRESENT: ICD-10-CM

## 2022-04-06 PROCEDURE — 99213 OFFICE O/P EST LOW 20 MIN: CPT

## 2022-04-06 PROCEDURE — 99213 OFFICE O/P EST LOW 20 MIN: CPT | Performed by: ANESTHESIOLOGY

## 2022-04-06 NOTE — PROGRESS NOTES
Do you currently have any of the following:    Fever: No  Headache:  No  Cough: No  Shortness of breath: No  Exposed to anyone with these symptoms: No         China Carr presents to the [address] [place] [address] on 4/6/2022. Jose Lee is complaining of pain in his lower back. The pain is persistent. The pain is described as aching. Pain is rated on his best day at a 4, on his worst day at a 7, and on average at a 6 on the VAS scale. He took his last dose of Tylenol 3 days ago. Any procedures since your last visit: Yes, # 2 BILATERAL LUMBAR MEDIAL BRANCH NERVE BLOCK UNDER FLUORO AT L3 L4 L5 DORSAL RAMI  with 90 % relief. Pacemaker or defibrillator: No managed by n/a. He is not on NSAIDS and is not on anticoagulation medications. BP (!) 158/76   Pulse 75   Temp 97.9 °F (36.6 °C) (Infrared)   Resp 16   SpO2 96%      No LMP for male patient.

## 2022-04-06 NOTE — PROGRESS NOTES
[facility]  Emre Cazares, Greyson Christian [employer]  Dept: [phone]    Follow up Note      Kia Chesters     Date of Visit:  4/6/2022    CC:  Patient presents for follow up   Chief Complaint   Patient presents with    Follow Up After Procedure     # 2 BILATERAL LUMBAR MEDIAL BRANCH NERVE BLOCK UNDER FLUORO AT L3 L4 L5 DORSAL RAMI       Lower Back Pain     90% short relief from MBB       HPI:  Chronic low back pain for > 1 year.     Pain causes functional limitations/ limits Adl's : Yes       Had CT Myelogram (Could not do MRI due to having penile implant).     Has h/o chronic right foot drop for > 10 yrs. Uses a AFO. S/P LESI provided moderate relief. Doing HEP. Has residual pain- in the low back predominantly axial in nature. Nursing notes and details of the pain history reviewed. Please see intake notes for details.     Previous treatments:   Physical Therapy : yes, for > 6 weeks and continues HEP      Medications: - yes     Surgeries: no LS spine surgery     He has been on anticoagulation medications yes and include ASA-81 mg ( he is off for few weeks)     He has not been on herbal supplements.       He is diabetic.     Imaging:      CT Myelogram: 10/22/2021:      FINDINGS:   BONES/ALIGNMENT: The vertebral body heights are maintained.  Facet joints are   aligned. Krystal Collar is a right unilateral L5 pars defect with grade 1   anterolisthesis of L5 on S1.       SOFT TISSUES: The posterior paraspinal soft tissues are unremarkable.  The   visualized abdominal structures are unremarkable.  There is no intrathecal   contrast.  The contrast is seen in the epidural space throughout the   thoracolumbar region.       L1-L2: There is a circumferential disc bulge.  There is no significant bony   canal stenosis or bony foraminal narrowing.       L2-L3: There is a circumferential disc bulge with facet hypertrophy.  There   is no significant canal stenosis.  There is moderate bilateral foraminal   narrowing.     L3-L4: There is a circumferential disc bulge with facet hypertrophy.  There   is no canal stenosis.  There is moderate bilateral foraminal narrowing.       L4-L5: There is a circumferential disc bulge with facet and ligamentous   hypertrophy.  There is no canal stenosis.  There is mild right and moderate   left foraminal narrowing.       L5-S1: There is a circumferential disc bulge with uncovering of the disc   space posteriorly.  There is facet hypertrophy.  There is no canal stenosis. There is severe bilateral foraminal narrowing.           Impression   Multilevel degenerative disc disease with associated facet hypertrophy with a   right unilateral pars defect at L5 resulting in grade 1 anterolisthesis of L5   on S1.       Bilateral foraminal narrowing as described above that is most severe at L5-S1.         MRI of LS spine: 10/2020:       Xray LS flex/ Ext: 9/13/2021:  FINDINGS:   Grade anterolisthesis of L5 upon S1 associated with bilateral L5   spondylolysis. Severe degenerative disc disease at this level.  Less   pronounced degenerative disc disease at multiple other levels. Degenerative   facet arthropathy is moderate from L4-S1.  There is evidence of instability   at L5-S1. Anterolisthesis in extension measures approximately 7 mm.  In the   neutral position and inflection it measures approximately 11 mm.           Impression   Grade 2 anterolisthesis of L5 on S1 associated with bilateral L5   spondylolysis.  Spinal instability at this level.  Degenerative spondylotic   changes.              Potential Aberrant Drug-Related Behavior:  no    Urine Drug Screening: no    OARRS report[de-identified] Reviewed.     Past Medical History:   Diagnosis Date    CAD (coronary artery disease) 2009    Diabetes mellitus ([address]     Diabetic neuropathy ([address]     Diabetic ulcer of left foot associated with type 2 diabetes mellitus ([address] 03/29/2017    Hyperlipidemia     Hypertension     Lumbar pain     for procedure 3/7/22    Alexa Short MD   rosuvastatin (CRESTOR) 20 MG tablet TAKE 1 TABLET DAILY 10/18/21  Yes Alexa Short MD   metFORMIN (GLUCOPHAGE) 1000 MG tablet TAKE 1 TABLET TWICE A DAY WITH MEALS 4/19/21  Yes Alexa Short MD   insulin glargine (LANTUS SOLOSTAR) 100 UNIT/ML injection pen Inject 10 Units into the skin nightly 2/15/21  Yes Alexa Short MD   FreeStyle Lancets MISC 1 each by Does not apply route daily 2/6/21  Yes Alexa Short MD   blood glucose test strips (ASCENSIA AUTODISC VI;ONE TOUCH ULTRA TEST VI) strip 1 each by In Vitro route 2 times daily As needed.  2/6/21  Yes Alexa Short MD   glipiZIDE (GLUCOTROL) 10 MG tablet TAKE 1 TABLET TWICE A DAY BEFORE MEALS 12/15/20  Yes Alexa Short MD   losartan (COZAAR) 25 MG tablet TAKE 1 TABLET TWICE A DAY 10/28/19  Yes Alexa Short MD   metoprolol succinate (TOPROL XL) 25 MG extended release tablet TAKE 1 TABLET TWICE A DAY 10/28/19  Yes Alexa Short MD       Allergies   Allergen Reactions    Ciprofloxacin Hives           Pcn [Penicillins] Hives    Sulfa Antibiotics Hives       Social History     Socioeconomic History    Marital status:      Spouse name: Not on file    Number of children: Not on file    Years of education: Not on file    Highest education level: Not on file   Occupational History    Not on file   Tobacco Use    Smoking status: Never Smoker    Smokeless tobacco: Never Used   Vaping Use    Vaping Use: Never used   Substance and Sexual Activity    Alcohol use: Not Currently     Alcohol/week: 3.0 standard drinks     Types: 2 Shots of liquor, 1 Standard drinks or equivalent per week    Drug use: No    Sexual activity: Not on file   Other Topics Concern    Not on file   Social History Narrative    Not on file     Social Determinants of Health     Financial Resource Strain:     Difficulty of Paying Living Expenses: Not on file   Food Insecurity:     Worried About Running Out of Food in the Last Year: Not on file    Dieudonne of Food in the Last Year: Not on file   Transportation Needs:     Lack of Transportation (Medical): Not on file    Lack of Transportation (Non-Medical): Not on file   Physical Activity:     Days of Exercise per Week: Not on file    Minutes of Exercise per Session: Not on file   Stress:     Feeling of Stress : Not on file   Social Connections:     Frequency of Communication with Friends and Family: Not on file    Frequency of Social Gatherings with Friends and Family: Not on file    Attends Muslim Services: Not on file    Active Member of [address] [place] [address] [employer] or Organizations: Not on file    Attends Club or Organization Meetings: Not on file    Marital Status: Not on file   Intimate Partner Violence:     Fear of Current or Ex-Partner: Not on file    Emotionally Abused: Not on file    Physically Abused: Not on file    Sexually Abused: Not on file   Housing Stability:     Unable to Pay for Housing in the Last Year: Not on file    Number of [place] in the Last Year: Not on file    Unstable Housing in the Last Year: Not on file       Family History   Problem Relation Age of Onset    High Blood Pressure Mother     COPD Father     Diabetes Father     Heart Disease Brother         chf    Seizures Brother        REVIEW OF SYSTEMS:     Margarita Israel denies fever/chills, chest pain, shortness of breath, new bowel or bladder complaints. All other review of systems was negative.     PHYSICAL EXAMINATION:      BP (!) 158/76   Pulse 75   Temp 97.9 °F (36.6 °C) (Infrared)   Resp 16   SpO2 96%      General:       General appearance:  Pleasant and well-hydrated, in no distress and A & O x 3  Build:Normal Weight  Function: Rises from seated position easily and Moves about room without difficulty     HEENT:     Head:normocephalic, atraumatic     Lungs:     Breathing:normal breathing pattern      CVS: RRR     Abdomen:     Shape:non-distended and normal     Cervical spine:     Inspection:normal     Thoracic spine:                Spine inspection:normal      Lumbar spine:     Spine inspection: Normal   Palpation: Tenderness paravertebral muscles Yes bilaterally  Range of motion: Decreased, flexion Decreased, Lateral bending, extension and rotation bilaterally reduced is painful. Lower lumbar facet tenderness +  Sacroiliac joint tenderness No bilaterally  SLR : negative bilaterally     Musculoskeletal:     Trigger points no     Extremities:  Tremors:None bilaterally upper and lower  Edema:none x all 4 extremities  B/l foot deformities noted.     Neurological:     Sensory: Normal to light touch      Motor:   Right hip flexor 5/5  Left hip flexor 5/5              Right Quadriceps 5/5          Left Quadriceps 5/5           Right Gastrocnemius 3/5    Left Gastrocnemius 5/5  Right Ant Tibialis 3/5  Left Ant Tibialis 5/5  Uses AFO on the right foot     Dermatology:     Skin:no rashes or lesions noted    Assessment/Plan:   Diagnosis Orders   1. DDD (degenerative disc disease), lumbar      2. Spondylolisthesis of lumbar region      3. Spinal stenosis of lumbar region, unspecified whether neurogenic claudication present      4. Lumbosacral spondylosis without myelopathy            77 y.o.  male with h/o chronic low back pain. Failed conservative treatment for > 6 weeks recently.     CT Myelogram: Has L5-S1 Spondylolisthesis, L5-S1 foraminal stenosis.     MRI done in 10/2020- reviewed.     X-ray Flex/ Ext: instability noted.     Has been evaluated by Dr. Duglas Osullivan team.     On ASA-81 listed on the Med list . But states that he is not taking it.     H/o DM +     Doing HEP. Has residual pain predominantly axial in nature. Facet tenderness+    S/P X 2 Lumbar MBNB with 75-80% pain relief for short duration. Now has recurrence of similar pain. Doing HEP.     PLAN:     Radiofrequency ablation of bilateral lumbar facet MB at  L3, L4 MB & L5DR under fluoroscopy. RBA discussed. Aqua therapy.     If no long term relief, recommend reeval with Dr. Caterina Lauren. He apparently has LE limb length discrepancy.  Has been evaluated for orthotics/correction.     Counseling : Patient encouraged to stay active and to continue Regular home exercise program as tolerated - stretching / strengthening.     Treatment plan discussed with the patient including medication and procedure side effects.     Controlled Substances Monitoring:      OARRS reviewed- not on chronic opioids.     Akila Pascual MD     CC:  Ayana De La Paz MD

## 2022-04-08 NOTE — PROGRESS NOTES
[facility]  INSTRUCTIONS  . .......................................................................................................................................... [x] Parking the day of Surgery is located in the [facility]. Upon entering the door, make immediate right into the surgery reception room    [x]  Bring photo ID and insurance card     [x] You may have a light breakfast day of procedure    [x]  Wear loose comfortable clothing    [x]  Please follow instructions for medications as given per Dr's office    [x] You can expect a call the business day prior to procedure to notify you of your arrival time     [x] Please arrange for     []  Other instructions            Have you been tested for COVID  No           Have you been told you were positive for COVID No  Have you had any known exposure to someone that is positive for COVID No  Do you have a cough                   No              Do you have shortness of breath No                 Do you have a sore throat            No                Are you having chills                    No                Are you having muscle aches. No                    Please come to the hospital wearing a mask and have your significant other wear a mask as well. Both of you should check your temperature before leaving to come here,  if it is 100 or higher please call [phone] for instruction.

## 2022-04-11 ENCOUNTER — HOSPITAL ENCOUNTER (OUTPATIENT)
Age: 77
Setting detail: OUTPATIENT SURGERY
Discharge: HOME | End: 2022-04-11
Attending: ANESTHESIOLOGY | Admitting: ANESTHESIOLOGY
Payer: MEDICARE

## 2022-04-11 ENCOUNTER — HOSPITAL ENCOUNTER (OUTPATIENT)
Dept: GENERAL RADIOLOGY | Age: 77
Discharge: HOME | End: 2022-04-13
Attending: ANESTHESIOLOGY
Payer: MEDICARE

## 2022-04-11 VITALS
SYSTOLIC BLOOD PRESSURE: 169 MMHG | HEART RATE: 65 BPM | WEIGHT: 140 LBS | OXYGEN SATURATION: 95 % | DIASTOLIC BLOOD PRESSURE: 71 MMHG | BODY MASS INDEX: 20.73 KG/M2 | RESPIRATION RATE: 16 BRPM | HEIGHT: 69 IN

## 2022-04-11 DIAGNOSIS — R52 PAIN MANAGEMENT: ICD-10-CM

## 2022-04-11 LAB — METER GLUCOSE: 142 MG/DL (ref 74–99)

## 2022-04-11 PROCEDURE — 2709999900 HC NON-CHARGEABLE SUPPLY: Performed by: ANESTHESIOLOGY

## 2022-04-11 PROCEDURE — 64636 DESTROY L/S FACET JNT ADDL: CPT | Performed by: ANESTHESIOLOGY

## 2022-04-11 PROCEDURE — 6360000002 HC RX W HCPCS: Performed by: ANESTHESIOLOGY

## 2022-04-11 PROCEDURE — 3600000012 HC SURGERY LEVEL 2 ADDTL 15MIN: Performed by: ANESTHESIOLOGY

## 2022-04-11 PROCEDURE — 7100000010 HC PHASE II RECOVERY - FIRST 15 MIN: Performed by: ANESTHESIOLOGY

## 2022-04-11 PROCEDURE — 64635 DESTROY LUMB/SAC FACET JNT: CPT | Performed by: ANESTHESIOLOGY

## 2022-04-11 PROCEDURE — 2500000003 HC RX 250 WO HCPCS: Performed by: ANESTHESIOLOGY

## 2022-04-11 PROCEDURE — 82962 GLUCOSE BLOOD TEST: CPT

## 2022-04-11 PROCEDURE — 3209999900 FLUORO FOR SURGICAL PROCEDURES

## 2022-04-11 PROCEDURE — 7100000011 HC PHASE II RECOVERY - ADDTL 15 MIN: Performed by: ANESTHESIOLOGY

## 2022-04-11 PROCEDURE — 3600000002 HC SURGERY LEVEL 2 BASE: Performed by: ANESTHESIOLOGY

## 2022-04-11 RX ORDER — LIDOCAINE HYDROCHLORIDE 10 MG/ML
INJECTION, SOLUTION EPIDURAL; INFILTRATION; INTRACAUDAL; PERINEURAL PRN
Status: DISCONTINUED | OUTPATIENT
Start: 2022-04-11 | End: 2022-04-11 | Stop reason: ALTCHOICE

## 2022-04-11 RX ORDER — LIDOCAINE HYDROCHLORIDE 5 MG/ML
INJECTION, SOLUTION INFILTRATION; INTRAVENOUS PRN
Status: DISCONTINUED | OUTPATIENT
Start: 2022-04-11 | End: 2022-04-11 | Stop reason: ALTCHOICE

## 2022-04-11 RX ORDER — BUPIVACAINE HYDROCHLORIDE 2.5 MG/ML
INJECTION, SOLUTION EPIDURAL; INFILTRATION; INTRACAUDAL PRN
Status: DISCONTINUED | OUTPATIENT
Start: 2022-04-11 | End: 2022-04-11 | Stop reason: ALTCHOICE

## 2022-04-11 RX ORDER — METHYLPREDNISOLONE ACETATE 40 MG/ML
INJECTION, SUSPENSION INTRA-ARTICULAR; INTRALESIONAL; INTRAMUSCULAR; SOFT TISSUE PRN
Status: DISCONTINUED | OUTPATIENT
Start: 2022-04-11 | End: 2022-04-11 | Stop reason: ALTCHOICE

## 2022-04-11 ASSESSMENT — PAIN DESCRIPTION - DESCRIPTORS: DESCRIPTORS: ACHING

## 2022-04-11 ASSESSMENT — PAIN - FUNCTIONAL ASSESSMENT: PAIN_FUNCTIONAL_ASSESSMENT: 0-10

## 2022-04-11 NOTE — H&P
[facility]      Procedure History & Physical      [facility]     HPI:    Patient  is here for Lumbar MB RFA for low back pain.   Labs/imaging studies reviewed   All question and concerns addressed including R/B/A associated with the procedure    Past Medical History:   Diagnosis Date    CAD (coronary artery disease) 2009    Diabetes mellitus ([address]     [facility]     Diabetic ulcer of left foot associated with type 2 diabetes mellitus ([facility] 03/29/2017    Hyperlipidemia     Hypertension     Lumbar pain     for procedure 3/7/22    Peripheral vascular disease ([facility]        Past Surgical History:   Procedure Laterality Date    CARDIAC SURGERY  2004    double bypass    ENDOSCOPY, COLON, DIAGNOSTIC Bilateral     cataracts    FOOT SURGERY Left     multiple    NERVE BLOCK Bilateral 2/7/2022    #1 BILATERAL LUMBAR MEDIAL BRANCH NERVE BLOCK UNDER FLUOROSCOPIC GUIDANCE AT L3, L4, AND L5 DORSAL RAMI performed by Lanny Toscano MD at [address] Bilateral 3/7/2022    BILATERAL LUMBAR MEDIAL BRANCH NERVE BLOCK UNDER FLUORO AT L3 L4 L5 DORSAL RAMI performed by Lanny Toscano MD at [facility] OTHER SURGICAL HISTORY  03/10/2017    left foot application integra graft, application of wound vac, delayed primax closure    OTHER SURGICAL HISTORY      fracture of left temporal part of skull    OTHER SURGICAL HISTORY Left 06/16/2017    delayed closure with bone debridement and biopsy, application of hemovac with Dr. Mitchell Stephenson N/A 11/22/2021    LUMBAR EPIDURAL STEROID INJECTION UNDER FLUOROSCOPIC GUIDANCE AT L5-S1 performed by Lanny Toscano MD at [place] [address] N/A 1/6/2022    # 2 LUMBAR EPIDURAL STEROID INJECTION UNDER FLUOROSCOPIC GUIDANCE AT L5-S1 performed by Lanny Toscano MD at [address] [place] [address] Left 05/2017    SFA, pop atherectomy, Dr. Dionne Wild TOE AMPUTATION Left 2009    2nd and 5th toe    TONSILLECTOMY         Prior to Admission medications    Medication Sig Start Date End Date Taking? Authorizing Provider   spironolactone (ALDACTONE) 25 MG tablet TAKE ONE-HALF (1/2) TABLET DAILY 2/22/22   Katerine Hopson MD   Multiple Vitamins-Minerals (THERAPEUTIC MULTIVITAMIN-MINERALS) tablet Take 1 tablet by mouth daily    Historical Provider, MD   traZODone (DESYREL) 50 MG tablet TAKE 1 TABLET DAILY EVERY NIGHT  Patient taking differently: nightly as needed TAKE 1 TABLET DAILY EVERY NIGHT 1/19/22   Katerine Hopson MD   JANUVIA 100 MG tablet TAKE 1 TABLET DAILY 11/15/21   Katerine Hopson MD   rosuvastatin (CRESTOR) 20 MG tablet TAKE 1 TABLET DAILY 10/18/21   Katerine Hopson MD   metFORMIN (GLUCOPHAGE) 1000 MG tablet TAKE 1 TABLET TWICE A DAY WITH MEALS 4/19/21   Katerine Hospon MD   insulin glargine (LANTUS SOLOSTAR) 100 UNIT/ML injection pen Inject 10 Units into the skin nightly 2/15/21   Katerine Hopson MD   FreeStyle Lancets MISC 1 each by Does not apply route daily 2/6/21   Katerine Hopson MD   blood glucose test strips (ASCENSIA AUTODISC VI;ONE TOUCH ULTRA TEST VI) strip 1 each by In Vitro route 2 times daily As needed.  2/6/21   Katerine Hopson MD   glipiZIDE (GLUCOTROL) 10 MG tablet TAKE 1 TABLET TWICE A DAY BEFORE MEALS 12/15/20   Katerine Hopson MD   losartan (COZAAR) 25 MG tablet TAKE 1 TABLET TWICE A DAY 10/28/19   Katerine Hopson MD   metoprolol succinate (TOPROL XL) 25 MG extended release tablet TAKE 1 TABLET TWICE A DAY 10/28/19   Katerine Hopson MD       Allergies   Allergen Reactions    Ciprofloxacin Hives           Pcn [Penicillins] Hives    Sulfa Antibiotics Hives       Social History     Socioeconomic History    Marital status:      Spouse name: Not on file    Number of children: Not on file    Years of education: Not on file    Highest education level: Not on file   Occupational History    Not on file   Tobacco Use    Smoking status: Never Smoker    Smokeless tobacco: Never Used   Vaping Use    Vaping Use: Never used   Substance and Sexual Activity    Alcohol use: Not Currently     Alcohol/week: 3.0 standard drinks     Types: 2 Shots of liquor, 1 Standard drinks or equivalent per week    Drug use: No    Sexual activity: Not on file   Other Topics Concern    Not on file   Social History Narrative    Not on file     Social Determinants of Health     Financial Resource Strain:     Difficulty of Paying Living Expenses: Not on file   Food Insecurity:     Worried About Running Out of Food in the Last Year: Not on file    Dieudonne of Food in the Last Year: Not on file   Transportation Needs:     Lack of Transportation (Medical): Not on file    Lack of Transportation (Non-Medical):  Not on file   Physical Activity:     Days of Exercise per Week: Not on file    Minutes of Exercise per Session: Not on file   Stress:     Feeling of Stress : Not on file   Social Connections:     Frequency of Communication with Friends and Family: Not on file    Frequency of Social Gatherings with Friends and Family: Not on file    Attends Amish Services: Not on file    Active Member of [address] [place] [address] [employer] or Organizations: Not on file    Attends Club or Organization Meetings: Not on file    Marital Status: Not on file   Intimate Partner Violence:     Fear of Current or Ex-Partner: Not on file    Emotionally Abused: Not on file    Physically Abused: Not on file    Sexually Abused: Not on file   Housing Stability:     Unable to Pay for Housing in the Last Year: Not on file    Number of [place] in the Last Year: Not on file    Unstable Housing in the Last Year: Not on file       Family History   Problem Relation Age of Onset    High Blood Pressure Mother     COPD Father     Diabetes Father     Heart Disease Brother         chf    Seizures Brother REVIEW OF SYSTEMS:    CONSTITUTIONAL:  negative for  fevers, chills, sweats and fatigue    RESPIRATORY:  negative for  dry cough, cough with sputum, dyspnea, wheezing and chest pain    CARDIOVASCULAR:  negative for chest pain, dyspnea, palpitations, syncope    GASTROINTESTINAL:  negative for nausea, vomiting, change in bowel habits, diarrhea, constipation and abdominal pain    MUSCULOSKELETAL: negative for muscle weakness    SKIN: negative for itching or rashes. BEHAVIOR/PSYCH:  negative for poor appetite, increased appetite, decreased sleep and poor concentration    All other systems negative      PHYSICAL EXAM:    VITALS:  BP (!) 168/71   Pulse 65   Resp 20   Ht 5' 9\" (1.753 m)   Wt 140 lb (63.5 kg)   SpO2 98%   BMI 20.67 kg/m²     CONSTITUTIONAL:  awake, alert, cooperative, no apparent distress, and appears stated age    EYES: PERRLA, EOMI    LUNGS:  No increased work of breathing, no audible wheezing    CARDIOVASCULAR:  regular rate and rhythm    ABDOMEN:  Soft non tender non distended     EXTREMITIES: no signs of clubbing or cyanosis. MUSCULOSKELETAL: negative for flaccid muscle tone or spastic movements. SKIN: gross examination reveals no signs of rashes, or diaphoresis. NEURO: Cranial nerves II-XII grossly intact. No signs of agitated mood. Assessment/Plan:    Patient  is here for Lumbar MB RFA for low back pain. The patient was counseled at length about the risks of denice Covid-19 during their perioperative period and any recovery window from their procedure. The patient was made aware that denice Covid-19  may worsen their prognosis for recovering from their procedure  and lend to a higher morbidity and/or mortality risk. All material risks, benefits, and reasonable alternatives including postponing the procedure were discussed. The patient does wish to proceed with the procedure at this time.       Marjorie Sanchez MD

## 2022-04-11 NOTE — OP NOTE
Operative Note      Patient: Samia Guidry  YOB: 1945  MRN: [id number]    Date of Procedure: 2022    Pre-Op Diagnosis: LUMBOSACRAL SPONDYLOSIS    Post-Op Diagnosis: Same       Procedure(s):  BILATERAL LUMBAR RADIOFREQUENCY ABLATION UNDER FLUORO AT L3 L4 MB  & L5 DORSAL RAMI      Surgeon(s):  Roland Malloy MD    Assistant:   * No surgical staff found *    Anesthesia: Local    Estimated Blood Loss (mL): Minimal    Complications: None    Specimens:   * No specimens in log *    Implants:  * No implants in log *      Drains: * No LDAs found *    Findings: good needle placement    Detailed Description of Procedure:   2022    Patient: Samia Guidry  :  1945  Age:  77 y.o. Sex:  male     PRE-OPERATIVE DIAGNOSIS: Bilateral Lumbar spondylosis, lumbar facet arthropathy. POST-OPERATIVE DIAGNOSIS: Same. PROCEDURE:  Fluoroscopic-guided Bilateral  Lumbar facet medial branch radiofrequency ablation at levels L3, L4 MB & L5DR . SURGEON:  Roland Malloy MD    ANESTHESIA: Local    ESTIMATED BLOOD LOSS: None.  ______________________________________________________________________  HISTORY & PHYSICAL: Samia Guidry presents today for fluoroscopic-guided Bilateral lumbar facet medial branch radiofrequency ablation at levels L3, L4 MB & L5 DR. The potential complications of the procedure were explained to Deanne Pinon again today and he has elected to undergo the aforementioned procedureNabil Woodward complete History & Physical examination were reviewed in depth, a copy of which is in the chart. DESCRIPTION OF PROCEDURE:    After confirming written and informed consent, a time-out was performed and Trace name and date of birth, the procedure to be performed as well as the plan for the location of the needle insertion were confirmed. The patient was brought into the procedure room and placed in the prone position on the fluoroscopy table.  Standard monitors were

## 2022-04-11 NOTE — PROGRESS NOTES
Pt sitting at side of cart. Assisted to stand. Stated leg is still slightly numb. Pt assisted to sit back on cart and instructed not to stand without nurse at side.

## 2022-05-18 ENCOUNTER — OFFICE VISIT (OUTPATIENT)
Dept: PAIN MANAGEMENT | Age: 77
End: 2022-05-18
Payer: MEDICARE

## 2022-05-18 VITALS
BODY MASS INDEX: 21.62 KG/M2 | SYSTOLIC BLOOD PRESSURE: 147 MMHG | HEIGHT: 69 IN | WEIGHT: 146 LBS | HEART RATE: 63 BPM | DIASTOLIC BLOOD PRESSURE: 76 MMHG | OXYGEN SATURATION: 96 % | TEMPERATURE: 96.2 F | RESPIRATION RATE: 16 BRPM

## 2022-05-18 DIAGNOSIS — M43.16 SPONDYLOLISTHESIS OF LUMBAR REGION: ICD-10-CM

## 2022-05-18 DIAGNOSIS — M51.36 DDD (DEGENERATIVE DISC DISEASE), LUMBAR: Primary | ICD-10-CM

## 2022-05-18 DIAGNOSIS — M48.061 SPINAL STENOSIS OF LUMBAR REGION, UNSPECIFIED WHETHER NEUROGENIC CLAUDICATION PRESENT: ICD-10-CM

## 2022-05-18 DIAGNOSIS — M47.817 LUMBOSACRAL SPONDYLOSIS WITHOUT MYELOPATHY: ICD-10-CM

## 2022-05-18 PROBLEM — N18.30 CHRONIC RENAL DISEASE, STAGE III (HCC): Status: ACTIVE | Noted: 2022-05-18

## 2022-05-18 PROCEDURE — 99213 OFFICE O/P EST LOW 20 MIN: CPT | Performed by: ANESTHESIOLOGY

## 2022-05-18 PROCEDURE — 1123F ACP DISCUSS/DSCN MKR DOCD: CPT | Performed by: ANESTHESIOLOGY

## 2022-05-18 NOTE — PROGRESS NOTES
[address] [facility]  [place], [address]  Dept: [phone]    Follow up Note      Granados Dee     Date of Visit:  5/18/2022    CC:  Patient presents for follow up   Chief Complaint   Patient presents with    Follow-up     6 week follow up from [address] AT L3 L4 L5 AND DORSAL RAMI    Pain     lower back when walking       HPI:  Chronic low back pain for > 1 year.     Pain causes functional limitations/ limits Adl's : Yes       Had CT Myelogram (Could not do MRI due to having penile implant).     Has h/o chronic right foot drop for > 10 yrs. Uses a AFO. S/P LESI provided moderate relief. Doing HEP. Has residual pain- in the low back predominantly axial in nature. S/P MB RFA on 4/11/2022. Nursing notes and details of the pain history reviewed.  Please see intake notes for details.     Previous treatments:   Physical Therapy : yes, for > 6 weeks and continues HEP      Medications: - yes     Surgeries: no LS spine surgery     He has been on anticoagulation medications yes and include ASA-81 mg     He has not been on herbal supplements.       He is diabetic.     Imaging:      CT Myelogram: 10/22/2021:      FINDINGS:   BONES/ALIGNMENT: The vertebral body heights are maintained.  Facet joints are   aligned. [place] is a right unilateral L5 pars defect with grade 1   anterolisthesis of L5 on S1.       SOFT TISSUES: The posterior paraspinal soft tissues are unremarkable.  The   visualized abdominal structures are unremarkable.  There is no intrathecal   contrast.  The contrast is seen in the epidural space throughout the   thoracolumbar region.       L1-L2: There is a circumferential disc bulge.  There is no significant bony   canal stenosis or bony foraminal narrowing.       L2-L3: There is a circumferential disc bulge with facet hypertrophy.  There   is no significant canal stenosis.  There is moderate bilateral foraminal   narrowing.     L3-L4: There is a circumferential disc bulge with facet hypertrophy.  There   is no canal stenosis.  There is moderate bilateral foraminal narrowing.       L4-L5: There is a circumferential disc bulge with facet and ligamentous   hypertrophy.  There is no canal stenosis.  There is mild right and moderate   left foraminal narrowing.       L5-S1: There is a circumferential disc bulge with uncovering of the disc   space posteriorly.  There is facet hypertrophy.  There is no canal stenosis. There is severe bilateral foraminal narrowing.           Impression   Multilevel degenerative disc disease with associated facet hypertrophy with a   right unilateral pars defect at L5 resulting in grade 1 anterolisthesis of L5   on S1.       Bilateral foraminal narrowing as described above that is most severe at L5-S1.         MRI of LS spine: 10/2020:       Xray LS flex/ Ext: 9/13/2021:  FINDINGS:   Grade anterolisthesis of L5 upon S1 associated with bilateral L5   spondylolysis. Severe degenerative disc disease at this level.  Less   pronounced degenerative disc disease at multiple other levels. Degenerative   facet arthropathy is moderate from L4-S1.  There is evidence of instability   at L5-S1. Anterolisthesis in extension measures approximately 7 mm.  In the   neutral position and inflection it measures approximately 11 mm.           Impression   Grade 2 anterolisthesis of L5 on S1 associated with bilateral L5   spondylolysis.  Spinal instability at this level.  Degenerative spondylotic   changes.              Potential Aberrant Drug-Related Behavior:  no    Urine Drug Screening: no    OARRS report[de-identified] Reviewed.     Past Medical History:   Diagnosis Date    CAD (coronary artery disease) 2009    Diabetes mellitus ([address]     Diabetic neuropathy ([address]     Diabetic ulcer of left foot associated with type 2 diabetes mellitus ([address] 03/29/2017    Hyperlipidemia     Hypertension     Lumbar pain     for procedure 3/7/22    Peripheral vascular disease [facility])        Past Surgical History:   Procedure Laterality Date    CARDIAC SURGERY  2004    double bypass    ENDOSCOPY, COLON, DIAGNOSTIC Bilateral     cataracts    FOOT SURGERY Left     multiple    NERVE BLOCK Bilateral 2/7/2022    #1 BILATERAL LUMBAR MEDIAL BRANCH NERVE BLOCK UNDER FLUOROSCOPIC GUIDANCE AT L3, L4, AND L5 DORSAL RAMI performed by Jovana Zarco MD at [address] [place] [address] Bilateral 3/7/2022    BILATERAL LUMBAR MEDIAL BRANCH NERVE BLOCK UNDER FLUORO AT L3 L4 L5 DORSAL RAMI performed by Jovana Zarco MD at [facility] OTHER SURGICAL HISTORY  03/10/2017    left foot application integra graft, application of wound vac, delayed primax closure    OTHER SURGICAL HISTORY      fracture of left temporal part of skull    OTHER SURGICAL HISTORY Left 06/16/2017    delayed closure with bone debridement and biopsy, application of hemovac with Dr. Twanda Dandy N/A 11/22/2021    LUMBAR EPIDURAL STEROID INJECTION UNDER FLUOROSCOPIC GUIDANCE AT L5-S1 performed by Jovana Zarco MD at [address] N/A 1/6/2022    # 2 LUMBAR EPIDURAL STEROID INJECTION UNDER FLUOROSCOPIC GUIDANCE AT L5-S1 performed by Jovana Zarco MD at [facility] Bilateral 4/11/2022    BILATERAL LUMBAR RADIOFREQUENCY ABLATION UNDER FLUORO AT L3 L4 L5 AND DORSAL RAMI performed by Jovana Zarco MD at [address] [place] [address] Left 05/2017    SFA, pop atherectomy, Dr. Erci Moore Left 2009    2nd and 5th toe    TONSILLECTOMY         Prior to Admission medications    Medication Sig Start Date End Date Taking?  Authorizing Provider   spironolactone (ALDACTONE) 25 MG tablet TAKE ONE-HALF (1/2) TABLET DAILY 2/22/22  Yes Renay Godwin MD   Multiple Vitamins-Minerals (THERAPEUTIC MULTIVITAMIN-MINERALS) tablet Take 1 tablet by mouth daily   Yes Historical Provider, MD   traZODone (DESYREL) 50 MG tablet TAKE 1 TABLET DAILY EVERY NIGHT  Patient taking differently: nightly as needed TAKE 1 TABLET DAILY EVERY NIGHT 1/19/22  Yes Yuli Mock MD   JANUVIA 100 MG tablet TAKE 1 TABLET DAILY 11/15/21  Yes Yuli Mock MD   rosuvastatin (CRESTOR) 20 MG tablet TAKE 1 TABLET DAILY 10/18/21  Yes Yuli Mock MD   metFORMIN (GLUCOPHAGE) 1000 MG tablet TAKE 1 TABLET TWICE A DAY WITH MEALS 4/19/21  Yes Yuli Mock MD   insulin glargine (LANTUS SOLOSTAR) 100 UNIT/ML injection pen Inject 10 Units into the skin nightly 2/15/21  Yes Yuli Mock MD   FreeStyle Lancets MISC 1 each by Does not apply route daily 2/6/21  Yes Yuli Mock MD   blood glucose test strips (ASCENSIA AUTODISC VI;ONE TOUCH ULTRA TEST VI) strip 1 each by In Vitro route 2 times daily As needed.  2/6/21  Yes Yuli Mock MD   glipiZIDE (GLUCOTROL) 10 MG tablet TAKE 1 TABLET TWICE A DAY BEFORE MEALS 12/15/20  Yes Yuli Mock MD   losartan (COZAAR) 25 MG tablet TAKE 1 TABLET TWICE A DAY 10/28/19  Yes Yuli Mock MD   metoprolol succinate (TOPROL XL) 25 MG extended release tablet TAKE 1 TABLET TWICE A DAY 10/28/19  Yes Yuli Mock MD       Allergies   Allergen Reactions    Ciprofloxacin Hives           Pcn [Penicillins] Hives    Sulfa Antibiotics Hives       Social History     Socioeconomic History    Marital status:      Spouse name: Not on file    Number of children: Not on file    Years of education: Not on file    Highest education level: Not on file   Occupational History    Not on file   Tobacco Use    Smoking status: Never Smoker    Smokeless tobacco: Never Used   Vaping Use    Vaping Use: Never used   Substance and Sexual Activity    Alcohol use: Not Currently     Alcohol/week: 3.0 standard drinks     Types: 2 Shots of liquor, 1 Standard drinks or equivalent per week    Drug use: No    Sexual activity: Not on file   Other Topics Concern    Not on file   Social History Narrative    Not on file     Social Determinants of Health     Financial Resource Strain:     Difficulty of Paying Living Expenses: Not on file   Food Insecurity:     Worried About Running Out of Food in the Last Year: Not on file    Dieudonne of Food in the Last Year: Not on file   Transportation Needs:     Lack of Transportation (Medical): Not on file    Lack of Transportation (Non-Medical): Not on file   Physical Activity:     Days of Exercise per Week: Not on file    Minutes of Exercise per Session: Not on file   Stress:     Feeling of Stress : Not on file   Social Connections:     Frequency of Communication with Friends and Family: Not on file    Frequency of Social Gatherings with Friends and Family: Not on file    Attends Confucianism Services: Not on file    Active Member of [address] [place] [address] [employer] or Organizations: Not on file    Attends Club or Organization Meetings: Not on file    Marital Status: Not on file   Intimate Partner Violence:     Fear of Current or Ex-Partner: Not on file    Emotionally Abused: Not on file    Physically Abused: Not on file    Sexually Abused: Not on file   Housing Stability:     Unable to Pay for Housing in the Last Year: Not on file    Number of [place] in the Last Year: Not on file    Unstable Housing in the Last Year: Not on file       Family History   Problem Relation Age of Onset    High Blood Pressure Mother     COPD Father     Diabetes Father     Heart Disease Brother         chf    Seizures Brother        REVIEW OF SYSTEMS:     Glenn Gupta denies fever/chills, chest pain, shortness of breath, new bowel or bladder complaints. All other review of systems was negative.     PHYSICAL EXAMINATION:      BP (!) 147/76   Pulse 63   Temp 96.2 °F (35.7 °C)   Resp 16   Ht 5' 9\" (1.753 m)   Wt 146 lb (66.2 kg)   SpO2 96%   BMI 21.56 kg/m²      General:       General taking it.     H/o DM +     S/P Radiofrequency ablation of bilateral lumbar facet MB at  L3, L4 MB & L5DR under fluoroscopy. Moderate relief. WiIl prescribe compound cream.   Diclofenac, Gabapentin, Baclofen, Lidocaine, Doxepin. Apply 1-2 grams TID over the painful area. Dispense #240 gram with X 2 refills. Use instructions and side effects explained. Aqua therapy.     If no long term relief, recommend reeval with Dr. Leonel Johnson. He apparently has LE limb length discrepancy. Has been evaluated for orthotics/correction.     F/U in 4-6 months.     Counseling : Patient encouraged to stay active and to continue Regular home exercise program as tolerated - stretching / strengthening.     Treatment plan discussed with the patient including medication and procedure side effects.     Controlled Substances Monitoring:      OARRS reviewed- not on chronic opioids.     James Hollingsworth MD     CC:  Adela Edwards MD

## 2022-05-18 NOTE — PROGRESS NOTES
Do you currently have any of the following:    Fever: No  Headache:  No  Cough: No  Shortness of breath: No  Exposed to anyone with these symptoms: Hoda         Maira Burdick presents to the [facility] on 5/18/2022. Carlos Mccullough is complaining of pain in lower back . The pain is constant. The pain is described as intermittent, aching but only when walking. Pain is rated on his best day at a 4, on his worst day at a 6, and on average at a 5 on the VAS scale. He is currently not taking any medication for pain. Any procedures since your last visit: yes-BILATERAL LUMBAR RADIOFREQUENCY ABLATION UNDER FLUORO AT L3 L4 L5 AND DORSAL RAMI with 25 % relief. Pacemaker or defibrillator: No.    He is not on NSAIDS and is not on anticoagulation medications to include none. Medication Contract and Consent for Opioid Use Documents Filed      No documents found                BP (!) 147/76   Pulse 63   Temp 96.2 °F (35.7 °C)   Resp 16   Ht 5' 9\" (1.753 m)   Wt 146 lb (66.2 kg)   SpO2 96%   BMI 21.56 kg/m²      No LMP for male patient.

## 2022-05-19 ENCOUNTER — TELEPHONE (OUTPATIENT)
Dept: PAIN MANAGEMENT | Age: 77
End: 2022-05-19

## 2022-05-24 ENCOUNTER — TELEPHONE (OUTPATIENT)
Dept: PAIN MANAGEMENT | Age: 77
End: 2022-05-24

## 2022-05-24 NOTE — TELEPHONE ENCOUNTER
Patient calling in stating he had not received pain cream that prescribed, he thought it was being sent through local pharmacy. I explained that the rx for the pain cream is sent through [facility] , order was faxed on 5/19. I gave him the pharmacy phone number and advised to call for update on rx. Pt voiced understanding.

## 2022-06-13 RX ORDER — GLIPIZIDE 10 MG/1
TABLET ORAL
Qty: 180 TABLET | Refills: 3 | Status: SHIPPED | OUTPATIENT
Start: 2022-06-13

## 2022-06-23 ENCOUNTER — OFFICE VISIT (OUTPATIENT)
Dept: FAMILY MEDICINE CLINIC | Age: 77
End: 2022-06-23
Payer: MEDICARE

## 2022-06-23 VITALS
HEIGHT: 69 IN | OXYGEN SATURATION: 98 % | DIASTOLIC BLOOD PRESSURE: 60 MMHG | WEIGHT: 143 LBS | TEMPERATURE: 98.7 F | SYSTOLIC BLOOD PRESSURE: 149 MMHG | HEART RATE: 84 BPM | RESPIRATION RATE: 18 BRPM | BODY MASS INDEX: 21.18 KG/M2

## 2022-06-23 DIAGNOSIS — I70.244 ATHEROSCLEROSIS OF NATIVE ARTERY OF LEFT LOWER EXTREMITY WITH ULCERATION OF MIDFOOT (HCC): ICD-10-CM

## 2022-06-23 DIAGNOSIS — E11.51 TYPE 2 DIABETES MELLITUS WITH DIABETIC PERIPHERAL ANGIOPATHY WITHOUT GANGRENE, WITH LONG-TERM CURRENT USE OF INSULIN (HCC): ICD-10-CM

## 2022-06-23 DIAGNOSIS — N18.30 STAGE 3 CHRONIC KIDNEY DISEASE, UNSPECIFIED WHETHER STAGE 3A OR 3B CKD (HCC): ICD-10-CM

## 2022-06-23 DIAGNOSIS — Z00.00 INITIAL MEDICARE ANNUAL WELLNESS VISIT: Primary | ICD-10-CM

## 2022-06-23 DIAGNOSIS — I50.22 CHRONIC SYSTOLIC HEART FAILURE (HCC): ICD-10-CM

## 2022-06-23 DIAGNOSIS — Z79.4 TYPE 2 DIABETES MELLITUS WITH DIABETIC PERIPHERAL ANGIOPATHY WITHOUT GANGRENE, WITH LONG-TERM CURRENT USE OF INSULIN (HCC): ICD-10-CM

## 2022-06-23 DIAGNOSIS — I10 HYPERTENSION, UNSPECIFIED TYPE: ICD-10-CM

## 2022-06-23 PROCEDURE — 99214 OFFICE O/P EST MOD 30 MIN: CPT | Performed by: FAMILY MEDICINE

## 2022-06-23 PROCEDURE — G0438 PPPS, INITIAL VISIT: HCPCS | Performed by: FAMILY MEDICINE

## 2022-06-23 PROCEDURE — 3052F HG A1C>EQUAL 8.0%<EQUAL 9.0%: CPT | Performed by: FAMILY MEDICINE

## 2022-06-23 PROCEDURE — 1123F ACP DISCUSS/DSCN MKR DOCD: CPT | Performed by: FAMILY MEDICINE

## 2022-06-23 RX ORDER — METOPROLOL SUCCINATE 25 MG/1
TABLET, EXTENDED RELEASE ORAL
Qty: 180 TABLET | Refills: 3 | Status: SHIPPED | OUTPATIENT
Start: 2022-06-23

## 2022-06-23 RX ORDER — BLOOD SUGAR DIAGNOSTIC
1 STRIP MISCELLANEOUS DAILY
Qty: 100 EACH | Refills: 3 | Status: SHIPPED | OUTPATIENT
Start: 2022-06-23

## 2022-06-23 RX ORDER — LOSARTAN POTASSIUM 50 MG/1
50 TABLET ORAL DAILY
Qty: 90 TABLET | Refills: 3 | Status: SHIPPED | OUTPATIENT
Start: 2022-06-23

## 2022-06-23 ASSESSMENT — PATIENT HEALTH QUESTIONNAIRE - PHQ9
SUM OF ALL RESPONSES TO PHQ QUESTIONS 1-9: 0
SUM OF ALL RESPONSES TO PHQ QUESTIONS 1-9: 0
SUM OF ALL RESPONSES TO PHQ9 QUESTIONS 1 & 2: 0
1. LITTLE INTEREST OR PLEASURE IN DOING THINGS: 0
SUM OF ALL RESPONSES TO PHQ QUESTIONS 1-9: 0
SUM OF ALL RESPONSES TO PHQ QUESTIONS 1-9: 0
2. FEELING DOWN, DEPRESSED OR HOPELESS: 0

## 2022-06-23 ASSESSMENT — LIFESTYLE VARIABLES: HOW OFTEN DO YOU HAVE A DRINK CONTAINING ALCOHOL: NEVER

## 2022-06-23 NOTE — PROGRESS NOTES
Blood was drawn but patient could not leave a urine sample.   Jorge Luis Gupta he would do it next time

## 2022-06-23 NOTE — PROGRESS NOTES
Medicare Annual Wellness Visit    Tierra Arias is here for Medicare AWV    [address] was seen today for medicare awv. Diagnoses and all orders for this visit:    Initial Medicare annual wellness visit  -     Full code    Chronic systolic heart failure ([facility]  -     TSH; Future  Stable. Echo in 2020 showed ejection fraction of 40 to 45%. Is on metoprolol, Aldactone and ARB. Plan is to initiate SGLT2 inhibitors at some point    Type 2 diabetes mellitus with diabetic peripheral angiopathy without gangrene, with long-term current use of insulin (HCC)  -     blood glucose test strips (FREESTYLE TEST STRIPS) strip; 1 each by In Vitro route daily As needed. -     Insulin Pen Needle 31G X 5 MM MISC; 1 each by Does not apply route daily  -     CBC; Future  -     Comprehensive Metabolic Panel; Future  -     Hemoglobin A1C; Future  -     Microalbumin / Creatinine Urine Ratio; Future  Continue Januvia, metformin and insulin. 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 Advised him to start taking it twice a day as prescribed. Metoprolol succinate (TOPROL XL) 25 MG extended release tablet; TAKE 1 TABLET TWICE A DAY      Recommendations for Preventive Services Due: see orders and patient instructions/AVS.  Recommended screening schedule for the next 5-10 years is provided to the patient in written form: see Patient Instructions/AVS.     Return for Medicare Annual Wellness Visit in 1 year. Subjective   Diabetes.   Patient she has been taking his medications regularly. No chest pain, shortness of breath or palpitations. History of systolic heart failure. He is on Aldactone. No shortness of breath or leg edema  CKD. Needs monitoring    Hypertension. His systolic blood pressure is slightly high but diastolic is low. Denies any lightheadedness or dizziness. complete Health Risk Assessment and screening values have been reviewed and are found in Flowsheets. The following problems were reviewed today and where indicated follow up appointments were made and/or referrals ordered.     Positive Risk Factor Screenings with Interventions:             General Health and ACP:  General  In general, how would you say your health is?: Very Good  In the past 7 days, have you experienced any of the following: New or Increased Pain, New or Increased Fatigue, Loneliness, Social Isolation, Stress or Anger?: (!) Yes  Select all that apply: (!) New or Increased Pain  Do you get the social and emotional support that you need?: Yes  Do you have a Living Will?: (!) No    Advance Directives     Power of  Living Will ACP-Advance Directive ACP-Power of     Not on File Not on File Not on File Not on File      General Health Risk Interventions:  · Pain issues: back pain, managed by pain management    Health Habits/Nutrition:     Physical Activity: Inactive    Days of Exercise per Week: 0 days    Minutes of Exercise per Session: 0 min     Have you lost any weight without trying in the past 3 months?: No  Body mass index: 21.12       Health Habits/Nutrition Interventions:  · Inadequate physical activity:  patient agrees to exercise for at least 150 minutes/week     Safety:  Do you have working smoke detectors?: Yes  Do you have any tripping hazards - loose or unsecured carpets or rugs?: No  Do you have any tripping hazards - clutter in doorways, halls, or stairs?: No  Do you have either shower bars, grab bars, non-slip mats or non-slip surfaces in your shower or bathtub?: (!) No  Do all of your stairways have a railing or banister?: Yes  Do you always fasten your seatbelt when you are in a car?: Yes    Safety Interventions:  · Home safety tips provided           Objective   Vitals:    06/23/22 1255 06/23/22 1308   BP: (!) 172/65 (!) 149/60   Pulse: 84    Resp: 18    Temp: 98.7 °F (37.1 °C)    TempSrc: Temporal    SpO2: 98%    Weight: 143 lb (64.9 kg)    Height: 5' 9\" (1.753 m)       Body mass index is 21.12 kg/m². Physical Examination:  General appearance - alert, well appearing, and in no distress  Chest - clear to auscultation, no wheezes, rales or rhonchi, symmetric air entry  Heart - normal rate, regular rhythm, normal S1, S2, no murmurs, rubs, clicks or gallops  Neurological - alert, oriented, normal speech, no focal findings or movement disorder noted  Extremities - no pedal edema  Skin- no rash  Mental status - Normal mood, judgement and thought process          Allergies   Allergen Reactions    Ciprofloxacin Hives           Pcn [Penicillins] Hives    Sulfa Antibiotics Hives     Prior to Visit Medications    Medication Sig Taking?  Authorizing Provider   glipiZIDE (GLUCOTROL) 10 MG tablet TAKE 1 TABLET TWICE A DAY BEFORE MEALS Yes Isabel Sidhu MD   spironolactone (ALDACTONE) 25 MG tablet TAKE ONE-HALF (1/2) TABLET DAILY Yes Isabel Sidhu MD   Multiple Vitamins-Minerals (THERAPEUTIC MULTIVITAMIN-MINERALS) tablet Take 1 tablet by mouth daily Yes Historical Provider, MD   traZODone (DESYREL) 50 MG tablet TAKE 1 TABLET DAILY EVERY NIGHT  Patient taking differently: nightly as needed TAKE 1 TABLET DAILY EVERY NIGHT Yes Isabel Sidhu MD   JANUVIA 100 MG tablet TAKE 1 TABLET DAILY Yes Isabel Sidhu MD   rosuvastatin (CRESTOR) 20 MG tablet TAKE 1 TABLET DAILY Yes Isabel Sidhu MD   metFORMIN (GLUCOPHAGE) 1000 MG tablet TAKE 1 TABLET TWICE A DAY WITH MEALS Yes Isabel Sidhu MD   insulin glargine (LANTUS SOLOSTAR) 100 UNIT/ML injection pen Inject 10 Units into the skin nightly Yes Christiano Atkins MD   FreeStyle Lancets MISC 1 each by Does not apply route daily Yes Christiano Atkins MD   blood glucose test strips (ASCENSIA AUTODISC VI;ONE TOUCH ULTRA TEST VI) strip 1 each by In Vitro route 2 times daily As needed.  Yes Christiano Atkins MD   losartan (COZAAR) 25 MG tablet TAKE 1 TABLET TWICE A DAY Yes Christiano Atkins MD   metoprolol succinate (TOPROL XL) 25 MG extended release tablet TAKE 1 TABLET TWICE A DAY Yes Christiano Atkins MD       [facility] (Including outside providers/suppliers regularly involved in providing care):   Patient Care Team:  Christiano Atkins MD as PCP - General (Family Medicine)  Christiano Atkins MD as PCP - [facility] Empaneled Provider  Angelika Martínez MD as Surgeon ([facility])  Carmen Landeros MD as Consulting Physician (Cardiology)     Reviewed and updated this visit:  Tobacco  Allergies  Meds  Med Hx  Surg Hx  Soc Hx  Fam Hx

## 2022-06-23 NOTE — PATIENT INSTRUCTIONS
Personalized Preventive Plan for Paras Strong - 6/23/2022  Medicare offers a range of preventive health benefits. Some of the tests and screenings are paid in full while other may be subject to a deductible, co-insurance, and/or copay. Some of these benefits include a comprehensive review of your medical history including lifestyle, illnesses that may run in your family, and various assessments and screenings as appropriate. After reviewing your medical record and screening and assessments performed today your provider may have ordered immunizations, labs, imaging, and/or referrals for you. A list of these orders (if applicable) as well as your Preventive Care list are included within your After Visit Summary for your review. Other Preventive Recommendations:    · A preventive eye exam performed by an eye specialist is recommended every 1-2 years to screen for glaucoma; cataracts, macular degeneration, and other eye disorders. · A preventive dental visit is recommended every 6 months. · Try to get at least 150 minutes of exercise per week or 10,000 steps per day on a pedometer . · Order or download the FREE \"Exercise & Physical Activity: Your Everyday Guide\" from [employer]. Call 9-368.969.3337 or search [employer] online. · You need 3545-1338 mg of calcium and 0296-2738 IU of vitamin D per day. It is possible to meet your calcium requirement with diet alone, but a vitamin D supplement is usually necessary to meet this goal.  · When exposed to the sun, use a sunscreen that protects against both UVA and UVB radiation with an SPF of 30 or greater. Reapply every 2 to 3 hours or after sweating, drying off with a towel, or swimming. · Always wear a seat belt when traveling in a car. Always wear a helmet when riding a bicycle or motorcycle.

## 2022-06-24 DIAGNOSIS — D64.9 LOW HEMOGLOBIN: Primary | ICD-10-CM

## 2022-06-24 LAB
ALBUMIN SERPL-MCNC: 4.2 G/DL (ref 3.5–5.2)
ALP BLD-CCNC: 63 U/L (ref 40–129)
ALT SERPL-CCNC: 7 U/L (ref 0–40)
ANION GAP SERPL CALCULATED.3IONS-SCNC: 22 MMOL/L (ref 7–16)
AST SERPL-CCNC: 30 U/L (ref 0–39)
BILIRUB SERPL-MCNC: 0.7 MG/DL (ref 0–1.2)
BUN BLDV-MCNC: 30 MG/DL (ref 6–23)
CALCIUM SERPL-MCNC: 10 MG/DL (ref 8.6–10.2)
CHLORIDE BLD-SCNC: 102 MMOL/L (ref 98–107)
CHOLESTEROL, TOTAL: 112 MG/DL (ref 0–199)
CO2: 15 MMOL/L (ref 22–29)
CREAT SERPL-MCNC: 1.2 MG/DL (ref 0.7–1.2)
GFR AFRICAN AMERICAN: >60
GFR NON-AFRICAN AMERICAN: 59 ML/MIN/1.73
GLUCOSE BLD-MCNC: 62 MG/DL (ref 74–99)
HBA1C MFR BLD: 8.8 % (ref 4–5.6)
HCT VFR BLD CALC: 39.4 % (ref 37–54)
HDLC SERPL-MCNC: 46 MG/DL
HEMOGLOBIN: 11.5 G/DL (ref 12.5–16.5)
LDL CHOLESTEROL CALCULATED: 49 MG/DL (ref 0–99)
MCH RBC QN AUTO: 30 PG (ref 26–35)
MCHC RBC AUTO-ENTMCNC: 29.2 % (ref 32–34.5)
MCV RBC AUTO: 102.9 FL (ref 80–99.9)
PDW BLD-RTO: 13.4 FL (ref 11.5–15)
PLATELET # BLD: 179 E9/L (ref 130–450)
PMV BLD AUTO: 11.6 FL (ref 7–12)
POTASSIUM SERPL-SCNC: 4.8 MMOL/L (ref 3.5–5)
RBC # BLD: 3.83 E12/L (ref 3.8–5.8)
SODIUM BLD-SCNC: 139 MMOL/L (ref 132–146)
TOTAL PROTEIN: 7.9 G/DL (ref 6.4–8.3)
TRIGL SERPL-MCNC: 83 MG/DL (ref 0–149)
TSH SERPL DL<=0.05 MIU/L-ACNC: 2.01 UIU/ML (ref 0.27–4.2)
VLDLC SERPL CALC-MCNC: 17 MG/DL
WBC # BLD: 9.2 E9/L (ref 4.5–11.5)

## 2022-06-24 RX ORDER — ROSUVASTATIN CALCIUM 10 MG/1
TABLET, COATED ORAL
Qty: 90 TABLET | Refills: 3 | Status: SHIPPED | OUTPATIENT
Start: 2022-06-24

## 2022-06-27 RX ORDER — LANCETS 28 GAUGE
EACH MISCELLANEOUS
Qty: 100 EACH | Refills: 5 | Status: SHIPPED | OUTPATIENT
Start: 2022-06-27

## 2022-07-18 ENCOUNTER — OFFICE VISIT (OUTPATIENT)
Dept: PAIN MANAGEMENT | Age: 77
End: 2022-07-18
Payer: MEDICARE

## 2022-07-18 VITALS
RESPIRATION RATE: 16 BRPM | TEMPERATURE: 98 F | OXYGEN SATURATION: 94 % | WEIGHT: 138 LBS | HEIGHT: 69 IN | DIASTOLIC BLOOD PRESSURE: 62 MMHG | SYSTOLIC BLOOD PRESSURE: 114 MMHG | BODY MASS INDEX: 20.44 KG/M2 | HEART RATE: 70 BPM

## 2022-07-18 DIAGNOSIS — M51.36 DDD (DEGENERATIVE DISC DISEASE), LUMBAR: ICD-10-CM

## 2022-07-18 DIAGNOSIS — M43.16 SPONDYLOLISTHESIS OF LUMBAR REGION: Primary | ICD-10-CM

## 2022-07-18 DIAGNOSIS — M47.817 LUMBOSACRAL SPONDYLOSIS WITHOUT MYELOPATHY: ICD-10-CM

## 2022-07-18 DIAGNOSIS — M48.061 SPINAL STENOSIS OF LUMBAR REGION, UNSPECIFIED WHETHER NEUROGENIC CLAUDICATION PRESENT: ICD-10-CM

## 2022-07-18 PROCEDURE — 99213 OFFICE O/P EST LOW 20 MIN: CPT | Performed by: ANESTHESIOLOGY

## 2022-07-18 PROCEDURE — 99214 OFFICE O/P EST MOD 30 MIN: CPT | Performed by: ANESTHESIOLOGY

## 2022-07-18 PROCEDURE — 1123F ACP DISCUSS/DSCN MKR DOCD: CPT | Performed by: ANESTHESIOLOGY

## 2022-07-18 RX ORDER — PREGABALIN 25 MG/1
25 CAPSULE ORAL 2 TIMES DAILY
Qty: 60 CAPSULE | Refills: 2 | Status: SHIPPED
Start: 2022-07-18 | End: 2022-10-19

## 2022-07-18 NOTE — PROGRESS NOTES
Do you currently have any of the following:    Fever: No  Headache:  No  Cough: No  Shortness of breath: No  Exposed to anyone with these symptoms: No         Belenda Caller presents to the [address] [place] [address] on 7/18/2022. Iris Morrow is complaining of pain lower back. The pain is constant. The pain is described as aching. Pain is rated on his best day at a 4, on his worst day at a 9, and on average at a 6 on the VAS scale. He took his last dose of Tylenol 4 days ago. Any procedures since your last visit: No    Pacemaker or defibrillator: No.    He is not on NSAIDS and is not on anticoagulation medications. Medication Contract and Consent for Opioid Use Documents Filed        No documents found                    /62   Pulse 70   Temp 98 °F (36.7 °C) (Infrared)   Resp 16   Ht 5' 9\" (1.753 m)   Wt 138 lb (62.6 kg)   SpO2 94%   BMI 20.38 kg/m²      No LMP for male patient.

## 2022-07-18 NOTE — PROGRESS NOTES
[facility] - BEHAVIORAL HEALTH SERVICES Pain Management  Jv, Greyson Metcalf  Dept: [phone]    Follow up Note      Neel Frias     Date of Visit:  7/18/2022    CC:  Patient presents for follow up   Chief Complaint   Patient presents with    Follow-up    Lower Back Pain       HPI:  Chronic low back pain for > 1 year. Pain causes functional limitations/ limits Adl's : Yes       Had CT Myelogram (Could not do MRI due to having penile implant). Has h/o chronic right foot drop for > 10 yrs. Uses a AFO. S/P LESI provided moderate relief. Doing HEP. Has residual pain- in the low back predominantly axial in nature. S/P MB RFA on 4/11/2022. Nursing notes and details of the pain history reviewed. Please see intake notes for details. Previous treatments:   Physical Therapy : yes, for > 6 weeks and continues HEP      Medications: - yes     Surgeries: no LS spine surgery     He has been on anticoagulation medications yes and include ASA-81 mg     He has not been on herbal supplements. He is diabetic. Imaging:      CT Myelogram: 10/22/2021:      FINDINGS:   BONES/ALIGNMENT: The vertebral body heights are maintained. Facet joints are   aligned. There is a right unilateral L5 pars defect with grade 1   anterolisthesis of L5 on S1. SOFT TISSUES: The posterior paraspinal soft tissues are unremarkable. The   visualized abdominal structures are unremarkable. There is no intrathecal   contrast.  The contrast is seen in the epidural space throughout the   thoracolumbar region. L1-L2: There is a circumferential disc bulge. There is no significant bony   canal stenosis or bony foraminal narrowing. L2-L3: There is a circumferential disc bulge with facet hypertrophy. There   is no significant canal stenosis. There is moderate bilateral foraminal   narrowing. L3-L4: There is a circumferential disc bulge with facet hypertrophy. There   is no canal stenosis.   There is moderate bilateral foraminal narrowing. L4-L5: There is a circumferential disc bulge with facet and ligamentous   hypertrophy. There is no canal stenosis. There is mild right and moderate   left foraminal narrowing. L5-S1: There is a circumferential disc bulge with uncovering of the disc   space posteriorly. There is facet hypertrophy. There is no canal stenosis. There is severe bilateral foraminal narrowing. Impression   Multilevel degenerative disc disease with associated facet hypertrophy with a   right unilateral pars defect at L5 resulting in grade 1 anterolisthesis of L5   on S1. Bilateral foraminal narrowing as described above that is most severe at L5-S1. MRI of LS spine: 10/2020:       Xray LS flex/ Ext: 9/13/2021:  FINDINGS:   Grade anterolisthesis of L5 upon S1 associated with bilateral L5   spondylolysis. Severe degenerative disc disease at this level. Less   pronounced degenerative disc disease at multiple other levels. Degenerative   facet arthropathy is moderate from L4-S1. There is evidence of instability   at L5-S1. Anterolisthesis in extension measures approximately 7 mm. In the   neutral position and inflection it measures approximately 11 mm. Impression   Grade 2 anterolisthesis of L5 on S1 associated with bilateral L5   spondylolysis. Spinal instability at this level. Degenerative spondylotic   changes. Potential Aberrant Drug-Related Behavior:  no    Urine Drug Screening: no    OARRS report[de-identified] Reviewed.     Past Medical History:   Diagnosis Date    CAD (coronary artery disease) 2009    Diabetes mellitus ([address]     Diabetic neuropathy ([address]     Diabetic ulcer of left foot associated with type 2 diabetes mellitus ([address] 03/29/2017    Hyperlipidemia     Hypertension     Lumbar pain     for procedure 3/7/22    Peripheral vascular disease [facility])        Past Surgical History:   Procedure Laterality Date    CARDIAC SURGERY  2004    double bypass 6/23/22  Yes Wilver Bishop MD   losartan (COZAAR) 50 MG tablet Take 1 tablet by mouth daily 6/23/22  Yes Wilver Bishop MD   metoprolol succinate (TOPROL XL) 25 MG extended release tablet TAKE 1 TABLET TWICE A DAY 6/23/22  Yes Wilver Bishop MD   glipiZIDE (GLUCOTROL) 10 MG tablet TAKE 1 TABLET TWICE A DAY BEFORE MEALS 6/13/22  Yes Wilver Bishop MD   spironolactone (ALDACTONE) 25 MG tablet TAKE ONE-HALF (1/2) TABLET DAILY 2/22/22  Yes Wilver Bishop MD   Multiple Vitamins-Minerals (THERAPEUTIC MULTIVITAMIN-MINERALS) tablet Take 1 tablet by mouth daily   Yes Historical Provider, MD   traZODone (DESYREL) 50 MG tablet TAKE 1 TABLET DAILY EVERY NIGHT  Patient taking differently: nightly as needed TAKE 1 TABLET DAILY EVERY NIGHT 1/19/22  Yes Wilver Bishop MD   JANUVIA 100 MG tablet TAKE 1 TABLET DAILY 11/15/21  Yes Wilver Bishop MD   metFORMIN (GLUCOPHAGE) 1000 MG tablet TAKE 1 TABLET TWICE A DAY WITH MEALS 4/19/21  Yes Wilver Bishop MD   insulin glargine (LANTUS SOLOSTAR) 100 UNIT/ML injection pen Inject 10 Units into the skin nightly 2/15/21  Yes Wilver Bishop MD   blood glucose test strips (ASCENSIA AUTODISC VI;ONE TOUCH ULTRA TEST VI) strip 1 each by In Vitro route 2 times daily As needed.  2/6/21  Yes Wilver Bishop MD       Allergies   Allergen Reactions    Ciprofloxacin Hives           Pcn [Penicillins] Hives    Sulfa Antibiotics Hives       Social History     Socioeconomic History    Marital status:      Spouse name: Not on file    Number of children: Not on file    Years of education: Not on file    Highest education level: Not on file   Occupational History    Not on file   Tobacco Use    Smoking status: Never    Smokeless tobacco: Never   Vaping Use    Vaping Use: Never used   Substance and Sexual Activity    Alcohol use: Not Currently     Alcohol/week: 3.0 standard drinks     Types: 2 Shots of liquor, 1 Standard drinks or equivalent per week    Drug use: No    Sexual activity: Not on file   Other Topics Concern    Not on file   Social History Narrative    Not on file     Social Determinants of Health     Financial Resource Strain: Not on file   Food Insecurity: Not on file   Transportation Needs: Not on file   Physical Activity: Inactive    Days of Exercise per Week: 0 days    Minutes of Exercise per Session: 0 min   Stress: Not on file   Social Connections: Not on file   Intimate Partner Violence: Not on file   Housing Stability: Not on file       Family History   Problem Relation Age of Onset    High Blood Pressure Mother     COPD Father     Diabetes Father     Heart Disease Brother         chf    Seizures Brother        REVIEW OF SYSTEMS:     Janina Olivares denies fever/chills, chest pain, shortness of breath, new bowel or bladder complaints. All other review of systems was negative. PHYSICAL EXAMINATION:      /62   Pulse 70   Temp 98 °F (36.7 °C) (Infrared)   Resp 16   Ht 5' 9\" (1.753 m)   Wt 138 lb (62.6 kg)   SpO2 94%   BMI 20.38 kg/m²      General:       General appearance:  Pleasant and well-hydrated, in no distress and A & O x 3  Build:Normal Weight  Function: Rises from seated position easily and Moves about room without difficulty     HEENT:     Head:normocephalic, atraumatic     Lungs:     Breathing:normal breathing pattern      CVS:     RRR     Abdomen:     Shape:non-distended and normal     Cervical spine:     Inspection:normal     Thoracic spine:                Spine inspection:normal      Lumbar spine:     Spine inspection: Normal   Palpation: Tenderness paravertebral muscles Yes bilaterally  Range of motion: Decreased, flexion Decreased, Lateral bending, extension and rotation bilaterally reduced is painful.   Lower lumbar facet tenderness +  Sacroiliac joint tenderness No bilaterally  SLR : negative bilaterally     Musculoskeletal:     Trigger points no Extremities:  Tremors:None bilaterally upper and lower  Edema:none x all 4 extremities  B/l foot deformities noted. Neurological:     Sensory: Normal to light touch      Motor:   Right hip flexor 5/5  Left hip flexor 5/5              Right Quadriceps 5/5          Left Quadriceps 5/5           Right Gastrocnemius 3/5    Left Gastrocnemius 5/5  Right Ant Tibialis 3/5  Left Ant Tibialis 5/5  Uses AFO on the right foot     Dermatology:     Skin:no rashes or lesions noted    Assessment/Plan:   Diagnosis Orders   1. DDD (degenerative disc disease), lumbar      2. Spondylolisthesis of lumbar region      3. Spinal stenosis of lumbar region, unspecified whether neurogenic claudication present      4. Lumbosacral spondylosis without myelopathy            69 y.o.  male with h/o chronic low back pain. Failed conservative treatment for > 6 weeks recently. Doing HEP. CT Myelogram: Has L5-S1 Spondylolisthesis, L5-S1 foraminal stenosis. MRI done in 10/2020- reviewed. X-ray Flex/ Ext: instability noted. Has been evaluated by Dr. Sandoval Learn team.     On ASA-81 listed on the Med list. But states that he is not taking it. H/o DM +     S/p LESI and S/P Radiofrequency ablation. Compound cream- not much benefit. Aqua therapy/ HEP. He was very reluctant to try any meds- finally he has agreed to try low dose Lyrica. Will start on Low dose Lyrica- 25 mg po bid. Recommended re-eval with Dr. Brian Miller. He apparently has LE limb length discrepancy. Has been evaluated for orthotics/correction- got corrective foot wear and it helps. F/U in 4-6 months. Counseling : Patient encouraged to stay active and to continue Regular home exercise program as tolerated - stretching / strengthening. Treatment plan discussed with the patient including medication and procedure side effects. Controlled Substances Monitoring:      OARRS reviewed- not on chronic opioids.      Shital Mckenna MD     CC:  Katie Packer Becky Garcia MD

## 2022-07-20 DIAGNOSIS — E11.51 TYPE 2 DIABETES MELLITUS WITH DIABETIC PERIPHERAL ANGIOPATHY WITHOUT GANGRENE, WITH LONG-TERM CURRENT USE OF INSULIN (HCC): ICD-10-CM

## 2022-07-20 DIAGNOSIS — D64.9 LOW HEMOGLOBIN: ICD-10-CM

## 2022-07-20 DIAGNOSIS — Z79.4 TYPE 2 DIABETES MELLITUS WITH DIABETIC PERIPHERAL ANGIOPATHY WITHOUT GANGRENE, WITH LONG-TERM CURRENT USE OF INSULIN (HCC): ICD-10-CM

## 2022-07-20 LAB
CREATININE URINE: 93 MG/DL (ref 40–278)
HCT VFR BLD CALC: 36.4 % (ref 37–54)
HEMOGLOBIN: 11.6 G/DL (ref 12.5–16.5)
MCH RBC QN AUTO: 30.4 PG (ref 26–35)
MCHC RBC AUTO-ENTMCNC: 31.9 % (ref 32–34.5)
MCV RBC AUTO: 95.5 FL (ref 80–99.9)
MICROALBUMIN UR-MCNC: 719.5 MG/L
MICROALBUMIN/CREAT UR-RTO: 773.7 (ref 0–30)
PDW BLD-RTO: 13.1 FL (ref 11.5–15)
PLATELET # BLD: 199 E9/L (ref 130–450)
PMV BLD AUTO: 11.2 FL (ref 7–12)
RBC # BLD: 3.81 E12/L (ref 3.8–5.8)
WBC # BLD: 8.5 E9/L (ref 4.5–11.5)

## 2022-08-12 ENCOUNTER — HOSPITAL ENCOUNTER (OUTPATIENT)
Dept: GENERAL RADIOLOGY | Age: 77
Discharge: HOME | End: 2022-08-14
Payer: MEDICARE

## 2022-08-12 ENCOUNTER — OFFICE VISIT (OUTPATIENT)
Dept: NEUROSURGERY | Age: 77
End: 2022-08-12
Payer: MEDICARE

## 2022-08-12 VITALS
OXYGEN SATURATION: 96 % | HEIGHT: 69 IN | TEMPERATURE: 98.1 F | RESPIRATION RATE: 20 BRPM | DIASTOLIC BLOOD PRESSURE: 69 MMHG | BODY MASS INDEX: 20.44 KG/M2 | SYSTOLIC BLOOD PRESSURE: 161 MMHG | HEART RATE: 69 BPM | WEIGHT: 138 LBS

## 2022-08-12 DIAGNOSIS — M54.42 CHRONIC BILATERAL LOW BACK PAIN WITH BILATERAL SCIATICA: Primary | ICD-10-CM

## 2022-08-12 DIAGNOSIS — G89.29 CHRONIC BILATERAL LOW BACK PAIN WITH BILATERAL SCIATICA: ICD-10-CM

## 2022-08-12 DIAGNOSIS — G89.29 CHRONIC BILATERAL LOW BACK PAIN WITH BILATERAL SCIATICA: Primary | ICD-10-CM

## 2022-08-12 DIAGNOSIS — M54.41 CHRONIC BILATERAL LOW BACK PAIN WITH BILATERAL SCIATICA: ICD-10-CM

## 2022-08-12 DIAGNOSIS — M54.41 CHRONIC BILATERAL LOW BACK PAIN WITH BILATERAL SCIATICA: Primary | ICD-10-CM

## 2022-08-12 DIAGNOSIS — M51.36 LUMBAR DEGENERATIVE DISC DISEASE: ICD-10-CM

## 2022-08-12 DIAGNOSIS — M43.16 SPONDYLOLISTHESIS OF LUMBAR REGION: ICD-10-CM

## 2022-08-12 DIAGNOSIS — M54.42 CHRONIC BILATERAL LOW BACK PAIN WITH BILATERAL SCIATICA: ICD-10-CM

## 2022-08-12 PROCEDURE — 99213 OFFICE O/P EST LOW 20 MIN: CPT | Performed by: STUDENT IN AN ORGANIZED HEALTH CARE EDUCATION/TRAINING PROGRAM

## 2022-08-12 PROCEDURE — 1123F ACP DISCUSS/DSCN MKR DOCD: CPT | Performed by: STUDENT IN AN ORGANIZED HEALTH CARE EDUCATION/TRAINING PROGRAM

## 2022-08-12 PROCEDURE — 72120 X-RAY BEND ONLY L-S SPINE: CPT

## 2022-08-12 NOTE — PROGRESS NOTES
Problem Focused Office Visit     Subjective: Afshan Monet is a 69 y.o.  male who has a past medical history of Heart failure, CAD, HTN, PVD, and DM who presents to office for follow up on back pain after exhausting all conservative measures. Patient states since last visit this pain has gradually worsened. This pain is mostly located in the mid back but radiates to both sides of the back, left worse than right. He denies any radiation down the legs. He describes this pain as a constant aching pain. He denies any associated numbness or weakness with this pain. Pain is worse with movement and relieved with rest. He states he now has an unsteady gait and loses balance from time to time so he has obtained AFO braces for this. He has tried PT at the [facility] with no relief. He also follows with Dr. Rinku Hagen and has received RONNIE and nerve block with little relief. He is a nonsmoker and denies any blood thinner usage. Previous CT myelogram reviewed. Physical Exam  HENT:      Head: Normocephalic. Eyes:      Pupils: Pupils are equal, round, and reactive to light. Cardiovascular:      Rate and Rhythm: Normal rate. Pulmonary:      Effort: Pulmonary effort is normal.   Abdominal:      General: There is no distension. Musculoskeletal:         General: Normal range of motion. Cervical back: Normal range of motion. Skin:     General: Skin is warm and dry. Neurological:      Mental Status: He is alert. Comments: A&Ox3  CN3-12 intact  Bilateral DF 4-/5 otherwise Motor Strength full   Sensation intact to light touch   Reflexes normal   Mild tenderness to palpation of lumbar spine  Bilateral AFO braces   Psychiatric:         Thought Content: Thought content normal.         Assessment: This is a 69 y.o.  male presenting for a office follow-up for low back pain. This pain is a constant aching pain. All conservative measures, including PT and RONNIE  have been exhausted and patient continues to have pain.

## 2022-08-22 ENCOUNTER — TELEPHONE (OUTPATIENT)
Dept: NEUROSURGERY | Age: 77
End: 2022-08-22

## 2022-08-29 ENCOUNTER — TELEPHONE (OUTPATIENT)
Dept: NEUROSURGERY | Age: 77
End: 2022-08-29

## 2022-08-29 NOTE — TELEPHONE ENCOUNTER
Andressa Denial for CT/Myelogram  Ref# [id number]  Denied due to no 6 weeks of PT within the last 3 months w/o improved symptoms.

## 2022-09-08 ENCOUNTER — TELEPHONE (OUTPATIENT)
Dept: NEUROSURGERY | Age: 77
End: 2022-09-08

## 2022-09-08 DIAGNOSIS — M54.42 CHRONIC BILATERAL LOW BACK PAIN WITH BILATERAL SCIATICA: Primary | ICD-10-CM

## 2022-09-08 DIAGNOSIS — M54.41 CHRONIC BILATERAL LOW BACK PAIN WITH BILATERAL SCIATICA: Primary | ICD-10-CM

## 2022-09-08 DIAGNOSIS — G89.29 CHRONIC BILATERAL LOW BACK PAIN WITH BILATERAL SCIATICA: Primary | ICD-10-CM

## 2022-09-08 NOTE — TELEPHONE ENCOUNTER
Patient had his ct myelogram denied to not having six week of pt within the last 3months. Are we able to order physical therapy?

## 2022-09-08 NOTE — TELEPHONE ENCOUNTER
Faxed pt order to [facility] Joel MCFADDEN PT at [facility] in [facility].

## 2022-09-21 ENCOUNTER — HOSPITAL ENCOUNTER (OUTPATIENT)
Dept: PHYSICAL THERAPY | Age: 77
Setting detail: THERAPIES SERIES
Discharge: HOME | End: 2022-09-21
Payer: MEDICARE

## 2022-09-21 PROCEDURE — 97161 PT EVAL LOW COMPLEX 20 MIN: CPT | Performed by: PHYSICAL THERAPIST

## 2022-09-21 ASSESSMENT — PAIN DESCRIPTION - LOCATION: LOCATION: BACK

## 2022-09-21 ASSESSMENT — PAIN DESCRIPTION - DESCRIPTORS: DESCRIPTORS: ACHING

## 2022-09-21 ASSESSMENT — PAIN SCALES - GENERAL: PAINLEVEL_OUTOF10: 0

## 2022-09-21 ASSESSMENT — PAIN DESCRIPTION - PAIN TYPE: TYPE: CHRONIC PAIN

## 2022-09-21 ASSESSMENT — PAIN DESCRIPTION - ORIENTATION: ORIENTATION: LOWER

## 2022-09-21 NOTE — PROGRESS NOTES
Physical Therapy    Physical Therapy: Initial Evaluation    Patient: Chun Bravo (14 y.o. male)   Examination Date:   Plan of Care Certification Period: 2022 to 22      :  1945 ;    Confirmed: Yes MRN: [id number]  CSN: [id number]   Insurance: Payor: Yolanda Cosby / Plan: Olekimsunday Verasunday PPO / Product Type: Medicare /   Insurance ID: [id number] - (Medicare Managed) Secondary Insurance (if applicable):    Referring Physician: DILEEP Multani     PCP: Chad Evans MD Visits to Date/Visits Approved:   /      No Show/Cancelled Appts:   /       Medical Diagnosis: Lumbago with sciatica, left side [M54.42]  Lumbago with sciatica, right side [M54.41]  Other chronic pain [G89.29] M54.42, M54.41, G89.29 (ICD-10-CM) - Chronic bilateral low back pain with bilateral sciatica  Treatment Diagnosis:       PERTINENT MEDICAL HISTORY           Medical History: Chart Reviewed: Yes   Past Medical History:   Diagnosis Date    CAD (coronary artery disease)     Diabetes mellitus [address]     Diabetic neuropathy [address]     Diabetic ulcer of left foot associated with type 2 diabetes mellitus ([facility] 2017    Hyperlipidemia     Hypertension     Lumbar pain     for procedure 3/7/22    Peripheral vascular disease [facility])      Surgical History:   Past Surgical History:   Procedure Laterality Date    CARDIAC SURGERY      double bypass    ENDOSCOPY, COLON, DIAGNOSTIC Bilateral     cataracts    FOOT SURGERY Left     multiple    NERVE BLOCK Bilateral 2022    #1 BILATERAL LUMBAR MEDIAL BRANCH NERVE BLOCK UNDER FLUOROSCOPIC GUIDANCE AT L3, L4, AND L5 DORSAL RAMI performed by Kenji Valdivia MD at [address] [facility] Bilateral 3/7/2022    BILATERAL LUMBAR MEDIAL BRANCH NERVE BLOCK UNDER FLUORO AT L3 L4 L5 DORSAL RAMI performed by Kenji Valdivia MD at [address] [facility] HISTORY  03/10/2017    left foot application integra graft, application of wound vac, delayed primax closure    OTHER SURGICAL HISTORY      fracture of left temporal part of skull    OTHER SURGICAL HISTORY Left 06/16/2017    delayed closure with bone debridement and biopsy, application of hemovac with Dr. Jono Dubois N/A 11/22/2021    LUMBAR EPIDURAL STEROID INJECTION UNDER FLUOROSCOPIC GUIDANCE AT L5-S1 performed by Gus Padilla MD at [address] N/A 1/6/2022    # 2 LUMBAR EPIDURAL STEROID INJECTION UNDER FLUOROSCOPIC GUIDANCE AT L5-S1 performed by Gus Padilla MD at [address] Bilateral 4/11/2022    BILATERAL LUMBAR RADIOFREQUENCY ABLATION UNDER FLUORO AT L3 L4 L5 AND DORSAL RAMI performed by Gus Padilla MD at [address] [facility] [address] Left 05/2017    SFA, pop atherectomy, Dr. Spivey [place] Left 2009    2nd and 5th toe    TONSILLECTOMY         Medications:   Current Outpatient Medications:     pregabalin (LYRICA) 25 MG capsule, Take 1 capsule by mouth in the morning and 1 capsule before bedtime. Do all this for 90 days. , Disp: 60 capsule, Rfl: 2    FreeStyle Lancets MISC, TEST EVERY DAY, Disp: 100 each, Rfl: 5    rosuvastatin (CRESTOR) 10 MG tablet, TAKE 1 TABLET DAILY, Disp: 90 tablet, Rfl: 3    blood glucose test strips (FREESTYLE TEST STRIPS) strip, 1 each by In Vitro route daily As needed. , Disp: 100 each, Rfl: 3    Insulin Pen Needle 31G X 5 MM MISC, 1 each by Does not apply route daily, Disp: 100 each, Rfl: 3    losartan (COZAAR) 50 MG tablet, Take 1 tablet by mouth daily, Disp: 90 tablet, Rfl: 3    metoprolol succinate (TOPROL XL) 25 MG extended release tablet, TAKE 1 TABLET TWICE A DAY, Disp: 180 tablet, Rfl: 3    glipiZIDE (GLUCOTROL) 10 MG tablet, TAKE 1 TABLET TWICE A DAY BEFORE MEALS, Disp: 180 tablet, Rfl: 3    spironolactone (ALDACTONE) 25 MG tablet, TAKE ONE-HALF (1/2) TABLET DAILY, Disp: 45 tablet, Rfl: 3    Multiple Vitamins-Minerals (THERAPEUTIC MULTIVITAMIN-MINERALS) tablet, Take 1 tablet by mouth daily, Disp: , Rfl:     traZODone (DESYREL) 50 MG tablet, TAKE 1 TABLET DAILY EVERY NIGHT (Patient taking differently: nightly as needed TAKE 1 TABLET DAILY EVERY NIGHT), Disp: 90 tablet, Rfl: 3    JANUVIA 100 MG tablet, TAKE 1 TABLET DAILY, Disp: 90 tablet, Rfl: 3    metFORMIN (GLUCOPHAGE) 1000 MG tablet, TAKE 1 TABLET TWICE A DAY WITH MEALS, Disp: 180 tablet, Rfl: 3    insulin glargine (LANTUS SOLOSTAR) 100 UNIT/ML injection pen, Inject 10 Units into the skin nightly, Disp: 5 pen, Rfl: 5    blood glucose test strips (ASCENSIA AUTODISC VI;ONE TOUCH ULTRA TEST VI) strip, 1 each by In Vitro route 2 times daily As needed. , Disp: 100 each, Rfl: 3  Allergies: Ciprofloxacin, Pcn [penicillins], and Sulfa antibiotics      SUBJECTIVE EXAMINATION      ,           Subjective History:    Subjective: Patient presents to PT with c/o back pain. Patient reports having back pain for approx 2 years. States having a few falls around that time which could have contributed to the issue. Patient has had multiple bouts of therapy with no relief. Pt also had a series of injections and nerve block with no symptom relief. Patient wants to see NS however MRI is required. MRI was denied due to not having therapy in the past 3 months. Patient reports no difficulties iwth bending/twisting motions. Pain increases across back with standing/walking. Pt takes OTC meds prn for symptom reduction.   Additional Pertinent Hx (if applicable):     Previous treatments prior to current episode?: Injections, Outpatient PT      Learning/Language: Learning  Does the patient/guardian have any barriers to learning?: No barriers  What is the preferred language of the patient/guardian?: English     Pain Screening    Pain Screening  Patient Currently in Pain: Yes  Pain Assessment: 0-10  Pain Level: 0  Best Pain Level: 0  Worst Pain Level: 7  Pain Type: Chronic pain  Pain Location: Back  Pain Orientation: Lower  Pain Descriptors: Aching    Functional Status    Dominant Hand: : Right    OBJECTIVE EXAMINATION   Restrictions:    None     Observations:   General Observations  Description: Pt presents to PT with B AFOs- states wearing for many years    Palpation:   Lumbar Spine Palpation: No pain with palpation across back region    Ambulation/Gait (if applicable):   Ambulation  Surface: level tile  Device: No Device  Other Apparatus: AFO, Left, Right  Assistance: Independent  Quality of Gait: Pt ambulates with flat foot contact bilaterally; increased medial/lateral trunk deviation throughout  Gait Deviations: Slow Susy, Increased FRANCA, Decreased step length, Decreased step height    Balance Screen:   Balance  Posture: Fair (rounded shoulder posturing)  Sitting - Static: Good  Sitting - Dynamic: Good  Standing - Static: Good, -  Standing - Dynamic: Good, -    Neuro Screen:   Left Dermatomes  Right Dermatomes      Left LE Dermatomes  L1 (Inguinal Ligament): Intact  L2 (Medial Anterior Thigh): Intact  L3 (Medial Knee): Intact  L4 (Medial Malleolus): Intact  L5 (Dorsum 3rd MTP): Intact  S1 (Lateral Malleolus): Intact  S2 (Popliteal Fossa): Intact Right LE Dermatomes  L1 (Inguinal Ligament): Intact  L2 (Medial Anterior Thigh): Intact  L3 (Medial Knee): Intact  L4 (Medial Malleolus): Intact  L5 (Dorsum 3rd MTP): Intact  S1 (Lateral Malleolus): Intact  S2 (Popliteal Fossa):  Intact       Sensation      Sensation  Overall Sensation Status: WFL     Left AROM  Right AROM       LLE- WFL     RLE-WFL        Left Strength  Right Strength         Strength LLE  L Hip Flexion: 4-/5  L Hip Extension: 4-/5  L Hip ABduction: 4-/5  L Hip ADduction: 4-/5  L Knee Flexion: 4+/5  L Knee Extension: 4+/5    Strength RLE  R Hip Flexion: 4-/5  R Hip Extension: 4-/5  R Hip ABduction: 4-/5  R Hip ADduction: 4-/5  R Knee Flexion: 4+/5  R Knee Extension: 4+/5     Lumbar Assessment     AROM Lumbar Spine   Lumbar spine general AROM: [facility] Trunk Strength     Trunk Strength  Trunk Flexion: 4-/5  Trunk Extension: 4-/5        ASSESSMENT     Impression: Assessment: Patient presents to PT with c/o back pain that increases with ambulation. Limited strength noted across trunk and  B hips with hindered posturing and gait mechanics    Body Structures, Functions, Activity Limitations Requiring Skilled Therapeutic Intervention: Decreased functional mobility , Decreased strength, Decreased balance, Decreased posture, Increased pain    Statement of Medical Necessity: Physical Therapy is both indicated and medically necessary as outlined in the POC to increase the likelihood of meeting the functionally related goals stated below. Patient's Activity Tolerance:        Patient's rehabilitation potential/prognosis is considered to be:  Fair    Factors which may impact rehabilitation potential include:          GOALS   Patient Goal(s):    Short Term Goals Completed by 3 weeks Goal Status   increase strength of trunk to grossly 4/5 improving upight posture to FAIR+     increase strength of B hips to grossly 4/5 allowing for improved heel-toe mechanics with decreased trunk deviation     decrease pain to < 5/10 across back region with activity                     Long Term Goals Completed by 6 weeks Goal Status   increase strength of trunk to grossly 4+/5 improving upight posture to GOOD-     increase strength of B hips to grossly 4+/5 allowing for improved heel-toe mechanics with little to no trunk deviation     decrease pain to < 3/10 across back region with activity     pt demonstrates independence with HEP                TREATMENT PLAN       Requires PT Follow-Up: Yes    Pt. actively involved in establishing Plan of Care and Goals: Yes  Patient/ Caregiver education and instruction:               Treatment may include any combination of the following: Strengthening, ROM, Balance training, Gait training, Manual Therapy - Soft Tissue Mobilization, Home exercise program, Safety education & training, Patient/Caregiver education & training, Modalities     Frequency / Duration:  Patient to be seen 2 for 6 weeks      Eval Complexity:    Decision Making: Low Complexity    PT Treatment Completed:  N/A - Evaluation Only    Therapy Time  Individual Time In:   5718      Individual Time Out:  9659  Minutes:  50mins        Therapist Signature: Marisela Curran, PT    Date: 4/57/3134     I certify that the above Therapy Services are being furnished while the patient is under my care. I agree with the treatment plan and certify that this therapy is necessary. Physician's Signature:  ___________________________   Date:_______                                                                   DILEEP Choudhary        Physician Comments: _______________________________________________    Please sign and return to [facility] PHYSICAL THERAPY. Please fax to the location listed below.  Lety Madden for this referral!    [address] [facility] PHYSICAL THERAPY  [facility] [address]  Dept: [phone]  Fax: [phone]       POC NOTE

## 2022-09-27 ENCOUNTER — HOSPITAL ENCOUNTER (OUTPATIENT)
Dept: PHYSICAL THERAPY | Age: 77
Setting detail: THERAPIES SERIES
Discharge: HOME | End: 2022-09-27
Payer: MEDICARE

## 2022-09-27 PROCEDURE — 97110 THERAPEUTIC EXERCISES: CPT | Performed by: PHYSICAL THERAPIST

## 2022-09-27 NOTE — PROGRESS NOTES
[facility]  Phone: [phone] Fax: [phone]       Physical Therapy Daily Treatment Note  Date:  2022    Patient Name:  Karie Hunter    :  1945  MRN: [id number]    Patient: Karie Hunter (68 y.o. male)   Examination Date: 4216  Plan of Care Certification Period: 2022 to 22     :  1945 ;    Confirmed: Yes MRN: [id number]  CSN: [id number]   Insurance: Payor: Stanford Gaona / Plan: [employer] Young PPO / Product Type: Medicare /   Insurance ID: [id number] - (Medicare Managed) Secondary Insurance (if applicable):    Referring Physician: DILEEP Mendez     PCP: Chip Yousif MD Visits to Date/Visits Approved:        No Show/Cancelled Appts:   /       Medical Diagnosis: Lumbago with sciatica, left side [M54.42]  Lumbago with sciatica, right side [M54.41]  Other chronic pain [G89.29] M54.42, M54.41, G89.29 (ICD-10-CM) - Chronic bilateral low back pain with bilateral sciatica  Treatment Diagnosis:       Time In:    1100    Time Out:   1150       Subjective:  pt reports wearing a boot on RLE due to having a callus. Exercises:  Exercise/Equipment Resistance/Repetitions Other comments   Bike    [id number]           Trunk rot   5x10s           bridge   2x10    Pelvic tilts   2x10    SLR    2x10ea    Sit to stand    x10           Marching      x10ea    Standing hip abd                       Ext     [id number]  L13MW                                                              Other Therapeutic Activities:      Home Exercise Program:      Manual Treatments:      Modalities:      Timed Code Treatment Minutes: Total Treatment Minutes:      Treatment/Activity Tolerance:  [x] Patient tolerated treatment well [] Patient limited by fatigue  [] Patient limited by pain  [] Patient limited by other medical complications  [] Other: performed there ex focusing on trunk and hip strength to improve functional gait.  Gait remains hindered with significant med/lat trunk deviation.      Plan:   [x] Continue per plan of care [] Alter current plan (see comments)  [] Plan of care initiated [] Hold pending MD visit [] Discharge  Plan for Next Session:         Treatment Charges: Mins Units   Initial Evaluation     Re-Evaluation     Ther Exercise         TE 40 2   Manual Therapy     MT     Ther Activities        TA     Gait Training          GT     Neuro Re-education NR     Modalities     Non-Billable Service Time 10 -   Other     Total Time/Units 50 2     Electronically signed by:  Trinh Cabezas, PT

## 2022-09-29 ENCOUNTER — HOSPITAL ENCOUNTER (OUTPATIENT)
Dept: PHYSICAL THERAPY | Age: 77
Setting detail: THERAPIES SERIES
Discharge: HOME | End: 2022-09-29
Payer: MEDICARE

## 2022-09-29 PROCEDURE — 97110 THERAPEUTIC EXERCISES: CPT | Performed by: PHYSICAL THERAPIST

## 2022-09-29 NOTE — PROGRESS NOTES
[facility]  Phone: [phone] Fax: [phone]       Physical Therapy Daily Treatment Note  Date:  2022    Patient Name:  Karie Hunter    :  1945  MRN: [id number]    Patient: Karie Hunter (59 y.o. male)   Examination Date:   Plan of Care Certification Period: 2022 to 22     :  1945 ;    Confirmed: Yes MRN: [id number]  CSN: [id number]   Insurance: Payor: [facility] [address] / Plan: [address] [facility] PPO / Product Type: Medicare /   Insurance ID: [id number] - (Medicare Managed) Secondary Insurance (if applicable):    Referring Physician: DILEEP Mendez     PCP: Chip Yousif MD Visits to Date/Visits Approved:        No Show/Cancelled Appts:   /       Medical Diagnosis: Lumbago with sciatica, left side [M54.42]  Lumbago with sciatica, right side [M54.41]  Other chronic pain [G89.29] M54.42, M54.41, G89.29 (ICD-10-CM) - Chronic bilateral low back pain with bilateral sciatica  Treatment Diagnosis:       Time In:    1100    Time Out:   1150       Subjective:  pt reports working on exercises at home. States noticing the hip weakness     Exercises:  Exercise/Equipment Resistance/Repetitions Other comments   Bike    [id number]           Trunk rot   5x10s           bridge   2x10    Pelvic tilts   2x10    SLR    2x10ea    Hip abd x10ea    Sit to stand    x10    Marching      2x10ea    Standing hip abd                       Ext     X10ea  x10ea            Side to side amb  2x20ft ea     Fwd/bwd   2x20ft ea                                         Other Therapeutic Activities:      Home Exercise Program:      Manual Treatments:      Modalities:      Timed Code Treatment Minutes:       Total Treatment Minutes:      Treatment/Activity Tolerance:  [x] Patient tolerated treatment well [] Patient limited by fatigue  [] Patient limited by pain  [] Patient limited by other medical complications  [] Other: performed there ex focusing on trunk and hip strength to improve functional gait. Gait remains hindered with significant med/lat trunk deviation. Added side ambulation and fwd/bwd ambulation to promote gait mechanics/balance and to improve functional strength across hips. Mild LOB with activity. PT provided CGA throughout. Pt instructed to cont with exercises at home.  Pt to not perform gait training activities without assist.      Plan:   [x] Continue per plan of care [] Alter current plan (see comments)  [] Plan of care initiated [] Hold pending MD visit [] Discharge  Plan for Next Session:         Treatment Charges: Mins Units   Initial Evaluation     Re-Evaluation     Ther Exercise         TE 40 2   Manual Therapy     MT     Ther Activities        TA     Gait Training          GT 5 -   Neuro Re-education NR     Modalities     Non-Billable Service Time 5    Other     Total Time/Units 50 2     Electronically signed by:  Gloria Bustos PT

## 2022-10-04 ENCOUNTER — HOSPITAL ENCOUNTER (OUTPATIENT)
Dept: PHYSICAL THERAPY | Age: 77
Setting detail: THERAPIES SERIES
End: 2022-10-04
Payer: MEDICARE

## 2022-10-06 ENCOUNTER — HOSPITAL ENCOUNTER (OUTPATIENT)
Dept: PHYSICAL THERAPY | Age: 77
Setting detail: THERAPIES SERIES
Discharge: HOME | End: 2022-10-06
Payer: MEDICARE

## 2022-10-06 PROCEDURE — 97110 THERAPEUTIC EXERCISES: CPT | Performed by: PHYSICAL THERAPIST

## 2022-10-06 NOTE — PROGRESS NOTES
[facility]  Phone: [phone] Fax: [phone]       Physical Therapy Daily Treatment Note  Date:  10/6/2022    Patient Name:  Ibrahima Dee    :  1945  MRN: [id number]    Patient: Ibrahima Dee (89 y.o. male)   Examination Date: 8412  Plan of Care Certification Period: 2022 to 22     :  1945 ;    Confirmed: Yes MRN: [id number]  CSN: [id number]   Insurance: Payor: [employer] / Plan: Elidia Perez PPO / Product Type: Medicare /   Insurance ID: [id number] - (Medicare Managed) Secondary Insurance (if applicable):    Referring Physician: DILEEP Marquez     PCP: Catie San MD Visits to Date/Visits Approved:   3/ 12     No Show/Cancelled Appts:   /       Medical Diagnosis: Lumbago with sciatica, left side [M54.42]  Lumbago with sciatica, right side [M54.41]  Other chronic pain [G89.29] M54.42, M54.41, G89.29 (ICD-10-CM) - Chronic bilateral low back pain with bilateral sciatica  Treatment Diagnosis:       Time In:    1100    Time Out:   1150       Subjective:  pt reports working on exercises at home. States noticing the hip weakness     Exercises:  Exercise/Equipment Resistance/Repetitions Other comments   Bike    [id number]           Trunk rot   5x10s           bridge   2x10    Pelvic tilts   2x10    SLR    2x10ea    Hip abd x10ea    Sit to stand    x10    Marching      2x10ea    Standing hip abd                       Ext     X10ea  x10ea            Side to side amb      Fwd/bwd                                           Other Therapeutic Activities:  ambulation with SC- x225ft with SC     Home Exercise Program:      Manual Treatments:      Modalities:      Timed Code Treatment Minutes:       Total Treatment Minutes:      Treatment/Activity Tolerance:  [x] Patient tolerated treatment well [] Patient limited by fatigue  [] Patient limited by pain  [] Patient limited by other medical complications  [] Other: performed there ex focusing on trunk and hip strength to improve functional gait. Gait remains hindered with significant med/lat trunk deviation. Performed gait training with SC. Improvement with minimal trunk deviation and decreased overall pain symptoms.  PT recommends SC at community level to improve gait mechanics and to reduce hip/back symptoms     Plan:   [x] Continue per plan of care [] Alter current plan (see comments)  [] Plan of care initiated [] Hold pending MD visit [] Discharge  Plan for Next Session:         Treatment Charges: Mins Units   Initial Evaluation     Re-Evaluation     Ther Exercise         TE 40 2   Manual Therapy     MT     Ther Activities        TA     Gait Training          GT 5 -   Neuro Re-education NR     Modalities     Non-Billable Service Time 5    Other     Total Time/Units 50 2     Electronically signed by:  Alonzo Martinez PT

## 2022-10-11 ENCOUNTER — HOSPITAL ENCOUNTER (OUTPATIENT)
Dept: PHYSICAL THERAPY | Age: 77
Setting detail: THERAPIES SERIES
Discharge: HOME | End: 2022-10-11
Payer: MEDICARE

## 2022-10-11 NOTE — PROGRESS NOTES
[facility]  Phone: [phone] Fax: [phone]     Physical Therapy  Cancellation/No-show Note  Patient Name:  Raymond Mccoy  :  1945   Date:  10/11/2022    For today's appointment patient:  [x]  Cancelled  []  Rescheduled appointment  []  No-show     Reason given by patient:  []  Patient ill  []  Conflicting appointment  []  No transportation    []  Conflict with work  []  No reason given  []  Other:     Comments:  pt brother is in hospital     Electronically signed by:  Christen James PT

## 2022-10-13 ENCOUNTER — HOSPITAL ENCOUNTER (OUTPATIENT)
Dept: PHYSICAL THERAPY | Age: 77
Setting detail: THERAPIES SERIES
Discharge: HOME | End: 2022-10-13
Payer: MEDICARE

## 2022-10-13 PROCEDURE — 97110 THERAPEUTIC EXERCISES: CPT | Performed by: PHYSICAL THERAPIST

## 2022-10-19 ENCOUNTER — OFFICE VISIT (OUTPATIENT)
Dept: PAIN MANAGEMENT | Age: 77
End: 2022-10-19
Payer: MEDICARE

## 2022-10-19 VITALS
DIASTOLIC BLOOD PRESSURE: 72 MMHG | HEART RATE: 87 BPM | OXYGEN SATURATION: 97 % | BODY MASS INDEX: 21.48 KG/M2 | RESPIRATION RATE: 16 BRPM | SYSTOLIC BLOOD PRESSURE: 154 MMHG | HEIGHT: 69 IN | TEMPERATURE: 97.3 F | WEIGHT: 145 LBS

## 2022-10-19 DIAGNOSIS — M47.817 LUMBOSACRAL SPONDYLOSIS WITHOUT MYELOPATHY: ICD-10-CM

## 2022-10-19 DIAGNOSIS — M51.36 DDD (DEGENERATIVE DISC DISEASE), LUMBAR: Primary | ICD-10-CM

## 2022-10-19 DIAGNOSIS — M43.16 SPONDYLOLISTHESIS OF LUMBAR REGION: ICD-10-CM

## 2022-10-19 DIAGNOSIS — M48.061 SPINAL STENOSIS OF LUMBAR REGION, UNSPECIFIED WHETHER NEUROGENIC CLAUDICATION PRESENT: ICD-10-CM

## 2022-10-19 PROCEDURE — 1123F ACP DISCUSS/DSCN MKR DOCD: CPT | Performed by: ANESTHESIOLOGY

## 2022-10-19 PROCEDURE — 99214 OFFICE O/P EST MOD 30 MIN: CPT | Performed by: ANESTHESIOLOGY

## 2022-10-19 PROCEDURE — 99213 OFFICE O/P EST LOW 20 MIN: CPT | Performed by: ANESTHESIOLOGY

## 2022-10-19 RX ORDER — GABAPENTIN 100 MG/1
CAPSULE ORAL
Qty: 90 CAPSULE | Refills: 1 | Status: SHIPPED | OUTPATIENT
Start: 2022-10-19 | End: 2022-11-18

## 2022-10-19 NOTE — PROGRESS NOTES
Do you currently have any of the following:    Fever: No  Headache:  No  Cough: No  Shortness of breath: No  Exposed to anyone with these symptoms: No         Raymond Mccoy presents to the [facility] on 10/19/2022. Lisa Ann is complaining of pain lower back. The pain is constant. The pain is described as aching and sharp. Pain is rated on his best day at a 4, on his worst day at a 7, and on average at a 4 on the VAS scale. He took his last dose of Lyrica today. Any procedures since your last visit: No.    Pacemaker or defibrillator: No .    He is not on NSAIDS and is not on anticoagulation medications. Medication Contract and Consent for Opioid Use Documents Filed        No documents found                    There were no vitals taken for this visit. No LMP for male patient.

## 2022-10-19 NOTE — PROGRESS NOTES
[facility]  Sin, Greyson Metcalf  Dept: [phone]    Follow up Note      Alison Lambert     Date of Visit:  10/19/2022    CC:  Patient presents for follow up   Chief Complaint   Patient presents with    Follow-up    Lower Back Pain       HPI:  Chronic low back pain. Pain causes functional limitations/ limits Adl's : Yes       Had CT Myelogram (Could not do MRI due to having penile implant). Has h/o chronic right foot drop for > 10 yrs. Uses a AFO. S/P LESI & S/P MB RFA - did not provide long term pain relief. Doing HEP. Nursing notes and details of the pain history reviewed. Please see intake notes for details. Previous treatments:   Physical Therapy : yes, for > 6 weeks and continues HEP      Medications: - yes     Surgeries: no LS spine surgery     He has been on anticoagulation medications yes and include ASA-81 mg     He has not been on herbal supplements. He is diabetic. Imaging:      CT Myelogram: 10/22/2021:      FINDINGS:   BONES/ALIGNMENT: The vertebral body heights are maintained. Facet joints are   aligned. There is a right unilateral L5 pars defect with grade 1   anterolisthesis of L5 on S1. SOFT TISSUES: The posterior paraspinal soft tissues are unremarkable. The   visualized abdominal structures are unremarkable. There is no intrathecal   contrast.  The contrast is seen in the epidural space throughout the   thoracolumbar region. L1-L2: There is a circumferential disc bulge. There is no significant bony   canal stenosis or bony foraminal narrowing. L2-L3: There is a circumferential disc bulge with facet hypertrophy. There   is no significant canal stenosis. There is moderate bilateral foraminal   narrowing. L3-L4: There is a circumferential disc bulge with facet hypertrophy. There   is no canal stenosis. There is moderate bilateral foraminal narrowing.        L4-L5: There is a circumferential disc bulge with facet and ligamentous   hypertrophy. There is no canal stenosis. There is mild right and moderate   left foraminal narrowing. L5-S1: There is a circumferential disc bulge with uncovering of the disc   space posteriorly. There is facet hypertrophy. There is no canal stenosis. There is severe bilateral foraminal narrowing. Impression   Multilevel degenerative disc disease with associated facet hypertrophy with a   right unilateral pars defect at L5 resulting in grade 1 anterolisthesis of L5   on S1. Bilateral foraminal narrowing as described above that is most severe at L5-S1. MRI of LS spine: 10/2020:       X-ray LS flex/ Ext: 9/13/2021:  FINDINGS:   Grade anterolisthesis of L5 upon S1 associated with bilateral L5   spondylolysis. Severe degenerative disc disease at this level. Less   pronounced degenerative disc disease at multiple other levels. Degenerative   facet arthropathy is moderate from L4-S1. There is evidence of instability   at L5-S1. Anterolisthesis in extension measures approximately 7 mm. In the   neutral position and inflection it measures approximately 11 mm. Impression   Grade 2 anterolisthesis of L5 on S1 associated with bilateral L5   spondylolysis. Spinal instability at this level. Degenerative spondylotic   changes. Potential Aberrant Drug-Related Behavior:  no    Urine Drug Screening: no    OARRS report[de-identified] Reviewed.     Past Medical History:   Diagnosis Date    CAD (coronary artery disease) 2009    Diabetes mellitus ([address]     Diabetic neuropathy ([address]     Diabetic ulcer of left foot associated with type 2 diabetes mellitus ([address] 03/29/2017    Hyperlipidemia     Hypertension     Lumbar pain     for procedure 3/7/22    Peripheral vascular disease [facility])        Past Surgical History:   Procedure Laterality Date    CARDIAC SURGERY  2004    double bypass    ENDOSCOPY, COLON, DIAGNOSTIC Bilateral     cataracts    FOOT SURGERY Left     multiple NERVE BLOCK Bilateral 2/7/2022    #1 BILATERAL LUMBAR MEDIAL BRANCH NERVE BLOCK UNDER FLUOROSCOPIC GUIDANCE AT L3, L4, AND L5 DORSAL RAMI performed by Jered Dumont MD at [facility] Bilateral 3/7/2022    BILATERAL LUMBAR MEDIAL BRANCH NERVE BLOCK UNDER FLUORO AT L3 L4 L5 DORSAL RAMI performed by Jered Dumont MD at [facility] OR    OTHER SURGICAL HISTORY  03/10/2017    left foot application integra graft, application of wound vac, delayed primax closure    OTHER SURGICAL HISTORY      fracture of left temporal part of skull    OTHER SURGICAL HISTORY Left 06/16/2017    delayed closure with bone debridement and biopsy, application of hemovac with Dr. Jaren Gatica N/A 11/22/2021    LUMBAR EPIDURAL STEROID INJECTION UNDER FLUOROSCOPIC GUIDANCE AT L5-S1 performed by Jered Dumont MD at [address] [place] [address] N/A 1/6/2022    # 2 LUMBAR EPIDURAL STEROID INJECTION UNDER FLUOROSCOPIC GUIDANCE AT L5-S1 performed by Jered Dumont MD at [address] [place] [address] Bilateral 4/11/2022    BILATERAL LUMBAR RADIOFREQUENCY ABLATION UNDER FLUORO AT L3 L4 L5 AND DORSAL RAMI performed by Jered Dumont MD at [address] [place] [address]. Left 05/2017    SFA, pop atherectomy, Dr. Pagan Garcia Left 2009    2nd and 5th toe    TONSILLECTOMY         Prior to Admission medications    Medication Sig Start Date End Date Taking? Authorizing Provider   FreeStyle Lancets MISC TEST EVERY DAY 6/27/22  Yes Aaron Danielle MD   rosuvastatin (CRESTOR) 10 MG tablet TAKE 1 TABLET DAILY 6/24/22  Yes Danford Libman, MD   blood glucose test strips (FREESTYLE TEST STRIPS) strip 1 each by In Vitro route daily As needed.  6/23/22  Yes Danford Libman, MD   Insulin Pen Needle 31G X 5 MM MISC 1 each by Does not apply route daily 6/23/22  Yes Danford Libman, MD   losartan (COZAAR) 50 MG tablet Take 1 tablet by mouth daily 6/23/22  Yes Yolis Cabrera MD   metoprolol succinate (TOPROL XL) 25 MG extended release tablet TAKE 1 TABLET TWICE A DAY 6/23/22  Yes Yolis Cabrera MD   glipiZIDE (GLUCOTROL) 10 MG tablet TAKE 1 TABLET TWICE A DAY BEFORE MEALS 6/13/22  Yes Yolis Cabrera MD   spironolactone (ALDACTONE) 25 MG tablet TAKE ONE-HALF (1/2) TABLET DAILY 2/22/22  Yes Yolis Cabrera MD   Multiple Vitamins-Minerals (THERAPEUTIC MULTIVITAMIN-MINERALS) tablet Take 1 tablet by mouth daily   Yes Historical Provider, MD   traZODone (DESYREL) 50 MG tablet TAKE 1 TABLET DAILY EVERY NIGHT  Patient taking differently: nightly as needed TAKE 1 TABLET DAILY EVERY NIGHT 1/19/22  Yes Yolis Cabrera MD   JANUVIA 100 MG tablet TAKE 1 TABLET DAILY 11/15/21  Yes Yolis Cabrera MD   metFORMIN (GLUCOPHAGE) 1000 MG tablet TAKE 1 TABLET TWICE A DAY WITH MEALS 4/19/21  Yes Yolis Cabrera MD   insulin glargine (LANTUS SOLOSTAR) 100 UNIT/ML injection pen Inject 10 Units into the skin nightly 2/15/21  Yes Yolis Cabrera MD   blood glucose test strips (ASCENSIA AUTODISC VI;ONE TOUCH ULTRA TEST VI) strip 1 each by In Vitro route 2 times daily As needed. 2/6/21  Yes Yolis Cabrera MD   pregabalin (LYRICA) 25 MG capsule Take 1 capsule by mouth in the morning and 1 capsule before bedtime. Do all this for 90 days.  7/18/22 10/16/22  Nicol Rodríguez MD       Allergies   Allergen Reactions    Ciprofloxacin Hives           Pcn [Penicillins] Hives    Sulfa Antibiotics Hives       Social History     Socioeconomic History    Marital status:      Spouse name: Not on file    Number of children: Not on file    Years of education: Not on file    Highest education level: Not on file   Occupational History    Not on file   Tobacco Use    Smoking status: Never    Smokeless tobacco: Never   Vaping Use    Vaping Use: Never used   Substance and Sexual Activity    Alcohol use: Not Currently     Alcohol/week: 3.0 standard drinks     Types: 2 Shots of liquor, 1 Standard drinks or equivalent per week    Drug use: No    Sexual activity: Not on file   Other Topics Concern    Not on file   Social History Narrative    Not on file     Social Determinants of Health     Financial Resource Strain: Not on file   Food Insecurity: Not on file   Transportation Needs: Not on file   Physical Activity: Inactive    Days of Exercise per Week: 0 days    Minutes of Exercise per Session: 0 min   Stress: Not on file   Social Connections: Not on file   Intimate Partner Violence: Not on file   Housing Stability: Not on file       Family History   Problem Relation Age of Onset    High Blood Pressure Mother     COPD Father     Diabetes Father     Heart Disease Brother         chf    Seizures Brother        REVIEW OF SYSTEMS:     Tru Mccarthy denies fever/chills, chest pain, shortness of breath, new bowel or bladder complaints. All other review of systems was negative. PHYSICAL EXAMINATION:      BP (!) 154/72   Pulse 87   Temp 97.3 °F (36.3 °C) (Infrared)   Resp 16   Ht 5' 9\" (1.753 m)   Wt 145 lb (65.8 kg)   SpO2 97%   BMI 21.41 kg/m²      General:       General appearance:  Pleasant and well-hydrated, in no distress and A & O x 3  Build:Normal Weight  Function: Rises from seated position easily and Moves about room without difficulty     HEENT:     Head:normocephalic, atraumatic     Lungs:     Breathing:normal breathing pattern      CVS:     RRR     Abdomen:     Shape:non-distended and normal     Cervical spine:     Inspection:normal     Thoracic spine:                Spine inspection:normal      Lumbar spine:     Spine inspection: Normal   Palpation: Tenderness paravertebral muscles Yes bilaterally  Range of motion: Decreased, flexion Decreased, Lateral bending, extension and rotation bilaterally reduced is painful.   Lower lumbar facet tenderness +  Sacroiliac joint tenderness No bilaterally  SLR : negative bilaterally     Musculoskeletal:     Trigger points no     Extremities:  Tremors:None bilaterally upper and lower  Edema:no  B/l foot deformities noted. Neurological:     Sensory: Normal to light touch      Motor:   Right hip flexor 5/5  Left hip flexor 5/5              Right Quadriceps 5/5          Left Quadriceps 5/5           Right Gastrocnemius 3/5    Left Gastrocnemius 5/5  Right Ant Tibialis 3/5  Left Ant Tibialis 5/5  Uses AFO on the right foot     Dermatology:     Skin:no rashes or lesions noted    Assessment/Plan:   Diagnosis Orders   1. DDD (degenerative disc disease), lumbar      2. Spondylolisthesis of lumbar region      3. Spinal stenosis of lumbar region, unspecified whether neurogenic claudication present      4. Lumbosacral spondylosis without myelopathy            69 y.o.  male with h/o chronic low back pain. Failed conservative treatment for > 6 weeks recently. Doing HEP. CT Myelogram: Has L5-S1 Spondylolisthesis, L5-S1 foraminal stenosis. X-ray Flex/ Ext: instability noted. Has been evaluated by Dr. Ellen Zimmerman team.    S/p LESI / RFA- did not provide significant long term pain relief. Compound cream- not much benefit. Aqua therapy/ HEP. He is very reluctant to try any meds. Has tried Low dose Lyrica- 25 mg po bid. Did not help. Will DC Lyrica. Will try low dose gabapentin- 100 mg to start with and titrate up to tid gradually. Titration regime explained. Re-eval by Dr. Ron Irving team. Is supposed to get CT myelogram and follow up with NSG. He apparently has LE limb length discrepancy. Has been evaluated for orthotics/correction- got corrective foot wear and it helps. F/U prn     Counseling : Patient encouraged to stay active and to continue Regular home exercise program as tolerated - stretching / strengthening. Treatment plan discussed with the patient including medication and procedure side effects.      Controlled Substances Monitoring:      OARRS reviewed- not on chronic opioids.      Pamela Rodriguez MD     CC:  Kilo Mares MD

## 2022-10-20 ENCOUNTER — HOSPITAL ENCOUNTER (OUTPATIENT)
Dept: PHYSICAL THERAPY | Age: 77
Setting detail: THERAPIES SERIES
Discharge: HOME | End: 2022-10-20
Payer: MEDICARE

## 2022-10-20 PROCEDURE — 97110 THERAPEUTIC EXERCISES: CPT | Performed by: PHYSICAL THERAPIST

## 2022-10-20 NOTE — PROGRESS NOTES
[facility]  Phone: [phone] Fax: [phone]       Physical Therapy Daily Treatment Note  Date:  10/20/2022    Patient Name:  Caro Grider    :  1945  MRN: [id number]    Patient: Caro Grider (18 y.o. male)   Examination Date:   Plan of Care Certification Period: 2022 to 22     :  1945 ;    Confirmed: Yes MRN: [id number]  CSN: [id number]   Insurance: Payor: Nitza Haskins / Plan: [facility] PPO / Product Type: Medicare /   Insurance ID: [id number] - (Medicare Managed) Secondary Insurance (if applicable):    Referring Physician: DILEEP Bae     PCP: Sharon Ashraf MD Visits to Date/Visits Approved:        No Show/Cancelled Appts:   /       Medical Diagnosis: Lumbago with sciatica, left side [M54.42]  Lumbago with sciatica, right side [M54.41]  Other chronic pain [G89.29] M54.42, M54.41, G89.29 (ICD-10-CM) - Chronic bilateral low back pain with bilateral sciatica  Treatment Diagnosis:       Time In:    1110  Time Out:   1200      Subjective:  pt reports no change in symptoms. Saw physician yesterday. Given prescription for gabapentin     Exercises:  Exercise/Equipment Resistance/Repetitions Other comments   Bike    [id number]           Trunk rot   5x10s           bridge   2x10    Pelvic tilts   2x10    SLR    2x10ea    Hip abd x10ea    Sit to stand    x10    Marching      2x10ea    Standing hip abd                       Ext     [id number]  B70FT                                                      Other Therapeutic Activities:  ambulation with SC- [id number] with SC     Home Exercise Program:      Manual Treatments:      Modalities:      Timed Code Treatment Minutes:       Total Treatment Minutes:      Treatment/Activity Tolerance:  [x] Patient tolerated treatment well [] Patient limited by fatigue  [] Patient limited by pain  [] Patient limited by other medical complications  [] Other: performed there ex focusing on trunk and hip strength to improve functional gait. Soreness remains across back and hips with activities. Gait remains hindered with significant med/lat trunk deviation. Cont gait training with SC. Improved mechanics with SC with less overall pain with ambulation.  PT recommends pt use SC especially at community level to reduce symptoms     Plan:   [x] Continue per plan of care [] Alter current plan (see comments)  [] Plan of care initiated [] Hold pending MD visit [] Discharge  Plan for Next Session:         Treatment Charges: Mins Units   Initial Evaluation     Re-Evaluation     Ther Exercise         TE 40 2   Manual Therapy     MT     Ther Activities        TA     Gait Training          GT 5 -   Neuro Re-education NR     Modalities     Non-Billable Service Time 5    Other     Total Time/Units 50 2     Electronically signed by:  Christiana Ireland PT

## 2022-10-25 ENCOUNTER — HOSPITAL ENCOUNTER (OUTPATIENT)
Dept: PHYSICAL THERAPY | Age: 77
Setting detail: THERAPIES SERIES
Discharge: HOME | End: 2022-10-25
Payer: MEDICARE

## 2022-10-25 DIAGNOSIS — Z79.4 TYPE 2 DIABETES MELLITUS WITH DIABETIC PERIPHERAL ANGIOPATHY WITHOUT GANGRENE, WITH LONG-TERM CURRENT USE OF INSULIN (HCC): ICD-10-CM

## 2022-10-25 DIAGNOSIS — E11.51 TYPE 2 DIABETES MELLITUS WITH DIABETIC PERIPHERAL ANGIOPATHY WITHOUT GANGRENE, WITH LONG-TERM CURRENT USE OF INSULIN (HCC): ICD-10-CM

## 2022-10-25 RX ORDER — INSULIN GLARGINE 100 [IU]/ML
10 INJECTION, SOLUTION SUBCUTANEOUS NIGHTLY
Qty: 5 ADJUSTABLE DOSE PRE-FILLED PEN SYRINGE | Refills: 5 | Status: SHIPPED | OUTPATIENT
Start: 2022-10-25

## 2022-10-25 NOTE — TELEPHONE ENCOUNTER
Last Appointment   6/23/2022  Next Appointment  Visit date not found    Patient now using [employer] [facility]

## 2022-10-25 NOTE — PROGRESS NOTES
[facility]  Phone: [phone] Fax: [phone]     Physical Therapy  Cancellation/No-show Note  Patient Name:  Alessandra Serna  :  1945   Date:  10/25/2022    For today's appointment patient:  [x]  Cancelled  []  Rescheduled appointment  []  No-show     Reason given by patient:  []  Patient ill  []  Conflicting appointment  []  No transportation    []  Conflict with work  []  No reason given  []  Other:     Comments:      Electronically signed by:  Grace Oconnor, PT

## 2022-10-27 ENCOUNTER — HOSPITAL ENCOUNTER (OUTPATIENT)
Dept: PHYSICAL THERAPY | Age: 77
Setting detail: THERAPIES SERIES
Discharge: HOME | End: 2022-10-27
Payer: MEDICARE

## 2022-10-27 PROCEDURE — 97110 THERAPEUTIC EXERCISES: CPT | Performed by: PHYSICAL THERAPIST

## 2022-10-27 NOTE — PROGRESS NOTES
[facility]  Phone: [phone] Fax: [phone]       Physical Therapy Daily Treatment Note  Date:  10/27/2022    Patient Name:  Wanda Heard    :  1945  MRN: [id number]    Patient: Wanda Heard (32 y.o. male)   Examination Date:   Plan of Care Certification Period: 2022 to 22     :  1945 ;    Confirmed: Yes MRN: [id number]  CSN: [id number]   Insurance: Payor: [employer] / Plan: [address] PPO / Product Type: Medicare /   Insurance ID: [id number] - (Medicare Managed) Secondary Insurance (if applicable):    Referring Physician: DILEEP Odom     PCP: Karl Askew MD Visits to Date/Visits Approved:        No Show/Cancelled Appts:   /       Medical Diagnosis: Lumbago with sciatica, left side [M54.42]  Lumbago with sciatica, right side [M54.41]  Other chronic pain [G89.29] M54.42, M54.41, G89.29 (ICD-10-CM) - Chronic bilateral low back pain with bilateral sciatica  Treatment Diagnosis:       Time In:    1115  Time Out:   1200      Subjective:  pt reports taking gabapentin the other day with increase in pain symptoms     Exercises:  Exercise/Equipment Resistance/Repetitions Other comments   Bike    [id number]           Trunk rot   5x10s           bridge   2x10    Pelvic tilts   2x10    SLR    2x10ea    Hip abd x10ea    Sit to stand    x10    Marching      2x10ea    Standing hip abd                       Ext     Sid Saliva                                                      Other Therapeutic Activities:    Home Exercise Program:      Manual Treatments:      Modalities:      Timed Code Treatment Minutes:       Total Treatment Minutes:      Treatment/Activity Tolerance:  [x] Patient tolerated treatment well [] Patient limited by fatigue  [] Patient limited by pain  [] Patient limited by other medical complications  [] Other: performed there ex focusing on trunk and hip strength to improve functional gait. Weakness present across hips especially abd motions. Pt did purchase a SC. Did use appropriately in clinic.  Gait mechanics have improved with decreased trunk deviation     Plan:   [x] Continue per plan of care [] Alter current plan (see comments)  [] Plan of care initiated [] Hold pending MD visit [] Discharge  Plan for Next Session:         Treatment Charges: Mins Units   Initial Evaluation     Re-Evaluation     Ther Exercise         TE 40 2   Manual Therapy     MT     Ther Activities        TA     Gait Training          GT     Neuro Re-education NR     Modalities     Non-Billable Service Time 5    Other     Total Time/Units 45 2     Electronically signed by:  Karina Lopez PT

## 2022-11-01 ENCOUNTER — HOSPITAL ENCOUNTER (OUTPATIENT)
Dept: PHYSICAL THERAPY | Age: 77
Setting detail: THERAPIES SERIES
Discharge: HOME | End: 2022-11-01
Payer: MEDICARE

## 2022-11-01 PROCEDURE — 97110 THERAPEUTIC EXERCISES: CPT | Performed by: PHYSICAL THERAPIST

## 2022-11-01 NOTE — PROGRESS NOTES
[facility]  Phone: [phone] Fax: [phone]       Physical Therapy Daily Treatment Note  Date:  2022    Patient Name:  Caro Grider    :  1945  MRN: [id number]    Patient: Caro Grider (16 y.o. male)   Examination Date:   Plan of Care Certification Period: 2022 to 22     :  1945 ;    Confirmed: Yes MRN: [id number]  CSN: [id number]   Insurance: Payor: Nitza Haskins / Plan: [facility] PPO / Product Type: Medicare /   Insurance ID: [id number] - (Medicare Managed) Secondary Insurance (if applicable):    Referring Physician: DILEEP Bae     PCP: Sharon Ashraf MD Visits to Date/Visits Approved:        No Show/Cancelled Appts:   /       Medical Diagnosis: Lumbago with sciatica, left side [M54.42]  Lumbago with sciatica, right side [M54.41]  Other chronic pain [G89.29] M54.42, M54.41, G89.29 (ICD-10-CM) - Chronic bilateral low back pain with bilateral sciatica  Treatment Diagnosis:       Time In:    1100  Time Out:   1140     Subjective:  pt reports no changes in symptoms. States using cane more often which does help him with his balance     Exercises:  Exercise/Equipment Resistance/Repetitions Other comments             Trunk rot   5x10s           bridge   2x10    Pelvic tilts   2x10    SLR    2x10ea    Hip abd x10ea    Sit to stand    x10    Marching      2x10ea    Standing hip abd                       Ext     X10ea  x10ea            Side to side amb  2x20ft ea                                            Other Therapeutic Activities:    Home Exercise Program:      Manual Treatments:      Modalities:      Timed Code Treatment Minutes:       Total Treatment Minutes:      Treatment/Activity Tolerance:  [x] Patient tolerated treatment well [] Patient limited by fatigue  [] Patient limited by pain  [] Patient limited by other medical complications  [] Other: performed there ex focusing on trunk and hip strength to improve functional gait. Cont weakness across hips with hindered balance/gait. Improvement with SC.      Plan:   [x] Continue per plan of care [] Alter current plan (see comments)  [] Plan of care initiated [] Hold pending MD visit [] Discharge  Plan for Next Session:         Treatment Charges: Mins Units   Initial Evaluation     Re-Evaluation     Ther Exercise         TE 40 2   Manual Therapy     MT     Ther Activities        TA     Gait Training          GT     Neuro Re-education NR     Modalities     Non-Billable Service Time     Other     Total Time/Units 40 2     Electronically signed by:  Rober Shay PT

## 2022-11-02 DIAGNOSIS — Z79.4 DRUG OR CHEMICAL INDUCED DIABETES MELLITUS WITH HYPERGLYCEMIA, WITH LONG-TERM CURRENT USE OF INSULIN (HCC): ICD-10-CM

## 2022-11-02 DIAGNOSIS — E09.65 DRUG OR CHEMICAL INDUCED DIABETES MELLITUS WITH HYPERGLYCEMIA, WITH LONG-TERM CURRENT USE OF INSULIN (HCC): ICD-10-CM

## 2022-11-02 NOTE — TELEPHONE ENCOUNTER
Last Appointment   6/23/2022  Next Appointment  Visit date not found    2 scripts pending    Temporary supply (2 weeks worth) to go to the local pharmacy while waiting for the mail order

## 2022-11-03 ENCOUNTER — HOSPITAL ENCOUNTER (OUTPATIENT)
Dept: PHYSICAL THERAPY | Age: 77
Setting detail: THERAPIES SERIES
Discharge: HOME | End: 2022-11-03
Payer: MEDICARE

## 2022-11-03 PROCEDURE — 97110 THERAPEUTIC EXERCISES: CPT | Performed by: PHYSICAL THERAPIST

## 2022-11-03 NOTE — PROGRESS NOTES
to improve functional gait. Cont weakness across hips with hindered balance/gait. Gait has improved with SC with less overall deviation. Patient began PT on 9/21/22 and has completed 9 sessions is 6 weeks of therapy. No change in symptoms at this time.      Plan:   [x] Continue per plan of care [] Alter current plan (see comments)  [] Plan of care initiated [] Hold pending MD visit [] Discharge  Plan for Next Session:         Treatment Charges: Mins Units   Initial Evaluation     Re-Evaluation     Ther Exercise         TE 40 2   Manual Therapy     MT     Ther Activities        TA     Gait Training          GT     Neuro Re-education NR     Modalities     Non-Billable Service Time     Other     Total Time/Units 40 2     Electronically signed by:  Sophia Pollock PT

## 2022-11-07 ENCOUNTER — TELEPHONE (OUTPATIENT)
Dept: NEUROSURGERY | Age: 77
End: 2022-11-07

## 2022-11-07 NOTE — TELEPHONE ENCOUNTER
Patient called and stated that he finished his PT and would like to get his CT/Myelogram scheduled, please.

## 2022-11-07 NOTE — TELEPHONE ENCOUNTER
Pt needs to schedule a follow up appointment in the office with Hill Felipe to discuss his current condition after PT and Pain management.  This must be done prior to resubmitting for CT/Myelogram.

## 2022-11-10 RX ORDER — SITAGLIPTIN 100 MG/1
TABLET, FILM COATED ORAL
Qty: 90 TABLET | Refills: 3 | Status: SHIPPED | OUTPATIENT
Start: 2022-11-10

## 2022-11-11 ENCOUNTER — OFFICE VISIT (OUTPATIENT)
Dept: NEUROSURGERY | Age: 77
End: 2022-11-11
Payer: MEDICARE

## 2022-11-11 VITALS
SYSTOLIC BLOOD PRESSURE: 140 MMHG | RESPIRATION RATE: 16 BRPM | HEIGHT: 69 IN | HEART RATE: 85 BPM | BODY MASS INDEX: 21.48 KG/M2 | OXYGEN SATURATION: 93 % | TEMPERATURE: 98.7 F | WEIGHT: 145 LBS | DIASTOLIC BLOOD PRESSURE: 64 MMHG

## 2022-11-11 DIAGNOSIS — G89.29 CHRONIC RIGHT-SIDED LOW BACK PAIN WITH RIGHT-SIDED SCIATICA: Primary | ICD-10-CM

## 2022-11-11 DIAGNOSIS — M54.41 CHRONIC RIGHT-SIDED LOW BACK PAIN WITH RIGHT-SIDED SCIATICA: Primary | ICD-10-CM

## 2022-11-11 DIAGNOSIS — M54.16 LUMBAR RADICULOPATHY: ICD-10-CM

## 2022-11-11 PROCEDURE — 3078F DIAST BP <80 MM HG: CPT | Performed by: STUDENT IN AN ORGANIZED HEALTH CARE EDUCATION/TRAINING PROGRAM

## 2022-11-11 PROCEDURE — 99213 OFFICE O/P EST LOW 20 MIN: CPT | Performed by: STUDENT IN AN ORGANIZED HEALTH CARE EDUCATION/TRAINING PROGRAM

## 2022-11-11 PROCEDURE — 3074F SYST BP LT 130 MM HG: CPT | Performed by: STUDENT IN AN ORGANIZED HEALTH CARE EDUCATION/TRAINING PROGRAM

## 2022-11-11 PROCEDURE — 99212 OFFICE O/P EST SF 10 MIN: CPT

## 2022-11-11 PROCEDURE — 1123F ACP DISCUSS/DSCN MKR DOCD: CPT | Performed by: STUDENT IN AN ORGANIZED HEALTH CARE EDUCATION/TRAINING PROGRAM

## 2022-11-11 RX ORDER — METHYLPREDNISOLONE 4 MG/1
TABLET ORAL
Qty: 1 KIT | Refills: 0 | Status: SHIPPED | OUTPATIENT
Start: 2022-11-11

## 2022-12-20 ENCOUNTER — TELEPHONE (OUTPATIENT)
Dept: NEUROSURGERY | Age: 77
End: 2022-12-20

## 2022-12-20 NOTE — TELEPHONE ENCOUNTER
[facility]    2022  CT/MYELOGRAM LUMBAR  Case Number: [id number]     Patient Name: Nishi Conway  :1945  Status:  Additional Information Received; Pending [facility] Review        2022  CT/MYELOGRAM LUMBAR  Case Number: [id number]     Patient Name: Nishi Conway  :1945  Status: Denied      2022  CT/MYELOGRAM LUMBAR  Case Number: [id number]     Patient Name: Nishi Conway  :1945  Status: Denied        Informed patient of status

## 2022-12-22 ENCOUNTER — APPOINTMENT (OUTPATIENT)
Dept: CT IMAGING | Age: 77
DRG: 291 | End: 2022-12-22
Payer: MEDICARE

## 2022-12-22 ENCOUNTER — APPOINTMENT (OUTPATIENT)
Dept: ULTRASOUND IMAGING | Age: 77
DRG: 291 | End: 2022-12-22
Payer: MEDICARE

## 2022-12-22 ENCOUNTER — APPOINTMENT (OUTPATIENT)
Dept: GENERAL RADIOLOGY | Age: 77
DRG: 291 | End: 2022-12-22
Payer: MEDICARE

## 2022-12-22 ENCOUNTER — HOSPITAL ENCOUNTER (INPATIENT)
Age: 77
LOS: 2 days | Discharge: HOME | DRG: 291 | End: 2022-12-25
Attending: EMERGENCY MEDICINE | Admitting: FAMILY MEDICINE
Payer: MEDICARE

## 2022-12-22 DIAGNOSIS — E87.5 HYPERKALEMIA: ICD-10-CM

## 2022-12-22 DIAGNOSIS — I82.4Y1 ACUTE DEEP VEIN THROMBOSIS (DVT) OF PROXIMAL VEIN OF RIGHT LOWER EXTREMITY (HCC): ICD-10-CM

## 2022-12-22 DIAGNOSIS — R06.02 SHORTNESS OF BREATH: ICD-10-CM

## 2022-12-22 DIAGNOSIS — I50.9 ACUTE DECOMPENSATED HEART FAILURE (HCC): Primary | ICD-10-CM

## 2022-12-22 LAB
ALBUMIN SERPL-MCNC: 3.5 G/DL (ref 3.5–5.2)
ALP BLD-CCNC: 122 U/L (ref 40–129)
ALT SERPL-CCNC: 13 U/L (ref 0–40)
ANION GAP SERPL CALCULATED.3IONS-SCNC: 14 MMOL/L (ref 7–16)
APTT: 30.9 SEC (ref 24.5–35.1)
APTT: 32.7 SEC (ref 24.5–35.1)
AST SERPL-CCNC: 52 U/L (ref 0–39)
BASOPHILS ABSOLUTE: 0.08 E9/L (ref 0–0.2)
BASOPHILS RELATIVE PERCENT: 0.9 % (ref 0–2)
BILIRUB SERPL-MCNC: 0.8 MG/DL (ref 0–1.2)
BUN BLDV-MCNC: 39 MG/DL (ref 6–23)
CALCIUM SERPL-MCNC: 9.1 MG/DL (ref 8.6–10.2)
CHLORIDE BLD-SCNC: 101 MMOL/L (ref 98–107)
CO2: 19 MMOL/L (ref 22–29)
CREAT SERPL-MCNC: 1.3 MG/DL (ref 0.7–1.2)
EOSINOPHILS ABSOLUTE: 0.15 E9/L (ref 0.05–0.5)
EOSINOPHILS RELATIVE PERCENT: 1.8 % (ref 0–6)
GFR SERPL CREATININE-BSD FRML MDRD: 57 ML/MIN/1.73
GLUCOSE BLD-MCNC: 248 MG/DL (ref 74–99)
HCT VFR BLD CALC: 36.7 % (ref 37–54)
HEMOGLOBIN: 11.4 G/DL (ref 12.5–16.5)
IMMATURE GRANULOCYTES #: 0.04 E9/L
IMMATURE GRANULOCYTES %: 0.5 % (ref 0–5)
INFLUENZA A BY PCR: NOT DETECTED
INFLUENZA B BY PCR: NOT DETECTED
INR BLD: 1.4
LYMPHOCYTES ABSOLUTE: 1.62 E9/L (ref 1.5–4)
LYMPHOCYTES RELATIVE PERCENT: 19 % (ref 20–42)
MAGNESIUM: 1.6 MG/DL (ref 1.6–2.6)
MCH RBC QN AUTO: 28.2 PG (ref 26–35)
MCHC RBC AUTO-ENTMCNC: 31.1 % (ref 32–34.5)
MCV RBC AUTO: 90.8 FL (ref 80–99.9)
MONOCYTES ABSOLUTE: 0.83 E9/L (ref 0.1–0.95)
MONOCYTES RELATIVE PERCENT: 9.8 % (ref 2–12)
NEUTROPHILS ABSOLUTE: 5.79 E9/L (ref 1.8–7.3)
NEUTROPHILS RELATIVE PERCENT: 68 % (ref 43–80)
PDW BLD-RTO: 15.3 FL (ref 11.5–15)
PLATELET # BLD: 212 E9/L (ref 130–450)
PMV BLD AUTO: 10.9 FL (ref 7–12)
POTASSIUM SERPL-SCNC: 5.6 MMOL/L (ref 3.5–5)
PRO-BNP: ABNORMAL PG/ML (ref 0–450)
PROTHROMBIN TIME: 16.1 SEC (ref 9.3–12.4)
RBC # BLD: 4.04 E12/L (ref 3.8–5.8)
SARS-COV-2, NAAT: NOT DETECTED
SODIUM BLD-SCNC: 134 MMOL/L (ref 132–146)
TOTAL PROTEIN: 7.4 G/DL (ref 6.4–8.3)
TROPONIN, HIGH SENSITIVITY: 74 NG/L (ref 0–11)
WBC # BLD: 8.5 E9/L (ref 4.5–11.5)

## 2022-12-22 PROCEDURE — 93970 EXTREMITY STUDY: CPT

## 2022-12-22 PROCEDURE — 84484 ASSAY OF TROPONIN QUANT: CPT

## 2022-12-22 PROCEDURE — 85025 COMPLETE CBC W/AUTO DIFF WBC: CPT

## 2022-12-22 PROCEDURE — 71046 X-RAY EXAM CHEST 2 VIEWS: CPT

## 2022-12-22 PROCEDURE — 83880 ASSAY OF NATRIURETIC PEPTIDE: CPT

## 2022-12-22 PROCEDURE — 87502 INFLUENZA DNA AMP PROBE: CPT

## 2022-12-22 PROCEDURE — 70450 CT HEAD/BRAIN W/O DYE: CPT

## 2022-12-22 PROCEDURE — 71275 CT ANGIOGRAPHY CHEST: CPT

## 2022-12-22 PROCEDURE — 96375 TX/PRO/DX INJ NEW DRUG ADDON: CPT

## 2022-12-22 PROCEDURE — 36415 COLL VENOUS BLD VENIPUNCTURE: CPT

## 2022-12-22 PROCEDURE — 6360000004 HC RX CONTRAST MEDICATION: Performed by: RADIOLOGY

## 2022-12-22 PROCEDURE — 80053 COMPREHEN METABOLIC PANEL: CPT

## 2022-12-22 PROCEDURE — 93005 ELECTROCARDIOGRAM TRACING: CPT | Performed by: EMERGENCY MEDICINE

## 2022-12-22 PROCEDURE — 85730 THROMBOPLASTIN TIME PARTIAL: CPT

## 2022-12-22 PROCEDURE — 83735 ASSAY OF MAGNESIUM: CPT

## 2022-12-22 PROCEDURE — 84145 PROCALCITONIN (PCT): CPT

## 2022-12-22 PROCEDURE — 85610 PROTHROMBIN TIME: CPT

## 2022-12-22 PROCEDURE — 99285 EMERGENCY DEPT VISIT HI MDM: CPT

## 2022-12-22 PROCEDURE — 96374 THER/PROPH/DIAG INJ IV PUSH: CPT

## 2022-12-22 PROCEDURE — 2500000003 HC RX 250 WO HCPCS

## 2022-12-22 PROCEDURE — 87635 SARS-COV-2 COVID-19 AMP PRB: CPT

## 2022-12-22 RX ORDER — HEPARIN SODIUM 1000 [USP'U]/ML
40 INJECTION, SOLUTION INTRAVENOUS; SUBCUTANEOUS PRN
Status: DISCONTINUED | OUTPATIENT
Start: 2022-12-22 | End: 2022-12-23

## 2022-12-22 RX ORDER — FUROSEMIDE 10 MG/ML
40 INJECTION INTRAMUSCULAR; INTRAVENOUS ONCE
Status: COMPLETED | OUTPATIENT
Start: 2022-12-22 | End: 2022-12-23

## 2022-12-22 RX ORDER — HEPARIN SODIUM 1000 [USP'U]/ML
80 INJECTION, SOLUTION INTRAVENOUS; SUBCUTANEOUS ONCE
Status: COMPLETED | OUTPATIENT
Start: 2022-12-22 | End: 2022-12-23

## 2022-12-22 RX ORDER — HEPARIN SODIUM 1000 [USP'U]/ML
80 INJECTION, SOLUTION INTRAVENOUS; SUBCUTANEOUS PRN
Status: DISCONTINUED | OUTPATIENT
Start: 2022-12-22 | End: 2022-12-23

## 2022-12-22 RX ORDER — CALCIUM CHLORIDE 100 MG/ML
1000 INJECTION INTRAVENOUS; INTRAVENTRICULAR ONCE
Status: COMPLETED | OUTPATIENT
Start: 2022-12-22 | End: 2022-12-22

## 2022-12-22 RX ORDER — HEPARIN SODIUM 10000 [USP'U]/100ML
5-30 INJECTION, SOLUTION INTRAVENOUS CONTINUOUS
Status: DISCONTINUED | OUTPATIENT
Start: 2022-12-22 | End: 2022-12-23

## 2022-12-22 RX ORDER — ENOXAPARIN SODIUM 100 MG/ML
1 INJECTION SUBCUTANEOUS ONCE
Status: DISCONTINUED | OUTPATIENT
Start: 2022-12-22 | End: 2022-12-22

## 2022-12-22 RX ADMIN — IOPAMIDOL 75 ML: 755 INJECTION, SOLUTION INTRAVENOUS at 22:03

## 2022-12-22 RX ADMIN — CALCIUM CHLORIDE INJECTION 1000 MG: 100 INJECTION, SOLUTION INTRAVENOUS at 23:35

## 2022-12-22 ASSESSMENT — ENCOUNTER SYMPTOMS
EYE DISCHARGE: 0
NAUSEA: 0
BACK PAIN: 1
COLOR CHANGE: 0
SORE THROAT: 0
SINUS PAIN: 0
DIARRHEA: 0
CONSTIPATION: 0
SHORTNESS OF BREATH: 1
COUGH: 0
VOMITING: 0
ABDOMINAL PAIN: 0

## 2022-12-22 NOTE — ED NOTES
Department of Emergency Medicine  FIRST PROVIDER TRIAGE NOTE             [facility]LP           12/22/22  6:56 PM EST    Date of Encounter: 12/22/22   MRN: [id number]      HPI: David Vale is a 69 y.o. male who presents to the ED for Shortness of Breath (Leg swelling )  Patient complains of shortness of breath and leg swelling which began 2 days ago. No chest pain, fevers or chills    ROS: Negative for cp. PE: Gen Appearance/Constitutional: alert  CV: regular rate  Pulm: CTA bilat     Initial Plan of Care: All treatment areas with department are currently occupied. Plan to order/Initiate the following while awaiting opening in ED: labs, EKG, and imaging studies.   Initiate Treatment-Testing, Proceed toTreatment Area When Bed Available for ED Attending/KATIA to Continue Care    Electronically signed by GEMINI Peace CNP   DD: 12/22/22       GEMINI Peace CNP  12/22/22 0592

## 2022-12-23 PROBLEM — I82.409 DVT (DEEP VENOUS THROMBOSIS) (HCC): Status: ACTIVE | Noted: 2022-12-23

## 2022-12-23 PROBLEM — I50.23 ACUTE ON CHRONIC SYSTOLIC HEART FAILURE (HCC): Status: ACTIVE | Noted: 2020-09-10

## 2022-12-23 PROBLEM — R79.89 ELEVATED TROPONIN: Status: ACTIVE | Noted: 2022-12-23

## 2022-12-23 PROBLEM — I50.9 ACUTE DECOMPENSATED HEART FAILURE (HCC): Status: ACTIVE | Noted: 2022-12-23

## 2022-12-23 PROBLEM — R77.8 ELEVATED TROPONIN: Status: ACTIVE | Noted: 2022-12-23

## 2022-12-23 LAB
ALBUMIN SERPL-MCNC: 3.4 G/DL (ref 3.5–5.2)
ALP BLD-CCNC: 122 U/L (ref 40–129)
ALT SERPL-CCNC: 24 U/L (ref 0–40)
ANION GAP SERPL CALCULATED.3IONS-SCNC: 16 MMOL/L (ref 7–16)
APTT: 46 SEC (ref 24.5–35.1)
AST SERPL-CCNC: 79 U/L (ref 0–39)
BILIRUB SERPL-MCNC: 1.4 MG/DL (ref 0–1.2)
BUN BLDV-MCNC: 36 MG/DL (ref 6–23)
CALCIUM SERPL-MCNC: 9.8 MG/DL (ref 8.6–10.2)
CHLORIDE BLD-SCNC: 102 MMOL/L (ref 98–107)
CO2: 17 MMOL/L (ref 22–29)
CREAT SERPL-MCNC: 1.3 MG/DL (ref 0.7–1.2)
EKG ATRIAL RATE: 72 BPM
EKG ATRIAL RATE: 73 BPM
EKG P AXIS: 6 DEGREES
EKG P-R INTERVAL: 216 MS
EKG Q-T INTERVAL: 464 MS
EKG Q-T INTERVAL: 466 MS
EKG QRS DURATION: 152 MS
EKG QRS DURATION: 164 MS
EKG QTC CALCULATION (BAZETT): 513 MS
EKG QTC CALCULATION (BAZETT): 515 MS
EKG R AXIS: -126 DEGREES
EKG R AXIS: -138 DEGREES
EKG T AXIS: -21 DEGREES
EKG T AXIS: 16 DEGREES
EKG VENTRICULAR RATE: 73 BPM
EKG VENTRICULAR RATE: 74 BPM
GFR SERPL CREATININE-BSD FRML MDRD: 57 ML/MIN/1.73
GLUCOSE BLD-MCNC: 256 MG/DL (ref 74–99)
HBA1C MFR BLD: 7.9 % (ref 4–5.6)
MAGNESIUM: 1.4 MG/DL (ref 1.6–2.6)
METER GLUCOSE: 199 MG/DL (ref 74–99)
METER GLUCOSE: 212 MG/DL (ref 74–99)
METER GLUCOSE: 241 MG/DL (ref 74–99)
METER GLUCOSE: 80 MG/DL (ref 74–99)
POTASSIUM REFLEX MAGNESIUM: 4.9 MMOL/L (ref 3.5–5)
PROCALCITONIN: 0.05 NG/ML (ref 0–0.08)
REASON FOR REJECTION: NORMAL
REJECTED TEST: NORMAL
SODIUM BLD-SCNC: 135 MMOL/L (ref 132–146)
TOTAL PROTEIN: 7.5 G/DL (ref 6.4–8.3)
TROPONIN, HIGH SENSITIVITY: 70 NG/L (ref 0–11)

## 2022-12-23 PROCEDURE — 99223 1ST HOSP IP/OBS HIGH 75: CPT | Performed by: INTERNAL MEDICINE

## 2022-12-23 PROCEDURE — 6370000000 HC RX 637 (ALT 250 FOR IP)

## 2022-12-23 PROCEDURE — 6360000002 HC RX W HCPCS: Performed by: EMERGENCY MEDICINE

## 2022-12-23 PROCEDURE — 80053 COMPREHEN METABOLIC PANEL: CPT

## 2022-12-23 PROCEDURE — 97165 OT EVAL LOW COMPLEX 30 MIN: CPT

## 2022-12-23 PROCEDURE — 93306 TTE W/DOPPLER COMPLETE: CPT

## 2022-12-23 PROCEDURE — 6360000002 HC RX W HCPCS

## 2022-12-23 PROCEDURE — 83036 HEMOGLOBIN GLYCOSYLATED A1C: CPT

## 2022-12-23 PROCEDURE — 97530 THERAPEUTIC ACTIVITIES: CPT

## 2022-12-23 PROCEDURE — 6360000002 HC RX W HCPCS: Performed by: FAMILY MEDICINE

## 2022-12-23 PROCEDURE — 97161 PT EVAL LOW COMPLEX 20 MIN: CPT

## 2022-12-23 PROCEDURE — 83735 ASSAY OF MAGNESIUM: CPT

## 2022-12-23 PROCEDURE — 82962 GLUCOSE BLOOD TEST: CPT

## 2022-12-23 PROCEDURE — 85730 THROMBOPLASTIN TIME PARTIAL: CPT

## 2022-12-23 PROCEDURE — 93010 ELECTROCARDIOGRAM REPORT: CPT | Performed by: INTERNAL MEDICINE

## 2022-12-23 PROCEDURE — APPSS60 APP SPLIT SHARED TIME 46-60 MINUTES

## 2022-12-23 PROCEDURE — 36415 COLL VENOUS BLD VENIPUNCTURE: CPT

## 2022-12-23 PROCEDURE — 1200000000 HC SEMI PRIVATE

## 2022-12-23 PROCEDURE — 2580000003 HC RX 258

## 2022-12-23 PROCEDURE — 99222 1ST HOSP IP/OBS MODERATE 55: CPT | Performed by: FAMILY MEDICINE

## 2022-12-23 PROCEDURE — 6370000000 HC RX 637 (ALT 250 FOR IP): Performed by: FAMILY MEDICINE

## 2022-12-23 RX ORDER — ROSUVASTATIN CALCIUM 10 MG/1
10 TABLET, COATED ORAL NIGHTLY
Status: DISCONTINUED | OUTPATIENT
Start: 2022-12-23 | End: 2022-12-25 | Stop reason: HOSPADM

## 2022-12-23 RX ORDER — SPIRONOLACTONE 25 MG/1
25 TABLET ORAL DAILY
Status: DISCONTINUED | OUTPATIENT
Start: 2022-12-23 | End: 2022-12-25 | Stop reason: HOSPADM

## 2022-12-23 RX ORDER — DEXTROSE MONOHYDRATE 100 MG/ML
INJECTION, SOLUTION INTRAVENOUS CONTINUOUS PRN
Status: DISCONTINUED | OUTPATIENT
Start: 2022-12-23 | End: 2022-12-25 | Stop reason: HOSPADM

## 2022-12-23 RX ORDER — ONDANSETRON 4 MG/1
4 TABLET, ORALLY DISINTEGRATING ORAL EVERY 8 HOURS PRN
Status: CANCELLED | OUTPATIENT
Start: 2022-12-23

## 2022-12-23 RX ORDER — TRAZODONE HYDROCHLORIDE 50 MG/1
50 TABLET ORAL NIGHTLY PRN
Status: DISCONTINUED | OUTPATIENT
Start: 2022-12-23 | End: 2022-12-23

## 2022-12-23 RX ORDER — INSULIN GLARGINE 100 [IU]/ML
5 INJECTION, SOLUTION SUBCUTANEOUS ONCE
Status: COMPLETED | OUTPATIENT
Start: 2022-12-23 | End: 2022-12-23

## 2022-12-23 RX ORDER — INSULIN LISPRO 100 [IU]/ML
0-8 INJECTION, SOLUTION INTRAVENOUS; SUBCUTANEOUS
Status: DISCONTINUED | OUTPATIENT
Start: 2022-12-23 | End: 2022-12-25 | Stop reason: HOSPADM

## 2022-12-23 RX ORDER — PROCHLORPERAZINE EDISYLATE 5 MG/ML
10 INJECTION INTRAMUSCULAR; INTRAVENOUS EVERY 6 HOURS PRN
Status: DISCONTINUED | OUTPATIENT
Start: 2022-12-23 | End: 2022-12-25 | Stop reason: HOSPADM

## 2022-12-23 RX ORDER — LOSARTAN POTASSIUM 50 MG/1
50 TABLET ORAL DAILY
Status: DISCONTINUED | OUTPATIENT
Start: 2022-12-23 | End: 2022-12-23

## 2022-12-23 RX ORDER — INSULIN LISPRO 100 [IU]/ML
0-4 INJECTION, SOLUTION INTRAVENOUS; SUBCUTANEOUS NIGHTLY
Status: DISCONTINUED | OUTPATIENT
Start: 2022-12-23 | End: 2022-12-25 | Stop reason: HOSPADM

## 2022-12-23 RX ORDER — ONDANSETRON 2 MG/ML
4 INJECTION INTRAMUSCULAR; INTRAVENOUS EVERY 6 HOURS PRN
Status: CANCELLED | OUTPATIENT
Start: 2022-12-23

## 2022-12-23 RX ORDER — SODIUM CHLORIDE 0.9 % (FLUSH) 0.9 %
5-40 SYRINGE (ML) INJECTION EVERY 12 HOURS SCHEDULED
Status: DISCONTINUED | OUTPATIENT
Start: 2022-12-23 | End: 2022-12-25 | Stop reason: HOSPADM

## 2022-12-23 RX ORDER — LANOLIN ALCOHOL/MO/W.PET/CERES
3 CREAM (GRAM) TOPICAL NIGHTLY PRN
Status: DISCONTINUED | OUTPATIENT
Start: 2022-12-23 | End: 2022-12-25 | Stop reason: HOSPADM

## 2022-12-23 RX ORDER — SODIUM CHLORIDE 9 MG/ML
25 INJECTION, SOLUTION INTRAVENOUS PRN
Status: DISCONTINUED | OUTPATIENT
Start: 2022-12-23 | End: 2022-12-25 | Stop reason: HOSPADM

## 2022-12-23 RX ORDER — FUROSEMIDE 10 MG/ML
40 INJECTION INTRAMUSCULAR; INTRAVENOUS 2 TIMES DAILY
Status: DISCONTINUED | OUTPATIENT
Start: 2022-12-23 | End: 2022-12-25

## 2022-12-23 RX ORDER — SODIUM CHLORIDE 0.9 % (FLUSH) 0.9 %
5-40 SYRINGE (ML) INJECTION PRN
Status: DISCONTINUED | OUTPATIENT
Start: 2022-12-23 | End: 2022-12-25 | Stop reason: HOSPADM

## 2022-12-23 RX ORDER — FUROSEMIDE 40 MG/1
40 TABLET ORAL DAILY
Status: DISCONTINUED | OUTPATIENT
Start: 2022-12-23 | End: 2022-12-23

## 2022-12-23 RX ORDER — MAGNESIUM SULFATE IN WATER 40 MG/ML
2000 INJECTION, SOLUTION INTRAVENOUS ONCE
Status: COMPLETED | OUTPATIENT
Start: 2022-12-23 | End: 2022-12-23

## 2022-12-23 RX ORDER — POLYETHYLENE GLYCOL 3350 17 G/17G
17 POWDER, FOR SOLUTION ORAL DAILY PRN
Status: DISCONTINUED | OUTPATIENT
Start: 2022-12-23 | End: 2022-12-25 | Stop reason: HOSPADM

## 2022-12-23 RX ORDER — METOPROLOL SUCCINATE 25 MG/1
25 TABLET, EXTENDED RELEASE ORAL 2 TIMES DAILY
Status: DISCONTINUED | OUTPATIENT
Start: 2022-12-23 | End: 2022-12-25 | Stop reason: HOSPADM

## 2022-12-23 RX ORDER — ACETAMINOPHEN 325 MG/1
650 TABLET ORAL EVERY 6 HOURS PRN
Status: DISCONTINUED | OUTPATIENT
Start: 2022-12-23 | End: 2022-12-25 | Stop reason: HOSPADM

## 2022-12-23 RX ORDER — ACETAMINOPHEN 650 MG/1
650 SUPPOSITORY RECTAL EVERY 6 HOURS PRN
Status: DISCONTINUED | OUTPATIENT
Start: 2022-12-23 | End: 2022-12-25 | Stop reason: HOSPADM

## 2022-12-23 RX ORDER — INSULIN GLARGINE 100 [IU]/ML
10 INJECTION, SOLUTION SUBCUTANEOUS NIGHTLY
Status: DISCONTINUED | OUTPATIENT
Start: 2022-12-23 | End: 2022-12-25 | Stop reason: HOSPADM

## 2022-12-23 RX ADMIN — METOPROLOL SUCCINATE 25 MG: 25 TABLET, EXTENDED RELEASE ORAL at 07:57

## 2022-12-23 RX ADMIN — METOPROLOL SUCCINATE 25 MG: 25 TABLET, EXTENDED RELEASE ORAL at 05:04

## 2022-12-23 RX ADMIN — MAGNESIUM SULFATE HEPTAHYDRATE 2000 MG: 40 INJECTION, SOLUTION INTRAVENOUS at 17:28

## 2022-12-23 RX ADMIN — ROSUVASTATIN CALCIUM 10 MG: 10 TABLET, FILM COATED ORAL at 20:35

## 2022-12-23 RX ADMIN — SODIUM CHLORIDE, PRESERVATIVE FREE 10 ML: 5 INJECTION INTRAVENOUS at 08:02

## 2022-12-23 RX ADMIN — HEPARIN SODIUM 18 UNITS/KG/HR: 10000 INJECTION, SOLUTION INTRAVENOUS at 00:47

## 2022-12-23 RX ADMIN — INSULIN LISPRO 2 UNITS: 100 INJECTION, SOLUTION INTRAVENOUS; SUBCUTANEOUS at 08:00

## 2022-12-23 RX ADMIN — LOSARTAN POTASSIUM 50 MG: 50 TABLET, FILM COATED ORAL at 07:57

## 2022-12-23 RX ADMIN — FUROSEMIDE 40 MG: 10 INJECTION, SOLUTION INTRAMUSCULAR; INTRAVENOUS at 00:48

## 2022-12-23 RX ADMIN — PROCHLORPERAZINE EDISYLATE 10 MG: 5 INJECTION INTRAMUSCULAR; INTRAVENOUS at 05:11

## 2022-12-23 RX ADMIN — INSULIN LISPRO 2 UNITS: 100 INJECTION, SOLUTION INTRAVENOUS; SUBCUTANEOUS at 11:19

## 2022-12-23 RX ADMIN — METOPROLOL SUCCINATE 25 MG: 25 TABLET, EXTENDED RELEASE ORAL at 20:35

## 2022-12-23 RX ADMIN — HEPARIN SODIUM 5080 UNITS: 1000 INJECTION INTRAVENOUS; SUBCUTANEOUS at 00:43

## 2022-12-23 RX ADMIN — SPIRONOLACTONE 25 MG: 25 TABLET ORAL at 07:57

## 2022-12-23 RX ADMIN — APIXABAN 10 MG: 5 TABLET, FILM COATED ORAL at 05:04

## 2022-12-23 RX ADMIN — SACUBITRIL AND VALSARTAN 1 TABLET: 24; 26 TABLET, FILM COATED ORAL at 20:35

## 2022-12-23 RX ADMIN — FUROSEMIDE 40 MG: 40 TABLET ORAL at 10:31

## 2022-12-23 RX ADMIN — SODIUM CHLORIDE, PRESERVATIVE FREE 10 ML: 5 INJECTION INTRAVENOUS at 20:35

## 2022-12-23 RX ADMIN — APIXABAN 10 MG: 5 TABLET, FILM COATED ORAL at 17:20

## 2022-12-23 RX ADMIN — INSULIN GLARGINE 5 UNITS: 100 INJECTION, SOLUTION SUBCUTANEOUS at 11:18

## 2022-12-23 RX ADMIN — EMPAGLIFLOZIN 10 MG: 10 TABLET, FILM COATED ORAL at 10:31

## 2022-12-23 RX ADMIN — FUROSEMIDE 40 MG: 10 INJECTION, SOLUTION INTRAMUSCULAR; INTRAVENOUS at 17:20

## 2022-12-23 ASSESSMENT — ENCOUNTER SYMPTOMS
SORE THROAT: 0
NAUSEA: 0
WHEEZING: 0
BACK PAIN: 1
CONSTIPATION: 0
CHEST TIGHTNESS: 0
RHINORRHEA: 0
SHORTNESS OF BREATH: 0
COUGH: 0
VOMITING: 0
SHORTNESS OF BREATH: 1
EYE PAIN: 0
DIARRHEA: 0
ABDOMINAL PAIN: 0

## 2022-12-23 NOTE — CONSULTS
Inpatient Cardiology Consultation      Reason for Consult: CHF exacerbation    Consulting Physician: Dr. Tyron Fields     Requesting Physician:  Akanksha Seen,     Date of Consultation: 12/23/2022    HISTORY OF PRESENT ILLNESS:   Patient is a 79-year-old male who is known to [address] [place] [address] cardiology through Dr. Elizabeth Coffman. He was last seen outpatient 5/13/2021 for cardiac clearance with history of CHF and CAD. Marlise Ortega ordered. SGL 2 inhibitor recommended at that time. PMHx: CAD (CABG X3 2004> Lexiscan 5/2021: No resting imaging changes, large size defect), HFrEF (TTE 9/2020: Moderate concentric LVH. EF 40-45% with inferior akinesis. Moderate MR. Mild AR. RVSP 39 mmHg), RBBB, hyperlipidemia, hypertension, diabetes with neuropathy and history of diabetic foot ulcers, PVD with history of osteomyelitis to left foot, CKD baseline 1.2-1.3    Lexiscan 5/2021: LV enlarged at both rest and stress. Defect was present in the basal inferolateral, basal inferior, mid inferolateral, mid inferior, and apex lateral wall that was large sized by quantification. Resting images showed no change. EF 23% with akinesis of the inferior and lateral walls. Risk preop form nuke stress test with severe LV dysfunction. HPI:  Patient presented ER 12/22/2022 at 6:24 PM for shortness of breath with bilateral foot swelling x2 weeks. Patient reports shortness of breath is worse with laying down and made better sitting up. Patient complaining also of PND. Patient was initially hyperkalemic at 5.6 with magnesium 1.6.  proBNP was 16,000 with elevated troponin that is flat at 74 then 70. Ultrasound of bilateral lower extremities did reveal DVT in the right superficial femoral vein. CTA of chest showed no PE but did reveal bilateral pleural effusions. Patient was started on heparin for DVT and given Lasix. Patient reporting recent COVID infection 1 month prior. VS on arrival 18 respirations, 84 pulse, 157/72, 100% room air.   Labs: Potassium 5.6 >4.9, CO2 17, BUN 36, creatinine 1.3>1.3, magnesium 1.6, serum calcium 9.8, Pro-Frankie 0.05, proBNP 16,742, troponin 74> 70, albumin 3.4, ALT 24, AST 79, WBC 8.5, H&H 11.4/36.7, platelet 724, INR 1.4, influenza/COVID-negative  CTA pulm: No PE. Cardiomegaly with dependent pulmonary edema and bilateral small pleural effusions consistent with CHF. Reflux of injected contrast into dilated hepatic veins suggests component of right heart failure. Partially visualized abdominal ascites and cirrhotic appearing liver. CT head: No acute abnormality  Ultrasound bilateral lower extremities: DVT right mid superficial femoral to distal superficial femoral vein. CXR: Mild cardiomegaly and mild bilateral pleural effusion. Upon assessment today 12/23/2022 patient is lying semi-John in hospital bed on room air. Patient is alert and oriented, speaking full sentences, is no apparent distress at this time. Patient reports over the last 2 weeks he has had increasing SOB/ALEMAN, orthopnea, PND, bilateral lower extremity swelling, and abdominal distention. He reports he has been increasingly sedentary due to waiting for back surgery that has been postponed due to insurance issues. The patient denies chest pain, dizziness, syncope, diaphoresis, nausea, or palpitations. The patient was found to have a DVT in his right leg and has been started on Eliquis 10 twice daily that will transition to 5 twice daily. Currently the patient is normal sinus rhythm in the 70s and is hypervolemic on examination. Patient has swelling to bilateral lower extremity, diminished lung sounds, and positive JVD. Patient so far has received Lasix 40 IV once yesterday and started on Lasix 40 p.o. Intake and output has not been charted at this time. Patient still having similar complaints as before he came in. Echocardiogram has been ordered by primary service. Patient reports compliancy to medications at home.   Patient is a non-smoker, nondrinker, does not use any drugs. Patient is on no oxygen at home. Most recent VS 98 Fahrenheit, 16 respirations, 82 pulse, 178/86, 93% room air. Please note: past medical records were reviewed per electronic medical record (EMR) - see detailed reports under Past Medical/ Surgical History. Past Medical History:    CAD:  Quin Avila 5/2021: LV enlarged at both rest and stress. Defect was present in the basal inferolateral, basal inferior, mid inferolateral, mid inferior, and apex lateral wall that was large sized by quantification. Resting images showed no change. EF 23% with akinesis of the inferior and lateral walls. Risk preop form nuke stress test with severe LV dysfunction. CABG X3 2004, details unknown  HFrEF:  TTE 9/2020: Moderate concentric LVH. EF 40-45% with inferior akinesis. Moderate MR. Mild AR. RVSP 39 mmHg  TTE 10/2018: EF 35%. Inferior/inferolateral akinesis. Dilated RV and reduced ventricular function. Tapes 1.0 cm. Stage III diastolic dysfunction. Mild to moderate MR. Mild TR. PASP 37 mmHg.   RBBB  Hyperlipidemia  Hypertension  Diabetes with neuropathy and history of diabetic foot ulcers  PVD with history of osteomyelitis of the left foot  CKD baseline 1.2-1.3    Past Surgical History:    Past Surgical History:   Procedure Laterality Date    CARDIAC SURGERY  2004    double bypass    COLONOSCOPY      FOOT SURGERY Left     multiple    NERVE BLOCK Bilateral 02/07/2022    #1 BILATERAL LUMBAR MEDIAL BRANCH NERVE BLOCK UNDER FLUOROSCOPIC GUIDANCE AT L3, L4, AND L5 DORSAL RAMI performed by Tila Dodd MD at [address] [facility] Bilateral 03/07/2022    BILATERAL LUMBAR MEDIAL BRANCH NERVE BLOCK UNDER FLUORO AT L3 L4 L5 DORSAL RAMI performed by Tila Dodd MD at [address] [facility] HISTORY  03/10/2017    left foot application integra graft, application of wound vac, delayed primax closure    OTHER SURGICAL HISTORY      fracture of left temporal part of skull OTHER SURGICAL HISTORY Left 06/16/2017    delayed closure with bone debridement and biopsy, application of hemovac with Dr. Mayco Sage N/A 11/22/2021    LUMBAR EPIDURAL STEROID INJECTION UNDER FLUOROSCOPIC GUIDANCE AT L5-S1 performed by Cecelia Burns MD at [address] N/A 01/06/2022    # 2 LUMBAR EPIDURAL STEROID INJECTION UNDER FLUOROSCOPIC GUIDANCE AT L5-S1 performed by Cecelia Burns MD at [address] Bilateral 04/11/2022    BILATERAL LUMBAR RADIOFREQUENCY ABLATION UNDER FLUORO AT L3 L4 L5 AND DORSAL RAMI performed by Cecelia Burns MD at [address] Left 05/2017    SFA, pop atherectomy, Dr. Felix Zavala Left 2009    2nd and 5th toe    TONSILLECTOMY         Medications Prior to admit:  Prior to Admission medications    Medication Sig Start Date End Date Taking?  Authorizing Provider   JANUVIA 100 MG tablet TAKE 1 TABLET DAILY 11/10/22  Yes Georgie Reyes MD   metFORMIN (GLUCOPHAGE) 1000 MG tablet Take 1 tablet by mouth 2 times daily (with meals) 11/2/22  Yes Georgie Reyes MD   insulin glargine (LANTUS SOLOSTAR) 100 UNIT/ML injection pen Inject 10 Units into the skin nightly 10/25/22  Yes Georgie Reyes MD   rosuvastatin (CRESTOR) 10 MG tablet TAKE 1 TABLET DAILY 6/24/22  Yes Georgie Reyes MD   losartan (COZAAR) 50 MG tablet Take 1 tablet by mouth daily 6/23/22  Yes Georgie Reyes MD   metoprolol succinate (TOPROL XL) 25 MG extended release tablet TAKE 1 TABLET TWICE A DAY 6/23/22  Yes Georgie Reyes MD   glipiZIDE (GLUCOTROL) 10 MG tablet TAKE 1 TABLET TWICE A DAY BEFORE MEALS 6/13/22  Yes Georgie Reyes MD   spironolactone (ALDACTONE) 25 MG tablet TAKE ONE-HALF (1/2) TABLET DAILY 2/22/22  Yes Georgie Reyes MD   Multiple Vitamins-Minerals (THERAPEUTIC MULTIVITAMIN-MINERALS) tablet Take 1 tablet by mouth daily   Yes Historical Provider, MD   traZODone (DESYREL) 50 MG tablet TAKE 1 TABLET DAILY EVERY NIGHT  Patient taking differently: nightly as needed TAKE 1 TABLET DAILY EVERY NIGHT 1/19/22  Yes Zain Upton MD   metFORMIN (GLUCOPHAGE) 1000 MG tablet TAKE 1 TABLET TWICE A DAY WITH MEALS 11/2/22   Zain Upton MD   FreeStyle Lancets [address] [facility] TEST EVERY DAY 6/27/22   Summer Younger MD   blood glucose test strips (FREESTYLE TEST STRIPS) strip 1 each by In Vitro route daily As needed. 6/23/22   Zain Upton MD   Insulin Pen Needle 31G X 5 MM MISC 1 each by Does not apply route daily 6/23/22   Zain Upton MD   blood glucose test strips (ASCENSIA AUTODISC VI;ONE TOUCH ULTRA TEST VI) strip 1 each by In Vitro route 2 times daily As needed.  2/6/21   Zain Upton MD       Current Medications:    Current Facility-Administered Medications: sodium chloride flush 0.9 % injection 5-40 mL, 5-40 mL, IntraVENous, 2 times per day  sodium chloride flush 0.9 % injection 5-40 mL, 5-40 mL, IntraVENous, PRN  0.9 % sodium chloride infusion, 25 mL, IntraVENous, PRN  polyethylene glycol (GLYCOLAX) packet 17 g, 17 g, Oral, Daily PRN  acetaminophen (TYLENOL) tablet 650 mg, 650 mg, Oral, Q6H PRN **OR** acetaminophen (TYLENOL) suppository 650 mg, 650 mg, Rectal, Q6H PRN  apixaban (ELIQUIS) tablet 10 mg, 10 mg, Oral, BID **FOLLOWED BY** [START ON 12/30/2022] apixaban (ELIQUIS) tablet 5 mg, 5 mg, Oral, BID  losartan (COZAAR) tablet 50 mg, 50 mg, Oral, Daily  metoprolol succinate (TOPROL XL) extended release tablet 25 mg, 25 mg, Oral, BID  rosuvastatin (CRESTOR) tablet 10 mg, 10 mg, Oral, Nightly  spironolactone (ALDACTONE) tablet 25 mg, 25 mg, Oral, Daily  glucose chewable tablet 16 g, 4 tablet, Oral, PRN  dextrose bolus 10% 125 mL, 125 mL, IntraVENous, PRN **OR** dextrose bolus 10% 250 mL, 250 mL, IntraVENous, PRN  glucagon (rDNA) injection 1 mg, 1 mg, SubCUTAneous, PRN  dextrose 10 % infusion, , IntraVENous, Continuous PRN  insulin lispro (HUMALOG) injection vial 0-8 Units, 0-8 Units, SubCUTAneous, TID WC  insulin lispro (HUMALOG) injection vial 0-4 Units, 0-4 Units, SubCUTAneous, Nightly  melatonin tablet 3 mg, 3 mg, Oral, Nightly PRN  prochlorperazine (COMPAZINE) injection 10 mg, 10 mg, IntraVENous, Q6H PRN  insulin glargine (LANTUS) injection vial 10 Units, 10 Units, SubCUTAneous, Nightly  furosemide (LASIX) tablet 40 mg, 40 mg, Oral, Daily  empagliflozin (JARDIANCE) tablet 10 mg, 10 mg, Oral, Daily  perflutren lipid microspheres (DEFINITY) injection 1.5 mL, 1.5 mL, IntraVENous, ONCE PRN  insulin glargine (LANTUS) injection vial 5 Units, 5 Units, SubCUTAneous, Once    Allergies:  Ciprofloxacin, Pcn [penicillins], and Sulfa antibiotics    Social History:    Social History     Socioeconomic History    Marital status:      Spouse name: Not on file    Number of children: Not on file    Years of education: Not on file    Highest education level: Not on file   Occupational History    Not on file   Tobacco Use    Smoking status: Never    Smokeless tobacco: Never   Vaping Use    Vaping Use: Never used   Substance and Sexual Activity    Alcohol use: Not Currently     Alcohol/week: 3.0 standard drinks     Types: 2 Shots of liquor, 1 Standard drinks or equivalent per week    Drug use: No    Sexual activity: Not on file   Other Topics Concern    Not on file   Social History Narrative    Not on file     Social Determinants of Health     Financial Resource Strain: Not on file   Food Insecurity: Not on file   Transportation Needs: Not on file   Physical Activity: Inactive    Days of Exercise per Week: 0 days    Minutes of Exercise per Session: 0 min   Stress: Not on file   Social Connections: Not on file   Intimate Partner Violence: Not on file   Housing Stability: Not on file       Family History:   Family History   Problem Relation Age of Onset    High Blood Pressure Mother     COPD Father     Diabetes Father     Heart Disease Brother         chf    Seizures Brother          REVIEW OF SYSTEMS:     Constitutional: Denies fevers, chills, night sweats. Fatigue  HEENT: Denies headaches, nose bleeds, and blurred vision,oral pain, abscess or lesion. Musculoskeletal: Denies falls, pain to BLE with ambulation. Bilateral lower extremity edema and pain to right lower leg  Neurological: Denies dizziness and lightheadedness, numbness and tingling  Cardiovascular: Denies chest pain, palpitations, and feelings of heart racing. Respiratory: Orthopnea, PND, SOB, ALEMAN  Gastrointestinal: Denies heartburn, nausea/vomiting, diarrhea and constipation, black/bloody, and tarry stools. Genitourinary: Denies dysuria and hematuria  Hematologic: Denies excessive bruising or bleeding  Lymphatic: Denies lumps and bumps to neck, axilla, breast, and groin  Endocrine: Denies excessive thirst. Denies intolerance to hot and cold  Psychiatric: Denies anxiety and depression. PHYSICAL EXAM:   BP (!) 178/86   Pulse 82   Temp 98 °F (36.7 °C) (Oral)   Resp 16   Ht 5' 9\" (1.753 m)   Wt 140 lb (63.5 kg)   SpO2 93%   BMI 20.67 kg/m²   CONST:  Well developed, well nourished 60-year-old  male who appears stated age. Awake, alert, cooperative, no apparent distress  HEENT:   Head- Normocephalic, atraumatic   Eyes- Conjunctivae pink, anicteric  Throat- Oral mucosa pink and moist  Neck-  No stridor, trachea midline, + JVD. No adenopathy   CHEST: Chest symmetrical and non-tender to palpation. No accessory muscle use or intercostal retractions  RESPIRATORY: Lung sounds -diminished lower lung sounds bilaterally  CARDIOVASCULAR:     No carotid bruit  Heart Inspection- shows no noted pulsations  Heart Palpation- no heaves or thrills; PMI is non-displaced   Heart Ausculation- Regular rate and rhythm, no murmur. No s3, s4 or rub   PV: Nonpitting bilateral lower extremity edema. No varicosities.  Pedal pulses palpable, no clubbing or cyanosis   ABDOMEN: Soft, non-tender to light palpation. Bowel sounds present. No palpable masses no organomegaly; no abdominal bruit  MS: Good muscle strength and tone. No atrophy or abnormal movements. : Deferred  SKIN: Warm and dry no statis dermatitis or ulcers   NEURO / PSYCH: Oriented to person, place and time. Speech clear and appropriate. Follows all commands. Pleasant affect     DATA:    ECG: Sinus rhythm with first-degree AV block, RBBB. Vent rate 73, IL interval 216, QRS duration 152, QTc 513. Tele strips: Normal sinus rhythm, 79 heart rate  Diagnostic:    Labs:   CBC:   Recent Labs     12/22/22 2020   WBC 8.5   HGB 11.4*   HCT 36.7*        BMP:   Recent Labs     12/22/22 2020 12/23/22  0835    135   K 5.6* 4.9   CO2 19* 17*   BUN 39* 36*   CREATININE 1.3* 1.3*   LABGLOM 57 57   CALCIUM 9.1 9.8     Mag:   Recent Labs     12/22/22 2020   MG 1.6       HgA1c:   Lab Results   Component Value Date    LABA1C 8.8 (H) 06/23/2022       PT/INR:   Recent Labs     12/22/22 2020   PROTIME 16.1*   INR 1.4     APTT:  Recent Labs     12/22/22 2327 12/23/22  0835   APTT 30.9 46.0*       FASTING LIPID PANEL:  Lab Results   Component Value Date/Time    CHOL 112 06/23/2022 12:00 PM    HDL 46 06/23/2022 12:00 PM    LDLCALC 49 06/23/2022 12:00 PM    TRIG 83 06/23/2022 12:00 PM     LIVER PROFILE:  Recent Labs     12/22/22 2020 12/23/22  0835   AST 52* 79*   ALT 13 24   LABALBU 3.5 3.4*      Latest Reference Range & Units 12/22/22 20:20 12/22/22 23:27   Pro-BNP 0 - 450 pg/mL 16,742 (H)    Troponin, High Sensitivity 0 - 11 ng/L 74 (H) 70 (H)   (H): Data is abnormally high    CXR:  Impression   Mild cardiomegaly and mild bilateral pleural effusion     CTA pulm: Impression   No evidence of pulmonary embolism. Cardiomegaly with dependent pulmonary edema and bilateral small pleural   effusions consistent with congestive heart failure acute exacerbation.    Reflux of injected contrast into dilated hepatic veins suggests a component   of right heart failure. Partially visualized abdominal ascites and cirrhotic appearing liver, which   may be cardiogenic. Correlate clinically. US bilateral lower extremities:  Impression   DVT, right mid superficial femoral to distal superficial femoral vein. No evidence of DVT in the left lower extremity       Assessment:  Acute on chronic systolic heart failure. ACC stage C/NYHA class III. Intake and output not charted at this time. Initial proBNP [id number] with bilateral pleural effusions per imaging. Elevated, flat troponin presentation 2/2 acute on chronic heart failure. Acute DVT right mid superficial femoral to distal superficial femoral vein. Patient started on Eliquis per primary service. CAD history: CABG times 3/2004>> risk preoperative Lexiscan 5/2021 due to severe LV dysfunction  History RBBB  Hyperlipidemia, on Crestor  Hypertension, uncontrolled  Diabetes with neuropathy and history of diabetic foot ulcers  PVD with history of osteomyelitis of the left foot  CKD with baseline 1.2-1.3    Plan:  Review echocardiogram.  Diuresis with Lasix 40 mg IV twice daily. Monitor renal function closely, strict input and output and daily weights. Agree with adding Jardiance. Replace losartan with Entresto 24/26 twice daily. Continue Crestor, Toprol-XL, and Aldactone. Continue telemetry, monitor for arrhythmias. Monitor electrolytes: Keep potassium> 4.0 and magnesium> 2.0. Rest per primary service and other consultants. Case and subsequent orders discussed with Dr. Josh Dukes. Further recommendations to follow as per above. Will follow.       Electronically signed by GEMINI Stanton CNP on 12/23/2022 at 10:31 AM

## 2022-12-23 NOTE — PROGRESS NOTES
Attempted to verify home med list with wife, no answer left message will attempt to call again soon.

## 2022-12-23 NOTE — CARE COORDINATION
Family requesting [employer] for home; all DME companies closed;provided RX for ww; they will obtain one from [facility] medical supply on Tuesday; they are temporarily using a family [employer]  and will have one at home. Family agreeable. Kim Bryan.

## 2022-12-23 NOTE — PROGRESS NOTES
Physical Therapy  Facility/Department: [facility] SURG/TELE  Physical Therapy Initial Assessment    Name: Karoline Meneses  : 1945  MRN: [id number]  Date of Service: 2022    Attending Provider:  Steven Santo MD    Evaluating PT:  Keyana Lazo P.T. Room #:  [facility]-  Diagnosis:  Shortness of breath [R06.02]  Hyperkalemia [E87.5]  Acute decompensated heart failure (HCC) [I50.9]  Acute deep vein thrombosis (DVT) of proximal vein of right lower extremity (HCC) [I82.4Y1]  Pertinent PMHx/PSHx:  L TMA  Precautions:  falls, bed/chair alarm    SUBJECTIVE:    Pt lives with his wife in a 1 story home with no stairs to enter. Pt ambulated with a cane or ww and B AFO's (but pt states unable to wear AFOs recently due to swelling B feet. OBJECTIVE:   Initial Evaluation  Date: 22 Treatment Short Term/ Long Term   Goals   Was pt agreeable to Eval/treatment? yes     Does pt have pain? No c/o pain     Bed Mobility  Rolling: Independent  Supine to sit: Independent  Sit to supine: NA  Scooting: Independent  Independent   Transfers Sit to stand: Independent  Stand to sit: Independent  Stand pivot: Independent with ww  Independent   Ambulation   15+150 feet with ww supervision  250 feet with ww Independent    Stair negotiation: ascended and descended NA  NA   AM-PAC 6 Clicks        BLE ROM is WFL. BLE strength is grossly 4/5 to 4+/5. Sensation:  Pt denies numbness and tingling to extremities  Balance: sitting is Independent and standing with ww is Independent  Endurance: fair+    ASSESSMENT:    Conditions Requiring Skilled Therapeutic Intervention:    [x]Decreased strength     []Decreased ROM  [x]Decreased functional mobility  [x]Decreased balance   [x]Decreased endurance   []Decreased posture  []Decreased sensation  []Decreased coordination   []Decreased vision  []Decreased safety awareness   []Increased pain       Comments:  Pt was in bed and agreeable to PT.   He was on RA and had no SOB with activity during PT session. Upon sitting up to EOB he reported he did not need his AFOs and they aren't fitting well right now due to swelling B feet. He did states he needed to use BR and walked with ww into BR and transferred on/off commode Independently. He performed self hygiene care and then stood at sink Independently while washing his hands. Pt walked with ww in the alarcon and had no LOB or SOB. Pt c/o mild fatigue that limited further amb distance at this time. Pt had no drop foot during amb not using B AFOs this am.     Treatment:  Patient practiced and was instructed in the following treatment:    Bed mobility, transfers, ADLs, and gait with ww to improve functional strength and endurance. Pt was left sitting up in chair with call light left by patient. Chair/bed alarm: chair alarm was activated. Pt's/ family goals   1. To go home. Patient and or family understand(s) diagnosis, prognosis, and plan of care. PHYSICAL THERAPY PLAN OF CARE:    PT POC is established based on physician order and patient diagnosis     Referring provider/PT Order:  PT eval and treat  Diagnosis:  Shortness of breath [R06.02]  Hyperkalemia [E87.5]  Acute decompensated heart failure (HCC) [I50.9]  Acute deep vein thrombosis (DVT) of proximal vein of right lower extremity ([facility] [address] [I82.4Y1]  Specific instructions for next treatment:  to increase amb distance.      Current Treatment Recommendations:     [x] Strengthening to improve independence with functional mobility   [] ROM to improve ROM and decrease spasm and pain which will help promote independence with functional mobility   [] Balance Training to improve static/dynamic balance and to reduce fall risk  [x] Endurance Training to improve activity tolerance during functional mobility   [x] Transfer Training to improve safety and independence with all functional transfers   [x] Gait Training to improve gait mechanics, endurance and assess need for appropriate assistive device  [] Stair Training in preparation for safe discharge home and/or into the community   [] Positioning to prevent skin breakdown and contractures  [] Safety and Education Training   [x] Patient/Caregiver Education   [] HEP  [] Other     PT long term treatment goals are located in above grid    Frequency of treatments: 2-5x/week x 1-2 weeks. Time in  07:15  Time out  07:40    Total Treatment Time  10 minutes     Evaluation Time includes thorough review of current medical information, gathering information on past medical history/social history and prior level of function, completion of standardized testing/informal observation of tasks, assessment of data and education on plan of care and goals. CPT codes:  [x] Low Complexity PT evaluation 50648  [] Moderate Complexity PT evaluation 81763  [] High Complexity PT evaluation 37293  [] PT Re-evaluation 83790  [] Gait training 34748 ** minutes  [] Manual therapy 41097 ** minutes  [x] Therapeutic activities 40601 10 minutes  [] Therapeutic exercises 06408 ** minutes  [] Neuromuscular reeducation 99352 ** minutes     Terry Correa., P.T.   License Number: [id number]

## 2022-12-23 NOTE — PLAN OF CARE
Patient's chart updated to reflect:      . - HF care plan, HF education points and HF discharge instructions.  -Orders: 2 gram sodium diet, daily weights, I/O.  -PCP and cardiology follow up appointments to be scheduled within 7 days of hospital discharge. -CHF education session will be provided to the patient prior to hospital discharge.     Priyanka Alfonso RN BSN  Heart Failure Navigator

## 2022-12-23 NOTE — ED PROVIDER NOTES
Power Nguyen is a 69 y.o. male with hx of CAD, CKD, HTN, HLD, DM       Patient is a 69 y.o. male presents with a chief complaint of SOB and bilateral foot swelling  This has been occurring for the past 2 weeks. Patient states that it gets better with sitting up. Patient states that it gets worse with laying flat. Patient states that it is severe in severity. Patient states it was acute in onset. He notes that for the past 2 weeks he has been having increasing shortness of breath, worst when laying down and improved when he sits up. Patient reports he has been waking up at night due to shortness of breath and had initially been attributing it to his chronic low back pain which she is following with Dr. Calvin Young for, however is realized it is more because of his shortness of breath. He states he has been noticing this worsening shortness of breath even when laying down to watch television. Patient does note once he sits up the shortness of breath does improve. Bilateral legs have been swelling throughout the day for the past 1 week. He is denying any pain, no chest pain, abdominal pain, nausea, vomiting, calf pain, headaches, lightheaded or dizziness, numbness or tingling, blurry or double vision, urinary or fecal incontinence, unexplained weight loss, saddle anesthesia. Review of Systems   Constitutional:  Negative for chills and fever. HENT:  Negative for congestion, sinus pain and sore throat. Eyes:  Negative for discharge and visual disturbance. Respiratory:  Positive for shortness of breath. Negative for cough. Cardiovascular:  Negative for chest pain and leg swelling. Gastrointestinal:  Negative for abdominal pain, constipation, diarrhea, nausea and vomiting. Endocrine: Negative for polyuria. Genitourinary:  Negative for difficulty urinating, dysuria, frequency and hematuria. Musculoskeletal:  Positive for back pain (chronic; unchanged). Negative for arthralgias and joint swelling. Skin:  Negative for color change and rash. Neurological:  Negative for dizziness, weakness, light-headedness, numbness and headaches. All other systems reviewed and are negative. Physical Exam  Constitutional:       General: He is not in acute distress. Appearance: Normal appearance. HENT:      Head: Normocephalic and atraumatic. Mouth/Throat:      Mouth: Mucous membranes are moist.      Pharynx: Oropharynx is clear. Eyes:      Extraocular Movements: Extraocular movements intact. Conjunctiva/sclera: Conjunctivae normal.      Pupils: Pupils are equal, round, and reactive to light. Cardiovascular:      Rate and Rhythm: Normal rate and regular rhythm. Pulses: Normal pulses. Heart sounds: Normal heart sounds. Pulmonary:      Effort: Pulmonary effort is normal.      Breath sounds: Rales present. No decreased breath sounds or wheezing. Abdominal:      General: Abdomen is flat. Palpations: Abdomen is soft. Musculoskeletal:         General: No swelling. Normal range of motion. Cervical back: Normal range of motion and neck supple. Comments: Pt does have bilateral leg splints in place as he notes he has a short leg and wears these for balance assistance. No edema appreciated at this moment. Skin:     General: Skin is warm and dry. Neurological:      General: No focal deficit present. Mental Status: He is alert and oriented to person, place, and time. Psychiatric:         Mood and Affect: Mood normal.         Behavior: Behavior normal.        Procedures     MDM  Number of Diagnoses or Management Options  Acute decompensated heart failure (HCC)  Acute deep vein thrombosis (DVT) of proximal vein of right lower extremity (HCC)  Hyperkalemia  Shortness of breath  Diagnosis management comments: [place] Chol is a 69-year-old male presenting to the ED with complaints of shortness of breath.   On exam, patient in no acute distress with no tachycardia and stable oxygen saturations. Given concern for leg swelling with SOB, concern for DVT vs PE vs CHF vs pneumonia. Therefore, CMP, CBC, COVID, flu, BNP, troponin, PT/INR, PTT, magnesium, ultrasound bilateral lower extremities, chest x-ray, head CT, CTA chest and EKG were ordered. COVID and flu swabs negative. CBC revealing mild anemia of 11.4. CMP did reveal hyperkalemia of 5.6. Magnesium of 1.6. BNP is elevated at 16,742. Initial troponin of 74, on repeat 70. PT of 16.1. INR of 1.4. PTT of 32.7. EKG as documented in ED course. Ultrasound of the lower extremities did reveal a DVT in the right superficial femoral vein. CTA of the chest revealing no pulmonary embolism however did reveal bilateral pleural effusions and pulmonary edema. CT of the head was unremarkable. Chest x-ray revealing mild bilateral pleural effusion and mild cardiomegaly. Patient given calcium in ED as well as Lasix for hyperkalemia. Patient started on heparin for DVT. Spoke with patient and wife regarding plan to admit to medicine for further evaluation and management. They are understanding and agreeable to plan. Spoke with family medicine resident who agreed to evaluate patient in ED and spoke with attending who agreed to admit patient. ED Course as of 12/23/22 0429   Thu Dec 22, 2022   2007 EKG @ 2004: This EKG is signed and interpreted by Dr Kristel Patel. Rate: 74  Rhythm: Significant noise artifact but regular, appears to be underlying sinus rhythm  Interpretation: Significant noise artifact, difficult to discern P waves however regular rhythm likely underlying sinus rhythm, tendency to left axis deviated, right bundle branch block with likely left anterior fascicular block, QRS is 164, QTc measured at 515, compared to prior EKG from 5/31/2021 QTC was 463, with sinus at that time, no evidence of ST elevation  Comparison: changes compared to previous EKG   [ME]   2125 EKG #2 @ 2120:   This EKG is signed and interpreted by Dr Nic Gonsales DO. Rate: 73  Rhythm: Sinus  Interpretation: Sinus rhythm first-degree block, technically normal axis, much less noise artifact compared to EKG #1. Right bundle branch block, likely left anterior fascicular block, MN is 216, first-degree block, QRS is 152, QTc is 513, no evidence of ST elevation, when compared to EKG #1 appears stable  Comparison: stable as compared to patient's most recent EKG   [ME]   26 Spoke with family medicine resident who will evaluate pt in ED. [CP]      ED Course User Index  [CP] Last Kearney DO  [ME] Angel Davila DO      ED Course as of 12/23/22 0429   Thu Dec 22, 2022   2007 EKG @ 2004: This EKG is signed and interpreted by Dr Dony Mackey. Rate: 74  Rhythm: Significant noise artifact but regular, appears to be underlying sinus rhythm  Interpretation: Significant noise artifact, difficult to discern P waves however regular rhythm likely underlying sinus rhythm, tendency to left axis deviated, right bundle branch block with likely left anterior fascicular block, QRS is 164, QTc measured at 515, compared to prior EKG from 5/31/2021 QTC was 463, with sinus at that time, no evidence of ST elevation  Comparison: changes compared to previous EKG   [ME]   2125 EKG #2 @ 2120: This EKG is signed and interpreted by Dr Dony Mackey. Rate: 73  Rhythm: Sinus  Interpretation: Sinus rhythm first-degree block, technically normal axis, much less noise artifact compared to EKG #1. Right bundle branch block, likely left anterior fascicular block, MN is 216, first-degree block, QRS is 152, QTc is 513, no evidence of ST elevation, when compared to EKG #1 appears stable  Comparison: stable as compared to patient's most recent EKG   [ME]   26 Spoke with family medicine resident who will evaluate pt in ED.  [CP]      ED Course User Index  [CP] Last Kearney DO  [ME] Angel Davila DO       --------------------------------------------- PAST HISTORY ---------------------------------------------  Past Medical History:  has a past medical history of CAD (coronary artery disease), Diabetes mellitus ([facility] Diabetic neuropathy ([facility] Diabetic ulcer of left foot associated with type 2 diabetes mellitus ([facility] Hyperlipidemia, Hypertension, Lumbar pain, and Peripheral vascular disease ([facility] Past Surgical History:  has a past surgical history that includes Cardiac surgery (2004); Toe amputation (Left, 2009); Foot surgery (Left); other surgical history (03/10/2017); Tonsillectomy; other surgical history; Endoscopy, colon, diagnostic (Bilateral); other surgical history (Left, 06/16/2017); PERIPHERAL PERCUTANEOUS ARTERIAL INTERVENTION (Left, 05/2017); Pain management procedure (N/A, 11/22/2021); Pain management procedure (N/A, 1/6/2022); Nerve Block (Bilateral, 2/7/2022); Nerve Block (Bilateral, 3/7/2022); and Pain management procedure (Bilateral, 4/11/2022). Social History:  reports that he has never smoked. He has never used smokeless tobacco. He reports that he does not currently use alcohol after a past usage of about 3.0 standard drinks per week. He reports that he does not use drugs. Family History: family history includes COPD in his father; Diabetes in his father; Heart Disease in his brother; High Blood Pressure in his mother; Seizures in his brother. The patients home medications have been reviewed.     Allergies: Ciprofloxacin, Pcn [penicillins], and Sulfa antibiotics    -------------------------------------------------- RESULTS -------------------------------------------------    LABS:  Results for orders placed or performed during the hospital encounter of 12/22/22   COVID-19, Rapid    Specimen: Nasopharyngeal Swab   Result Value Ref Range    SARS-CoV-2, NAAT Not Detected Not Detected   Rapid influenza A/B antigens    Specimen: Nasopharyngeal   Result Value Ref Range    Influenza A by PCR Not Detected Not Detected    Influenza B by PCR Not Detected Not Detected   CBC with Auto Differential   Result Value Ref Range    WBC 8.5 4.5 - 11.5 E9/L    RBC 4.04 3.80 - 5.80 E12/L    Hemoglobin 11.4 (L) 12.5 - 16.5 g/dL    Hematocrit 36.7 (L) 37.0 - 54.0 %    MCV 90.8 80.0 - 99.9 fL    MCH 28.2 26.0 - 35.0 pg    MCHC 31.1 (L) 32.0 - 34.5 %    RDW 15.3 (H) 11.5 - 15.0 fL    Platelets 860 691 - 351 E9/L    MPV 10.9 7.0 - 12.0 fL    Neutrophils % 68.0 43.0 - 80.0 %    Immature Granulocytes % 0.5 0.0 - 5.0 %    Lymphocytes % 19.0 (L) 20.0 - 42.0 %    Monocytes % 9.8 2.0 - 12.0 %    Eosinophils % 1.8 0.0 - 6.0 %    Basophils % 0.9 0.0 - 2.0 %    Neutrophils Absolute 5.79 1.80 - 7.30 E9/L    Immature Granulocytes # 0.04 E9/L    Lymphocytes Absolute 1.62 1.50 - 4.00 E9/L    Monocytes Absolute 0.83 0.10 - 0.95 E9/L    Eosinophils Absolute 0.15 0.05 - 0.50 E9/L    Basophils Absolute 0.08 0.00 - 0.20 E9/L   Comprehensive Metabolic Panel   Result Value Ref Range    Sodium 134 132 - 146 mmol/L    Potassium 5.6 (H) 3.5 - 5.0 mmol/L    Chloride 101 98 - 107 mmol/L    CO2 19 (L) 22 - 29 mmol/L    Anion Gap 14 7 - 16 mmol/L    Glucose 248 (H) 74 - 99 mg/dL    BUN 39 (H) 6 - 23 mg/dL    Creatinine 1.3 (H) 0.7 - 1.2 mg/dL    Est, Glom Filt Rate 57 >=60 mL/min/1.73    Calcium 9.1 8.6 - 10.2 mg/dL    Total Protein 7.4 6.4 - 8.3 g/dL    Albumin 3.5 3.5 - 5.2 g/dL    Total Bilirubin 0.8 0.0 - 1.2 mg/dL    Alkaline Phosphatase 122 40 - 129 U/L    ALT 13 0 - 40 U/L    AST 52 (H) 0 - 39 U/L   Magnesium   Result Value Ref Range    Magnesium 1.6 1.6 - 2.6 mg/dL   Brain Natriuretic Peptide   Result Value Ref Range    Pro-BNP 16,742 (H) 0 - 450 pg/mL   Troponin   Result Value Ref Range    Troponin, High Sensitivity 74 (H) 0 - 11 ng/L   Protime-INR   Result Value Ref Range    Protime 16.1 (H) 9.3 - 12.4 sec    INR 1.4    APTT   Result Value Ref Range    aPTT 32.7 24.5 - 35.1 sec   Troponin   Result Value Ref Range    Troponin, High Sensitivity 70 (H) 0 - 11 ng/L   APTT   Result Value Ref Range    aPTT 30.9 24.5 - 35.1 sec   EKG 12 Lead   Result Value Ref Range    Ventricular Rate 73 BPM    Atrial Rate 73 BPM    P-R Interval 216 ms    QRS Duration 152 ms    Q-T Interval 466 ms    QTc Calculation (Bazett) 513 ms    P Axis 6 degrees    R Axis -126 degrees    T Axis -21 degrees       RADIOLOGY:  CTA PULMONARY W CONTRAST   Final Result   No evidence of pulmonary embolism. Cardiomegaly with dependent pulmonary edema and bilateral small pleural   effusions consistent with congestive heart failure acute exacerbation. Reflux of injected contrast into dilated hepatic veins suggests a component   of right heart failure. Partially visualized abdominal ascites and cirrhotic appearing liver, which   may be cardiogenic. Correlate clinically. CT Head W/O Contrast   Final Result   No acute intracranial abnormality. Diffuse cerebral and cerebellar volume loss and chronic small vessel ischemic   white matter change. US DUP LOWER EXTREMITIES BILATERAL VENOUS   Final Result   DVT, right mid superficial femoral to distal superficial femoral vein. No evidence of DVT in the left lower extremity. Critical results were called by Dr. Carlton Degroot to Kasey Andrew CNP on   12/22/2022 at 7:31 p.m. XR CHEST (2 VW)   Final Result   Mild cardiomegaly and mild bilateral pleural effusion.             ------------------------- NURSING NOTES AND VITALS REVIEWED ---------------------------  Date / Time Roomed:  12/22/2022  7:34 PM  ED Bed Assignment:  22/22    The nursing notes within the ED encounter and vital signs as below have been reviewed.      Patient Vitals for the past 24 hrs:   BP Pulse Resp SpO2 Height Weight   12/23/22 0048 (!) 171/101 -- -- -- -- --   12/22/22 1828 (!) 157/72 84 18 100 % 5' 9\" (1.753 m) 140 lb (63.5 kg)       Oxygen Saturation Interpretation: Normal    ------------------------------------------ PROGRESS NOTES ------------------------------------------  Re-evaluation(s):  Time: 2250  Patients symptoms show no change  Repeat physical examination is not changed    Counseling:  I have spoken with the patient and discussed todays results, in addition to providing specific details for the plan of care and counseling regarding the diagnosis and prognosis. Their questions are answered at this time and they are agreeable with the plan of admission.    --------------------------------- ADDITIONAL PROVIDER NOTES ---------------------------------  Consultations:  Time: 2354. Spoke with Dr. Marco Small on for Dr. Ally Reyna. Discussed case. They will admit the patient. This patient's ED course included: a personal history and physicial examination, multiple bedside re-evaluations, IV medications, cardiac monitoring, continuous pulse oximetry, and complex medical decision making and emergency management    This patient has remained hemodynamically stable during their ED course. Diagnosis:  1. Acute decompensated heart failure ([address]    2. Shortness of breath    3. Acute deep vein thrombosis (DVT) of proximal vein of right lower extremity (HCC)    4. Hyperkalemia        Disposition:  Patient's disposition: Admit to telemetry  Patient's condition is stable. Patient was seen and evaluated by myself and my attending Ellie Garcia DO. Assessment and Plan discussed with attending provider, please see attestation for final plan of care. This note was done using dictation software and there may be some grammatical errors associated with this.     Vidya Pastrana 58, DO  Resident  12/23/22 1505

## 2022-12-23 NOTE — PROGRESS NOTES
Occupational Therapy    OCCUPATIONAL THERAPY INITIAL EVALUATION     Afsane[facility] Metcalf [place], [place]         [facility]:                                                  Patient Name: Christina Hutchins    MRN: [id number]    : 1945    Room: [address] [place] [address]      Evaluating OT: Nikki Silver OTR/L   [id number]      Referring Shasha Denis MD    Specific Provider Orders/Date:OT eval and treat 2022      Diagnosis:  Shortness of breath [R06.02]  Hyperkalemia [E87.5]  Acute decompensated heart failure ([facility] [I50.9]  Acute deep vein thrombosis (DVT) of proximal vein of right lower extremity ([facility] [I82.4Y1]     Pertinent Medical History: neuropathy, PVD,  L  TMA, B AFOs, back pain     Precautions:  Fall Risk,      Assessment of current deficits    [x] Functional mobility  [x]ADLs  [x] Strength               []Cognition    [x] Functional transfers   [x] IADLs         [x] Safety Awareness   [x]Endurance    [] Fine Coordination              [x] Balance      [] Vision/perception   []Sensation     []Gross Motor Coordination  [] ROM  [] Delirium                   [] Motor Control     OT PLAN OF CARE   OT POC based on physician orders, patient diagnosis and results of clinical assessment    Frequency/Duration  2-4 days/wk for 2 weeks PRN   Specific OT Treatment Interventions to include:   ADL retraining/adapted techniques and AE recommendations to increase functional independence within precautions                    Energy conservation techniques to improve tolerance for selfcare routine   Functional transfer/mobility training/DME recommendations for increased independence, safety and fall prevention         Patient/family education to increase safety and functional independence             Environmental modifications for safe mobility and completion of ADLs                             Therapeutic activity to improve functional performance during ADLs. Therapeutic exercise to improve tolerance and functional strength for ADLs    Balance retraining/tolerance tasks for facilitation of postural control with dynamic challenges during ADLs . Positioning to improve functional independence  []  Recommended Adaptive Equipment: wheeled walker     Home Living: Pt lives with wife, 1 story with small step to enter    Bathroom setup: walk in shower    Equipment owned:  wife and patient report - they equipment they have at home is all borrowed and the owners want them back. They have a walker, wheelchair, cane. Wife reports patient has been using th walker more at home.    Uses wheelchair sometimes in community - d/t his back pain     Prior Level of Function: assist as needed  with ADLs , assist  with IADLs; ambulated with walker or cane     Pain Level: back pain ;   Cognition: A&O: pleasant , following commands, conversing    Memory:  fair+   Sequencing:  fair+   Problem solving:  fair+   Judgement/safety:  fair+     Functional Assessment:  AM-PAC Daily Activity Raw Score: 18/24   Initial Eval Status  Date: 12/23/22 Treatment Status  Date: STGs = LTGs  Time frame: 10-14 days   Feeding Independent     Grooming SBA/set-up   Standing at sink   Independent    UB Dressing SBA/set-up   Independent    LB Dressing SBA  Mod I    Bathing SBA  Mod I    Toileting SBA   Independent    Bed Mobility  Returning from test   SBA   Supine to sit from cart   Independent    Functional Transfers SBA /supervision   Sit - stand from bed,commode   Mod I    Functional Mobility SBA/supervision,w/walker   Ambulated to/from bathroom   Mod I  with good tolerance    Balance Sitting:     Static:  Independent     Dynamic:SBA   Standing: SBA   Independent    Activity Tolerance No SOB   Good  with ADL activity    Visual/  Perceptual Glasses: none by bedside                 Hand Dominance right   AROM (PROM) Strength Additional Info:    RICKIE [facility] WFL good  and wfl FMC/dexterity noted during ADL tasks       LUE WF WFL good  and wfl FMC/dexterity noted during ADL tasks       Hearing: WFL   Sensation:  No c/o numbness or tingling  Tone: WFL   Edema: none observed     Comments: Upon arrival patient returning from bed . At end of session, patient sitting in chair  with call light and phone within reach, all lines and tubes intact. Wife present    Overall patient demonstrated  decreased independence and safety during completion of ADL/functional transfer/mobility tasks. Pt would benefit from continued skilled OT to increase safety and independence with completion of ADL/IADL tasks for functional independence and quality of life. Rehab Potential: good for established goals     Patient / Family Goal: return home      Patient and/or family were instructed on functional diagnosis, prognosis/goals and OT plan of care. Demonstrated good  understanding. Eval Complexity: Low    Time In: 1425  Time Out: 1439      Min Units   OT Eval Low 97165 x  1   OT Eval Medium 20059      OT Eval High 57048      OT Re-Eval [id number]       Therapeutic Ex 75070      Therapeutic Activities 39083       ADL/Self Care 43086       Orthotic Management 84941       Manual 44551     Neuro Re-Ed 26385       Non-Billable Time         Evaluation Time additionally includes thorough review of current medical information, gathering information on past medical history/social history and prior level of function, interpretation of standardized testing/informal observation of tasks, assessment of data and development of plan of care and goals.             Nikki Silver  OTR/L  OT [id number]

## 2022-12-23 NOTE — H&P
[facility]  Resident History and Physical      CHIEF COMPLAINT:    Chief Complaint   Patient presents with    Shortness of Breath     Leg swelling         History of Present Illness:   Stormy Johnson is a 68 yo male with a PMHx of HFrEF with an EF of 40 - 45%, CAD, DMII, HTN, PVD, CKD IIIwho presents to ER with SOB and bilateral leg swelling. Reports SOB and bilateral leg swelling 2 weeks ago. Reports small nocturnal dyspnea. Denies chest pain, palpitations, claudication, fevers, chills, cough, lightheadedness, dizziness, headache. Reports COVID infection approximately 1 month ago. Patient also has chronic back pain which she takes ibuprofen and Tylenol. Last echo was in 9/2020 which showed left ventricular ejection fraction 40-45% with inferior akinesis with moderate MR, mild AR. Patient had stress test 05/2021 with LVEF calculated to be 23%, with akinesis of the inferior and lateral walls. Reports compliance with medications. ED course: Vitals were remarkable for hypertension 171/101 and patient saturating 100% on RA. Labs were remarkable for hyperkalemia with a potassium of 5.6, creatinine 1.3, glucose 248, proBNP 16, 742 (6660 in 2018). patient had a negative also troponin 74> 70. EKG showed sinus rhythm first-degree block with no evidence of ST changes, . Patient was given calcium chloride for hyperkalemia. CXR mild cardiomegaly and mild bilateral pleural effusion. US Doppler showed DVT, right mid superficial femoral to distal superficial femoral vein and no evidence of DVT in the left lower extremity. CTA was negative for PE but did show cardiomegaly with dependent pulmonary edema and abdominal ascites with cirrhotic appearing liver. Patient was given Lasix 40 mg IV x1 and initially started on heparin in the ED. Family Medicine was consulted to admit the patient for CHF exacerbation and DVT.     ROS:     Review of Systems   Constitutional: Negative for chills, fatigue and fever. HENT:  Negative for congestion, hearing loss, rhinorrhea and sore throat. Eyes:  Negative for pain and visual disturbance. Respiratory:  Positive for shortness of breath. Negative for cough and wheezing. Cardiovascular:  Positive for leg swelling. Negative for chest pain and palpitations. Gastrointestinal:  Negative for abdominal pain, constipation, diarrhea, nausea and vomiting. Genitourinary:  Negative for difficulty urinating, dysuria and hematuria. Musculoskeletal:  Positive for back pain (Chronic). Negative for arthralgias and myalgias. Skin:  Negative for rash and wound. Neurological:  Negative for dizziness, weakness, light-headedness, numbness and headaches. Psychiatric/Behavioral:  Negative for dysphoric mood. The patient is not nervous/anxious.         Past Medical History:   Diagnosis Date    CAD (coronary artery disease) 2009    Diabetes mellitus ([facility]     Diabetic neuropathy ([facility]     Diabetic ulcer of left foot associated with type 2 diabetes mellitus ([facility] 03/29/2017    Hyperlipidemia     Hypertension     Lumbar pain     for procedure 3/7/22    Peripheral vascular disease ([facility]          Past Surgical History:   Procedure Laterality Date    CARDIAC SURGERY  2004    double bypass    ENDOSCOPY, COLON, DIAGNOSTIC Bilateral     cataracts    FOOT SURGERY Left     multiple    NERVE BLOCK Bilateral 2/7/2022    #1 BILATERAL LUMBAR MEDIAL BRANCH NERVE BLOCK UNDER FLUOROSCOPIC GUIDANCE AT L3, L4, AND L5 DORSAL RAMI performed by Francesco Barrera MD at [address] Bilateral 3/7/2022    BILATERAL LUMBAR MEDIAL BRANCH NERVE BLOCK UNDER FLUORO AT L3 L4 L5 DORSAL RAMI performed by Francesco Barrera MD at [address]  03/10/2017    left foot application integra graft, application of wound vac, delayed primax closure    OTHER SURGICAL HISTORY      fracture of left temporal part of skull    OTHER SURGICAL HISTORY Left 06/16/2017    delayed closure with bone debridement and biopsy, application of hemovac with Dr. Aidan Copeland N/A 11/22/2021    LUMBAR EPIDURAL STEROID INJECTION UNDER FLUOROSCOPIC GUIDANCE AT L5-S1 performed by Pamela Rodriguez MD at [address] N/A 1/6/2022    # 2 LUMBAR EPIDURAL STEROID INJECTION UNDER FLUOROSCOPIC GUIDANCE AT L5-S1 performed by Pamela Rodriguez MD at [address] Bilateral 4/11/2022    BILATERAL LUMBAR RADIOFREQUENCY ABLATION UNDER FLUORO AT L3 L4 L5 AND DORSAL RAMI performed by Pamela Rodriguez MD at [address] Left 05/2017    SFA, pop atherectomy, Dr. Mcneal Room Left 2009    2nd and 5th toe    TONSILLECTOMY         Medications Prior to Admission:    Prior to Admission medications    Medication Sig Start Date End Date Taking?  Authorizing Provider   JANUVIA 100 MG tablet TAKE 1 TABLET DAILY 11/10/22  Yes Rafi Mancia MD   metFORMIN (GLUCOPHAGE) 1000 MG tablet Take 1 tablet by mouth 2 times daily (with meals) 11/2/22  Yes Rafi Mancia MD   insulin glargine (LANTUS SOLOSTAR) 100 UNIT/ML injection pen Inject 10 Units into the skin nightly 10/25/22  Yes Rafi Mancia MD   rosuvastatin (CRESTOR) 10 MG tablet TAKE 1 TABLET DAILY 6/24/22  Yes Rafi Mancia MD   losartan (COZAAR) 50 MG tablet Take 1 tablet by mouth daily 6/23/22  Yes Rafi Mancia MD   metoprolol succinate (TOPROL XL) 25 MG extended release tablet TAKE 1 TABLET TWICE A DAY 6/23/22  Yes Rafi Mancia MD   glipiZIDE (GLUCOTROL) 10 MG tablet TAKE 1 TABLET TWICE A DAY BEFORE MEALS 6/13/22  Yes Rafi Mancia MD   spironolactone (ALDACTONE) 25 MG tablet TAKE ONE-HALF (1/2) TABLET DAILY 2/22/22  Yes Rafi Mancia MD   Multiple Vitamins-Minerals (THERAPEUTIC MULTIVITAMIN-MINERALS) tablet Take 1 tablet by mouth daily   Yes Historical Provider, MD   traZODone (DESYREL) 50 MG tablet TAKE 1 TABLET DAILY EVERY NIGHT  Patient taking differently: nightly as needed TAKE 1 TABLET DAILY EVERY NIGHT 1/19/22  Yes Iva Gomez MD   metFORMIN (GLUCOPHAGE) 1000 MG tablet TAKE 1 TABLET TWICE A DAY WITH MEALS 11/2/22   Iva Gomez MD   FreeStyle Lancets [address] [facility] TEST EVERY DAY 6/27/22   Yoon Adam MD   blood glucose test strips (FREESTYLE TEST STRIPS) strip 1 each by In Vitro route daily As needed. 6/23/22   Iva Gomez MD   Insulin Pen Needle 31G X 5 MM MISC 1 each by Does not apply route daily 6/23/22   Iva Gomez MD   blood glucose test strips (ASCENSIA AUTODISC VI;ONE TOUCH ULTRA TEST VI) strip 1 each by In Vitro route 2 times daily As needed. 2/6/21   Iva Gomez MD        Allergies:   Ciprofloxacin, Pcn [penicillins], and Sulfa antibiotics    Social History:    reports that he has never smoked. He has never used smokeless tobacco. He reports that he does not currently use alcohol after a past usage of about 3.0 standard drinks per week. He reports that he does not use drugs. Family History:   family history includes COPD in his father; Diabetes in his father; Heart Disease in his brother; High Blood Pressure in his mother; Seizures in his brother. PHYSICAL EXAM:    Vitals:  BP (!) 171/101   Pulse 84   Resp 18   Ht 5' 9\" (1.753 m)   Wt 140 lb (63.5 kg)   SpO2 100%   BMI 20.67 kg/m²     Physical Exam  Vitals reviewed. Constitutional:       General: He is not in acute distress. Appearance: Normal appearance. He is not ill-appearing. HENT:      Head: Normocephalic and atraumatic. Right Ear: External ear normal.      Left Ear: External ear normal.      Mouth/Throat:      Mouth: Mucous membranes are moist.      Pharynx: Oropharynx is clear. Eyes:      General: No scleral icterus. Right eye: No discharge. Left eye: No discharge. Extraocular Movements: Extraocular movements intact. Conjunctiva/sclera: Conjunctivae normal.   Cardiovascular:      Rate and Rhythm: Normal rate and regular rhythm. Pulses: Normal pulses. Heart sounds: Normal heart sounds. No murmur heard. Comments: No JVD appreciated  Pulmonary:      Effort: Pulmonary effort is normal.      Breath sounds: No stridor. Rales (Bibasilar) present. No wheezing. Chest:      Chest wall: No tenderness. Abdominal:      General: Abdomen is flat. Bowel sounds are normal.      Palpations: Abdomen is soft. Tenderness: There is no abdominal tenderness. Musculoskeletal:         General: Normal range of motion. Cervical back: Normal range of motion and neck supple. Right lower leg: Edema (1+ up to knees) present. Left lower leg: Edema (1+ up to knees) present. Comments: Linnette sign negative. No discolouration    Skin:     General: Skin is warm and dry. Capillary Refill: Capillary refill takes less than 2 seconds. Neurological:      General: No focal deficit present. Mental Status: He is alert and oriented to person, place, and time.    Psychiatric:         Mood and Affect: Mood normal.         Behavior: Behavior normal.       LABS:  Recent Results (from the past 24 hour(s))   CBC with Auto Differential    Collection Time: 12/22/22  8:20 PM   Result Value Ref Range    WBC 8.5 4.5 - 11.5 E9/L    RBC 4.04 3.80 - 5.80 E12/L    Hemoglobin 11.4 (L) 12.5 - 16.5 g/dL    Hematocrit 36.7 (L) 37.0 - 54.0 %    MCV 90.8 80.0 - 99.9 fL    MCH 28.2 26.0 - 35.0 pg    MCHC 31.1 (L) 32.0 - 34.5 %    RDW 15.3 (H) 11.5 - 15.0 fL    Platelets 201 512 - 868 E9/L    MPV 10.9 7.0 - 12.0 fL    Neutrophils % 68.0 43.0 - 80.0 %    Immature Granulocytes % 0.5 0.0 - 5.0 %    Lymphocytes % 19.0 (L) 20.0 - 42.0 %    Monocytes % 9.8 2.0 - 12.0 %    Eosinophils % 1.8 0.0 - 6.0 %    Basophils % 0.9 0.0 - 2.0 %    Neutrophils Absolute 5.79 1.80 - 7.30 E9/L    Immature Granulocytes # 0.04 E9/L    Lymphocytes Absolute 1.62 1.50 - 4.00 E9/L    Monocytes Absolute 0.83 0.10 - 0.95 E9/L    Eosinophils Absolute 0.15 0.05 - 0.50 E9/L    Basophils Absolute 0.08 0.00 - 0.20 E9/L   Comprehensive Metabolic Panel    Collection Time: 12/22/22  8:20 PM   Result Value Ref Range    Sodium 134 132 - 146 mmol/L    Potassium 5.6 (H) 3.5 - 5.0 mmol/L    Chloride 101 98 - 107 mmol/L    CO2 19 (L) 22 - 29 mmol/L    Anion Gap 14 7 - 16 mmol/L    Glucose 248 (H) 74 - 99 mg/dL    BUN 39 (H) 6 - 23 mg/dL    Creatinine 1.3 (H) 0.7 - 1.2 mg/dL    Est, Glom Filt Rate 57 >=60 mL/min/1.73    Calcium 9.1 8.6 - 10.2 mg/dL    Total Protein 7.4 6.4 - 8.3 g/dL    Albumin 3.5 3.5 - 5.2 g/dL    Total Bilirubin 0.8 0.0 - 1.2 mg/dL    Alkaline Phosphatase 122 40 - 129 U/L    ALT 13 0 - 40 U/L    AST 52 (H) 0 - 39 U/L   Magnesium    Collection Time: 12/22/22  8:20 PM   Result Value Ref Range    Magnesium 1.6 1.6 - 2.6 mg/dL   Brain Natriuretic Peptide    Collection Time: 12/22/22  8:20 PM   Result Value Ref Range    Pro-BNP 16,742 (H) 0 - 450 pg/mL   Troponin    Collection Time: 12/22/22  8:20 PM   Result Value Ref Range    Troponin, High Sensitivity 74 (H) 0 - 11 ng/L   COVID-19, Rapid    Collection Time: 12/22/22  8:20 PM    Specimen: Nasopharyngeal Swab   Result Value Ref Range    SARS-CoV-2, NAAT Not Detected Not Detected   Rapid influenza A/B antigens    Collection Time: 12/22/22  8:20 PM    Specimen: Nasopharyngeal   Result Value Ref Range    Influenza A by PCR Not Detected Not Detected    Influenza B by PCR Not Detected Not Detected   Protime-INR    Collection Time: 12/22/22  8:20 PM   Result Value Ref Range    Protime 16.1 (H) 9.3 - 12.4 sec    INR 1.4    APTT    Collection Time: 12/22/22  8:20 PM   Result Value Ref Range    aPTT 32.7 24.5 - 35.1 sec   EKG 12 Lead    Collection Time: 12/22/22  9:20 PM   Result Value Ref Range    Ventricular Rate 73 BPM    Atrial Rate 73 BPM    P-R Interval 216 ms    QRS Duration 152 ms    Q-T Interval 466 ms    QTc Calculation (Bazett) 513 ms    P Axis 6 degrees    R Axis -126 degrees    T Axis -21 degrees   Troponin    Collection Time: 12/22/22 11:27 PM   Result Value Ref Range    Troponin, High Sensitivity 70 (H) 0 - 11 ng/L   APTT    Collection Time: 12/22/22 11:27 PM   Result Value Ref Range    aPTT 30.9 24.5 - 35.1 sec       CTA PULMONARY W CONTRAST   Final Result   No evidence of pulmonary embolism. Cardiomegaly with dependent pulmonary edema and bilateral small pleural   effusions consistent with congestive heart failure acute exacerbation. Reflux of injected contrast into dilated hepatic veins suggests a component   of right heart failure. Partially visualized abdominal ascites and cirrhotic appearing liver, which   may be cardiogenic. Correlate clinically. CT Head W/O Contrast   Final Result   No acute intracranial abnormality. Diffuse cerebral and cerebellar volume loss and chronic small vessel ischemic   white matter change. US DUP LOWER EXTREMITIES BILATERAL VENOUS   Final Result   DVT, right mid superficial femoral to distal superficial femoral vein. No evidence of DVT in the left lower extremity. Critical results were called by Dr. Magalis Kenny to Shauna Quiros CNP on   12/22/2022 at 7:31 p.m. XR CHEST (2 VW)   Final Result   Mild cardiomegaly and mild bilateral pleural effusion.              ASSESSMENT/PLAN:      Active Hospital Problems    Diagnosis Date Noted    Ac[facility] decompens[facility] [I50.9] 12/23/2022     Priority: Medium    DVT (deep venous thrombosis) ([facility] [I82.409] 12/23/2022     Priority: Medium    Chronic renal disease, stage III [facility]) [695664] [N18.30] 05/18/2022     Priority: Medium    Diabetes mellitus type 2 in nonobese [facility]) [E11.9] 09/10/2020    PVD (peripheral vascular disease) [facility] [I73.9] 10/07/2019    Coronary artery disease involving native coronary artery of native heart without angina pectoris [I25.10] 10/09/2018    Mixed hyperlipidemia [E78.2] 10/06/2018    Hypertension [I10] 06/11/2018       Decompensated HFrEF  On admission: proBNP 16, 742. CXR mild cardiomegaly and mild bilateral pleural effusion. Echo 9/2020 which showed left ventricular ejection fraction 40-45% with inferior akinesis with moderate MR, mild AR. Given Lasix 40 mg IV once in ED  Defer lasix dosing to morning team pending creatinine   Continue Toprol 25 mg BID  Continue Cozaar 50 mg qd  Aldactone 25 mg qd  Strict I&Os  Daily weights  Low salt diet    DVT  Likely provoked from recent COVID infection. US Doppler showed DVT, right mid superficial femoral to distal superficial femoral vein  Stop heparin  Start Eliquis 10 mg twice daily for 1 week followed by Eliquis 5 mg twice daily    Hyperkalemia   K 5.6 in the ED. Received calcium chloride in ED. no changes on EKG  Monitor CMP    HTN  Continue Cozaar 50 mg daily    DMII  Home medication Lantus 10 units qhs, glipizide 10 mg BID, metformin 1000 mg twice daily, Januvia 100 mg daily  Lantus 10 units nightly  MDSSI  Diabetic diet  Hypoglycemia protocol  POCT glucose checks    Transaminitis  Likely secondary to decompensated heart failure. Denies EtOH use.   Monitor CMP    HL  Continue Crestor 10 mg daily    CKD III  Baseline cr 1.2-1.3  Monitor         Pain Control:  Tylenol 650 mg Q6HR PRN for mild pain  DVT ppx: Eliquis 10 mg BID  GI ppx: None  Code Status: Full  Diet: Diabetic diet and Low Sodium       Case discussed with attending on call Dr. Melita Kennedy MD   Family Medicine Resident Physician PGY-2  12/23/2022

## 2022-12-23 NOTE — CARE COORDINATION
Pt from home with spouse; uses cane/ww prn. PCP . room air. Cardiology; CHF ed following for CHF. +dvt new Eliquis; 30 day free card given with instruction advised to follow-up as instructed with physican at discharge. Plan is home, no additional needs. Wife can transport. Efren Nava.

## 2022-12-23 NOTE — PROGRESS NOTES
Occupational Therapy      Occupational Therapy referral received.   Attempt made- patient off floor - test

## 2022-12-23 NOTE — CARE COORDINATION
Family requesting [address] [employer] for home; all DME companies closed;provided RX for ww; they will obtain one from [employer] on Tuesday; they are temporarily using a family [address]  and will have one at home until then. Christopher Macdonald RN,CM.

## 2022-12-23 NOTE — DISCHARGE INSTRUCTIONS
Take [id number] mg twice a day until 12/29. Take Eliquis 5 mg twice daily starting 12/30  Follow up within 2 weeks of discharge at [facility]  / [address] [place]:  [address]:     MEDICATIONS:  Take your medication as directed. If you are experiencing any side effects, inform your doctor, Do not stop taking any of your medications without letting your doctor know. Check with your doctor before taking any over-the-counter medications / herbal / or dietary supplements. They may interfere with your other medications. Do not take ibuprofen (Advil or Motrin) and naproxen (Aleve) without talking to your doctor first. They could make your heart failure worse. WEIGHT MONITORING:   Weigh yourself everyday (with the same scale) around the same time of the day and write it down. (you can chart them on a calendar or keep track of them on paper. Notify your doctor of a weight gain of 3 pounds or more in 1 day   OR a total of 5 pounds or more in 1 week    Take your weight record to your doctor visits  Also, the same goes if you loose more than 3# in one day, let your heart doctor know. DIET:   Cardiac heart healthy diet- Low saturated / low trans fat, no added salt, caffeine restricted, Low sodium diet-   No more than 2,000mg (2 grams) of salt / sodium per day (which equals to a little less than  a teaspoon of salt)  If your doctor wants you on a fluid restriction. ..it is usually recommended a fluid limit of 2,000cc -  Fluid restriction- 2,000 ml (milliliters) = 64 ounces = you can have 8 glasses of fluid per day (each glass 8 ounces)    Follow a low salt diet - avoid using salt at the table, avoid / limit use of canned soups, processed / packaged foods, salted snacks, olives and pickles.   Do not use a salt substitute without checking with your doctor, they may contain a high amount of potassioum. (Mrs. Emre Conway is safe to use). Limit the use of alcohol       CALL YOUR DOCTOR THE FIRST DAY YOU NOTICE ANY OF THESE   SYMPTOMS:  You have a weight gain of 3 pounds or more in 1 day         OR 5 pounds or more in one week  More shortness of breath  More swelling of your stomach, legs, ankles or feet  Feeling more tired, No energy  Dry hacky cough  Dizziness  More chest pain / discomfort       (CALL 911 IF ANY OF THE FOLLOWING OCCURS  Chest pain (not relieved with nitroglycerine, if you have been prescribed this medication)  Severe shortness of breath  Faint / Pass out  Confusion / cannot think clearly  If symptoms get worse           SMOKING - TOBACCO USE:  * IF YOU SMOKE - STOP! Kick the habit. [address] Program is offered at [place] [address] and [address] [facility]. Call [phone] extension [phone] for more information. ACTIVITY:   (Ask your doctor when you will be able to return to work and before starting any exercise program.  Do not drive unless unless your doctor has given you permission to do so). Start light exercise. Even if you can only do a small amount, exercise will help you get stronger, have more energy, help manage your weight and decrease  stress. Walking is an easy way to get exercise. Start out slowly and  increase the amount you walk as tolerated  If you become short of breath, dizzy or have chest pain; stop, sit down, and rest.  If you feel \"wiped out\" the day after you exercise, walk at a slower pace or for a shorter distance. You can gradually increase the pace or amount of time. (Do not exercise right after a meal or in extreme temperatures, such as above 85 degrees, if the air is really humid, or wind chill is less than 20 degrees)                                             ADDITIONAL INFORMATION:  Avoid getting sick from colds and the flu.  Stay away from friends or family that you know may have a contagious illness  Get plenty of rest   Get a flu shot each year. Get a pneumococcal vaccine shot. If you have had one before, ask your doctor whether you need another dose. My Goal for Self-management of Heart Failure Includes 5 steps :    1. Notice a change in symptoms ( weight gain, short of breath, leg swelling, decreased activity level, bloating. ...)    2. Evaluate the change: (use the Heart Failure Zones )     3. Decide to take action: decide what your options are, such as: (call your doctor for an extra visit, take a prescribed medication, such as your water pill if your doctor has given you directions to do so, [address])    4. Come up with a strategy:  (now you call the doctor for advice / appointment. This is where you take action!!! Do not wait, catch the symptom early and treat it before it worsens. 5. Evaluate the response: The next day, check your Heart Failure Zones: are you in the GREEN ZONE (safe zone)? Worsening symptoms of YELLOW ZONE? Or have you moved to the RED ZONE and need to call 911 or go to the Emergency Room for evaluation? Call your doctor's office to update them on your symptoms of heart failure. You were started on a new medication called Eliquis and given a discount card. Please be sure to follow up with your physician soon after discharge so that this medication can be continued without interruption. Pre certification may be required with your insurance company.

## 2022-12-23 NOTE — PROGRESS NOTES
[facility]  [facility]    Chief complaint :  Chief Complaint   Patient presents with    Shortness of Breath     Leg swelling        Subjective:    No overnight problems. Patient describes feeling better this morning. His breathing has improved and he has noticed improvement in his swelling. Tolerating diet. Past medical, surgical, family and social history were reviewed, non-contributory, and unchanged unless otherwise stated. Review of Systems   Constitutional:  Negative for chills, fatigue and fever. HENT:  Negative for congestion, rhinorrhea and sore throat. Respiratory:  Negative for cough, chest tightness and shortness of breath. Cardiovascular:  Negative for chest pain and palpitations. Gastrointestinal:  Negative for abdominal pain, constipation, diarrhea, nausea and vomiting. Genitourinary:  Negative for dysuria and frequency. Neurological:  Negative for dizziness and light-headedness. All other systems reviewed and are negative. Objective:  BP (!) 178/86   Pulse 82   Temp 98 °F (36.7 °C) (Oral)   Resp 16   Ht 5' 9\" (1.753 m)   Wt 140 lb (63.5 kg)   SpO2 93%   BMI 20.67 kg/m²     Physical Exam  Constitutional:       General: He is not in acute distress. Appearance: Normal appearance. HENT:      Head: Normocephalic and atraumatic. Mouth/Throat:      Mouth: Mucous membranes are moist.      Pharynx: Oropharynx is clear. Eyes:      Extraocular Movements: Extraocular movements intact. Conjunctiva/sclera: Conjunctivae normal.   Cardiovascular:      Rate and Rhythm: Normal rate and regular rhythm. Pulses: Normal pulses. Heart sounds: Normal heart sounds. No murmur heard. Pulmonary:      Effort: Pulmonary effort is normal.      Breath sounds: Normal breath sounds. No wheezing. Comments: Bibasliar crackles  Musculoskeletal:      Cervical back: Normal range of motion.       Right lower leg: Edema (tracde) present. Left lower leg: Edema (trace) present. Skin:     General: Skin is warm and dry. Neurological:      General: No focal deficit present. Mental Status: He is alert and oriented to person, place, and time.    Psychiatric:         Attention and Perception: Attention normal.         Mood and Affect: Mood normal.         Labs:  Recent Results (from the past 24 hour(s))   EKG 12 Lead    Collection Time: 12/22/22  8:04 PM   Result Value Ref Range    Ventricular Rate 74 BPM    Atrial Rate 72 BPM    QRS Duration 164 ms    Q-T Interval 464 ms    QTc Calculation (Bazett) 515 ms    R Axis -138 degrees    T Axis 16 degrees   CBC with Auto Differential    Collection Time: 12/22/22  8:20 PM   Result Value Ref Range    WBC 8.5 4.5 - 11.5 E9/L    RBC 4.04 3.80 - 5.80 E12/L    Hemoglobin 11.4 (L) 12.5 - 16.5 g/dL    Hematocrit 36.7 (L) 37.0 - 54.0 %    MCV 90.8 80.0 - 99.9 fL    MCH 28.2 26.0 - 35.0 pg    MCHC 31.1 (L) 32.0 - 34.5 %    RDW 15.3 (H) 11.5 - 15.0 fL    Platelets 864 646 - 839 E9/L    MPV 10.9 7.0 - 12.0 fL    Neutrophils % 68.0 43.0 - 80.0 %    Immature Granulocytes % 0.5 0.0 - 5.0 %    Lymphocytes % 19.0 (L) 20.0 - 42.0 %    Monocytes % 9.8 2.0 - 12.0 %    Eosinophils % 1.8 0.0 - 6.0 %    Basophils % 0.9 0.0 - 2.0 %    Neutrophils Absolute 5.79 1.80 - 7.30 E9/L    Immature Granulocytes # 0.04 E9/L    Lymphocytes Absolute 1.62 1.50 - 4.00 E9/L    Monocytes Absolute 0.83 0.10 - 0.95 E9/L    Eosinophils Absolute 0.15 0.05 - 0.50 E9/L    Basophils Absolute 0.08 0.00 - 0.20 E9/L   Comprehensive Metabolic Panel    Collection Time: 12/22/22  8:20 PM   Result Value Ref Range    Sodium 134 132 - 146 mmol/L    Potassium 5.6 (H) 3.5 - 5.0 mmol/L    Chloride 101 98 - 107 mmol/L    CO2 19 (L) 22 - 29 mmol/L    Anion Gap 14 7 - 16 mmol/L    Glucose 248 (H) 74 - 99 mg/dL    BUN 39 (H) 6 - 23 mg/dL    Creatinine 1.3 (H) 0.7 - 1.2 mg/dL    Est, Glom Filt Rate 57 >=60 mL/min/1.73    Calcium 9.1 8.6 - 10.2 mg/dL Total Protein 7.4 6.4 - 8.3 g/dL    Albumin 3.5 3.5 - 5.2 g/dL    Total Bilirubin 0.8 0.0 - 1.2 mg/dL    Alkaline Phosphatase 122 40 - 129 U/L    ALT 13 0 - 40 U/L    AST 52 (H) 0 - 39 U/L   Magnesium    Collection Time: 12/22/22  8:20 PM   Result Value Ref Range    Magnesium 1.6 1.6 - 2.6 mg/dL   Brain Natriuretic Peptide    Collection Time: 12/22/22  8:20 PM   Result Value Ref Range    Pro-BNP 16,742 (H) 0 - 450 pg/mL   Troponin    Collection Time: 12/22/22  8:20 PM   Result Value Ref Range    Troponin, High Sensitivity 74 (H) 0 - 11 ng/L   COVID-19, Rapid    Collection Time: 12/22/22  8:20 PM    Specimen: Nasopharyngeal Swab   Result Value Ref Range    SARS-CoV-2, NAAT Not Detected Not Detected   Rapid influenza A/B antigens    Collection Time: 12/22/22  8:20 PM    Specimen: Nasopharyngeal   Result Value Ref Range    Influenza A by PCR Not Detected Not Detected    Influenza B by PCR Not Detected Not Detected   Protime-INR    Collection Time: 12/22/22  8:20 PM   Result Value Ref Range    Protime 16.1 (H) 9.3 - 12.4 sec    INR 1.4    APTT    Collection Time: 12/22/22  8:20 PM   Result Value Ref Range    aPTT 32.7 24.5 - 35.1 sec   EKG 12 Lead    Collection Time: 12/22/22  9:20 PM   Result Value Ref Range    Ventricular Rate 73 BPM    Atrial Rate 73 BPM    P-R Interval 216 ms    QRS Duration 152 ms    Q-T Interval 466 ms    QTc Calculation (Bazett) 513 ms    P Axis 6 degrees    R Axis -126 degrees    T Axis -21 degrees   Troponin    Collection Time: 12/22/22 11:27 PM   Result Value Ref Range    Troponin, High Sensitivity 70 (H) 0 - 11 ng/L   APTT    Collection Time: 12/22/22 11:27 PM   Result Value Ref Range    aPTT 30.9 24.5 - 35.1 sec   POCT Glucose    Collection Time: 12/23/22  5:45 AM   Result Value Ref Range    Meter Glucose 212 (H) 74 - 99 mg/dL       Radiology and other tests reviewed:  CTA PULMONARY W CONTRAST   Final Result   No evidence of pulmonary embolism.       Cardiomegaly with dependent pulmonary edema and bilateral small pleural   effusions consistent with congestive heart failure acute exacerbation. Reflux of injected contrast into dilated hepatic veins suggests a component   of right heart failure. Partially visualized abdominal ascites and cirrhotic appearing liver, which   may be cardiogenic. Correlate clinically. CT Head W/O Contrast   Final Result   No acute intracranial abnormality. Diffuse cerebral and cerebellar volume loss and chronic small vessel ischemic   white matter change. US DUP LOWER EXTREMITIES BILATERAL VENOUS   Final Result   DVT, right mid superficial femoral to distal superficial femoral vein. No evidence of DVT in the left lower extremity. Critical results were called by Dr. Griselda Drought to Nettie Armstrong CNP on   12/22/2022 at 7:31 p.m. XR CHEST (2 VW)   Final Result   Mild cardiomegaly and mild bilateral pleural effusion. Assessment:  Active Hospital Problems    Diagnosis Date Noted    Acute decompensated heart failure ([facility].) [I50.9] 12/23/2022     Priority: Medium    DVT (deep venous thrombosis) ([facility] [I82.409] 12/23/2022     Priority: Medium    Chronic renal disease, stage III [facility]) [596984] [N18.30] 05/18/2022     Priority: Medium    Diabetes mellitus type 2 in nonobese ([facility] [E11.9] 09/10/2020    PVD (peripheral vascular disease) ([facility].) [I73.9] 10/07/2019    Coronary artery disease involving native coronary artery of native heart without angina pectoris [I25.10] 10/09/2018    Mixed hyperlipidemia [E78.2] 10/06/2018    Hypertension [I10] 06/11/2018       Plan:  Decompensated HFrEF  On admission: proBNP 16, 742. CXR mild cardiomegaly and mild bilateral pleural effusion. Echo 9/2020 which showed left ventricular ejection fraction 40-45% with inferior akinesis with moderate MR, mild AR.   Continue lasix  Continue Toprol 25 mg BID  Continue Cozaar 50 mg qd  Aldactone 25 mg qd  Strict I&Os  Daily weights  Low salt diet DVT  Likely provoked from recent COVID infection. US Doppler showed DVT, right mid superficial femoral to distal superficial femoral vein  Stop heparin  Continue Eliquis 10 mg twice daily for 1 week followed by Eliquis 5 mg twice daily     Hyperkalemia   K 5.6 in the ED. Received calcium chloride in ED. no changes on EKG  Monitor CMP     HTN  Continue Cozaar 50 mg daily     DMII  Home medication Lantus 10 units qhs, glipizide 10 mg BID, metformin 1000 mg twice daily, Januvia 100 mg daily  Lantus 10 units nightly  MDSSI  Diabetic diet  Hypoglycemia protocol  POCT glucose checks     Transaminitis  Likely secondary to decompensated heart failure. Denies EtOH use.   Monitor CMP     HL  Continue Crestor 10 mg daily     CKD III  Baseline cr 1.2-1.3  Monitor     Jessica Alston DO  Family Medicine Resident PGY-3  12/23/22   7:54 AM

## 2022-12-24 LAB
ALBUMIN SERPL-MCNC: 3.2 G/DL (ref 3.5–5.2)
ALP BLD-CCNC: 113 U/L (ref 40–129)
ALT SERPL-CCNC: 17 U/L (ref 0–40)
ANION GAP SERPL CALCULATED.3IONS-SCNC: 16 MMOL/L (ref 7–16)
AST SERPL-CCNC: 52 U/L (ref 0–39)
BASOPHILS ABSOLUTE: 0.09 E9/L (ref 0–0.2)
BASOPHILS RELATIVE PERCENT: 1.2 % (ref 0–2)
BILIRUB SERPL-MCNC: 1.1 MG/DL (ref 0–1.2)
BUN BLDV-MCNC: 34 MG/DL (ref 6–23)
CALCIUM SERPL-MCNC: 9.8 MG/DL (ref 8.6–10.2)
CHLORIDE BLD-SCNC: 101 MMOL/L (ref 98–107)
CO2: 18 MMOL/L (ref 22–29)
CREAT SERPL-MCNC: 1.3 MG/DL (ref 0.7–1.2)
EOSINOPHILS ABSOLUTE: 0.31 E9/L (ref 0.05–0.5)
EOSINOPHILS RELATIVE PERCENT: 4 % (ref 0–6)
GFR SERPL CREATININE-BSD FRML MDRD: 57 ML/MIN/1.73
GLUCOSE BLD-MCNC: 147 MG/DL (ref 74–99)
HCT VFR BLD CALC: 40.2 % (ref 37–54)
HEMOGLOBIN: 12.8 G/DL (ref 12.5–16.5)
IMMATURE GRANULOCYTES #: 0.02 E9/L
IMMATURE GRANULOCYTES %: 0.3 % (ref 0–5)
LYMPHOCYTES ABSOLUTE: 1.56 E9/L (ref 1.5–4)
LYMPHOCYTES RELATIVE PERCENT: 20.3 % (ref 20–42)
MAGNESIUM: 2.1 MG/DL (ref 1.6–2.6)
MCH RBC QN AUTO: 28 PG (ref 26–35)
MCHC RBC AUTO-ENTMCNC: 31.8 % (ref 32–34.5)
MCV RBC AUTO: 88 FL (ref 80–99.9)
METER GLUCOSE: 136 MG/DL (ref 74–99)
METER GLUCOSE: 162 MG/DL (ref 74–99)
METER GLUCOSE: 254 MG/DL (ref 74–99)
METER GLUCOSE: 272 MG/DL (ref 74–99)
MONOCYTES ABSOLUTE: 0.82 E9/L (ref 0.1–0.95)
MONOCYTES RELATIVE PERCENT: 10.7 % (ref 2–12)
NEUTROPHILS ABSOLUTE: 4.88 E9/L (ref 1.8–7.3)
NEUTROPHILS RELATIVE PERCENT: 63.5 % (ref 43–80)
PDW BLD-RTO: 15.5 FL (ref 11.5–15)
PLATELET # BLD: 250 E9/L (ref 130–450)
PMV BLD AUTO: 10.5 FL (ref 7–12)
POTASSIUM REFLEX MAGNESIUM: 4 MMOL/L (ref 3.5–5)
RBC # BLD: 4.57 E12/L (ref 3.8–5.8)
SODIUM BLD-SCNC: 135 MMOL/L (ref 132–146)
TOTAL PROTEIN: 6.9 G/DL (ref 6.4–8.3)
WBC # BLD: 7.7 E9/L (ref 4.5–11.5)

## 2022-12-24 PROCEDURE — 80053 COMPREHEN METABOLIC PANEL: CPT

## 2022-12-24 PROCEDURE — 99232 SBSQ HOSP IP/OBS MODERATE 35: CPT | Performed by: FAMILY MEDICINE

## 2022-12-24 PROCEDURE — 6370000000 HC RX 637 (ALT 250 FOR IP)

## 2022-12-24 PROCEDURE — 82962 GLUCOSE BLOOD TEST: CPT

## 2022-12-24 PROCEDURE — 83735 ASSAY OF MAGNESIUM: CPT

## 2022-12-24 PROCEDURE — 6360000002 HC RX W HCPCS

## 2022-12-24 PROCEDURE — 36415 COLL VENOUS BLD VENIPUNCTURE: CPT

## 2022-12-24 PROCEDURE — 85025 COMPLETE CBC W/AUTO DIFF WBC: CPT

## 2022-12-24 PROCEDURE — 2580000003 HC RX 258

## 2022-12-24 PROCEDURE — 1200000000 HC SEMI PRIVATE

## 2022-12-24 PROCEDURE — 6370000000 HC RX 637 (ALT 250 FOR IP): Performed by: FAMILY MEDICINE

## 2022-12-24 PROCEDURE — 99233 SBSQ HOSP IP/OBS HIGH 50: CPT | Performed by: INTERNAL MEDICINE

## 2022-12-24 RX ADMIN — SODIUM CHLORIDE, PRESERVATIVE FREE 10 ML: 5 INJECTION INTRAVENOUS at 20:09

## 2022-12-24 RX ADMIN — APIXABAN 10 MG: 5 TABLET, FILM COATED ORAL at 17:58

## 2022-12-24 RX ADMIN — METOPROLOL SUCCINATE 25 MG: 25 TABLET, EXTENDED RELEASE ORAL at 20:03

## 2022-12-24 RX ADMIN — SACUBITRIL AND VALSARTAN 1 TABLET: 24; 26 TABLET, FILM COATED ORAL at 20:04

## 2022-12-24 RX ADMIN — FUROSEMIDE 40 MG: 10 INJECTION, SOLUTION INTRAMUSCULAR; INTRAVENOUS at 17:59

## 2022-12-24 RX ADMIN — SPIRONOLACTONE 25 MG: 25 TABLET ORAL at 09:28

## 2022-12-24 RX ADMIN — INSULIN LISPRO 4 UNITS: 100 INJECTION, SOLUTION INTRAVENOUS; SUBCUTANEOUS at 11:42

## 2022-12-24 RX ADMIN — ROSUVASTATIN CALCIUM 10 MG: 10 TABLET, FILM COATED ORAL at 20:03

## 2022-12-24 RX ADMIN — SODIUM CHLORIDE, PRESERVATIVE FREE 10 ML: 5 INJECTION INTRAVENOUS at 09:29

## 2022-12-24 RX ADMIN — APIXABAN 10 MG: 5 TABLET, FILM COATED ORAL at 06:08

## 2022-12-24 RX ADMIN — EMPAGLIFLOZIN 10 MG: 10 TABLET, FILM COATED ORAL at 09:28

## 2022-12-24 RX ADMIN — METOPROLOL SUCCINATE 25 MG: 25 TABLET, EXTENDED RELEASE ORAL at 09:28

## 2022-12-24 RX ADMIN — FUROSEMIDE 40 MG: 10 INJECTION, SOLUTION INTRAMUSCULAR; INTRAVENOUS at 09:29

## 2022-12-24 RX ADMIN — SACUBITRIL AND VALSARTAN 1 TABLET: 24; 26 TABLET, FILM COATED ORAL at 09:28

## 2022-12-24 RX ADMIN — INSULIN GLARGINE 10 UNITS: 100 INJECTION, SOLUTION SUBCUTANEOUS at 20:05

## 2022-12-24 ASSESSMENT — ENCOUNTER SYMPTOMS
DIARRHEA: 0
ABDOMINAL PAIN: 0
SORE THROAT: 0
SHORTNESS OF BREATH: 0
RHINORRHEA: 0
COUGH: 0
NAUSEA: 0
VOMITING: 0
CONSTIPATION: 0
CHEST TIGHTNESS: 0

## 2022-12-24 NOTE — PROGRESS NOTES
[facility]    Chief complaint :  Chief Complaint   Patient presents with    Shortness of Breath     Leg swelling        Subjective:  No overnight problems. Patient tolerating diet. Patient diuresed -1.1 L in 24 hours. Patient ambulating without complaints. Denies shortness of breath, chest pain, palpitations. Past medical, surgical, family and social history were reviewed, non-contributory, and unchanged unless otherwise stated. Review of Systems   Constitutional:  Negative for chills, fatigue and fever. HENT:  Negative for congestion, rhinorrhea and sore throat. Respiratory:  Negative for cough, chest tightness and shortness of breath. Cardiovascular:  Negative for chest pain and palpitations. Gastrointestinal:  Negative for abdominal pain, constipation, diarrhea, nausea and vomiting. Genitourinary:  Negative for dysuria and frequency. Neurological:  Negative for dizziness and light-headedness. All other systems reviewed and are negative. Objective:  BP (!) 150/67   Pulse 63   Temp 98 °F (36.7 °C) (Oral)   Resp 16   Ht 5' 9\" (1.753 m)   Wt 140 lb (63.5 kg)   SpO2 98%   BMI 20.67 kg/m²     Physical Exam  Constitutional:       General: He is not in acute distress. Appearance: Normal appearance. HENT:      Head: Normocephalic and atraumatic. Right Ear: External ear normal.      Left Ear: External ear normal.      Mouth/Throat:      Mouth: Mucous membranes are moist.      Pharynx: Oropharynx is clear. Eyes:      Extraocular Movements: Extraocular movements intact. Conjunctiva/sclera: Conjunctivae normal.   Cardiovascular:      Rate and Rhythm: Normal rate and regular rhythm. Pulses: Normal pulses. Heart sounds: Normal heart sounds. No murmur heard. Pulmonary:      Effort: Pulmonary effort is normal.      Breath sounds: Normal breath sounds. No wheezing.    Musculoskeletal:      Cervical back: Normal range of motion. Right lower leg: Edema (1+) present. Left lower leg: Edema (1+) present. Skin:     General: Skin is warm and dry. Neurological:      General: No focal deficit present. Mental Status: He is alert and oriented to person, place, and time.    Psychiatric:         Attention and Perception: Attention normal.         Mood and Affect: Mood normal.         Labs:  Recent Results (from the past 24 hour(s))   POCT Glucose    Collection Time: 12/24/22 11:38 AM   Result Value Ref Range    Meter Glucose 254 (H) 74 - 99 mg/dL   POCT Glucose    Collection Time: 12/24/22  4:05 PM   Result Value Ref Range    Meter Glucose 136 (H) 74 - 99 mg/dL   POCT Glucose    Collection Time: 12/24/22  8:00 PM   Result Value Ref Range    Meter Glucose 272 (H) 74 - 99 mg/dL   CBC with Auto Differential    Collection Time: 12/25/22  2:37 AM   Result Value Ref Range    WBC 8.2 4.5 - 11.5 E9/L    RBC 4.32 3.80 - 5.80 E12/L    Hemoglobin 12.3 (L) 12.5 - 16.5 g/dL    Hematocrit 37.7 37.0 - 54.0 %    MCV 87.3 80.0 - 99.9 fL    MCH 28.5 26.0 - 35.0 pg    MCHC 32.6 32.0 - 34.5 %    RDW 15.1 (H) 11.5 - 15.0 fL    Platelets 451 138 - 688 E9/L    MPV 10.3 7.0 - 12.0 fL    Neutrophils % 59.5 43.0 - 80.0 %    Immature Granulocytes % 0.2 0.0 - 5.0 %    Lymphocytes % 25.8 20.0 - 42.0 %    Monocytes % 9.7 2.0 - 12.0 %    Eosinophils % 3.8 0.0 - 6.0 %    Basophils % 1.0 0.0 - 2.0 %    Neutrophils Absolute 4.89 1.80 - 7.30 E9/L    Immature Granulocytes # 0.02 E9/L    Lymphocytes Absolute 2.12 1.50 - 4.00 E9/L    Monocytes Absolute 0.80 0.10 - 0.95 E9/L    Eosinophils Absolute 0.31 0.05 - 0.50 E9/L    Basophils Absolute 0.08 0.00 - 0.20 E9/L   Comprehensive Metabolic Panel w/ Reflex to MG    Collection Time: 12/25/22  2:37 AM   Result Value Ref Range    Sodium 138 132 - 146 mmol/L    Potassium reflex Magnesium 3.7 3.5 - 5.0 mmol/L    Chloride 100 98 - 107 mmol/L    CO2 23 22 - 29 mmol/L    Anion Gap 15 7 - 16 mmol/L    Glucose 157 (H) 74 - 99 mg/dL    BUN 40 (H) 6 - 23 mg/dL    Creatinine 1.6 (H) 0.7 - 1.2 mg/dL    Est, Glom Filt Rate 44 >=60 mL/min/1.73    Calcium 9.6 8.6 - 10.2 mg/dL    Total Protein 7.0 6.4 - 8.3 g/dL    Albumin 3.4 (L) 3.5 - 5.2 g/dL    Total Bilirubin 1.0 0.0 - 1.2 mg/dL    Alkaline Phosphatase 119 40 - 129 U/L    ALT 16 0 - 40 U/L    AST 39 0 - 39 U/L   POCT Glucose    Collection Time: 12/25/22  6:27 AM   Result Value Ref Range    Meter Glucose 132 (H) 74 - 99 mg/dL       Radiology and other tests reviewed:  CTA PULMONARY W CONTRAST   Final Result   No evidence of pulmonary embolism. Cardiomegaly with dependent pulmonary edema and bilateral small pleural   effusions consistent with congestive heart failure acute exacerbation. Reflux of injected contrast into dilated hepatic veins suggests a component   of right heart failure. Partially visualized abdominal ascites and cirrhotic appearing liver, which   may be cardiogenic. Correlate clinically. CT Head W/O Contrast   Final Result   No acute intracranial abnormality. Diffuse cerebral and cerebellar volume loss and chronic small vessel ischemic   white matter change. US DUP LOWER EXTREMITIES BILATERAL VENOUS   Final Result   DVT, right mid superficial femoral to distal superficial femoral vein. No evidence of DVT in the left lower extremity. Critical results were called by Dr. Griselda Drought to Nettie Armstrong CNP on   12/22/2022 at 7:31 p.m. XR CHEST (2 VW)   Final Result   Mild cardiomegaly and mild bilateral pleural effusion.              Assessment:  Active Hospital Problems    Diagnosis Date Noted    Acute decompensated heart failure ([address] [I50.9] 12/23/2022     Priority: Medium    DVT (deep venous thrombosis) ([facility]) [I82.409] 12/23/2022     Priority: Medium    Elevated troponin [R77.8] 12/23/2022     Priority: Medium    Chronic renal disease, stage III [facility]) [388517] [N18.30] 05/18/2022     Priority: Medium    Diabetes mellitus type 2 in nonobese ([facility] [E11.9] 09/10/2020    Acute on chronic systolic heart failure ([facility] [I50.23] 09/10/2020    PVD (peripheral vascular disease) ([facility] [I73.9] 10/07/2019    Coronary artery disease involving native coronary artery of native heart without angina pectoris [I25.10] 10/09/2018    Mixed hyperlipidemia [E78.2] 10/06/2018    Shortness of breath [R06.02]     Hypertension [I10] 06/11/2018       Plan:  Decompensated HFrEF  On admission: proBNP 16, 742. CXR mild cardiomegaly and mild bilateral pleural effusion. Echo 9/2020 which showed left ventricular ejection fraction 40-45% with inferior akinesis with moderate MR, mild AR. Continue Toprol 25 mg BID  Continue Cozaar 50 mg qd  Aldactone 25 mg qd  Strict I&Os: -1400 total  Low salt diet  Continue Jardiance and Entresto  Lasix switched to Demadex 20 mg p.o. daily  Continue monitor renal function  Cardiology following appreciate input     DVT  Likely provoked from recent COVID infection. US Doppler showed DVT, right mid superficial femoral to distal superficial femoral vein  Continue Eliquis 10 mg twice daily for 1 week followed by Eliquis 5 mg twice daily     HTN  Continue Cozaar 50 mg daily     DMII  Home medication Lantus 10 units qhs, glipizide 10 mg BID, metformin 1000 mg twice daily, Januvia 100 mg daily  Lantus 10 units nightly  MDSSI  Diabetic diet  Hypoglycemia protocol  POCT glucose checks     Transaminitis, improving  Likely secondary to decompensated heart failure. Denies EtOH use. Monitor CMP     HL  Continue Crestor 10 mg daily     CKD III  Baseline cr 1.2-1.3  Monitor     Dispo: Discharge home pending clinical course.   We will contact cardiology to determine discharge    Vallerie Bumpers, MD  Family Medicine Resident PGY-2  12/25/22   9:01 AM

## 2022-12-24 NOTE — PROGRESS NOTES
INPATIENT CARDIOLOGY FOLLOW-UP    Name: Manuel Ponce    Age: 69 y.o. Date of Admission: 12/22/2022  7:34 PM    Date of Service: 12/24/2022    Primary Cardiologist: Dr. Bj Vazquez    Chief Complaint: Follow-up for decompensated heart failure    Interim History:  No new overnight cardiac complaints. Currently with no complaints of CP, SOB, palpitations, dizziness, or lightheadedness. SR on telemetry. Occasional PVCs. Urine output not accurately documented.     Review of Systems:   Negative except as described above    Problem List:  Patient Active Problem List   Diagnosis    Hypertension    Shortness of breath    Left ventricular systolic dysfunction    Mixed hyperlipidemia    RBBB    Coronary artery disease involving native coronary artery of native heart without angina pectoris    Type 2 diabetes mellitus with diabetic peripheral angiopathy without gangrene, with long-term current use of insulin ([facility])    PVD (peripheral vascular disease) ([facility])    Acute on chronic systolic heart failure ([facility])    Diabetes mellitus type 2 in nonobese ([facility])    Lumbar radiculopathy    DDD (degenerative disc disease), lumbar    Spondylolisthesis of lumbar region    Spinal stenosis of lumbar region    Lumbosacral spondylosis without myelopathy    Chronic renal disease, stage III ([facility]) [242367]    Acute decompensated heart failure ([facility])    DVT (deep venous thrombosis) ([facility])    Elevated troponin       Current Medications:    Current Facility-Administered Medications:     sodium chloride flush 0.9 % injection 5-40 mL, 5-40 mL, IntraVENous, 2 times per day, Jesenia Medina MD, 10 mL at 12/24/22 0929    sodium chloride flush 0.9 % injection 5-40 mL, 5-40 mL, IntraVENous, PRN, Mendel Graves Ricke Rattan, MD    0.9 % sodium chloride infusion, 25 mL, IntraVENous, PRN, Mendel Graves Ricke Rattan, MD    polyethylene glycol [facility]) packet 17 g, 17 g, Oral, Daily PRN, Jesenia Medina MD acetaminophen (TYLENOL) tablet 650 mg, 650 mg, Oral, Q6H PRN **OR** acetaminophen (TYLENOL) suppository 650 mg, 650 mg, Rectal, Q6H PRN, Lisa Gomez MD    apixaban [facility]) tablet 10 mg, 10 mg, Oral, BID, 10 mg at 12/24/22 0608 **FOLLOWED BY** [START ON 12/30/2022] apixaban (ELIQUIS) tablet 5 mg, 5 mg, Oral, BID, Lisa Gomez MD    metoprolol succinate (TOPROL XL) extended release tablet 25 mg, 25 mg, Oral, BID, Lisa Gomez MD, 25 mg at 12/24/22 3748    rosuvastatin (CRESTOR) tablet 10 mg, 10 mg, Oral, Nightly, Lisa Gomez MD, 10 mg at 12/23/22 2035    spironolactone (ALDACTONE) tablet 25 mg, 25 mg, Oral, Daily, Lisa Gomez MD, 25 mg at 12/24/22 6677    glucose chewable tablet 16 g, 4 tablet, Oral, PRN, Lisa Gomez MD    dextrose bolus 10% 125 mL, 125 mL, IntraVENous, PRN **OR** dextrose bolus 10% 250 mL, 250 mL, IntraVENous, PRN, Lisa Gomez MD    glucagon (rDNA) injection 1 mg, 1 mg, SubCUTAneous, PRN, Lisa Gomez MD    dextrose 10 % infusion, , IntraVENous, Continuous PRN, Lisa Gomez MD    insulin lispro (HUMALOG) injection vial 0-8 Units, 0-8 Units, SubCUTAneous, TID WC, Lisa Gomez MD, 4 Units at 12/24/22 1142    insulin lispro (HUMALOG) injection vial 0-4 Units, 0-4 Units, SubCUTAneous, Nightly, Rosie Maria MD    melatonin tablet 3 mg, 3 mg, Oral, Nightly PRN, Martha Sargent MD    prochlorperazine (COMPAZINE) injection 10 mg, 10 mg, IntraVENous, Q6H PRN, Lisa Gomez MD, 10 mg at 12/23/22 4778    insulin glargine (LANTUS) injection vial 10 Units, 10 Units, SubCUTAneous, Nightly, Rosie Sargent MD    empagliflozin (JARDIANCE) tablet 10 mg, 10 mg, Oral, Daily, Eleanor Angles V, DO, 10 mg at 12/24/22 7344    perflutren lipid microspheres (DEFINITY) injection 1.5 mL, 1.5 mL, IntraVENous, ONCE PRN, Yunior Clark V, DO    furosemide (LASIX) injection 40 mg, 40 mg, IntraVENous, BID, Gay Spatz, APRN - CNP, 40 mg at 12/24/22 4532    sacubitril-valsartan (ENTRESTO) 24-26 MG per tablet 1 tablet, 1 tablet, Oral, BID, Gay Spatz, APRN - CNP, 1 tablet at 12/24/22 7703    Physical Exam:  /64   Pulse 64   Temp 97.9 °F (36.6 °C) (Oral)   Resp 18   Ht 5' 9\" (1.753 m)   Wt 140 lb (63.5 kg)   SpO2 96%   BMI 20.67 kg/m²   Wt Readings from Last 3 Encounters:   12/22/22 140 lb (63.5 kg)   11/11/22 145 lb (65.8 kg)   10/19/22 145 lb (65.8 kg)     Appearance: Awake, alert, no acute respiratory distress  Skin: Intact, no rash  Head: Normocephalic, atraumatic  Eyes: EOMI, no conjunctival erythema  ENMT: No pharyngeal erythema, MMM, no rhinorrhea  Neck: Supple, JVP elevated at 11 cm, no carotid bruits  Lungs: Clear to auscultation bilaterally. No wheezes, rales, or rhonchi. Cardiac: PMI nondisplaced, Regular rhythm with a normal rate, S1 & S2 normal, no murmurs  Abdomen: Soft, nontender, +bowel sounds  Extremities: Moves all extremities x 4, no lower extremity edema  Neurologic: No focal motor deficits apparent, normal mood and affect  Peripheral Pulses: Intact posterior tibial pulses bilaterally    Intake/Output:  No intake or output data in the 24 hours ending 12/24/22 1203  No intake/output data recorded.     Laboratory Tests:  Recent Labs     12/22/22 2020 12/23/22  0835 12/24/22  0349    135 135   K 5.6* 4.9 4.0    102 101   CO2 19* 17* 18*   BUN 39* 36* 34*   CREATININE 1.3* 1.3* 1.3*   GLUCOSE 248* 256* 147*   CALCIUM 9.1 9.8 9.8     Lab Results   Component Value Date/Time    MG 2.1 12/24/2022 03:49 AM     Recent Labs     12/22/22 2020 12/23/22  0835 12/24/22  0349   ALKPHOS 122 122 113   ALT 13 24 17   AST 52* 79* 52*   PROT 7.4 7.5 6.9   BILITOT 0.8 1.4* 1.1   LABALBU 3.5 3.4* 3.2*     Recent Labs     12/22/22 2020 12/24/22  0600   WBC 8.5 7.7   RBC 4.04 4.57   HGB 11.4* 12.8   HCT 36.7* 40.2   MCV 90.8 88.0   MCH 28.2 28.0   MCHC 31.1* 31.8*   RDW 15.3* 15.5*    250   MPV 10.9 10.5     Lab Results   Component Value Date    CKTOTAL 207 (H) 10/08/2018    TROPONINI 0.01 10/01/2018     Lab Results   Component Value Date    INR 1.4 12/22/2022    INR 1.1 10/22/2021    INR 1.2 10/01/2018    PROTIME 16.1 (H) 12/22/2022    PROTIME 11.7 10/22/2021    PROTIME 13.2 (H) 10/01/2018     Lab Results   Component Value Date    TSH 2.010 06/23/2022     Lab Results   Component Value Date    LABA1C 7.9 (H) 12/23/2022     No results found for: EAG  Lab Results   Component Value Date    CHOL 112 06/23/2022    CHOL 121 01/27/2021    CHOL 144 02/04/2020     Lab Results   Component Value Date    TRIG 83 06/23/2022    TRIG 115 01/27/2021    TRIG 104 02/04/2020     Lab Results   Component Value Date    HDL 46 06/23/2022    HDL 48 01/27/2021    HDL 43 02/04/2020     Lab Results   Component Value Date    LDLCALC 49 06/23/2022    LDLCALC 50 01/27/2021    LDLCALC 80 02/04/2020     Lab Results   Component Value Date    LABVLDL 17 06/23/2022    LABVLDL 23 01/27/2021    LABVLDL 21 02/04/2020     Lab Results   Component Value Date    CHOLHDLRATIO 3.2 05/17/2018     Recent Labs     12/22/22 2020   PROBNP 51,011*       Cardiac Tests:    CAD:  Lexiscan 5/2021: LV enlarged at both rest and stress. Defect was present in the basal inferolateral, basal inferior, mid inferolateral, mid inferior, and apex lateral wall that was large sized by quantification. Resting images showed no change. EF 23% with akinesis of the inferior and lateral walls. Risk preop form nuke stress test with severe LV dysfunction. CABG X3 2004, details unknown  HFrEF:  TTE 9/2020: Moderate concentric LVH. EF 40-45% with inferior akinesis. Moderate MR. Mild AR. RVSP 39 mmHg  TTE 10/2018: EF 35%. Inferior/inferolateral akinesis. Dilated RV and reduced ventricular function.   Tapse 1. 0 cm. Stage III diastolic dysfunction. Mild to moderate MR. Mild TR. PASP 37 mmHg. Echocardiogram from 12/24/2022: EF is 15%. Severe LVH, grade 3 diastolic dysfunction. Moderate to severe RV dysfunction. Moderate mitral as well as tricuspid regurgitation. Mild pulmonary hypertension. Decline in biventricular function compared to before. Assessment:  Acute on chronic systolic heart failure. ACC stage C/NYHA class III. Initial proBNP [id number]. Elevated, flat troponin presentation 2/2 acute on chronic heart failure. Acute DVT right mid superficial femoral to distal superficial femoral vein. Patient started on Eliquis per primary service. CAD history: CABG times 3/2004>> risk preoperative Lexiscan 5/2021 due to severe LV dysfunction  History RBBB  Hyperlipidemia, on Crestor  Hypertension, uncontrolled  Diabetes with neuropathy and history of diabetic foot ulcers  PVD with history of osteomyelitis of the left foot  CKD with baseline 1.2-1.3     Plan:  Echo results as above. Has had a decline in biventricular function compared to echocardiogram from 2018. Continue diuresis with Lasix 40 mg IV twice daily. Monitor renal function closely, strict input and output and daily weights. Does appear hypervolemic on examination. Should be likely able to switch to Demadex 20 mg daily starting tomorrow. Agree with adding Jardiance. Replaced losartan with Entresto 24/26 twice daily. Continue Crestor, Toprol-XL, and Aldactone. Continue telemetry, monitor for arrhythmias. Monitor electrolytes: Keep potassium> 4.0 and magnesium> 2.0. Rest per primary service and other consultants. With regards to his worsening biventricular function, I will have him discuss need for further ischemic evaluation as well as consideration of defibrillator therapy with Dr. Nikki Holliday. Jairo Solano MD, [address] [place] [address] Cardiology    NOTE: This report was transcribed using voice recognition software.  Every effort was made to ensure accuracy; however, inadvertent computerized transcription errors may be present.

## 2022-12-24 NOTE — PROGRESS NOTES
[facility]  Progress Note    Chief complaint :  Chief Complaint   Patient presents with    Shortness of Breath     Leg swelling        Subjective:  No overnight problems. He states that he has had significant improvement in symptoms. He feels almost back to normal.  He does state that he has some lower extremity swelling but that continues to improve. Tolerating diet. Past medical, surgical, family and social history were reviewed, non-contributory, and unchanged unless otherwise stated. Review of Systems   Constitutional:  Negative for chills, fatigue and fever. HENT:  Negative for congestion, rhinorrhea and sore throat. Respiratory:  Negative for cough, chest tightness and shortness of breath. Cardiovascular:  Negative for chest pain and palpitations. Gastrointestinal:  Negative for abdominal pain, constipation, diarrhea, nausea and vomiting. Genitourinary:  Negative for dysuria and frequency. Neurological:  Negative for dizziness and light-headedness. All other systems reviewed and are negative. Objective:  /64   Pulse 64   Temp 97.9 °F (36.6 °C) (Oral)   Resp 18   Ht 5' 9\" (1.753 m)   Wt 140 lb (63.5 kg)   SpO2 96%   BMI 20.67 kg/m²     Physical Exam  Constitutional:       General: He is not in acute distress. Appearance: Normal appearance. HENT:      Head: Normocephalic and atraumatic. Mouth/Throat:      Mouth: Mucous membranes are moist.      Pharynx: Oropharynx is clear. Eyes:      Extraocular Movements: Extraocular movements intact. Conjunctiva/sclera: Conjunctivae normal.   Cardiovascular:      Rate and Rhythm: Normal rate and regular rhythm. Pulses: Normal pulses. Heart sounds: Normal heart sounds. No murmur heard. Pulmonary:      Effort: Pulmonary effort is normal.      Breath sounds: Normal breath sounds. No wheezing.       Comments: Bibasliar crackles  Musculoskeletal:      Cervical back: Normal range of motion. Right lower leg: Edema (1+) present. Left lower leg: Edema (1+) present. Skin:     General: Skin is warm and dry. Neurological:      General: No focal deficit present. Mental Status: He is alert and oriented to person, place, and time.    Psychiatric:         Attention and Perception: Attention normal.         Mood and Affect: Mood normal.         Labs:  Recent Results (from the past 24 hour(s))   APTT    Collection Time: 12/23/22  8:35 AM   Result Value Ref Range    aPTT 46.0 (H) 24.5 - 35.1 sec   Comprehensive Metabolic Panel w/ Reflex to MG    Collection Time: 12/23/22  8:35 AM   Result Value Ref Range    Sodium 135 132 - 146 mmol/L    Potassium reflex Magnesium 4.9 3.5 - 5.0 mmol/L    Chloride 102 98 - 107 mmol/L    CO2 17 (L) 22 - 29 mmol/L    Anion Gap 16 7 - 16 mmol/L    Glucose 256 (H) 74 - 99 mg/dL    BUN 36 (H) 6 - 23 mg/dL    Creatinine 1.3 (H) 0.7 - 1.2 mg/dL    Est, Glom Filt Rate 57 >=60 mL/min/1.73    Calcium 9.8 8.6 - 10.2 mg/dL    Total Protein 7.5 6.4 - 8.3 g/dL    Albumin 3.4 (L) 3.5 - 5.2 g/dL    Total Bilirubin 1.4 (H) 0.0 - 1.2 mg/dL    Alkaline Phosphatase 122 40 - 129 U/L    ALT 24 0 - 40 U/L    AST 79 (H) 0 - 39 U/L   Magnesium    Collection Time: 12/23/22  8:35 AM   Result Value Ref Range    Magnesium 1.4 (L) 1.6 - 2.6 mg/dL   SPECIMEN REJECTION    Collection Time: 12/23/22  9:27 AM   Result Value Ref Range    Rejected Test A1C3     Reason for Rejection see below    Hemoglobin A1C    Collection Time: 12/23/22 10:33 AM   Result Value Ref Range    Hemoglobin A1C 7.9 (H) 4.0 - 5.6 %   POCT Glucose    Collection Time: 12/23/22 10:56 AM   Result Value Ref Range    Meter Glucose 241 (H) 74 - 99 mg/dL   POCT Glucose    Collection Time: 12/23/22  4:51 PM   Result Value Ref Range    Meter Glucose 80 74 - 99 mg/dL   POCT Glucose    Collection Time: 12/23/22  8:33 PM   Result Value Ref Range    Meter Glucose 199 (H) 74 - 99 mg/dL   Comprehensive Metabolic Panel w/ Reflex to MG    Collection Time: 12/24/22  3:49 AM   Result Value Ref Range    Sodium 135 132 - 146 mmol/L    Potassium reflex Magnesium 4.0 3.5 - 5.0 mmol/L    Chloride 101 98 - 107 mmol/L    CO2 18 (L) 22 - 29 mmol/L    Anion Gap 16 7 - 16 mmol/L    Glucose 147 (H) 74 - 99 mg/dL    BUN 34 (H) 6 - 23 mg/dL    Creatinine 1.3 (H) 0.7 - 1.2 mg/dL    Est, Glom Filt Rate 57 >=60 mL/min/1.73    Calcium 9.8 8.6 - 10.2 mg/dL    Total Protein 6.9 6.4 - 8.3 g/dL    Albumin 3.2 (L) 3.5 - 5.2 g/dL    Total Bilirubin 1.1 0.0 - 1.2 mg/dL    Alkaline Phosphatase 113 40 - 129 U/L    ALT 17 0 - 40 U/L    AST 52 (H) 0 - 39 U/L   Magnesium    Collection Time: 12/24/22  3:49 AM   Result Value Ref Range    Magnesium 2.1 1.6 - 2.6 mg/dL   CBC with Auto Differential    Collection Time: 12/24/22  6:00 AM   Result Value Ref Range    WBC 7.7 4.5 - 11.5 E9/L    RBC 4.57 3.80 - 5.80 E12/L    Hemoglobin 12.8 12.5 - 16.5 g/dL    Hematocrit 40.2 37.0 - 54.0 %    MCV 88.0 80.0 - 99.9 fL    MCH 28.0 26.0 - 35.0 pg    MCHC 31.8 (L) 32.0 - 34.5 %    RDW 15.5 (H) 11.5 - 15.0 fL    Platelets 081 110 - 885 E9/L    MPV 10.5 7.0 - 12.0 fL    Neutrophils % 63.5 43.0 - 80.0 %    Immature Granulocytes % 0.3 0.0 - 5.0 %    Lymphocytes % 20.3 20.0 - 42.0 %    Monocytes % 10.7 2.0 - 12.0 %    Eosinophils % 4.0 0.0 - 6.0 %    Basophils % 1.2 0.0 - 2.0 %    Neutrophils Absolute 4.88 1.80 - 7.30 E9/L    Immature Granulocytes # 0.02 E9/L    Lymphocytes Absolute 1.56 1.50 - 4.00 E9/L    Monocytes Absolute 0.82 0.10 - 0.95 E9/L    Eosinophils Absolute 0.31 0.05 - 0.50 E9/L    Basophils Absolute 0.09 0.00 - 0.20 E9/L   POCT Glucose    Collection Time: 12/24/22  6:07 AM   Result Value Ref Range    Meter Glucose 162 (H) 74 - 99 mg/dL       Radiology and other tests reviewed:  CTA PULMONARY W CONTRAST   Final Result   No evidence of pulmonary embolism.       Cardiomegaly with dependent pulmonary edema and bilateral small pleural   effusions consistent with congestive heart failure acute exacerbation. Reflux of injected contrast into dilated hepatic veins suggests a component   of right heart failure. Partially visualized abdominal ascites and cirrhotic appearing liver, which   may be cardiogenic. Correlate clinically. CT Head W/O Contrast   Final Result   No acute intracranial abnormality. Diffuse cerebral and cerebellar volume loss and chronic small vessel ischemic   white matter change. US DUP LOWER EXTREMITIES BILATERAL VENOUS   Final Result   DVT, right mid superficial femoral to distal superficial femoral vein. No evidence of DVT in the left lower extremity. Critical results were called by Dr. Enid Stallworth to Lucy Shannon CNP on   12/22/2022 at 7:31 p.m. XR CHEST (2 VW)   Final Result   Mild cardiomegaly and mild bilateral pleural effusion. Assessment:  Active Hospital Problems    Diagnosis Date Noted    Acute decompensated heart failure ([facility] [I50.9] 12/23/2022     Priority: Medium    DVT (deep venous thrombosis) (H[facility]) [I82.409] 12/23/2022     Priority: Medium    Elevated troponin [R77.8] 12/23/2022     Priority: Medium    Chronic renal disease, stage III [facility]) [377106] [N18.30] 05/18/2022     Priority: Medium    Diabetes mellitus type 2 in nonobese ([facility] [E11.9] 09/10/2020    Acute on chronic systolic heart failure ([facility] [I50.23] 09/10/2020    PVD (peripheral vascular disease) ([facility] [I73.9] 10/07/2019    Coronary artery disease involving native coronary artery of native heart without angina pectoris [I25.10] 10/09/2018    Mixed hyperlipidemia [E78.2] 10/06/2018    Shortness of breath [R06.02]     Hypertension [I10] 06/11/2018       Plan:  Heart failure exacerbation, HFrEF  On admission: proBNP 16, 742. CXR mild cardiomegaly and mild bilateral pleural effusion. Echo 9/2020 which showed left ventricular ejection fraction 40-45% with inferior akinesis with moderate MR, mild AR.   Continue Toprol 25 mg BID  Continue Cozaar 50 mg qd  Aldactone 25 mg qd  Strict I&Os: -300  Low salt diet  Patient started on Jardiance and Entresto  Lasix 40 mg IV twice daily   Continue monitor renal function  Cardiology following appreciate input     DVT  Likely provoked from recent COVID infection. US Doppler showed DVT, right mid superficial femoral to distal superficial femoral vein  Stop heparin  Continue Eliquis 10 mg twice daily for 1 week followed by Eliquis 5 mg twice daily     Hyperkalemia, resolved  K 5.6 in the ED. Received calcium chloride in ED. no changes on EKG  Monitor CMP     HTN  Continue Cozaar 50 mg daily     DMII  Home medication Lantus 10 units qhs, glipizide 10 mg BID, metformin 1000 mg twice daily, Januvia 100 mg daily  Lantus 10 units nightly  MDSSI  Diabetic diet  Hypoglycemia protocol  POCT glucose checks     Transaminitis, improving  Likely secondary to decompensated heart failure. Denies EtOH use.   Monitor CMP     HL  Continue Crestor 10 mg daily     CKD III  Baseline cr 1.2-1.3  Monitor     Jessica Alston DO  Family Medicine Resident PGY-3  12/24/22   8:18 AM

## 2022-12-25 VITALS
HEIGHT: 69 IN | BODY MASS INDEX: 20.73 KG/M2 | RESPIRATION RATE: 16 BRPM | HEART RATE: 63 BPM | WEIGHT: 140 LBS | SYSTOLIC BLOOD PRESSURE: 150 MMHG | TEMPERATURE: 98 F | DIASTOLIC BLOOD PRESSURE: 67 MMHG | OXYGEN SATURATION: 98 %

## 2022-12-25 PROBLEM — I50.9 ACUTE DECOMPENSATED HEART FAILURE (HCC): Status: RESOLVED | Noted: 2022-12-23 | Resolved: 2022-12-25

## 2022-12-25 LAB
ALBUMIN SERPL-MCNC: 3.4 G/DL (ref 3.5–5.2)
ALP BLD-CCNC: 119 U/L (ref 40–129)
ALT SERPL-CCNC: 16 U/L (ref 0–40)
ANION GAP SERPL CALCULATED.3IONS-SCNC: 15 MMOL/L (ref 7–16)
AST SERPL-CCNC: 39 U/L (ref 0–39)
BASOPHILS ABSOLUTE: 0.08 E9/L (ref 0–0.2)
BASOPHILS RELATIVE PERCENT: 1 % (ref 0–2)
BILIRUB SERPL-MCNC: 1 MG/DL (ref 0–1.2)
BUN BLDV-MCNC: 40 MG/DL (ref 6–23)
CALCIUM SERPL-MCNC: 9.6 MG/DL (ref 8.6–10.2)
CHLORIDE BLD-SCNC: 100 MMOL/L (ref 98–107)
CO2: 23 MMOL/L (ref 22–29)
CREAT SERPL-MCNC: 1.6 MG/DL (ref 0.7–1.2)
EOSINOPHILS ABSOLUTE: 0.31 E9/L (ref 0.05–0.5)
EOSINOPHILS RELATIVE PERCENT: 3.8 % (ref 0–6)
GFR SERPL CREATININE-BSD FRML MDRD: 44 ML/MIN/1.73
GLUCOSE BLD-MCNC: 157 MG/DL (ref 74–99)
HCT VFR BLD CALC: 37.7 % (ref 37–54)
HEMOGLOBIN: 12.3 G/DL (ref 12.5–16.5)
IMMATURE GRANULOCYTES #: 0.02 E9/L
IMMATURE GRANULOCYTES %: 0.2 % (ref 0–5)
LYMPHOCYTES ABSOLUTE: 2.12 E9/L (ref 1.5–4)
LYMPHOCYTES RELATIVE PERCENT: 25.8 % (ref 20–42)
MCH RBC QN AUTO: 28.5 PG (ref 26–35)
MCHC RBC AUTO-ENTMCNC: 32.6 % (ref 32–34.5)
MCV RBC AUTO: 87.3 FL (ref 80–99.9)
METER GLUCOSE: 132 MG/DL (ref 74–99)
METER GLUCOSE: 215 MG/DL (ref 74–99)
MONOCYTES ABSOLUTE: 0.8 E9/L (ref 0.1–0.95)
MONOCYTES RELATIVE PERCENT: 9.7 % (ref 2–12)
NEUTROPHILS ABSOLUTE: 4.89 E9/L (ref 1.8–7.3)
NEUTROPHILS RELATIVE PERCENT: 59.5 % (ref 43–80)
PDW BLD-RTO: 15.1 FL (ref 11.5–15)
PLATELET # BLD: 259 E9/L (ref 130–450)
PMV BLD AUTO: 10.3 FL (ref 7–12)
POTASSIUM REFLEX MAGNESIUM: 3.7 MMOL/L (ref 3.5–5)
PRO-BNP: ABNORMAL PG/ML (ref 0–450)
RBC # BLD: 4.32 E12/L (ref 3.8–5.8)
SODIUM BLD-SCNC: 138 MMOL/L (ref 132–146)
TOTAL PROTEIN: 7 G/DL (ref 6.4–8.3)
WBC # BLD: 8.2 E9/L (ref 4.5–11.5)

## 2022-12-25 PROCEDURE — 2580000003 HC RX 258

## 2022-12-25 PROCEDURE — 85025 COMPLETE CBC W/AUTO DIFF WBC: CPT

## 2022-12-25 PROCEDURE — 83880 ASSAY OF NATRIURETIC PEPTIDE: CPT

## 2022-12-25 PROCEDURE — 99232 SBSQ HOSP IP/OBS MODERATE 35: CPT | Performed by: FAMILY MEDICINE

## 2022-12-25 PROCEDURE — 6370000000 HC RX 637 (ALT 250 FOR IP)

## 2022-12-25 PROCEDURE — 36415 COLL VENOUS BLD VENIPUNCTURE: CPT

## 2022-12-25 PROCEDURE — 99233 SBSQ HOSP IP/OBS HIGH 50: CPT | Performed by: INTERNAL MEDICINE

## 2022-12-25 PROCEDURE — 6370000000 HC RX 637 (ALT 250 FOR IP): Performed by: FAMILY MEDICINE

## 2022-12-25 PROCEDURE — 80053 COMPREHEN METABOLIC PANEL: CPT

## 2022-12-25 PROCEDURE — 82962 GLUCOSE BLOOD TEST: CPT

## 2022-12-25 RX ORDER — TORSEMIDE 20 MG/1
20 TABLET ORAL DAILY
Status: DISCONTINUED | OUTPATIENT
Start: 2022-12-25 | End: 2022-12-25 | Stop reason: HOSPADM

## 2022-12-25 RX ORDER — TORSEMIDE 20 MG/1
20 TABLET ORAL DAILY
Qty: 30 TABLET | Refills: 0 | Status: SHIPPED | OUTPATIENT
Start: 2022-12-26

## 2022-12-25 RX ORDER — POTASSIUM BICARBONATE 25 MEQ/1
50 TABLET, EFFERVESCENT ORAL ONCE
Status: DISCONTINUED | OUTPATIENT
Start: 2022-12-25 | End: 2022-12-25

## 2022-12-25 RX ADMIN — SPIRONOLACTONE 25 MG: 25 TABLET ORAL at 09:12

## 2022-12-25 RX ADMIN — METOPROLOL SUCCINATE 25 MG: 25 TABLET, EXTENDED RELEASE ORAL at 09:12

## 2022-12-25 RX ADMIN — TORSEMIDE 20 MG: 20 TABLET ORAL at 09:11

## 2022-12-25 RX ADMIN — SODIUM CHLORIDE, PRESERVATIVE FREE 10 ML: 5 INJECTION INTRAVENOUS at 09:12

## 2022-12-25 RX ADMIN — APIXABAN 10 MG: 5 TABLET, FILM COATED ORAL at 06:26

## 2022-12-25 RX ADMIN — POTASSIUM BICARBONATE 20 MEQ: 782 TABLET, EFFERVESCENT ORAL at 09:14

## 2022-12-25 RX ADMIN — SACUBITRIL AND VALSARTAN 1 TABLET: 24; 26 TABLET, FILM COATED ORAL at 09:11

## 2022-12-25 RX ADMIN — EMPAGLIFLOZIN 10 MG: 10 TABLET, FILM COATED ORAL at 09:12

## 2022-12-25 RX ADMIN — INSULIN LISPRO 2 UNITS: 100 INJECTION, SOLUTION INTRAVENOUS; SUBCUTANEOUS at 11:29

## 2022-12-25 ASSESSMENT — PAIN SCALES - GENERAL: PAINLEVEL_OUTOF10: 0

## 2022-12-25 NOTE — PLAN OF CARE
Problem: Discharge Planning  Goal: Discharge to home or other facility with appropriate resources  Recent Flowsheet Documentation  Taken 12/24/2022 2315 by Jeanette Roach  Discharge to home or other facility with appropriate resources: Identify barriers to discharge with patient and caregiver  12/24/2022 2229 by Maria Del Carmen Venegas RN  Outcome: Progressing     Problem: Safety - Adult  Goal: Free from fall injury  12/24/2022 2339 by Jeanette Roach  Outcome: Progressing  12/24/2022 2229 by Maria Del Carmen Venegas RN  Outcome: Progressing     Problem: Skin/Tissue Integrity  Goal: Absence of new skin breakdown  Description: 1. Monitor for areas of redness and/or skin breakdown  2. Assess vascular access sites hourly  3. Every 4-6 hours minimum:  Change oxygen saturation probe site  4. Every 4-6 hours:  If on nasal continuous positive airway pressure, respiratory therapy assess nares and determine need for appliance change or resting period.   12/24/2022 2229 by Maria Del Carmen Venegas RN  Outcome: Progressing     Problem: Chronic Conditions and Co-morbidities  Goal: Patient's chronic conditions and co-morbidity symptoms are monitored and maintained or improved  Recent Flowsheet Documentation  Taken 12/24/2022 2315 by [address] [place] [address] - Patient's Chronic Conditions and Co-Morbidity Symptoms are Monitored and Maintained or Improved: Monitor and assess patient's chronic conditions and comorbid symptoms for stability, deterioration, or improvement  12/24/2022 2229 by Maria Del Carmen Venegas RN  Outcome: Progressing

## 2022-12-25 NOTE — PROGRESS NOTES
INPATIENT CARDIOLOGY FOLLOW-UP    Name: Jackquline Hodgkin    Age: 69 y.o. Date of Admission: 12/22/2022  7:34 PM    Date of Service: 12/25/2022    Primary Cardiologist: Dr. Fatuomata Maria    Chief Complaint: Follow-up for decompensated heart failure    Interim History:  No new overnight cardiac complaints. Currently with no complaints of CP, SOB, palpitations, dizziness, or lightheadedness. SR on telemetry. Occasional PVCs. 1.4 L negative since yesterday.      Review of Systems:   Negative except as described above    Problem List:  Patient Active Problem List   Diagnosis    Hypertension    Left ventricular systolic dysfunction    Mixed hyperlipidemia    RBBB    Coronary artery disease involving native coronary artery of native heart without angina pectoris    Type 2 diabetes mellitus with diabetic peripheral angiopathy without gangrene, with long-term current use of insulin (Formerly [facility])    PVD (peripheral vascular disease) (Formerly [facility])    Acute on chronic systolic heart failure (Formerly [facility])    Diabetes mellitus type 2 in nonobese (Formerly [facility])    Lumbar radiculopathy    DDD (degenerative disc disease), lumbar    Spondylolisthesis of lumbar region    Spinal stenosis of lumbar region    Lumbosacral spondylosis without myelopathy    Chronic renal disease, stage III ([facility]) [382320]    DVT (deep venous thrombosis) ([facility])    Elevated troponin       Current Medications:    Current Facility-Administered Medications:     torsemide (DEMADEX) tablet 20 mg, 20 mg, Oral, Daily, Doe Alfonso MD, 20 mg at 12/25/22 7670    sodium chloride flush 0.9 % injection 5-40 mL, 5-40 mL, IntraVENous, 2 times per day, Doe Alfonso MD, 10 mL at 12/25/22 0912    sodium chloride flush 0.9 % injection 5-40 mL, 5-40 mL, IntraVENous, PRN, Amy Hardy MD    0.9 % sodium chloride infusion, 25 mL, IntraVENous, PRN, Amy Hardy MD    polyethylene glycol [facility]) packet 17 g, 17 g, Oral, Daily PRN, Monique Reilly MD    acetaminophen (TYLENOL) tablet 650 mg, 650 mg, Oral, Q6H PRN **OR** acetaminophen (TYLENOL) suppository 650 mg, 650 mg, Rectal, Q6H PRN, Monique Reilly MD    apixaban Jenifer Watkinsippel) tablet 10 mg, 10 mg, Oral, BID, 10 mg at 12/25/22 0626 **FOLLOWED BY** [START ON 12/30/2022] apixaban (ELIQUIS) tablet 5 mg, 5 mg, Oral, BID, Monique Reilly MD    metoprolol succinate (TOPROL XL) extended release tablet 25 mg, 25 mg, Oral, BID, Monique Reilly MD, 25 mg at 12/25/22 0912    rosuvastatin (CRESTOR) tablet 10 mg, 10 mg, Oral, Nightly, Monique Reilly MD, 10 mg at 12/24/22 2003    spironolactone (ALDACTONE) tablet 25 mg, 25 mg, Oral, Daily, Monique Reilly MD, 25 mg at 12/25/22 0912    glucose chewable tablet 16 g, 4 tablet, Oral, PRN, Monique Reilly MD    dextrose bolus 10% 125 mL, 125 mL, IntraVENous, PRN **OR** dextrose bolus 10% 250 mL, 250 mL, IntraVENous, PRN, Monique Reilly MD    glucagon (rDNA) injection 1 mg, 1 mg, SubCUTAneous, PRN, Monique Reilly MD    dextrose 10 % infusion, , IntraVENous, Continuous PRN, Monique Reilly MD    insulin lispro (HUMALOG) injection vial 0-8 Units, 0-8 Units, SubCUTAneous, TID WC, Monique Reilly MD, 2 Units at 12/25/22 1129    insulin lispro (HUMALOG) injection vial 0-4 Units, 0-4 Units, SubCUTAneous, Nightly, Rosie Tracey MD    melatonin tablet 3 mg, 3 mg, Oral, Nightly PRN, Lux Tracey MD    prochlorperazine (COMPAZINE) injection 10 mg, 10 mg, IntraVENous, Q6H PRN, Monique Reilly MD, 10 mg at 12/23/22 4358    insulin glargine (LANTUS) injection vial 10 Units, 10 Units, SubCUTAneous, Nightly, Rosie Tracey MD, 10 Units at 12/24/22 2005    empagliflozin (JARDIANCE) tablet 10 mg, 10 mg, Oral, Daily, Myrna Robb, DO, 10 mg at 12/25/22 1217    perflutren lipid microspheres (DEFINITY) injection 1.5 mL, 1.5 mL, IntraVENous, ONCE PRN, Lashawn Minor V, DO    sacubitril-valsartan (ENTRESTO) 24-26 MG per tablet 1 tablet, 1 tablet, Oral, BID, Aurealing Hayes, APRN - CNP, 1 tablet at 12/25/22 1197    Current Outpatient Medications:     apixaban (ELIQUIS) 5 MG TABS tablet, Take 2 tablets by mouth 2 times daily for 9 doses, Disp: 18 tablet, Rfl: 0    [START ON 12/26/2022] empagliflozin (JARDIANCE) 10 MG tablet, Take 1 tablet by mouth daily, Disp: 30 tablet, Rfl: 0    sacubitril-valsartan (ENTRESTO) 24-26 MG per tablet, Take 1 tablet by mouth 2 times daily, Disp: 60 tablet, Rfl: 0    [START ON 12/26/2022] torsemide (DEMADEX) 20 MG tablet, Take 1 tablet by mouth daily, Disp: 30 tablet, Rfl: 0    [START ON 12/30/2022] apixaban (ELIQUIS) 5 MG TABS tablet, Take 1 tablet by mouth 2 times daily, Disp: 60 tablet, Rfl: 0    JANUVIA 100 MG tablet, TAKE 1 TABLET DAILY, Disp: 90 tablet, Rfl: 3    metFORMIN (GLUCOPHAGE) 1000 MG tablet, Take 1 tablet by mouth 2 times daily (with meals), Disp: 28 tablet, Rfl: 0    insulin glargine (LANTUS SOLOSTAR) 100 UNIT/ML injection pen, Inject 10 Units into the skin nightly, Disp: 5 Adjustable Dose Pre-filled Pen Syringe, Rfl: 5    rosuvastatin (CRESTOR) 10 MG tablet, TAKE 1 TABLET DAILY, Disp: 90 tablet, Rfl: 3    metoprolol succinate (TOPROL XL) 25 MG extended release tablet, TAKE 1 TABLET TWICE A DAY, Disp: 180 tablet, Rfl: 3    glipiZIDE (GLUCOTROL) 10 MG tablet, TAKE 1 TABLET TWICE A DAY BEFORE MEALS, Disp: 180 tablet, Rfl: 3    spironolactone (ALDACTONE) 25 MG tablet, TAKE ONE-HALF (1/2) TABLET DAILY, Disp: 45 tablet, Rfl: 3    Multiple Vitamins-Minerals (THERAPEUTIC MULTIVITAMIN-MINERALS) tablet, Take 1 tablet by mouth daily, Disp: , Rfl:     traZODone (DESYREL) 50 MG tablet, TAKE 1 TABLET DAILY EVERY NIGHT (Patient taking differently: nightly as needed TAKE 1 TABLET DAILY EVERY NIGHT), Disp: 90 tablet, Rfl: 3    metFORMIN (GLUCOPHAGE) 1000 MG tablet, TAKE 1 TABLET TWICE A DAY WITH MEALS, Disp: 180 tablet, Rfl: 3    FreeStyle Lancets MISC, TEST EVERY DAY, Disp: 100 each, Rfl: 5    blood glucose test strips (FREESTYLE TEST STRIPS) strip, 1 each by In Vitro route daily As needed. , Disp: 100 each, Rfl: 3    Insulin Pen Needle 31G X 5 MM MISC, 1 each by Does not apply route daily, Disp: 100 each, Rfl: 3    blood glucose test strips (ASCENSIA AUTODISC VI;ONE TOUCH ULTRA TEST VI) strip, 1 each by In Vitro route 2 times daily As needed. , Disp: 100 each, Rfl: 3    Physical Exam:  BP (!) 150/67   Pulse 63   Temp 98 °F (36.7 °C) (Oral)   Resp 16   Ht 5' 9\" (1.753 m)   Wt 140 lb (63.5 kg)   SpO2 98%   BMI 20.67 kg/m²   Wt Readings from Last 3 Encounters:   12/25/22 140 lb (63.5 kg)   11/11/22 145 lb (65.8 kg)   10/19/22 145 lb (65.8 kg)     Appearance: Awake, alert, no acute respiratory distress  Skin: Intact, no rash  Head: Normocephalic, atraumatic  Eyes: EOMI, no conjunctival erythema  ENMT: No pharyngeal erythema, MMM, no rhinorrhea  Neck: Supple, JVP appears normal, no carotid bruits  Lungs: Clear to auscultation bilaterally. No wheezes, rales, or rhonchi.   Cardiac: PMI nondisplaced, Regular rhythm with a normal rate, S1 & S2 normal, no murmurs  Abdomen: Soft, nontender, +bowel sounds  Extremities: Moves all extremities x 4, no lower extremity edema  Neurologic: No focal motor deficits apparent, normal mood and affect  Peripheral Pulses: Intact posterior tibial pulses bilaterally    Intake/Output:    Intake/Output Summary (Last 24 hours) at 12/25/2022 1303  Last data filed at 12/25/2022 0911  Gross per 24 hour   Intake 230 ml   Output 1800 ml   Net -1570 ml     I/O this shift:  In: 10 [I.V.:10]  Out: -     Laboratory Tests:  Recent Labs     12/23/22  0835 12/24/22  0349 12/25/22  0237    135 138   K 4.9 4.0 3.7    101 100   CO2 17* 18* 23   BUN 36* 34* 40*   CREATININE 1.3* 1.3* 1.6*   GLUCOSE 256* 147* 157*   CALCIUM 9.8 9.8 9.6     Lab Results   Component Value Date/Time    MG 2.1 12/24/2022 03:49 AM     Recent Labs     12/23/22  0835 12/24/22  0349 12/25/22  0237   ALKPHOS 122 113 119   ALT 24 17 16   AST 79* 52* 39   PROT 7.5 6.9 7.0   BILITOT 1.4* 1.1 1.0   LABALBU 3.4* 3.2* 3.4*     Recent Labs     12/22/22 2020 12/24/22  0600 12/25/22  0237   WBC 8.5 7.7 8.2   RBC 4.04 4.57 4.32   HGB 11.4* 12.8 12.3*   HCT 36.7* 40.2 37.7   MCV 90.8 88.0 87.3   MCH 28.2 28.0 28.5   MCHC 31.1* 31.8* 32.6   RDW 15.3* 15.5* 15.1*    250 259   MPV 10.9 10.5 10.3     Lab Results   Component Value Date    CKTOTAL 207 (H) 10/08/2018    TROPONINI 0.01 10/01/2018     Lab Results   Component Value Date    INR 1.4 12/22/2022    INR 1.1 10/22/2021    INR 1.2 10/01/2018    PROTIME 16.1 (H) 12/22/2022    PROTIME 11.7 10/22/2021    PROTIME 13.2 (H) 10/01/2018     Lab Results   Component Value Date    TSH 2.010 06/23/2022     Lab Results   Component Value Date    LABA1C 7.9 (H) 12/23/2022     No results found for: EAG  Lab Results   Component Value Date    CHOL 112 06/23/2022    CHOL 121 01/27/2021    CHOL 144 02/04/2020     Lab Results   Component Value Date    TRIG 83 06/23/2022    TRIG 115 01/27/2021    TRIG 104 02/04/2020     Lab Results   Component Value Date    HDL 46 06/23/2022    HDL 48 01/27/2021    HDL 43 02/04/2020     Lab Results   Component Value Date    LDLCALC 49 06/23/2022    LDLCALC 50 01/27/2021    LDLCALC 80 02/04/2020     Lab Results   Component Value Date    LABVLDL 17 06/23/2022    LABVLDL 23 01/27/2021    LABVLDL 21 02/04/2020     Lab Results   Component Value Date    CHOLHDBARBARA 3.2 05/17/2018     Recent Labs     12/22/22 2020 12/25/22  0237   PROBNP 95,671* 13,140*       Cardiac Tests:    CAD:  Lexiscan 5/2021: LV enlarged at both rest and stress.  Defect was present in the basal inferolateral, basal inferior, mid inferolateral, mid inferior, and apex lateral wall that was large sized by quantification. Resting images showed no change. EF 23% with akinesis of the inferior and lateral walls. Risk preop form nuke stress test with severe LV dysfunction. CABG X3 2004, details unknown  HFrEF:  TTE 9/2020: Moderate concentric LVH. EF 40-45% with inferior akinesis. Moderate MR. Mild AR. RVSP 39 mmHg  TTE 10/2018: EF 35%. Inferior/inferolateral akinesis. Dilated RV and reduced ventricular function. Tapse 1.0 cm. Stage III diastolic dysfunction. Mild to moderate MR. Mild TR. PASP 37 mmHg. Echocardiogram from 12/24/2022: EF is 15%. Severe LVH, grade 3 diastolic dysfunction. Moderate to severe RV dysfunction. Moderate mitral as well as tricuspid regurgitation. Mild pulmonary hypertension. Decline in biventricular function compared to before. Assessment:  Acute on chronic systolic heart failure. ACC stage C/NYHA class III. Initial proBNP [id number]. Elevated, flat troponin presentation 2/2 acute on chronic heart failure. Acute DVT right mid superficial femoral to distal superficial femoral vein. Patient started on Eliquis per primary service. CAD history: CABG times 3/2004>> risk preoperative Lexiscan 5/2021 due to severe LV dysfunction  History RBBB  Hyperlipidemia, on Crestor  Hypertension, uncontrolled  Diabetes with neuropathy and history of diabetic foot ulcers  PVD with history of osteomyelitis of the left foot  CKD with baseline 1.2-1.3     Plan:  Echo results as above. Has had a decline in biventricular function compared to echocardiogram from 2018. Good response to IV Lasix. monitor renal function closely, strict input and output and daily weights. Now switched to Demadex 20 mg daily. Refer to congestive heart failure clinic as an outpatient. Agree with adding Jardiance. Replaced losartan with Entresto 24/26 twice daily. Continue Crestor, Toprol-XL, and Aldactone. Continue telemetry, monitor for arrhythmias.   Monitor electrolytes: Keep potassium> 4.0 and magnesium> 2.0. Rest per primary service and other consultants. With regards to his worsening biventricular function, I will have him discuss need for further ischemic evaluation as well as consideration of defibrillator therapy with Dr. Quinton Don. Overall, he appears stable from a cardiac standpoint. Follow-up with the CHF clinic next week. Follow-up with Dr. Quinton Don within 2 weeks. Javon Peterson MD, [facility]    NOTE: This report was transcribed using voice recognition software. Every effort was made to ensure accuracy; however, inadvertent computerized transcription errors may be present.

## 2022-12-27 ENCOUNTER — TELEPHONE (OUTPATIENT)
Dept: ADMINISTRATIVE | Age: 77
End: 2022-12-27

## 2022-12-27 ENCOUNTER — CARE COORDINATION (OUTPATIENT)
Dept: CASE MANAGEMENT | Age: 77
End: 2022-12-27

## 2022-12-27 DIAGNOSIS — I50.23 ACUTE ON CHRONIC SYSTOLIC HEART FAILURE (HCC): Primary | ICD-10-CM

## 2022-12-27 PROCEDURE — 1111F DSCHRG MED/CURRENT MED MERGE: CPT | Performed by: FAMILY MEDICINE

## 2022-12-27 ASSESSMENT — EJECTION FRACTION
EF_VALUE: 15%
EF_SOURCE: 2D ECHO

## 2022-12-27 NOTE — CARE COORDINATION
[address] Care Transitions Initial Follow Up Call    Call within 2 business days of discharge: Yes    Care Transition Nurse contacted the patient by telephone to perform post hospital discharge assessment. Verified name and  with patient as identifiers. Provided introduction to self, and explanation of the Care Transition Nurse role. Patient: Matthew Joya Patient : 1945   MRN: [id number]  Reason for Admission: Acute Decompensated Heart Failure  Discharge Date: 22 RARS: Readmission Risk Score: 14.3      Last Discharge  [address]       Date Complaint Diagnosis Description Type Department Provider    22 Shortness of Breath Acute decompensated heart failure ([facility] [address] . .. ED to Hosp-Admission (Discharged) (ADMITTED) [facility] Floresita Lloyd MD; Samira Conti... Was this an external facility discharge? No Discharge Facility: [facility]    Challenges to be reviewed by the provider   Additional needs identified to be addressed with provider: No           Method of communication with provider: none. Patient is pleasant in conversation.   -denies chest pain, palpitations, shortness of breath, lightheaded, or dizziness   -reports swelling in BLE has resolved   -reports SpO2 96% RA   -reports  this am   -reports appetite is improving; avoids sugar, sodium, and fatty foods   -ambulates with a [address] to assist with balance  -patient to resume monitoring daily weight   -able to afford cost of medications   -no transportation issues   -Patient reports he is awaiting call back from Dr. Brittanie Pizarro office for appointment scheduling. Discussed/reviewed Heart Failure Zones management tool including monitoring daily weight; patient verbalized understanding. Emotional support provided; discussed will continue to follow. Care Transition Nurse reviewed discharge instructions, medical action plan, and red flags with patient who verbalized understanding.  The patient was given an opportunity to ask questions and does not have any further questions or concerns at this time. Were discharge instructions available to patient? Yes. Reviewed appropriate site of care based on symptoms and resources available to patient including: PCP  Specialist  When to call 911. The patient agrees to contact the PCP office for questions related to their healthcare. Advance Care Planning:   Does patient have an Advance Directive: decision maker updated. Medication reconciliation was performed with patient, who verbalizes understanding of administration of home medications. Medications reviewed, 1111F entered: yes    Was patient discharged with a pulse oximeter? no      Offered patient enrollment in the Remote Patient Monitoring (RPM) program for in-home monitoring:  not on this call . Care Transitions 24 Hour Call    Do you have a copy of your discharge instructions?: Yes  Do you have all of your prescriptions and are they filled?: Yes  Have you scheduled your follow up appointment?: Yes  Do you feel like you have everything you need to keep you well at home?: Yes  Are you an active caregiver in your home?: No  Care Transitions Interventions  No Identified Needs      Follow Up  Future Appointments   Date Time Provider Cecy Marie   1/3/2023 10:30 AM [facility]   6/27/2023 11:00 AM Anna Caballero MD [place] [id number] Transition Nurse provided contact information. Plan for follow-up call in 7-10 days based on severity of symptoms and risk factors.   Plan for next call: symptom management-symptom assessment CHF  follow-up appointment-confirm f/u appointment scheduled with Cardiologist    Raymond Concepcion RN

## 2023-01-03 ENCOUNTER — OFFICE VISIT (OUTPATIENT)
Dept: FAMILY MEDICINE CLINIC | Age: 78
End: 2023-01-03

## 2023-01-03 VITALS
RESPIRATION RATE: 14 BRPM | SYSTOLIC BLOOD PRESSURE: 128 MMHG | TEMPERATURE: 97.3 F | WEIGHT: 134 LBS | HEART RATE: 50 BPM | HEIGHT: 69 IN | OXYGEN SATURATION: 94 % | DIASTOLIC BLOOD PRESSURE: 62 MMHG | BODY MASS INDEX: 19.85 KG/M2

## 2023-01-03 DIAGNOSIS — I50.20 HFREF (HEART FAILURE WITH REDUCED EJECTION FRACTION) (HCC): ICD-10-CM

## 2023-01-03 DIAGNOSIS — R79.89 ELEVATED SERUM CREATININE: ICD-10-CM

## 2023-01-03 DIAGNOSIS — N18.9 ACUTE KIDNEY INJURY SUPERIMPOSED ON CKD (HCC): ICD-10-CM

## 2023-01-03 DIAGNOSIS — Z09 HOSPITAL DISCHARGE FOLLOW-UP: Primary | ICD-10-CM

## 2023-01-03 DIAGNOSIS — N18.30 STAGE 3 CHRONIC KIDNEY DISEASE, UNSPECIFIED WHETHER STAGE 3A OR 3B CKD (HCC): ICD-10-CM

## 2023-01-03 DIAGNOSIS — N17.9 ACUTE KIDNEY INJURY SUPERIMPOSED ON CKD (HCC): ICD-10-CM

## 2023-01-03 DIAGNOSIS — Z09 HOSPITAL DISCHARGE FOLLOW-UP: ICD-10-CM

## 2023-01-03 LAB
ANION GAP SERPL CALCULATED.3IONS-SCNC: 19 MMOL/L (ref 7–16)
BUN BLDV-MCNC: 71 MG/DL (ref 6–23)
CALCIUM SERPL-MCNC: 10.6 MG/DL (ref 8.6–10.2)
CHLORIDE BLD-SCNC: 96 MMOL/L (ref 98–107)
CO2: 22 MMOL/L (ref 22–29)
CREAT SERPL-MCNC: 2.2 MG/DL (ref 0.7–1.2)
GFR SERPL CREATININE-BSD FRML MDRD: 30 ML/MIN/1.73
GLUCOSE BLD-MCNC: 173 MG/DL (ref 74–99)
POTASSIUM SERPL-SCNC: 4.6 MMOL/L (ref 3.5–5)
SODIUM BLD-SCNC: 137 MMOL/L (ref 132–146)

## 2023-01-03 NOTE — PROGRESS NOTES
Post-Discharge Transitional Care  Follow Up      Shira Owens   YOB: 1945    Date of Office Visit:  1/3/2023  Date of Hospital Admission: 12/22/22  Date of Hospital Discharge: 12/25/22  Risk of hospital readmission (high >=14%. Medium >=10%) :Readmission Risk Score: 14.3  Care management risk score Rising risk (score 2-5) and Complex Care (Scores >=6): No Risk Score On File     Non face to face  following discharge, date last encounter closed (first attempt may have been earlier): 12/27/2022    Call initiated 2 business days of discharge: Yes    ASSESSMENT/PLAN:   Hospital discharge follow-up  -     NV DISCHARGE MEDS RECONCILED W/ CURRENT OUTPATIENT MED LIST  -     Basic Metabolic Panel; Future  Elevated serum creatinine  Patient does have CKD at baseline with baseline creatinine around 1.2-1.3 however upon discharge it was elevated. At this time we will order BMP to recheck kidney function with the addition of Demadex since discharge  -     Basic Metabolic Panel; Future    Stage 3 chronic kidney disease, unspecified whether stage 3a or 3b CKD (HCC)  Recheck BMP at this time    HFrEF (heart failure with reduced ejection fraction) (HCC)  Continue Jardiance 10 mg at this time. Continue Entresto daily  Continue Toprol 25 mg daily  Continue Crestor 10 mg daily  Continue Aldactone 25 mg daily  She does has appointment with cardiology tomorrow and also has appointment set up for CHF clinic. DVT  Continue Eliquis 5 mg twice daily    Medical Decision Making: moderate complexity    On this date 1/3/2023 I have spent 30 minutes reviewing previous notes, test results and face to face with the patient discussing the diagnosis and importance of compliance with the treatment plan as well as documenting on the day of the visit.        Subjective:   HPI:  Follow up of Hospital problems/diagnosis(es):     Pt is here for hospital follow-up due to acute on chronic heart failure and DVT  PMHx of HFrEF with an EF of 40 - 45%, CAD, DMII, HTN, CKD   who presented with SOB and bilateral leg swelling . During the hospital stay patient was diuresed with IV Lasix with had good output. Cardiology was consulted and replaced losartan with Entresto 24/26 twice daily and added Jardiance was added due to heart failure and diabetes. Patient was discharged home on Demadex 20 mg daily. Echo from 12/24/2022 :  worsening biventricular function with EF of 15% and severe LVH, grade 3 diastolic dysfunction and valvular dysfunction. Pt was also found to have DVT of the right mid superficial femoral to distal superficial femoral vein and was started on Eliquis 10 mg twice daily for 1 week and now is on 5 mg twice daily. Pt did had covid 1 mo ago and DVT was likely provoked from recent COVID infection 1 month ago. Pt has been better since his discharge  Pt denies any cp , sob, dyspnea   No orthopnea  Patient is compliant with Jardiance, Eliquis and Entresto Eliquis 5mg BID   Lives with wife , patient states his wife has good awareness of his medical condition and medical problems. No side effects of medicine   Has appt with cardiology tomorrow and appt scheduled for CHF clinic in 1 week  Denies checking weight at home. Inpatient course: Discharge summary reviewed- see chart.     Interval history/Current status:         Patient Active Problem List   Diagnosis    Hypertension    Left ventricular systolic dysfunction    Mixed hyperlipidemia    RBBB    Coronary artery disease involving native coronary artery of native heart without angina pectoris    Type 2 diabetes mellitus with diabetic peripheral angiopathy without gangrene, with long-term current use of insulin ([facility])    PVD (peripheral vascular disease) ([facility])    Acute on chronic systolic heart failure ([facility])    Diabetes mellitus type 2 in nonobese ([facility])    Lumbar radiculopathy    DDD (degenerative disc disease), lumbar    Spondylolisthesis of lumbar region    Spinal stenosis of lumbar region    Lumbosacral spondylosis without myelopathy    Stage 3 chronic kidney disease (HCC)    DVT (deep venous thrombosis) ([facility])    Elevated troponin    HFrEF (heart failure with reduced ejection fraction) [facility])    Hospital discharge follow-up    Elevated serum creatinine       Medications listed as ordered at the time of discharge from hospital     Medication List            Accurate as of January 3, 2023  7:26 PM. If you have any questions, ask your nurse or doctor. CHANGE how you take these medications      traZODone 50 MG tablet  Commonly known as: DESYREL  TAKE 1 TABLET DAILY EVERY NIGHT  What changed:   when to take this  reasons to take this            CONTINUE taking these medications      apixaban 5 MG Tabs tablet  Commonly known as: ELIQUIS  Take 1 tablet by mouth 2 times daily     * blood glucose test strips strip  Commonly known as: ASCENSIA AUTODISC VI;ONE TOUCH ULTRA TEST VI  1 each by In Vitro route 2 times daily As needed. * FREESTYLE TEST STRIPS strip  Generic drug: blood glucose test strips  1 each by In Vitro route daily As needed.      empagliflozin 10 MG tablet  Commonly known as: JARDIANCE  Take 1 tablet by mouth daily     FreeStyle Lancets Misc  TEST EVERY DAY     glipiZIDE 10 MG tablet  Commonly known as: GLUCOTROL  TAKE 1 TABLET TWICE A DAY BEFORE MEALS     Insulin Pen Needle 31G X 5 MM Misc  1 each by Does not apply route daily     Januvia 100 MG tablet  Generic drug: SITagliptin  TAKE 1 TABLET DAILY     Lantus SoloStar 100 UNIT/ML injection pen  Generic drug: insulin glargine  Inject 10 Units into the skin nightly     metFORMIN 1000 MG tablet  Commonly known as: GLUCOPHAGE  TAKE 1 TABLET TWICE A DAY WITH MEALS     metoprolol succinate 25 MG extended release tablet  Commonly known as: TOPROL XL  TAKE 1 TABLET TWICE A DAY     mupirocin 2 % ointment  Commonly known as: BACTROBAN     rosuvastatin 10 MG tablet  Commonly known as: CRESTOR  TAKE 1 TABLET DAILY sacubitril-valsartan 24-26 MG per tablet  Commonly known as: ENTRESTO  Take 1 tablet by mouth 2 times daily     spironolactone 25 MG tablet  Commonly known as: ALDACTONE  TAKE ONE-HALF (1/2) TABLET DAILY     therapeutic multivitamin-minerals tablet     torsemide 20 MG tablet  Commonly known as: DEMADEX  Take 1 tablet by mouth daily           * This list has 2 medication(s) that are the same as other medications prescribed for you. Read the directions carefully, and ask your doctor or other care provider to review them with you. Medications marked \"taking\" at this time  Outpatient Medications Marked as Taking for the 1/3/23 encounter (Office Visit) with Tello Wright MD   Medication Sig Dispense Refill    empagliflozin (JARDIANCE) 10 MG tablet Take 1 tablet by mouth daily 30 tablet 0    sacubitril-valsartan (ENTRESTO) 24-26 MG per tablet Take 1 tablet by mouth 2 times daily 60 tablet 0    torsemide (DEMADEX) 20 MG tablet Take 1 tablet by mouth daily 30 tablet 0    apixaban (ELIQUIS) 5 MG TABS tablet Take 1 tablet by mouth 2 times daily 60 tablet 0    JANUVIA 100 MG tablet TAKE 1 TABLET DAILY 90 tablet 3    metFORMIN (GLUCOPHAGE) 1000 MG tablet TAKE 1 TABLET TWICE A DAY WITH MEALS 180 tablet 3    insulin glargine (LANTUS SOLOSTAR) 100 UNIT/ML injection pen Inject 10 Units into the skin nightly 5 Adjustable Dose Pre-filled Pen Syringe 5    FreeStyle Lancets MISC TEST EVERY  each 5    rosuvastatin (CRESTOR) 10 MG tablet TAKE 1 TABLET DAILY 90 tablet 3    blood glucose test strips (FREESTYLE TEST STRIPS) strip 1 each by In Vitro route daily As needed.  100 each 3    Insulin Pen Needle 31G X 5 MM MISC 1 each by Does not apply route daily 100 each 3    metoprolol succinate (TOPROL XL) 25 MG extended release tablet TAKE 1 TABLET TWICE A  tablet 3    glipiZIDE (GLUCOTROL) 10 MG tablet TAKE 1 TABLET TWICE A DAY BEFORE MEALS 180 tablet 3    spironolactone (ALDACTONE) 25 MG tablet TAKE ONE-HALF (1/2) TABLET DAILY 45 tablet 3    Multiple Vitamins-Minerals (THERAPEUTIC MULTIVITAMIN-MINERALS) tablet Take 1 tablet by mouth daily      traZODone (DESYREL) 50 MG tablet TAKE 1 TABLET DAILY EVERY NIGHT (Patient taking differently: nightly as needed TAKE 1 TABLET DAILY EVERY NIGHT) 90 tablet 3    blood glucose test strips (ASCENSIA AUTODISC VI;ONE TOUCH ULTRA TEST VI) strip 1 each by In Vitro route 2 times daily As needed. 100 each 3        Medications patient taking as of now reconciled against medications ordered at time of hospital discharge: Yes    A comprehensive review of systems was negative except for what was noted in the HPI. Objective:    /62   Pulse 50   Temp 97.3 °F (36.3 °C) (Temporal)   Resp 14   Ht 5' 9\" (1.753 m)   Wt 134 lb (60.8 kg)   SpO2 94%   BMI 19.79 kg/m²   General Appearance: alert and oriented to person, place and time, well developed and well- nourished, in no acute distress  Skin: warm and dry, no rash or erythema  Head: normocephalic and atraumatic  Eyes: pupils equal, round, and reactive to light, extraocular eye movements intact, conjunctivae normal  ENT: tympanic membrane, external ear and ear canal normal bilaterally, nose without deformity, nasal mucosa and turbinates normal without polyps  Neck: supple and non-tender without mass, no thyromegaly or thyroid nodules, no cervical lymphadenopathy  Pulmonary/Chest: Minimal fine crackles at bilateral bases.   Normal air movement, no respiratory distress  Cardiovascular: normal rate, regular rhythm, normal S1 and S2, no murmurs, rubs, clicks, or gallops, distal pulses intact, no carotid bruits  Abdomen: soft, non-tender, non-distended, normal bowel sounds, no masses or organomegaly  Extremities: no cyanosis, clubbing or edema  Musculoskeletal: normal range of motion, no joint swelling, deformity or tenderness  Neurologic: reflexes normal and symmetric, no cranial nerve deficit, gait, coordination and speech normal      An electronic signature was used to authenticate this note.   --Erica Weaver MD

## 2023-01-03 NOTE — PROGRESS NOTES
1/3/2023    Silvana Herman is a 69 y.o. malehere for:    HPI:    ***  BP Readings from Last 3 Encounters:   01/03/23 128/62   12/25/22 (!) 150/67   11/11/22 (!) 140/64     Current Outpatient Medications   Medication Sig Dispense Refill    empagliflozin (JARDIANCE) 10 MG tablet Take 1 tablet by mouth daily 30 tablet 0    sacubitril-valsartan (ENTRESTO) 24-26 MG per tablet Take 1 tablet by mouth 2 times daily 60 tablet 0    torsemide (DEMADEX) 20 MG tablet Take 1 tablet by mouth daily 30 tablet 0    apixaban (ELIQUIS) 5 MG TABS tablet Take 1 tablet by mouth 2 times daily 60 tablet 0    JANUVIA 100 MG tablet TAKE 1 TABLET DAILY 90 tablet 3    metFORMIN (GLUCOPHAGE) 1000 MG tablet TAKE 1 TABLET TWICE A DAY WITH MEALS 180 tablet 3    insulin glargine (LANTUS SOLOSTAR) 100 UNIT/ML injection pen Inject 10 Units into the skin nightly 5 Adjustable Dose Pre-filled Pen Syringe 5    FreeStyle Lancets MISC TEST EVERY  each 5    rosuvastatin (CRESTOR) 10 MG tablet TAKE 1 TABLET DAILY 90 tablet 3    blood glucose test strips (FREESTYLE TEST STRIPS) strip 1 each by In Vitro route daily As needed. 100 each 3    Insulin Pen Needle 31G X 5 MM MISC 1 each by Does not apply route daily 100 each 3    metoprolol succinate (TOPROL XL) 25 MG extended release tablet TAKE 1 TABLET TWICE A  tablet 3    glipiZIDE (GLUCOTROL) 10 MG tablet TAKE 1 TABLET TWICE A DAY BEFORE MEALS 180 tablet 3    spironolactone (ALDACTONE) 25 MG tablet TAKE ONE-HALF (1/2) TABLET DAILY 45 tablet 3    Multiple Vitamins-Minerals (THERAPEUTIC MULTIVITAMIN-MINERALS) tablet Take 1 tablet by mouth daily      traZODone (DESYREL) 50 MG tablet TAKE 1 TABLET DAILY EVERY NIGHT (Patient taking differently: nightly as needed TAKE 1 TABLET DAILY EVERY NIGHT) 90 tablet 3    blood glucose test strips (ASCENSIA AUTODISC VI;ONE TOUCH ULTRA TEST VI) strip 1 each by In Vitro route 2 times daily As needed.  100 each 3    mupirocin (BACTROBAN) 2 % ointment APPLY TO AFFECTED AREA EVERY DAY for 30       No current facility-administered medications for this visit.       Allergies   Allergen Reactions    Ciprofloxacin Hives           Pcn [Penicillins] Hives    Sulfa Antibiotics Hives and Other (See Comments)       Past Medical & Surgical History:      Diagnosis Date    CAD (coronary artery disease) 2009    CHF (congestive heart failure) ([facility]     Diabetes mellitus ([facility]     Diabetic neuropathy ([facility]     Diabetic ulcer of left foot associated with type 2 diabetes mellitus ([facility] 03/29/2017    Hx of blood clots     Lumbar pain     for procedure 3/7/22    Peripheral vascular disease [facility])      Past Surgical History:   Procedure Laterality Date    CARDIAC SURGERY  2004    double bypass    COLONOSCOPY      FOOT SURGERY Left     multiple    NERVE BLOCK Bilateral 02/07/2022    #1 BILATERAL LUMBAR MEDIAL BRANCH NERVE BLOCK UNDER FLUOROSCOPIC GUIDANCE AT L3, L4, AND L5 DORSAL RAMI performed by Radha Kelly MD at [facility] Bilateral 03/07/2022    BILATERAL LUMBAR MEDIAL BRANCH NERVE BLOCK UNDER FLUORO AT L3 L4 L5 DORSAL RAMI performed by Radha Kelly MD at [facility] OR    OTHER SURGICAL HISTORY  03/10/2017    left foot application integra graft, application of wound vac, delayed primax closure    OTHER SURGICAL HISTORY      fracture of left temporal part of skull    OTHER SURGICAL HISTORY Left 06/16/2017    delayed closure with bone debridement and biopsy, application of hemovac with Dr. Schmitz Lipoma N/A 11/22/2021    LUMBAR EPIDURAL STEROID INJECTION UNDER FLUOROSCOPIC GUIDANCE AT L5-S1 performed by Radha Kelly MD at [address] N/A 01/06/2022    # 2 LUMBAR EPIDURAL STEROID INJECTION UNDER FLUOROSCOPIC GUIDANCE AT L5-S1 performed by Radha Kelly MD at [address] Bilateral 04/11/2022    BILATERAL LUMBAR RADIOFREQUENCY ABLATION UNDER FLUORO AT L3 L4 L5 AND DORSAL RAMI performed by Yulissa Resendiz MD at [facility] ARTERIAL INTERVENTION Left 2017    SFA, pop atherectomy, Dr. Dot Curtis Left     2nd and 5th toe    TONSILLECTOMY         Family History:      Problem Relation Age of Onset    High Blood Pressure Mother     COPD Father     Diabetes Father     Heart Disease Brother         chf    Seizures Brother        Social History:  Social History     Tobacco Use    Smoking status: Never    Smokeless tobacco: Never   Substance Use Topics    Alcohol use: Not Currently     Alcohol/week: 3.0 standard drinks     Types: 2 Shots of liquor, 1 Standard drinks or equivalent per week       Immunization History   Administered Date(s) Administered    COVID-19, J&J, (age 18y+), IM, 0.5 mL 2021    Influenza Vaccine, unspecified formulation 2018    Influenza, High Dose (Fluzone 65 yrs and older) 10/20/2017, 2018, 10/27/2019, 10/12/2020    Pneumococcal Conjugate 13-valent (Gddldus90) 2017    Pneumococcal Polysaccharide (Dqapsridg05) 2015    Zoster Recombinant (Shingrix) 2015, 2019       Review of Systems    VS:  /62   Pulse 50   Temp 97.3 °F (36.3 °C) (Temporal)   Resp 14   Ht 5' 9\" (1.753 m)   Wt 134 lb (60.8 kg)   SpO2 94%   BMI 19.79 kg/m²     Physical Exam    Assessment/Plan:  There are no diagnoses linked to this encounter. Follow up:      Patient agrees with the above stated plan. Sudhakar received counseling on the following healthy behaviors: {Health Counselin}.     Lynda Rayo MD  PGY-3 Family Medicine

## 2023-01-03 NOTE — PROGRESS NOTES
[facility]  Precepting Note    Subjective:  HFU  Acute on chronic HF  Hefref  New DVT    Echo during stay EF worsened 15 to %, stage 3 diastolic and LVH. Entresto BID and jardiance added. Diuressed iv lasix 40 bid, dc on Demadex 20mg daily. He is on 5mg bid for his new DVT. Covid the month prior. Has FU with cardio tomorrow and CHF clinic upcoming after that. He is feeling better. CHF symptoms back to his baseline. Wt Readings from Last 3 Encounters:   01/03/23 134 lb (60.8 kg)   12/25/22 140 lb (63.5 kg)   11/11/22 145 lb (65.8 kg)     Dc with a worsening creatinine. 1.6 with baseline 1.2      ROS otherwise negative     Past medical, surgical, family and social history were reviewed, non-contributory, and unchanged unless otherwise stated. Objective:    /62   Pulse 50   Temp 97.3 °F (36.3 °C) (Temporal)   Resp 14   Ht 5' 9\" (1.753 m)   Wt 134 lb (60.8 kg)   SpO2 94%   BMI 19.79 kg/m²     Exam is as noted by resident with the following changes, additions or corrections:    General:  NAD; alert & oriented x 3   Heart:  RRR, no murmurs, gallops, or rubs. Lungs:  CTA bilaterally, no wheeze, rales or rhonchi  Abd: bowel sounds present, nontender, nondistended, no masses  Extrem:  No clubbing, cyanosis, or edema    Assessment/Plan:  HFReF worsened to EF ~15%  Seems euvolemic today and back to baseline. Follow up with cardio tomorrow and to discuss any needs for defibrillator with the low EF. ENMA, new  Recheck BMP on new diuretic regimen. DVT superficial femoral vein  Believed provoked with recent covid infection. At least 3 months eliquis, consider 6. HAS-BLED Score:    1:  Risk was 3.4% in one validation study and 1.02 bleeds per 100 patient-years in another validation study. Attending Physician Statement  I have reviewed the chart, including any radiology or labs.  I have discussed the case, including pertinent history and exam findings with the resident. I agree with the assessment, plan and orders as documented by the resident. Please refer to the resident note for additional information.       Electronically signed by Jayshree Pedraza MD on 1/3/2023 at 11:05 AM

## 2023-01-04 ENCOUNTER — TELEPHONE (OUTPATIENT)
Dept: FAMILY MEDICINE CLINIC | Age: 78
End: 2023-01-04

## 2023-01-04 ENCOUNTER — OFFICE VISIT (OUTPATIENT)
Dept: CARDIOLOGY CLINIC | Age: 78
End: 2023-01-04
Payer: COMMERCIAL

## 2023-01-04 VITALS
BODY MASS INDEX: 19.43 KG/M2 | SYSTOLIC BLOOD PRESSURE: 134 MMHG | DIASTOLIC BLOOD PRESSURE: 71 MMHG | WEIGHT: 131.2 LBS | HEART RATE: 86 BPM | HEIGHT: 69 IN

## 2023-01-04 DIAGNOSIS — I25.10 CORONARY ARTERY DISEASE INVOLVING NATIVE CORONARY ARTERY OF NATIVE HEART WITHOUT ANGINA PECTORIS: ICD-10-CM

## 2023-01-04 DIAGNOSIS — I50.20 HFREF (HEART FAILURE WITH REDUCED EJECTION FRACTION) (HCC): ICD-10-CM

## 2023-01-04 PROBLEM — Z09 HOSPITAL DISCHARGE FOLLOW-UP: Status: RESOLVED | Noted: 2023-01-03 | Resolved: 2023-01-04

## 2023-01-04 PROBLEM — I50.23 ACUTE ON CHRONIC SYSTOLIC HEART FAILURE (HCC): Status: RESOLVED | Noted: 2020-09-10 | Resolved: 2023-01-04

## 2023-01-04 PROBLEM — I51.9 LEFT VENTRICULAR SYSTOLIC DYSFUNCTION: Status: RESOLVED | Noted: 2018-10-06 | Resolved: 2023-01-04

## 2023-01-04 PROBLEM — R79.89 ELEVATED SERUM CREATININE: Status: RESOLVED | Noted: 2023-01-03 | Resolved: 2023-01-04

## 2023-01-04 PROBLEM — R77.8 ELEVATED TROPONIN: Status: RESOLVED | Noted: 2022-12-23 | Resolved: 2023-01-04

## 2023-01-04 PROBLEM — M51.36 DDD (DEGENERATIVE DISC DISEASE), LUMBAR: Status: RESOLVED | Noted: 2021-07-07 | Resolved: 2023-01-04

## 2023-01-04 PROBLEM — M51.369 DDD (DEGENERATIVE DISC DISEASE), LUMBAR: Status: RESOLVED | Noted: 2021-07-07 | Resolved: 2023-01-04

## 2023-01-04 PROBLEM — Z79.4 TYPE 2 DIABETES MELLITUS WITH DIABETIC PERIPHERAL ANGIOPATHY WITHOUT GANGRENE, WITH LONG-TERM CURRENT USE OF INSULIN (HCC): Status: RESOLVED | Noted: 2018-10-19 | Resolved: 2023-01-04

## 2023-01-04 PROBLEM — R79.89 ELEVATED TROPONIN: Status: RESOLVED | Noted: 2022-12-23 | Resolved: 2023-01-04

## 2023-01-04 PROBLEM — I51.89 LEFT VENTRICULAR SYSTOLIC DYSFUNCTION: Status: RESOLVED | Noted: 2018-10-06 | Resolved: 2023-01-04

## 2023-01-04 PROBLEM — M43.16 SPONDYLOLISTHESIS OF LUMBAR REGION: Status: RESOLVED | Noted: 2021-11-16 | Resolved: 2023-01-04

## 2023-01-04 PROBLEM — E11.51 TYPE 2 DIABETES MELLITUS WITH DIABETIC PERIPHERAL ANGIOPATHY WITHOUT GANGRENE, WITH LONG-TERM CURRENT USE OF INSULIN (HCC): Status: RESOLVED | Noted: 2018-10-19 | Resolved: 2023-01-04

## 2023-01-04 PROCEDURE — G8420 CALC BMI NORM PARAMETERS: HCPCS | Performed by: INTERNAL MEDICINE

## 2023-01-04 PROCEDURE — 1124F ACP DISCUSS-NO DSCNMKR DOCD: CPT | Performed by: INTERNAL MEDICINE

## 2023-01-04 PROCEDURE — G8484 FLU IMMUNIZE NO ADMIN: HCPCS | Performed by: INTERNAL MEDICINE

## 2023-01-04 PROCEDURE — 3075F SYST BP GE 130 - 139MM HG: CPT | Performed by: INTERNAL MEDICINE

## 2023-01-04 PROCEDURE — 1036F TOBACCO NON-USER: CPT | Performed by: INTERNAL MEDICINE

## 2023-01-04 PROCEDURE — G8427 DOCREV CUR MEDS BY ELIG CLIN: HCPCS | Performed by: INTERNAL MEDICINE

## 2023-01-04 PROCEDURE — 93000 ELECTROCARDIOGRAM COMPLETE: CPT | Performed by: INTERNAL MEDICINE

## 2023-01-04 PROCEDURE — 1111F DSCHRG MED/CURRENT MED MERGE: CPT | Performed by: INTERNAL MEDICINE

## 2023-01-04 PROCEDURE — 3078F DIAST BP <80 MM HG: CPT | Performed by: INTERNAL MEDICINE

## 2023-01-04 PROCEDURE — 99215 OFFICE O/P EST HI 40 MIN: CPT | Performed by: INTERNAL MEDICINE

## 2023-01-04 NOTE — PROGRESS NOTES
Chief Complaint   Patient presents with    Congestive Heart Failure    Coronary Artery Disease       Patient Active Problem List    Diagnosis Date Noted    HFrEF (heart failure with reduced ejection fraction) ([facility] 01/03/2023     Overview Note:     A. TTE 12/24/2022 [facility]) : EF is 15%. Severe LVH, grade 3 diastolic dysfunction. Moderate to severe RV dysfunction. Moderate mitral as well as tricuspid regurgitation      DVT (deep venous thrombosis) ([facility] 12/23/2022    Stage 3 chronic kidney disease ([facility] 05/18/2022    Spinal stenosis of lumbar region 11/16/2021    Lumbosacral spondylosis without myelopathy 11/16/2021    Lumbar radiculopathy 09/21/2020    Diabetes mellitus type 2 in nonobese [facility]) 09/10/2020    PVD (peripheral vascular disease) ([facility] 10/07/2019    RBBB 10/09/2018    Coronary artery disease involving native coronary artery of native heart without angina pectoris 10/09/2018     Overview Note:     A. ACB [facility] Priest): 3 vessel, no other details   B.   Lexiscan 2021:  Large fixed inferior defect      Mixed hyperlipidemia 10/06/2018    Hypertension 06/11/2018       Current Outpatient Medications   Medication Sig Dispense Refill    mupirocin (BACTROBAN) 2 % ointment APPLY TO AFFECTED AREA EVERY DAY for 30      empagliflozin (JARDIANCE) 10 MG tablet Take 1 tablet by mouth daily 30 tablet 0    sacubitril-valsartan (ENTRESTO) 24-26 MG per tablet Take 1 tablet by mouth 2 times daily 60 tablet 0    torsemide (DEMADEX) 20 MG tablet Take 1 tablet by mouth daily 30 tablet 0    apixaban (ELIQUIS) 5 MG TABS tablet Take 1 tablet by mouth 2 times daily 60 tablet 0    JANUVIA 100 MG tablet TAKE 1 TABLET DAILY 90 tablet 3    metFORMIN (GLUCOPHAGE) 1000 MG tablet TAKE 1 TABLET TWICE A DAY WITH MEALS 180 tablet 3    insulin glargine (LANTUS SOLOSTAR) 100 UNIT/ML injection pen Inject 10 Units into the skin nightly 5 Adjustable Dose Pre-filled Pen Syringe 5    rosuvastatin (CRESTOR) 10 MG tablet TAKE 1 TABLET DAILY 90 tablet 3    metoprolol succinate (TOPROL XL) 25 MG extended release tablet TAKE 1 TABLET TWICE A  tablet 3    glipiZIDE (GLUCOTROL) 10 MG tablet TAKE 1 TABLET TWICE A DAY BEFORE MEALS 180 tablet 3    spironolactone (ALDACTONE) 25 MG tablet TAKE ONE-HALF (1/2) TABLET DAILY 45 tablet 3    Multiple Vitamins-Minerals (THERAPEUTIC MULTIVITAMIN-MINERALS) tablet Take 1 tablet by mouth daily      traZODone (DESYREL) 50 MG tablet TAKE 1 TABLET DAILY EVERY NIGHT (Patient taking differently: nightly as needed TAKE 1 TABLET DAILY EVERY NIGHT) 90 tablet 3     No current facility-administered medications for this visit. Allergies   Allergen Reactions    Ciprofloxacin Hives           Pcn [Penicillins] Hives    Sulfa Antibiotics Hives and Other (See Comments)       Vitals:    01/04/23 1025   BP: 134/71   Pulse: 86   Weight: 131 lb 3.2 oz (59.5 kg)   Height: 5' 9\" (1.753 m)               SUBJECTIVE: Beatrice Callejas presents to the office today for hfu.  I reviewed the hospital records - was admitted with ADHF with significant peripheral edema and sob. TTE read as EF of 15% with moderate MR, was diuresed, placed on [place]. DC labs BUN / Creat of 40 and 1.6, labs yesterday with 42/2.2  He complains of NO sob, nor orthopnea, and edema has resolved. Physical Exam   /71   Pulse 86   Ht 5' 9\" (1.753 m)   Wt 131 lb 3.2 oz (59.5 kg)   BMI 19.37 kg/m²   Constitutional: Oriented to person, place, and time. Thin No distress. .   Neck: NO JVD present. Carotid bruit is not present. Cardiovascular: Normal rate, regular rhythm, normal heart sounds and intact distal pulses. Exam reveals no gallop and no friction rub. No murmur heard. Pulmonary/Chest: Effort normal and breath sounds normal. No respiratory distress. No wheezes. No rales. Abdominal: Soft. Bowel sounds are normal.   Musculoskeletal: Normal range of motion.  No edema    Neurological: Alert and oriented to person, place, and time.   Skin: Skin is warm and dry. Psychiatric: Normal mood and affect. Behavior is normal.     EKG:  normal sinus rhythm, first degree AVB, QRS dur 154 msec, RBBB and LAFB, infero-lateral MI pattern     ASSESSMENT AND PLAN:  Patient Active Problem List   Diagnosis        Atherosclerosis of native artery of left lower extremity    Hypertension    Type 2 diabetes mellitus,     ICM:      Mixed hyperlipidemia:       RBBB: no symptoms or signs of high grade AVB    Coronary artery disease :       Hx of ACB 17 years ago with likely graft occlusion by nuclear stress 2021 - inferior defect corresponding to Q waves on EKG. No angina currently  HFrEF, profound reduction in EF, Stage C, NYHA I, volume depletion.    Will drop loop diuretic to qod dosing, consider suspending usage of Jardiance if PCP agrees renal function an issue, increase toprol to bid dosing    Will arrange Life Vest  Also recent DVT - will drop Eliquis dose for low weight and creat > 1.5 to 2.5 mg bid  OV one month      Alejandrina Rosa M.D  [facility] Card[facility]

## 2023-01-04 NOTE — TELEPHONE ENCOUNTER
Discussed with patient's wife the recent kidney function worsening being on Demadex and Entresto. Patient did had appointment with cardiology today and was recommended continuing with Entresto and switching Demadex to every other day. I did order repeat BMP for 3 to 4 days from now. Patient's wife in agreement and understand the plan we will follow-up after the BMP results.

## 2023-01-04 NOTE — TELEPHONE ENCOUNTER
Pts wife calling stating that she got a call yesterday from Dr Gwen Kwon that said she needed to talk to her about Edrie Starch medication. Don't see any documentation that anyone tried to call, please advise.

## 2023-01-05 ENCOUNTER — CARE COORDINATION (OUTPATIENT)
Dept: CASE MANAGEMENT | Age: 78
End: 2023-01-05

## 2023-01-05 NOTE — CARE COORDINATION
[facility] Care Transitions Follow Up Call    Care Transition Nurse contacted the patient by telephone to follow up after admission on 2022. Verified name and  with patient as identifiers. Patient: Neel Frias  Patient : 1945   MRN: [id number]  Reason for Admission: Acute Decompensated Heart Failure  Discharge Date: 22 RARS: Readmission Risk Score: 14.3      Needs to be reviewed by the provider   Additional needs identified to be addressed with provider: No           Method of communication with provider: none. Patient is pleasant in conversation.   -denies chest pain, palpitations, shortness of breath, lightheaded, or dizziness   -denies any swelling or weight gain   -reports SpO2 96% RA  -reports today    -voices no needs or concerns at this time    Emotional support provided; discussed will continue to follow. Addressed changes since last contact:   active medication adjustments per Cardiologist; f/u OV in one month  Discussed follow-up appointments. If no appointment was previously scheduled, appointment scheduling offered: N/A. Follow Up  Future Appointments   Date Time Provider Cecy Marie   1/10/2023  8:30 AM [facility] ROOM 1 [facility]   2023 10:15 AM Rose Catherine MD [address] [place] [address]   2023 11:00 AM Anna Caballero MD [address] [facility] Transition Nurse reviewed discharge instructions, medical action plan, and red flags with patient and discussed any barriers to care and/or understanding of plan of care after discharge. Discussed appropriate site of care based on symptoms and resources available to patient including: PCP  Specialist  When to call 911. The patient agrees to contact the PCP office for questions related to their healthcare.          Patients top risk factors for readmission: medical condition-Heart Failure, HTN, multiple health system providers, and polypharmacy  Interventions to address risk factors: Assessment and support for treatment adherence and medication management-patient reports taking medications as prescribed        Care Transition Nurse provided contact information for future needs. Plan for follow-up call in 5-7 days based on severity of symptoms and risk factors.   Plan for next call:  any new or worsening symptoms, daily weights    Chidi Matute RN

## 2023-01-06 DIAGNOSIS — I50.20 HFREF (HEART FAILURE WITH REDUCED EJECTION FRACTION) (HCC): Primary | ICD-10-CM

## 2023-01-06 NOTE — PROGRESS NOTES
Spoke to [employer] ([employer]) regarding new [employer] order. [employer] will reach out to the patient for fitting.

## 2023-01-09 ENCOUNTER — HOSPITAL ENCOUNTER (OUTPATIENT)
Age: 78
Discharge: HOME | End: 2023-01-09
Payer: MEDICARE

## 2023-01-09 DIAGNOSIS — I50.20 HFREF (HEART FAILURE WITH REDUCED EJECTION FRACTION) (HCC): ICD-10-CM

## 2023-01-09 DIAGNOSIS — N18.30 STAGE 3 CHRONIC KIDNEY DISEASE, UNSPECIFIED WHETHER STAGE 3A OR 3B CKD (HCC): ICD-10-CM

## 2023-01-09 DIAGNOSIS — N17.9 ACUTE KIDNEY INJURY SUPERIMPOSED ON CKD (HCC): ICD-10-CM

## 2023-01-09 DIAGNOSIS — N18.9 ACUTE KIDNEY INJURY SUPERIMPOSED ON CKD (HCC): ICD-10-CM

## 2023-01-09 LAB
ANION GAP SERPL CALCULATED.3IONS-SCNC: 14 MMOL/L (ref 7–16)
BUN BLDV-MCNC: 63 MG/DL (ref 6–23)
CALCIUM SERPL-MCNC: 10.4 MG/DL (ref 8.6–10.2)
CHLORIDE BLD-SCNC: 100 MMOL/L (ref 98–107)
CO2: 24 MMOL/L (ref 22–29)
CREAT SERPL-MCNC: 1.9 MG/DL (ref 0.7–1.2)
GFR SERPL CREATININE-BSD FRML MDRD: 36 ML/MIN/1.73
GLUCOSE BLD-MCNC: 132 MG/DL (ref 74–99)
POTASSIUM SERPL-SCNC: 4.8 MMOL/L (ref 3.5–5)
SODIUM BLD-SCNC: 138 MMOL/L (ref 132–146)

## 2023-01-09 PROCEDURE — 80048 BASIC METABOLIC PNL TOTAL CA: CPT

## 2023-01-09 PROCEDURE — 36415 COLL VENOUS BLD VENIPUNCTURE: CPT

## 2023-01-10 ENCOUNTER — HOSPITAL ENCOUNTER (OUTPATIENT)
Dept: OTHER | Age: 78
Setting detail: THERAPIES SERIES
Discharge: HOME | End: 2023-01-10
Payer: MEDICARE

## 2023-01-10 VITALS
SYSTOLIC BLOOD PRESSURE: 123 MMHG | HEART RATE: 69 BPM | DIASTOLIC BLOOD PRESSURE: 60 MMHG | BODY MASS INDEX: 20.05 KG/M2 | OXYGEN SATURATION: 92 % | RESPIRATION RATE: 16 BRPM | WEIGHT: 135.8 LBS

## 2023-01-10 LAB
ANION GAP SERPL CALCULATED.3IONS-SCNC: 13 MMOL/L (ref 7–16)
BUN BLDV-MCNC: 57 MG/DL (ref 6–23)
CALCIUM SERPL-MCNC: 10.6 MG/DL (ref 8.6–10.2)
CHLORIDE BLD-SCNC: 101 MMOL/L (ref 98–107)
CO2: 25 MMOL/L (ref 22–29)
CREAT SERPL-MCNC: 1.8 MG/DL (ref 0.7–1.2)
GFR SERPL CREATININE-BSD FRML MDRD: 38 ML/MIN/1.73
GLUCOSE BLD-MCNC: 59 MG/DL (ref 74–99)
POTASSIUM SERPL-SCNC: 5 MMOL/L (ref 3.5–5)
PRO-BNP: 4143 PG/ML (ref 0–450)
SODIUM BLD-SCNC: 139 MMOL/L (ref 132–146)

## 2023-01-10 PROCEDURE — 99205 OFFICE O/P NEW HI 60 MIN: CPT

## 2023-01-10 PROCEDURE — 36415 COLL VENOUS BLD VENIPUNCTURE: CPT

## 2023-01-10 PROCEDURE — 80048 BASIC METABOLIC PNL TOTAL CA: CPT

## 2023-01-10 PROCEDURE — 83880 ASSAY OF NATRIURETIC PEPTIDE: CPT

## 2023-01-10 PROCEDURE — 99204 OFFICE O/P NEW MOD 45 MIN: CPT

## 2023-01-10 NOTE — DISCHARGE INSTRUCTIONS
HEART FAILURE  / CONGESTIVE HEART FAILURE  DISCHARGE INSTRUCTIONS:  GUIDELINES TO FOLLOW AT HOME    Future Appointments   Date Time Provider Cecy Marie   1/19/2023  8:45 AM [facility] ROOM 2 [facility]   1/27/2023  8:45 AM [facility] ROOM 2 [facility]   2/3/2023  8:45 AM [facility] ROOM 2 [facility]   2/6/2023 10:15 AM Adrian Campos MD [facility]   6/27/2023 11:00 AM Alejandro Diaz MD [facility]       Self- Managed Care:     MEDICATIONS:  Take your medication as directed. If you are experiencing any side effects, inform your doctor, Do not stop taking any of your medications without letting your doctor know. Check with your doctor before taking any over-the-counter medications / herbal / or dietary supplements. They may interfere with your other medications. Do not take ibuprofen (Advil or Motrin) and naproxen (Aleve) without talking to your doctor first. They could make your heart failure worse. WEIGHT MONITORING:   Weigh yourself everyday (with the same scale) around the same time of the day and write it down. (you can chart them on a calendar or keep track of them on paper. Notify your doctor of a weight gain of 3 pounds or more in 1 day   OR a total of 5 pounds or more in 1 week    Take your weight record to your doctor visits  Also, the same goes if you loose more than 3# in one day, let your heart doctor know. DIET:   Cardiac heart healthy diet- Low saturated / low trans fat, no added salt, caffeine restricted, Low sodium diet-   No more than 2,000mg (2 grams) of salt / sodium per day (which equals to a little less than  a teaspoon of salt)  If your doctor wants you on a fluid restriction. ..it is usually recommended a fluid limit of 2,000cc -  Fluid restriction- 2,000 ml (milliliters) = 64 ounces = you can have 8 glasses of fluid per day (each glass 8 ounces)    Follow a low salt diet - avoid using salt at the table, avoid / limit use of canned soups, processed / packaged foods, salted snacks, olives and pickles. Do not use a salt substitute without checking with your doctor, they may contain a high amount of potassioum. (Mrs. Shane Dean is safe to use). Limit the use of alcohol       CALL YOUR DOCTOR THE FIRST DAY YOU NOTICE ANY OF THESE   SYMPTOMS:  You have a weight gain of 3 pounds or more in 1 day         OR 5 pounds or more in one week  More shortness of breath  More swelling of your stomach, legs, ankles or feet  Feeling more tired, No energy  Dry hacky cough  Dizziness  More chest pain / discomfort       (CALL 911 IF ANY OF THE FOLLOWING OCCURS  Chest pain (not relieved with nitroglycerine, if you have been prescribed this medication)  Severe shortness of breath  Faint / Pass out  Confusion / cannot think clearly  If symptoms get worse           SMOKING - TOBACCO USE:  * IF YOU SMOKE - STOP! Kick the habit. [address] Program is offered at [place] [address] and [address] [facility]. Call [phone] extension [phone] for more information. ACTIVITY:   (Ask your doctor when you will be able to return to work and before starting any exercise program.  Do not drive unless unless your doctor has given you permission to do so). Start light exercise. Even if you can only do a small amount, exercise will help you get stronger, have more energy, help manage your weight and decrease  stress. Walking is an easy way to get exercise. Start out slowly and  increase the amount you walk as tolerated  If you become short of breath, dizzy or have chest pain; stop, sit down, and rest.  If you feel \"wiped out\" the day after you exercise, walk at a slower pace or for a shorter distance. You can gradually increase the pace or amount of time.             (Do not exercise right after a meal or in extreme temperatures, such as above 85 degrees, if the air is really humid, or wind chill is less than 20 degrees)                                             ADDITIONAL INFORMATION:  Avoid getting sick from colds and the flu. Stay away from friends or family that you know may have a contagious illness  Get plenty of rest   Get a flu shot each year. Get a pneumococcal vaccine shot. If you have had one before, ask your doctor whether you need another dose. My Goal for Self-management of Heart Failure Includes 5 steps :    1. Notice a change in symptoms ( weight gain, short of breath, leg swelling, decreased activity level, bloating. ...)    2. Evaluate the change: (use the Heart Failure Zones )     3. Decide to take action: decide what your options are, such as: (call your doctor for an extra visit, take a prescribed medication, such as your water pill if your doctor has given you directions to do so, [address])    4. Come up with a strategy:  (now you call the doctor for advice / appointment. This is where you take action!!! Do not wait, catch the symptom early and treat it before it worsens. 5. Evaluate the response: The next day, check your Heart Failure Zones: are you in the GREEN ZONE (safe zone)? Worsening symptoms of YELLOW ZONE? Or have you moved to the RED ZONE and need to call 911 or go to the Emergency Room for evaluation? Call your doctor's office to update them on your symptoms of heart failure. Learning About Heart Failure Zones  What are heart failure zones? Heart failure zones give you an easy way to see changes in your heart failure symptoms. They also tell you when you need to get help. Check every day to see which zone you are in. Green zone. You are doing well. This is where you want to be. Your weight is stable. It's not going up or down. You breathe easily. You are sleeping well. You are able to lie flat without shortness of breath. You can do your usual activities. Yellow zone. Be careful. Your symptoms are changing.  Call your doctor. You have new or increased shortness of breath. You are dizzy or lightheaded, or you feel like you may faint. You have sudden weight gain, such as more than 2 to 3 pounds in a day or 5 pounds in a week. (Your doctor may suggest a different range of weight gain.)  You have increased swelling in your legs, ankles, or feet. You are so tired or weak that you can't do your usual activities. You are not sleeping well. Shortness of breath wakes you up at night. You need extra pillows. Red zone. 911  This is an emergency. Call . You have symptoms of sudden heart failure. For example: You have severe trouble breathing. You cough up pink, foamy mucus. You have a new irregular or fast heartbeat. You have symptoms of a heart attack. These may include:  Chest pain or pressure, or a strange feeling in the chest.  Sweating. Shortness of breath. Nausea or vomiting. Pain, pressure, or a strange feeling in the back, neck, jaw, or upper belly or in one or both shoulders or arms. Lightheadedness or sudden weakness. A fast or irregular heartbeat. If you have symptoms of a heart attack 911 : After you call , the  may tell you to chew 1 adult-strength or 2 to 4 low-dose aspirin. Wait for an ambulance. Do not try to drive yourself. Follow-up care is a key part of your treatment and safety. Be sure to make and go to all appointments, and call your doctor if you are having problems. It's also a good idea to know your test results and keep a list of the medicines you take. Where can you learn more? Go to https://Obviousideapemandaeweb.[employer]. org and sign in to your [employer] account. Enter T174 in the [facility] box to learn more about \"Learning About Heart Failure Zones. \"     If you do not have an account, please click on the \"Sign Up Now\" link. Current as of: August 31, 2020               Content Version: 12.9  © 9388-5657 Healthwise, Incorporated.    Care instructions adapted under license by [facility] ([facility]). If you have questions about a medical condition or this instruction, always ask your healthcare professional. [place] [address] any warranty or liability for your use of this information.

## 2023-01-10 NOTE — PLAN OF CARE
Problem: Chronic Conditions and Co-morbidities  Goal: Patient's chronic conditions and co-morbidity symptoms are monitored and maintained or improved  Outcome: Progressing   CHF-continue plan of care
(3) slightly limited

## 2023-01-10 NOTE — RESULT ENCOUNTER NOTE
Labs and CHF clinic note reviewed  Patient of Dr. Eduardo Hennessy with ischemic cardiomyopathy    OV on 1/4/2023:   Torsemide reduced to 20 mg QOD  Toprol increased to 25 mg BID  Continued on:  Entresto 24/26 mg BID  Spironolactone 25 mg daily  Jardiance 10 mg daily (consider adjustment pending renal function)    Vitals: 123/60, 69  Pro-bnp down trending 13k > 4k  SCr 2.2 > 1.9 > 1.8    Continue current GDMT, pending vitals, labs and renal function consider increasing Entresto next week

## 2023-01-10 NOTE — PROGRESS NOTES
Congestive Heart Failure [facility] 63   1945          Referring Provider: Dr. Nannette Lares  Primary Care Physician: Dr. Zaria Quigley  Cardiologist: Dr. Anselmo Lamas  Nephrologist:         History of Present Illness:     Kayce Mcgovern is a 69 y.o. male with a history of HFrEF, most recent EF 15% on 12/23/2022. Patient Story:    He does not  have dyspnea with exertion, shortness of breath, or decline in overall functional capacity. He does not have orthopnea, PND, nocturnal cough or hemoptysis. He does not have abdominal distention or bloating, early satiety, anorexia/change in appetite. He does has a good urinary response to  oral diuretic. He does not have  lower extremity edema. He denies lightheadedness, dizziness. He denies palpitations, syncope or near syncope. He does not complain of chest pain, pressure, discomfort. Allergies   Allergen Reactions    Ciprofloxacin Hives           Pcn [Penicillins] Hives    Sulfa Antibiotics Hives and Other (See Comments)         No outpatient medications have been marked as taking for the 1/10/23 encounter [facility] Encounter) with [facility] ROOM 1.      Current Outpatient Medications on File Prior to Encounter   Medication Sig Dispense Refill    mupirocin (BACTROBAN) 2 % ointment APPLY TO AFFECTED AREA EVERY DAY for 30      empagliflozin (JARDIANCE) 10 MG tablet Take 1 tablet by mouth daily 30 tablet 0    sacubitril-valsartan (ENTRESTO) 24-26 MG per tablet Take 1 tablet by mouth 2 times daily 60 tablet 0    torsemide (DEMADEX) 20 MG tablet Take 1 tablet by mouth daily (Patient taking differently: Take 20 mg by mouth every other day Every other day per Dr. Zaria Quigley) 30 tablet 0    apixaban (ELIQUIS) 5 MG TABS tablet Take 1 tablet by mouth 2 times daily 60 tablet 0    JANUVIA 100 MG tablet TAKE 1 TABLET DAILY 90 tablet 3    metFORMIN (GLUCOPHAGE) 1000 MG tablet TAKE 1 TABLET TWICE A DAY WITH MEALS 180 tablet 3    insulin glargine (LANTUS SOLOSTAR) 100 UNIT/ML injection pen Inject 10 Units into the skin nightly 5 Adjustable Dose Pre-filled Pen Syringe 5    rosuvastatin (CRESTOR) 10 MG tablet TAKE 1 TABLET DAILY 90 tablet 3    metoprolol succinate (TOPROL XL) 25 MG extended release tablet TAKE 1 TABLET TWICE A  tablet 3    glipiZIDE (GLUCOTROL) 10 MG tablet TAKE 1 TABLET TWICE A DAY BEFORE MEALS 180 tablet 3    spironolactone (ALDACTONE) 25 MG tablet TAKE ONE-HALF (1/2) TABLET DAILY 45 tablet 3    Multiple Vitamins-Minerals (THERAPEUTIC MULTIVITAMIN-MINERALS) tablet Take 1 tablet by mouth daily      traZODone (DESYREL) 50 MG tablet TAKE 1 TABLET DAILY EVERY NIGHT (Patient taking differently: nightly as needed TAKE 1 TABLET DAILY EVERY NIGHT) 90 tablet 3     No current facility-administered medications on file prior to encounter. Guideline directed medical:  ARNI/ACE I/ARB: Yes  Beta blocker:   Yes  Aldosterone antagonist:  Yes  SGLT2i:  Yes        Physical Examination:     /60   Pulse 69   Resp 16   Wt 135 lb 12.8 oz (61.6 kg)   SpO2 92%   BMI 20.05 kg/m²     Assessment  Charting Type: Shift assessment    Neurological  Level of Consciousness: Alert (0)  Orientation Level: Oriented X4         HEENT (Head, Ears, Eyes, Nose, & Throat)  HEENT (WDL): Exceptions to WDL  Right Ear: Hearing aid  Left Ear: Hearing aid    Respiratory  Respiratory Depth: Normal  L Breath Sounds: Clear  R Breath Sounds: Clear              Cardiac  Cardiac Rhythm: Sinus rhythm    Rhythm Interpretation  Heart Rate: 69         Gastrointestinal  Abdominal (WDL): Within Defined Limits               Peripheral Vascular  Peripheral Vascular (WDL): Within Defined Limits  Edema: None              Musculoskeletal  RL Extremity: Brace  LL Extremity: Brace         Psychosocial  Psychosocial (WDL): Within Defined Limits                        Heart Rate: 69                     LAB DATA:    Last 3 BMP      Sodium (mmol/L)   Date Value   01/10/2023 139   01/09/2023 138   01/03/2023 137     Potassium (mmol/L)   Date Value   01/10/2023 5.0   01/09/2023 4.8   01/03/2023 4.6     Potassium reflex Magnesium (mmol/L)   Date Value   12/25/2022 3.7   12/24/2022 4.0   12/23/2022 4.9     Chloride (mmol/L)   Date Value   01/10/2023 101   01/09/2023 100   01/03/2023 96 (L)     CO2 (mmol/L)   Date Value   01/10/2023 25   01/09/2023 24   01/03/2023 22     BUN (mg/dL)   Date Value   01/10/2023 57 (H)   01/09/2023 63 (H)   01/03/2023 71 (H)     Glucose (mg/dL)   Date Value   01/10/2023 59 (L)   01/09/2023 132 (H)   01/03/2023 173 (H)     Calcium (mg/dL)   Date Value   01/10/2023 10.6 (H)   01/09/2023 10.4 (H)   01/03/2023 10.6 (H)       Last 3 BNP       Pro-BNP (pg/mL)   Date Value   01/10/2023 4,143 (H)   12/25/2022 13,140 (H)   12/22/2022 16,742 (H)          CBC: No results for input(s): WBC, HGB, PLT in the last 72 hours. BMP:    Recent Labs     01/09/23  1151 01/10/23  0937    139   K 4.8 5.0    101   CO2 24 25   BUN 63* 57*   CREATININE 1.9* 1.8*   GLUCOSE 132* 59*     Hepatic: No results for input(s): AST, ALT, ALB, BILITOT, ALKPHOS in the last 72 hours. Troponin: No results for input(s): TROPONINI in the last 72 hours. BNP: No results for input(s): BNP in the last 72 hours. Lipids: No results for input(s): CHOL, HDL in the last 72 hours. Invalid input(s): LDLCALCU  INR: No results for input(s): INR in the last 72 hours. WEIGHTS:    Wt Readings from Last 3 Encounters:   01/10/23 135 lb 12.8 oz (61.6 kg)   01/04/23 131 lb 3.2 oz (59.5 kg)   01/03/23 134 lb (60.8 kg)         TELEMETRY:  Cardiac Regularity: Regular  Cardiac Rhythm/Interpretation: NSR        ASSESSMENT:  Jasper Fernandez is evolemic with stable weights. He was discharde on a loop diuretic and currently taking every other day ( torsemide 20 MG) reporting a good urinary response on days he takes his torsemide.  He sleeps in bed on 1 pillow and sometimes \"no pillow at all\" in an un-elevated bed. Patient currently not weighing daily but is willing to start and document so that he can monitor closely    First visit with CHF clinic today. Patient presented with wife Jordy Jones. Discussed with patient and Jordy Jones the purpose of CHF clinic and importance of daily weights and doing a self check every day to monitor for changes. Went over the three heart failure zones and to call cardiologist if in yellow zone immediately to prevent any further decline. Patient has a working scale. Patient is agreeable to future CHF clinic visits. Next 3 consecutive weekly appointments made today so that patient has a total of 4 visits within first 30 days. Interventions completed this visit:  IV diuretics given- no  Lab work obtained yes, BMP, proBNP   Reviewed currently prescribed medications with patient, educated on importance of compliance and answered any questions regarding their medication  Educated on signs and symptoms of HF  Educated on low sodium diet    PLAN:  Scheduled to follow up in CHF clinic on   Future Appointments   Date Time Provider Cecy Marie   1/19/2023  8:45 AM [facility] ROOM 2 [facility]   1/27/2023  8:45 AM [facility] ROOM 2 [facility]   2/3/2023  8:45 AM [facility] ROOM 2 [facility]   2/6/2023 10:15 AM Brandie Dillard MD [address] [place] [address]   6/27/2023 11:00 AM Sterling Abreu MD [facility]     Given clinic phone number [phone] and aware of signs and symptoms to call with any HF change in symptoms.

## 2023-01-11 ENCOUNTER — CARE COORDINATION (OUTPATIENT)
Dept: CASE MANAGEMENT | Age: 78
End: 2023-01-11

## 2023-01-11 NOTE — CARE COORDINATION
[facility] Care Transitions Follow Up Call        Patient: Rei Cavanaugh  Patient : 1945   MRN: [id number]  Reason for Admission: Acute Decompensated Heart Failure  Discharge Date: 22 RARS: Readmission Risk Score: 14.3    Attempted to reach patient by phone regarding follow up; Care Transitions, Subsequent Call. HIPAA compliant message left on patient's voicemail; CTN contact information provided.         Liset Marrero RN

## 2023-01-13 ENCOUNTER — TELEPHONE (OUTPATIENT)
Dept: FAMILY MEDICINE CLINIC | Age: 78
End: 2023-01-13

## 2023-01-13 RX ORDER — TORSEMIDE 20 MG/1
20 TABLET ORAL EVERY OTHER DAY
Qty: 30 TABLET | Refills: 5 | Status: SHIPPED | OUTPATIENT
Start: 2023-01-13

## 2023-01-13 NOTE — TELEPHONE ENCOUNTER
Refills needed on:    Apixaban 5 mg     Empagliflozin 10 mg    Entresto 24-26    Torsemide 20 mg      At [facility] on [address]

## 2023-01-13 NOTE — TELEPHONE ENCOUNTER
Called and discussed with patient  Jardiance discontinued  Dose of Eliquis was reduced because of Creatinine >1.5  Patient is following with cardiology

## 2023-01-18 ENCOUNTER — CARE COORDINATION (OUTPATIENT)
Dept: CASE MANAGEMENT | Age: 78
End: 2023-01-18

## 2023-01-19 ENCOUNTER — HOSPITAL ENCOUNTER (OUTPATIENT)
Dept: OTHER | Age: 78
Setting detail: THERAPIES SERIES
Discharge: HOME | End: 2023-01-19
Payer: MEDICARE

## 2023-01-19 VITALS
BODY MASS INDEX: 19.49 KG/M2 | DIASTOLIC BLOOD PRESSURE: 60 MMHG | SYSTOLIC BLOOD PRESSURE: 138 MMHG | HEART RATE: 65 BPM | OXYGEN SATURATION: 94 % | RESPIRATION RATE: 18 BRPM | WEIGHT: 132 LBS

## 2023-01-19 LAB
ANION GAP SERPL CALCULATED.3IONS-SCNC: 14 MMOL/L (ref 7–16)
BUN BLDV-MCNC: 60 MG/DL (ref 6–23)
CALCIUM SERPL-MCNC: 9.7 MG/DL (ref 8.6–10.2)
CHLORIDE BLD-SCNC: 101 MMOL/L (ref 98–107)
CO2: 21 MMOL/L (ref 22–29)
CREAT SERPL-MCNC: 2.3 MG/DL (ref 0.7–1.2)
GFR SERPL CREATININE-BSD FRML MDRD: 29 ML/MIN/1.73
GLUCOSE BLD-MCNC: 110 MG/DL (ref 74–99)
POTASSIUM SERPL-SCNC: 4.7 MMOL/L (ref 3.5–5)
PRO-BNP: 3438 PG/ML (ref 0–450)
SODIUM BLD-SCNC: 136 MMOL/L (ref 132–146)

## 2023-01-19 PROCEDURE — 83880 ASSAY OF NATRIURETIC PEPTIDE: CPT

## 2023-01-19 PROCEDURE — 80048 BASIC METABOLIC PNL TOTAL CA: CPT

## 2023-01-19 PROCEDURE — 36415 COLL VENOUS BLD VENIPUNCTURE: CPT

## 2023-01-19 PROCEDURE — 99214 OFFICE O/P EST MOD 30 MIN: CPT

## 2023-01-19 RX ORDER — TORSEMIDE 20 MG/1
20 TABLET ORAL PRN
Qty: 30 TABLET | Refills: 5 | Status: SHIPPED | OUTPATIENT
Start: 2023-01-19

## 2023-01-19 NOTE — RESULT ENCOUNTER NOTE
Patient of Dr. Tommy Avina with ischemic cardiomyopathy    OV on 1/4/2023: Torsemide reduced to 20 mg QOD  Toprol increased to 25 mg BID  Continued on:  Entresto 24/26 mg BID  Spironolactone 25 mg daily  Jardiance 10 mg daily (consider adjustment pending renal function)    PCP called patient on 1/13/2023 and stopped Jardiacne and dose reduced Eliquis.     Labs and CHF clinic reviewed  Spoke with Fozia Mcbride RN in CHF clinic     Labs today in CHF clinic   BUN 57 > 60  SCR 1.8 > 2.3  Pro-bnp 16k > 13k>4k>3k    Will change torsemide from every other day to PRN (weight gain, shortness of breath, swelling, etc)  Patient drinks at least 4 bottles of water daily already    Following up in CHF clinic in 1 week    Thank you

## 2023-01-19 NOTE — PROGRESS NOTES
Congestive Heart Failure [facility] 63   1945          Referring Provider: Dr. Ashleigh Garcia  Primary Care Physician: Dr. Annie Acosta  Cardiologist: Dr. Mac Lucia  Nephrologist:         History of Present Illness:     Will Alaniz is a 69 y.o. male with a history of HFrEF, most recent EF 15% on 12/23/2022. Patient Story:    He does not  have dyspnea with exertion, shortness of breath, or decline in overall functional capacity. He does not have orthopnea, PND, nocturnal cough or hemoptysis. He does not have abdominal distention or bloating, early satiety, anorexia/change in appetite. He does has a good urinary response to  oral diuretic. He does not have  lower extremity edema. He denies lightheadedness, dizziness. He denies palpitations, syncope or near syncope. He does not complain of chest pain, pressure, discomfort. Allergies   Allergen Reactions    Ciprofloxacin Hives           Pcn [Penicillins] Hives    Sulfa Antibiotics Hives and Other (See Comments)         No outpatient medications have been marked as taking for the 1/19/23 encounter [facility] Encounter) with [facility] CHF ROOM 2.      Current Outpatient Medications on File Prior to Encounter   Medication Sig Dispense Refill    apixaban (ELIQUIS) 2.5 MG TABS tablet Take 1 tablet by mouth 2 times daily 60 tablet 5    sacubitril-valsartan (ENTRESTO) 24-26 MG per tablet Take 1 tablet by mouth 2 times daily 60 tablet 5    mupirocin (BACTROBAN) 2 % ointment APPLY TO AFFECTED AREA EVERY DAY for 30 (Patient not taking: Reported on 1/19/2023)      empagliflozin (JARDIANCE) 10 MG tablet Take 1 tablet by mouth daily (Patient not taking: Reported on 1/19/2023) 30 tablet 0    JANUVIA 100 MG tablet TAKE 1 TABLET DAILY 90 tablet 3    metFORMIN (GLUCOPHAGE) 1000 MG tablet TAKE 1 TABLET TWICE A DAY WITH MEALS 180 tablet 3    insulin glargine (LANTUS SOLOSTAR) 100 UNIT/ML injection pen Inject 10 Units into the skin nightly 5 Adjustable Dose Pre-filled Pen Syringe 5    rosuvastatin (CRESTOR) 10 MG tablet TAKE 1 TABLET DAILY 90 tablet 3    metoprolol succinate (TOPROL XL) 25 MG extended release tablet TAKE 1 TABLET TWICE A  tablet 3    glipiZIDE (GLUCOTROL) 10 MG tablet TAKE 1 TABLET TWICE A DAY BEFORE MEALS 180 tablet 3    spironolactone (ALDACTONE) 25 MG tablet TAKE ONE-HALF (1/2) TABLET DAILY 45 tablet 3    Multiple Vitamins-Minerals (THERAPEUTIC MULTIVITAMIN-MINERALS) tablet Take 1 tablet by mouth daily      traZODone (DESYREL) 50 MG tablet TAKE 1 TABLET DAILY EVERY NIGHT (Patient taking differently: nightly as needed TAKE 1 TABLET DAILY EVERY NIGHT) 90 tablet 3     No current facility-administered medications on file prior to encounter. Guideline directed medical:  ARNI/ACE I/ARB: Yes  Beta blocker:   Yes  Aldosterone antagonist:  Yes  SGLT2i:  Yes        Physical Examination:     /60   Pulse 65   Resp 18   Wt 132 lb (59.9 kg)   SpO2 94%   BMI 19.49 kg/m²     Assessment  Charting Type: Shift assessment    Neurological  Level of Consciousness: Alert (0)  Orientation Level: Oriented X4         HEENT (Head, Ears, Eyes, Nose, & Throat)  HEENT (WDL): Exceptions to WDL  Right Ear: Hearing aid  Left Ear: Hearing aid    Respiratory  Respiratory Depth: Normal  L Breath Sounds: Clear  R Breath Sounds: Clear              Cardiac  Cardiac Rhythm: Sinus rhythm    Rhythm Interpretation  Heart Rate: 65         Gastrointestinal  Abdominal (WDL): Within Defined Limits               Peripheral Vascular  Peripheral Vascular (WDL): Within Defined Limits  Edema: None              Musculoskeletal  RL Extremity: Brace  LL Extremity: Brace         Psychosocial  Psychosocial (WDL): Within Defined Limits                        Heart Rate: 65                     LAB DATA:    Last 3 BMP      Sodium (mmol/L)   Date Value   01/19/2023 136   01/10/2023 139   01/09/2023 138     Potassium (mmol/L)   Date Value 01/19/2023 4.7   01/10/2023 5.0   01/09/2023 4.8     Potassium reflex Magnesium (mmol/L)   Date Value   12/25/2022 3.7   12/24/2022 4.0   12/23/2022 4.9     Chloride (mmol/L)   Date Value   01/19/2023 101   01/10/2023 101   01/09/2023 100     CO2 (mmol/L)   Date Value   01/19/2023 21 (L)   01/10/2023 25   01/09/2023 24     BUN (mg/dL)   Date Value   01/19/2023 60 (H)   01/10/2023 57 (H)   01/09/2023 63 (H)     Glucose (mg/dL)   Date Value   01/19/2023 110 (H)   01/10/2023 59 (L)   01/09/2023 132 (H)     Calcium (mg/dL)   Date Value   01/19/2023 9.7   01/10/2023 10.6 (H)   01/09/2023 10.4 (H)       Last 3 BNP       Pro-BNP (pg/mL)   Date Value   01/19/2023 3,438 (H)   01/10/2023 4,143 (H)   12/25/2022 13,140 (H)          CBC: No results for input(s): WBC, HGB, PLT in the last 72 hours. BMP:    Recent Labs     01/19/23  0911      K 4.7      CO2 21*   BUN 60*   CREATININE 2.3*   GLUCOSE 110*       Hepatic: No results for input(s): AST, ALT, ALB, BILITOT, ALKPHOS in the last 72 hours. Troponin: No results for input(s): TROPONINI in the last 72 hours. BNP: No results for input(s): BNP in the last 72 hours. Lipids: No results for input(s): CHOL, HDL in the last 72 hours. Invalid input(s): LDLCALCU  INR: No results for input(s): INR in the last 72 hours. WEIGHTS:    Wt Readings from Last 3 Encounters:   01/19/23 132 lb (59.9 kg)   01/10/23 135 lb 12.8 oz (61.6 kg)   01/04/23 131 lb 3.2 oz (59.5 kg)         TELEMETRY:  Cardiac Regularity: Regular  Cardiac Rhythm/Interpretation: NSR        ASSESSMENT:  Juan Jose Long is evolemic with a 3 pound weight loss from lst visit on 1/10/23. He is taking torsemide 20 MG and reports a good urinary response on days he takes his torsemide. Patient is weighing himself and taking his blood pressure daily. Patient did bring a log and he has been consistent daily. Second  visit with CHF clinic today. Patient presented with wife Delbert Alonzo.  Discussed with patient the importance of following a low sodium diet and drinking 64 ounces of fluid daily. Patient verbalized he is following the guidelines. Patient recently had Tomer Orlando d/c'd by PCP  and Eliquis was reduced because of creatine being greater than 1.5. Interventions completed this visit:  IV diuretics given- no  Lab work obtained yes, BMP, proBNP   Reviewed currently prescribed medications with patient, educated on importance of compliance and answered any questions regarding their medication  Educated on signs and symptoms of HF  Educated on low sodium diet    PLAN:  Scheduled to follow up in CHF clinic on   Future Appointments   Date Time Provider Cecy Marie   1/27/2023  8:45 AM [facility] ROOM 2 [facility]   2/3/2023  8:45 AM [facility] ROOM 2 [facility]   2/6/2023 10:15 AM Gleda Castleman, MD [address]   6/27/2023 11:00 AM Jeanine Nathan MD [facility]     Given clinic phone number [phone] and aware of signs and symptoms to call with any HF change in symptoms.

## 2023-01-19 NOTE — PROGRESS NOTES
Patient instructed to take Demadex as needed for weight gain, shortness of breath, swelling, or abdominal bloating. Patient verbalized an understanding and repeated order back to me. Follow up appointment 1/27/23 at 8:45 a.m.

## 2023-01-25 ENCOUNTER — CARE COORDINATION (OUTPATIENT)
Dept: CASE MANAGEMENT | Age: 78
End: 2023-01-25

## 2023-01-25 NOTE — CARE COORDINATION
[facility] Care Transitions Follow Up Call        Patient: Jada Arellano  Patient : 1945   MRN: [id number]  Reason for Admission: Acute Decompensated Heart Failure  Discharge Date: 22 RARS: Readmission Risk Score: 14.3      Attempted to reach patient by phone regarding follow up; Care Transitions, Final Call. HIPAA compliant message left on patient's voicemail; CTN contact information provided. Episode to be resolved and CTN will sign off.          Follow Up  Future Appointments   Date Time Provider Cecy Marie   2023  8:45 AM [facility] CHF ROOM 2 [facility]   2/3/2023  8:45 AM [facility] CHF ROOM 2 [facility]   2023 10:15 AM Snehal Hugo MD [facility]   2023 11:00 AM Genie Alvarado MD [facility]           Tu Nice RN '

## 2023-01-27 ENCOUNTER — TELEPHONE (OUTPATIENT)
Dept: FAMILY MEDICINE CLINIC | Age: 78
End: 2023-01-27

## 2023-01-27 ENCOUNTER — TELEPHONE (OUTPATIENT)
Dept: OTHER | Age: 78
End: 2023-01-27

## 2023-01-27 ENCOUNTER — HOSPITAL ENCOUNTER (OUTPATIENT)
Dept: OTHER | Age: 78
Setting detail: THERAPIES SERIES
Discharge: HOME | End: 2023-01-27
Payer: MEDICARE

## 2023-01-27 VITALS
HEART RATE: 70 BPM | SYSTOLIC BLOOD PRESSURE: 126 MMHG | OXYGEN SATURATION: 95 % | WEIGHT: 138 LBS | DIASTOLIC BLOOD PRESSURE: 56 MMHG | RESPIRATION RATE: 16 BRPM | BODY MASS INDEX: 20.38 KG/M2

## 2023-01-27 DIAGNOSIS — I50.9 ACUTE DECOMPENSATED HEART FAILURE (HCC): ICD-10-CM

## 2023-01-27 DIAGNOSIS — Z79.899 MEDICATION DOSE INCREASED: Primary | ICD-10-CM

## 2023-01-27 LAB
ANION GAP SERPL CALCULATED.3IONS-SCNC: 11 MMOL/L (ref 7–16)
BUN BLDV-MCNC: 37 MG/DL (ref 6–23)
CALCIUM SERPL-MCNC: 10.1 MG/DL (ref 8.6–10.2)
CHLORIDE BLD-SCNC: 102 MMOL/L (ref 98–107)
CO2: 22 MMOL/L (ref 22–29)
CREAT SERPL-MCNC: 1.6 MG/DL (ref 0.7–1.2)
GFR SERPL CREATININE-BSD FRML MDRD: 44 ML/MIN/1.73
GLUCOSE BLD-MCNC: 118 MG/DL (ref 74–99)
POTASSIUM SERPL-SCNC: 4.7 MMOL/L (ref 3.5–5)
PRO-BNP: 2749 PG/ML (ref 0–450)
SODIUM BLD-SCNC: 135 MMOL/L (ref 132–146)

## 2023-01-27 PROCEDURE — 83880 ASSAY OF NATRIURETIC PEPTIDE: CPT

## 2023-01-27 PROCEDURE — 99214 OFFICE O/P EST MOD 30 MIN: CPT

## 2023-01-27 PROCEDURE — 6360000002 HC RX W HCPCS

## 2023-01-27 PROCEDURE — 80048 BASIC METABOLIC PNL TOTAL CA: CPT

## 2023-01-27 PROCEDURE — 36415 COLL VENOUS BLD VENIPUNCTURE: CPT

## 2023-01-27 PROCEDURE — 96374 THER/PROPH/DIAG INJ IV PUSH: CPT

## 2023-01-27 PROCEDURE — 2580000003 HC RX 258

## 2023-01-27 RX ORDER — SODIUM CHLORIDE 0.9 % (FLUSH) 0.9 %
10 SYRINGE (ML) INJECTION ONCE
Status: DISCONTINUED | OUTPATIENT
Start: 2023-01-27 | End: 2023-01-28 | Stop reason: HOSPADM

## 2023-01-27 RX ORDER — FUROSEMIDE 10 MG/ML
40 INJECTION INTRAMUSCULAR; INTRAVENOUS ONCE
Status: DISCONTINUED | OUTPATIENT
Start: 2023-01-27 | End: 2023-01-28 | Stop reason: HOSPADM

## 2023-01-27 RX ORDER — SODIUM CHLORIDE 0.9 % (FLUSH) 0.9 %
SYRINGE (ML) INJECTION
Status: COMPLETED
Start: 2023-01-27 | End: 2023-01-27

## 2023-01-27 RX ORDER — FUROSEMIDE 10 MG/ML
INJECTION INTRAMUSCULAR; INTRAVENOUS
Status: COMPLETED
Start: 2023-01-27 | End: 2023-01-27

## 2023-01-27 RX ADMIN — FUROSEMIDE 40 MG: 10 INJECTION, SOLUTION INTRAMUSCULAR; INTRAVENOUS at 09:15

## 2023-01-27 RX ADMIN — SODIUM CHLORIDE, PRESERVATIVE FREE 10 ML: 5 INJECTION INTRAVENOUS at 09:16

## 2023-01-27 NOTE — DISCHARGE INSTRUCTIONS
Medicines for Heart Failure: Care Instructions  Overview     Most people with heart failure are helped by taking several medicines to protect their heart. Medicines may be used to help you feel better, have a better quality of life, stay out of the hospital, and live longer. It is important to take all your medicines exactly as prescribed to get the best results. They can also cause side effects. If you think that any of your medicines are causing side effects, talk with your doctor. Follow-up care is a key part of your treatment and safety. Be sure to make and go to all appointments. Call your doctor if you are having problems. It's also a good idea to know your test results and keep a list of the medicines you take. What medicines are used for heart failure? This list includes most of the common medicines used to treat heart failure. You will get more details on the specific medicines your doctor prescribes. Aldosterone receptor antagonists. These are a type of diuretic. They make the kidneys get rid of extra fluid. Angiotensin-converting enzyme (ACE) inhibitors help blood flow. They relax the blood vessels and lower your blood pressure. Angiotensin II receptor blockers (ARBs) relax the blood vessels and lower your blood pressure. Angiotensin receptor neprilysin inhibitor (ARNI) medicine relaxes the blood vessels and lowers your blood pressure. Beta-blockers can slow the heart rate and decrease blood pressure. Diuretics reduce swelling. They do this by helping the kidneys get rid of excess fluid. They are also called water pills. Hydralazine. This may be taken with a nitrate to widen blood vessels. It can lower blood pressure and reduce the workload on the heart. Ivabradine slows the heart rate. SGLT2 inhibitors help remove extra glucose through the urine, lower blood pressure, and help people lose weight. What should you know about these medicines?   This is not a complete list of medicines used for heart failure. If you have questions about your medicine, ask your doctor or pharmacist.  Aldosterone receptor antagonists  Before you start to take an aldosterone receptor antagonist, talk to your doctor. Tell your doctor: All of the prescription medicines, vitamins, herbal products, and supplements you take. Side effects include:  Hair loss. An irregular menstrual cycle. Tender breasts. This may occur in men who take spironolactone. Call your doctor if you get tender breasts that bother you. Angiotensin-converting enzyme (ACE) inhibitors  Before you start to take an ACE inhibitor, talk to your doctor. Tell your doctor if:  You take any anti-inflammatory medicines. These include ibuprofen (Advil, Motrin) and naproxen (Aleve). You take antacids, potassium pills or a salt substitute, or lithium. You take a diuretic. You are pregnant or breastfeeding or you plan to get pregnant. You have kidney disease. Side effects include:  A dry cough. If the cough is bad enough to make you stop the medicine, talk to your doctor. You may need to take a different one. Feeling lightheaded. This may happen when you stand up too fast. It usually gets better with time. Angiotensin II receptor blockers (ARBs)  Before you start to take an ARB, talk to your doctor. Tell your doctor if:  You take any anti-inflammatory medicines. These include ibuprofen (Advil, Motrin) and naproxen (Aleve). You take antacids, potassium pills or a salt substitute, or lithium. You have kidney disease. You take a diuretic. You are pregnant or breastfeeding or you plan to get pregnant. Side effects include:  Feeling lightheaded or dizzy. These are the most common side effects. Angiotensin receptor neprilysin inhibitor (ARNI)  Before you start to take an ARNI, talk to your doctor. Tell your doctor if:  You take any anti-inflammatory medicines. These include ibuprofen (Advil, Motrin) and naproxen (Aleve).   You take antacids, potassium pills or a salt substitute, or lithium. You take an ACE inhibitor or an ARB. You have kidney or liver problems. You take a diuretic. You are pregnant or breastfeeding or you plan to get pregnant. Side effects include:  Feeling lightheaded or dizzy. These are the most common side effects. Beta-blockers  Before you start to take a beta-blocker, talk to your doctor. Tell your doctor if:  You have asthma or diabetes. Side effects include:  Feeling dizzy or tired. This usually gets better with time. Diuretics (water pills)  Before you start to take a diuretic, talk to your doctor. Tell your doctor if:  You take lithium or anti-inflammatory medicines such as Advil or Aleve. Side effects include:  Urinating often. Ask your doctor about timing these pills so that you don't have to use the bathroom at a bad time. Muscle cramps. This may mean that you are losing too much potassium. It is an important mineral. Call your doctor if you get muscle cramps. SGLT2 inhibitors  Before you start to take an SGLT2 inhibitor, talk to your doctor. Tell your doctor: All of the prescription medicines, vitamins, herbal products, and supplements you take. Side effects include:  Genital or urinary tract infections. Where can you learn more? Go to http://www.woods.com/ and enter F132 to learn more about \"Medicines for Heart Failure: Care Instructions. \"  Current as of: September 7, 2022               Content Version: 13.5  © 6918-4868 [employer]. Care instructions adapted under license by [facility] [employer] [facility] ([facility]). If you have questions about a medical condition or this instruction, always ask your healthcare professional. [place] [address] any warranty or liability for your use of this information.

## 2023-01-27 NOTE — PROGRESS NOTES
Congestive Heart Failure [facility] 63   1945          Referring Provider: Dr. Rom Pacheco  Primary Care Physician: Dr. Chelsey Mcguire  Cardiologist: Dr. Angelina Briones  Nephrologist:         History of Present Illness:     Aleksandar Odonnell is a 69 y.o. male with a history of HFrEF, most recent EF 15% on 12/23/2022. Patient Story:    He does not  have dyspnea with exertion, shortness of breath, or decline in overall functional capacity. He does not have orthopnea, PND, nocturnal cough or hemoptysis. He does not have abdominal distention or bloating, early satiety, anorexia/change in appetite. He does has a good urinary response to  oral diuretic. He does not have  lower extremity edema. He denies lightheadedness, dizziness. He denies palpitations, syncope or near syncope. He does not complain of chest pain, pressure, discomfort.          Allergies   Allergen Reactions    Ciprofloxacin Hives           Pcn [Penicillins] Hives    Sulfa Antibiotics Hives and Other (See Comments)           Current Outpatient Medications on File Prior to Encounter   Medication Sig Dispense Refill    torsemide (DEMADEX) 20 MG tablet Take 1 tablet by mouth as needed (weight gain, shortness of breath, swelling, abdominal bloating) 30 tablet 5    apixaban (ELIQUIS) 2.5 MG TABS tablet Take 1 tablet by mouth 2 times daily 60 tablet 5    sacubitril-valsartan (ENTRESTO) 24-26 MG per tablet Take 1 tablet by mouth 2 times daily 60 tablet 5    mupirocin (BACTROBAN) 2 % ointment APPLY TO AFFECTED AREA EVERY DAY for 30 (Patient not taking: Reported on 1/19/2023)      empagliflozin (JARDIANCE) 10 MG tablet Take 1 tablet by mouth daily (Patient not taking: No sig reported) 30 tablet 0    JANUVIA 100 MG tablet TAKE 1 TABLET DAILY 90 tablet 3    metFORMIN (GLUCOPHAGE) 1000 MG tablet TAKE 1 TABLET TWICE A DAY WITH MEALS 180 tablet 3    insulin glargine (LANTUS SOLOSTAR) 100 UNIT/ML injection pen Inject 10 Units into the skin nightly 5 Adjustable Dose Pre-filled Pen Syringe 5    rosuvastatin (CRESTOR) 10 MG tablet TAKE 1 TABLET DAILY 90 tablet 3    metoprolol succinate (TOPROL XL) 25 MG extended release tablet TAKE 1 TABLET TWICE A  tablet 3    glipiZIDE (GLUCOTROL) 10 MG tablet TAKE 1 TABLET TWICE A DAY BEFORE MEALS 180 tablet 3    spironolactone (ALDACTONE) 25 MG tablet TAKE ONE-HALF (1/2) TABLET DAILY 45 tablet 3    Multiple Vitamins-Minerals (THERAPEUTIC MULTIVITAMIN-MINERALS) tablet Take 1 tablet by mouth daily      traZODone (DESYREL) 50 MG tablet TAKE 1 TABLET DAILY EVERY NIGHT (Patient taking differently: nightly as needed TAKE 1 TABLET DAILY EVERY NIGHT) 90 tablet 3     No current facility-administered medications on file prior to encounter. Guideline directed medical:  ARNI/ACE I/ARB: Yes  Beta blocker:   Yes  Aldosterone antagonist:  Yes  SGLT2i: no        Physical Examination:     BP (!) 126/56   Pulse 70   Resp 16   Wt 138 lb (62.6 kg)   SpO2 95%   BMI 20.38 kg/m²     Assessment  Charting Type: Shift assessment (chf clinic)    Neurological  Level of Consciousness: Alert (0)                   Breath Sounds  Right Upper Lobe: Clear  Right Middle Lobe: Fine Crackles  Right Lower Lobe: Crackles  Left Upper Lobe: Clear  Left Lower Lobe: Crackles         Cardiac  Cardiac Rhythm: Sinus rhythm    Rhythm Interpretation  Heart Rate: 70         Gastrointestinal  Abdominal (WDL): Within Defined Limits               Peripheral Vascular  Peripheral Vascular (WDL): Within Defined Limits (braces on pj lower legs)  Edema: Right lower extremity, Left lower extremity  RLE Edema: None  LLE Edema: None                   Genitourinary  Genitourinary (WDL): Within Defined Limits    Psychosocial  Psychosocial (WDL): Within Defined Limits    Pain Assessment  Pain Assessment: None - Denies Pain                   Heart Rate: 70                     LAB DATA:    Last 3 BMP      Sodium (mmol/L)   Date Value   01/27/2023 135   01/19/2023 136   01/10/2023 139     Potassium (mmol/L)   Date Value   01/27/2023 4.7   01/19/2023 4.7   01/10/2023 5.0     Potassium reflex Magnesium (mmol/L)   Date Value   12/25/2022 3.7   12/24/2022 4.0   12/23/2022 4.9     Chloride (mmol/L)   Date Value   01/27/2023 102   01/19/2023 101   01/10/2023 101     CO2 (mmol/L)   Date Value   01/27/2023 22   01/19/2023 21 (L)   01/10/2023 25     BUN (mg/dL)   Date Value   01/27/2023 37 (H)   01/19/2023 60 (H)   01/10/2023 57 (H)     Glucose (mg/dL)   Date Value   01/27/2023 118 (H)   01/19/2023 110 (H)   01/10/2023 59 (L)     Calcium (mg/dL)   Date Value   01/27/2023 10.1   01/19/2023 9.7   01/10/2023 10.6 (H)       Last 3 BNP       Pro-BNP (pg/mL)   Date Value   01/27/2023 2,749 (H)   01/19/2023 3,438 (H)   01/10/2023 4,143 (H)          CBC: No results for input(s): WBC, HGB, PLT in the last 72 hours. BMP:    Recent Labs     01/27/23  0914      K 4.7      CO2 22   BUN 37*   CREATININE 1.6*   GLUCOSE 118*         Hepatic: No results for input(s): AST, ALT, ALB, BILITOT, ALKPHOS in the last 72 hours. Troponin: No results for input(s): TROPONINI in the last 72 hours. BNP: No results for input(s): BNP in the last 72 hours. Lipids: No results for input(s): CHOL, HDL in the last 72 hours. Invalid input(s): LDLCALCU  INR: No results for input(s): INR in the last 72 hours. WEIGHTS:    Wt Readings from Last 3 Encounters:   01/27/23 138 lb (62.6 kg)   01/19/23 132 lb (59.9 kg)   01/10/23 135 lb 12.8 oz (61.6 kg)         TELEMETRY:  Cardiac Regularity: Regular  Cardiac Rhythm/Interpretation: NSR        ASSESSMENT:  Braulio Robert weight is up 6lbs since last CHF clinic visit 1-19-23 He is taking Aldactone and reports a good urinary response , he states he has not taken Bumex for approx 2 months Patient is weighing himself and taking his blood pressure daily, Trend sheet brought to clinic . Life Vest on at clinic .   Heart failure medications reviewed and handout given    Interventions completed this visit:  IV diuretics given- Yes Lasix IVP 40mg for increased weight and crackle pj bases and right mid lobe  Lab work obtained yes, BMP, proBNP   Reviewed currently prescribed medications with patient, educated on importance of compliance and answered any questions regarding their medication  Educated on signs and symptoms of HF  Educated on low sodium diet    PLAN:  Scheduled to follow up in CHF clinic on   Future Appointments   Date Time Provider Cecy Marie   2/3/2023  8:45 AM [facility] ROOM 2 [facility]   2/6/2023 10:15 AM Citlali Diamond MD [address] [facility]   6/27/2023 11:00 AM Alexandra Dobson MD [facility] clinic phone number [phone] and aware of signs and symptoms to call with any HF change in symptoms.

## 2023-01-27 NOTE — TELEPHONE ENCOUNTER
Reviewed the notes from CHF clinic today. He is to take spironolactone 25 mg daily and take torsemide 20 mg as needed for swelling. Also dose of Entresto has been increased. He is to have his labs in a week at CHF clinic. Please advise him to follow the recommendations from CHF clinic.

## 2023-01-27 NOTE — TELEPHONE ENCOUNTER
Patient's wife called back stating that he went to CHF clinic today and he was up 6#. Received diuretic via IV to pull that off. He is not taking the Torsemide at home or the [place]. Any new orders?

## 2023-01-27 NOTE — RESULT ENCOUNTER NOTE
Labs and CHF Clinic note reviewed  Patient of Dr. Bhumi Lopez with ischemic cardiomyopathy  Vitals:  BP (!) 126/56   Pulse 70     Current GDMT:  Entresto 24/26 mg BID  Toprol 25 mg BID (increased OV 1/4/23)  Spironolactone 25 mg daily   Jardiance 10 mg daily (stop per PCP, 1/13/23)  Torsemide 20 mg PRN (changed to PRN 1/19/23)      Please have him increase Entresto to moderate dose   Follow up labs in week at scheduled CHF clinic appointment

## 2023-01-29 NOTE — TELEPHONE ENCOUNTER
Wife notified. Advised to take all medication bottles with him on Friday to CHF clinic. Advised will call him tomorrow to set up a time to bring his medications to our office as well.

## 2023-02-03 ENCOUNTER — HOSPITAL ENCOUNTER (OUTPATIENT)
Dept: OTHER | Age: 78
Setting detail: THERAPIES SERIES
Discharge: HOME | End: 2023-02-03
Payer: MEDICARE

## 2023-02-03 VITALS
WEIGHT: 138 LBS | RESPIRATION RATE: 16 BRPM | OXYGEN SATURATION: 96 % | DIASTOLIC BLOOD PRESSURE: 61 MMHG | HEART RATE: 73 BPM | BODY MASS INDEX: 20.38 KG/M2 | SYSTOLIC BLOOD PRESSURE: 127 MMHG

## 2023-02-03 LAB
ANION GAP SERPL CALCULATED.3IONS-SCNC: 14 MMOL/L (ref 7–16)
BUN BLDV-MCNC: 29 MG/DL (ref 6–23)
CALCIUM SERPL-MCNC: 9.5 MG/DL (ref 8.6–10.2)
CHLORIDE BLD-SCNC: 106 MMOL/L (ref 98–107)
CO2: 18 MMOL/L (ref 22–29)
CREAT SERPL-MCNC: 1.4 MG/DL (ref 0.7–1.2)
GFR SERPL CREATININE-BSD FRML MDRD: 52 ML/MIN/1.73
GLUCOSE BLD-MCNC: 66 MG/DL (ref 74–99)
POTASSIUM SERPL-SCNC: 4.8 MMOL/L (ref 3.5–5)
PRO-BNP: 3616 PG/ML (ref 0–450)
SODIUM BLD-SCNC: 138 MMOL/L (ref 132–146)

## 2023-02-03 PROCEDURE — 36415 COLL VENOUS BLD VENIPUNCTURE: CPT

## 2023-02-03 PROCEDURE — 83880 ASSAY OF NATRIURETIC PEPTIDE: CPT

## 2023-02-03 PROCEDURE — 80048 BASIC METABOLIC PNL TOTAL CA: CPT

## 2023-02-03 PROCEDURE — 99214 OFFICE O/P EST MOD 30 MIN: CPT

## 2023-02-03 NOTE — RESULT ENCOUNTER NOTE
Labs and CHF Clinic note reviewed  Patient of Dr. Nikki Holliday with ischemic cardiomyopathy  Vitals:  /61   Pulse 73     Current GDMT:  Entresto 49/51 mg BID (increased 1/27/23)  Toprol 25 mg BID (increased OV 1/4/23)  Spironolactone 25 mg daily   Jardiance 10 mg daily (stop per PCP, 1/13/23)  Torsemide 20 mg PRN (changed to PRN 1/19/23)    Continue current management  Has follow up appointment with Dr. Nikki Holliday next week

## 2023-02-03 NOTE — PROGRESS NOTES
Congestive Heart Failure [facility] 63   1945          Referring Provider: Dr. Feng Will  Primary Care Physician: Dr. Yue Armenta  Cardiologist: Dr. Machelle Salinas  Nephrologist:         History of Present Illness:     Breana Burch is a 69 y.o. male with a history of HFrEF, most recent EF 15% on 12/23/2022. Patient Story:    He does not  have dyspnea with exertion, shortness of breath, or decline in overall functional capacity. He does not have orthopnea, PND, nocturnal cough or hemoptysis. He does not have abdominal distention or bloating, early satiety, anorexia/change in appetite. He does has a good urinary response to PRN  oral diuretic but has not needed a dose in several weeks he says. He does not have  lower extremity edema. He denies lightheadedness, dizziness. He denies palpitations, syncope or near syncope. He does not complain of chest pain, pressure, discomfort.          Allergies   Allergen Reactions    Ciprofloxacin Hives           Pcn [Penicillins] Hives    Sulfa Antibiotics Hives and Other (See Comments)           Current Outpatient Medications on File Prior to Encounter   Medication Sig Dispense Refill    Misc Natural Products (ADV TURMERIC CURCUMIN COMPLEX PO) Take 1 tablet by mouth      sacubitril-valsartan (ENTRESTO) 49-51 MG per tablet Take 1 tablet by mouth 2 times daily 60 tablet 3    torsemide (DEMADEX) 20 MG tablet Take 1 tablet by mouth as needed (weight gain, shortness of breath, swelling, abdominal bloating) 30 tablet 5    apixaban (ELIQUIS) 2.5 MG TABS tablet Take 1 tablet by mouth 2 times daily 60 tablet 5    empagliflozin (JARDIANCE) 10 MG tablet Take 1 tablet by mouth daily (Patient not taking: No sig reported) 30 tablet 0    JANUVIA 100 MG tablet TAKE 1 TABLET DAILY 90 tablet 3    metFORMIN (GLUCOPHAGE) 1000 MG tablet TAKE 1 TABLET TWICE A DAY WITH MEALS 180 tablet 3    insulin glargine (LANTUS SOLOSTAR) 100 UNIT/ML injection pen Inject 10 Units into the skin nightly 5 Adjustable Dose Pre-filled Pen Syringe 5    rosuvastatin (CRESTOR) 10 MG tablet TAKE 1 TABLET DAILY 90 tablet 3    metoprolol succinate (TOPROL XL) 25 MG extended release tablet TAKE 1 TABLET TWICE A  tablet 3    glipiZIDE (GLUCOTROL) 10 MG tablet TAKE 1 TABLET TWICE A DAY BEFORE MEALS 180 tablet 3    spironolactone (ALDACTONE) 25 MG tablet TAKE ONE-HALF (1/2) TABLET DAILY 45 tablet 3    Multiple Vitamins-Minerals (THERAPEUTIC MULTIVITAMIN-MINERALS) tablet Take 1 tablet by mouth daily      traZODone (DESYREL) 50 MG tablet TAKE 1 TABLET DAILY EVERY NIGHT (Patient taking differently: nightly as needed TAKE 1 TABLET DAILY EVERY NIGHT) 90 tablet 3     No current facility-administered medications on file prior to encounter. Guideline directed medical:  ARNI/ACE I/ARB: Yes  Beta blocker:   Yes  Aldosterone antagonist:  Yes  SGLT2i: no-per patient it was stopped by PCP        Physical Examination:     /61   Pulse 73   Resp 16   Wt 138 lb (62.6 kg)   SpO2 96%   BMI 20.38 kg/m²     Assessment  Charting Type: Shift assessment (chf clinic)    Neurological  Level of Consciousness: Alert (0)         HEENT (Head, Ears, Eyes, Nose, & Throat)  HEENT (WDL): Exceptions to WDL  Right Ear: Hearing aid  Left Ear: Hearing aid    Respiratory  Respiratory Depth: Normal  L Breath Sounds: Clear  R Breath Sounds: Clear    Breath Sounds  Right Upper Lobe: Clear  Right Middle Lobe: Clear  Right Lower Lobe: Fine Crackles  Left Upper Lobe: Clear  Left Lower Lobe: Diminished         Cardiac  Cardiac Rhythm: Sinus rhythm    Rhythm Interpretation  Heart Rate: 73         Gastrointestinal  Abdominal (WDL): Within Defined Limits               Peripheral Vascular  Peripheral Vascular (WDL): Within Defined Limits  Edema: None  RLE Edema: None  LLE Edema: None              Musculoskeletal  RL Extremity: Brace  LL Extremity: Brace    Genitourinary  Genitourinary (WDL): Within Defined Limits    Psychosocial  Psychosocial (WDL): Within Defined Limits                        Heart Rate: 73                     LAB DATA:    Last 3 BMP      Sodium (mmol/L)   Date Value   02/03/2023 138   01/27/2023 135   01/19/2023 136     Potassium (mmol/L)   Date Value   02/03/2023 4.8   01/27/2023 4.7   01/19/2023 4.7     Potassium reflex Magnesium (mmol/L)   Date Value   12/25/2022 3.7   12/24/2022 4.0   12/23/2022 4.9     Chloride (mmol/L)   Date Value   02/03/2023 106   01/27/2023 102   01/19/2023 101     CO2 (mmol/L)   Date Value   02/03/2023 18 (L)   01/27/2023 22   01/19/2023 21 (L)     BUN (mg/dL)   Date Value   02/03/2023 29 (H)   01/27/2023 37 (H)   01/19/2023 60 (H)     Glucose (mg/dL)   Date Value   02/03/2023 66 (L)   01/27/2023 118 (H)   01/19/2023 110 (H)     Calcium (mg/dL)   Date Value   02/03/2023 9.5   01/27/2023 10.1   01/19/2023 9.7       Last 3 BNP       Pro-BNP (pg/mL)   Date Value   02/03/2023 3,616 (H)   01/27/2023 2,749 (H)   01/19/2023 3,438 (H)          CBC: No results for input(s): WBC, HGB, PLT in the last 72 hours. BMP:    Recent Labs     02/03/23  0856      K 4.8      CO2 18*   BUN 29*   CREATININE 1.4*   GLUCOSE 66*           Hepatic: No results for input(s): AST, ALT, ALB, BILITOT, ALKPHOS in the last 72 hours. Troponin: No results for input(s): TROPONINI in the last 72 hours. BNP: No results for input(s): BNP in the last 72 hours. Lipids: No results for input(s): CHOL, HDL in the last 72 hours. Invalid input(s): LDLCALCU  INR: No results for input(s): INR in the last 72 hours. WEIGHTS:    Wt Readings from Last 3 Encounters:   02/03/23 138 lb (62.6 kg)   01/27/23 138 lb (62.6 kg)   01/19/23 132 lb (59.9 kg)         TELEMETRY:  Cardiac Regularity: Regular  Cardiac Rhythm/Interpretation: NSR        ASSESSMENT:  Hillary Almanza has stable weight, negative for JVD on exam and no complaint of shortness of breath.  Abdomen is soft and no edema in bilateral lower extremities when leg braces were removed to assess. Regi England was stopped by PCP per patient due to \"concern about my kidneys\". Advised patient and spouse to discuss with Dr. Guillermo Cervantes at appointment on 2/6/23 to advise further. Patient is weighing himself and taking his blood pressure daily, Trend sheet brought to clinic . Life Vest on at clinic and denies any alarms going off. Patient denies needing his PRN torsemide in past week and not currently on a daily loop diuretic. Interventions completed this visit:  IV diuretics given- No  Lab work obtained yes, BMP, proBNP   Reviewed currently prescribed medications with patient, educated on importance of compliance and answered any questions regarding their medication  Educated on signs and symptoms of HF  Educated on low sodium diet    PLAN:  Scheduled to follow up in CHF clinic on   Future Appointments   Date Time Provider Cecy Marie   2/6/2023 10:15 AM Soto Sorensen MD [id number] [facility]   2/20/2023  8:45 AM [facility] ROOM 2 [facility]   6/27/2023 11:00 AM Samul Phalen, MD [facility]     Given clinic phone number [phone] and aware of signs and symptoms to call with any HF change in symptoms.

## 2023-02-06 ENCOUNTER — OFFICE VISIT (OUTPATIENT)
Dept: CARDIOLOGY CLINIC | Age: 78
End: 2023-02-06
Payer: MEDICARE

## 2023-02-06 VITALS
RESPIRATION RATE: 16 BRPM | SYSTOLIC BLOOD PRESSURE: 158 MMHG | HEART RATE: 75 BPM | DIASTOLIC BLOOD PRESSURE: 74 MMHG | HEIGHT: 69 IN | WEIGHT: 138 LBS | BODY MASS INDEX: 20.44 KG/M2

## 2023-02-06 DIAGNOSIS — I25.10 CORONARY ARTERY DISEASE INVOLVING NATIVE CORONARY ARTERY OF NATIVE HEART WITHOUT ANGINA PECTORIS: Primary | ICD-10-CM

## 2023-02-06 DIAGNOSIS — I50.20 HFREF (HEART FAILURE WITH REDUCED EJECTION FRACTION) (HCC): ICD-10-CM

## 2023-02-06 PROCEDURE — 1036F TOBACCO NON-USER: CPT | Performed by: INTERNAL MEDICINE

## 2023-02-06 PROCEDURE — G8427 DOCREV CUR MEDS BY ELIG CLIN: HCPCS | Performed by: INTERNAL MEDICINE

## 2023-02-06 PROCEDURE — 99214 OFFICE O/P EST MOD 30 MIN: CPT | Performed by: INTERNAL MEDICINE

## 2023-02-06 PROCEDURE — 3078F DIAST BP <80 MM HG: CPT | Performed by: INTERNAL MEDICINE

## 2023-02-06 PROCEDURE — 93000 ELECTROCARDIOGRAM COMPLETE: CPT | Performed by: INTERNAL MEDICINE

## 2023-02-06 PROCEDURE — 3077F SYST BP >= 140 MM HG: CPT | Performed by: INTERNAL MEDICINE

## 2023-02-06 PROCEDURE — G8420 CALC BMI NORM PARAMETERS: HCPCS | Performed by: INTERNAL MEDICINE

## 2023-02-06 PROCEDURE — G8484 FLU IMMUNIZE NO ADMIN: HCPCS | Performed by: INTERNAL MEDICINE

## 2023-02-06 PROCEDURE — 1124F ACP DISCUSS-NO DSCNMKR DOCD: CPT | Performed by: INTERNAL MEDICINE

## 2023-02-06 NOTE — PROGRESS NOTES
Chief Complaint   Patient presents with    Congestive Heart Failure    Coronary Artery Disease       Patient Active Problem List    Diagnosis Date Noted    HFrEF (heart failure with reduced ejection fraction) ([facility] [address] 01/03/2023     Overview Note:     A. TTE 12/24/2022 [facility]) : EF is 15%. Severe LVH, grade 3 diastolic dysfunction. Moderate to severe RV dysfunction. Moderate mitral as well as tricuspid regurgitation      DVT (deep venous thrombosis) ([facility] [address] 12/23/2022    Stage 3 chronic kidney disease ([facility] [address] 05/18/2022    Spinal stenosis of lumbar region 11/16/2021    Lumbosacral spondylosis without myelopathy 11/16/2021    Lumbar radiculopathy 09/21/2020    Diabetes mellitus type 2 in nonobese [facility]) 09/10/2020    PVD (peripheral vascular disease) ([facility] [address] 10/07/2019    RBBB 10/09/2018    Coronary artery disease involving native coronary artery of native heart without angina pectoris 10/09/2018     Overview Note:     A. ACB 2004 Florance Popper): 3 vessel, no other details   B.   Lexiscan 2021:  Large fixed inferior defect      Mixed hyperlipidemia 10/06/2018    Hypertension 06/11/2018       Current Outpatient Medications   Medication Sig Dispense Refill    Misc Natural Products (ADV TURMERIC CURCUMIN COMPLEX PO) Take 1 tablet by mouth      sacubitril-valsartan (ENTRESTO) 49-51 MG per tablet Take 1 tablet by mouth 2 times daily 60 tablet 3    apixaban (ELIQUIS) 2.5 MG TABS tablet Take 1 tablet by mouth 2 times daily 60 tablet 5    JANUVIA 100 MG tablet TAKE 1 TABLET DAILY 90 tablet 3    metFORMIN (GLUCOPHAGE) 1000 MG tablet TAKE 1 TABLET TWICE A DAY WITH MEALS 180 tablet 3    insulin glargine (LANTUS SOLOSTAR) 100 UNIT/ML injection pen Inject 10 Units into the skin nightly 5 Adjustable Dose Pre-filled Pen Syringe 5    rosuvastatin (CRESTOR) 10 MG tablet TAKE 1 TABLET DAILY 90 tablet 3    metoprolol succinate (TOPROL XL) 25 MG extended release tablet TAKE 1 TABLET TWICE A  tablet 3    glipiZIDE (GLUCOTROL) 10 MG tablet TAKE 1 TABLET TWICE A DAY BEFORE MEALS 180 tablet 3    spironolactone (ALDACTONE) 25 MG tablet TAKE ONE-HALF (1/2) TABLET DAILY 45 tablet 3    Multiple Vitamins-Minerals (THERAPEUTIC MULTIVITAMIN-MINERALS) tablet Take 1 tablet by mouth daily      traZODone (DESYREL) 50 MG tablet TAKE 1 TABLET DAILY EVERY NIGHT (Patient taking differently: nightly as needed TAKE 1 TABLET DAILY EVERY NIGHT) 90 tablet 3     Current Facility-Administered Medications   Medication Dose Route Frequency Provider Last Rate Last Admin    perflutren lipid microspheres (DEFINITY) injection 1.5 mL  1.5 mL IntraVENous ONCE PRN Anna Zambrano MD            Allergies   Allergen Reactions    Ciprofloxacin Hives           Pcn [Penicillins] Hives    Sulfa Antibiotics Hives and Other (See Comments)       Vitals:    02/06/23 1010   BP: (!) 158/74   Pulse: 75   Resp: 16   Weight: 138 lb (62.6 kg)   Height: 5' 9\" (1.753 m)               SUBJECTIVE: Vinny Bruce presents to the office today for f/u. I reviewed the CHF clinic notes and Annamarie Torres's additions and titrations of meds - most helpful  Doing well. Had Oleta Beecher stopped by PCP due to CKD. He complains of NO sob, nor orthopnea, and edema has resolved. Physical Exam   BP (!) 158/74   Pulse 75   Resp 16   Ht 5' 9\" (1.753 m)   Wt 138 lb (62.6 kg)   BMI 20.38 kg/m²   Constitutional: Oriented to person, place, and time. Thin No distress. .   Neck: NO JVD present. Carotid bruit is not present. Cardiovascular: Normal rate, regular rhythm, normal heart sounds and intact distal pulses. Exam reveals no gallop and no friction rub. No murmur heard. Pulmonary/Chest: Effort normal and breath sounds normal. No respiratory distress. No wheezes. No rales. Abdominal: Soft. Bowel sounds are normal.   Musculoskeletal: Normal range of motion. No edema    Neurological: Alert and oriented to person, place, and time. Skin: Skin is warm and dry. Psychiatric: Normal mood and affect. Behavior is normal.     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

## 2023-02-13 NOTE — DISCHARGE INSTRUCTIONS
Visit Discharge/Physician Orders    Discharge condition: Stable    Assessment of pain at discharge:yes    Anesthetic used: 4% lidocaine solution    Discharge to: Home    Left via:Private automobile    Accompanied by:  spouse    ECF/HHA: n/a    Dressing Orders: RIGHT FOOT/HEEL/GREAT TOE-Cleanse with normal saline, apply calcium alginate at [address] [facility] today. Continue to follow with Dr. Terrance Payne and her dressing orders. Treatment Orders:Eat a diet high in protein and vitamin C. Take a multiple vitamin daily unless contraindicated. Angiogram in future- Dr. Ortiz Fraction office will call for date and instructions. [address] [facility] followup visit : Dr. Marci Prado to follow _____________________________  (Please note your next appointment above and if you are unable to keep, kindly give a 24 hour notice. Thank you.)    Physician signature:__________________________    If you experience any of the following, please call the [facility] during business hours:    * Increase in Pain  * Temperature over 101  * Increase in drainage from your wound  * Drainage with a foul odor  * Bleeding  * Increase in swelling  * Need for compression bandage changes due to slippage, breakthrough drainage. If you need medical attention outside of the business hours of the [facility] please contact your PCP or go to the nearest emergency room.

## 2023-02-15 ENCOUNTER — HOSPITAL ENCOUNTER (OUTPATIENT)
Dept: WOUND CARE | Age: 78
Discharge: HOME | End: 2023-02-15
Payer: MEDICARE

## 2023-02-15 VITALS
WEIGHT: 125 LBS | BODY MASS INDEX: 18.51 KG/M2 | HEIGHT: 69 IN | DIASTOLIC BLOOD PRESSURE: 61 MMHG | HEART RATE: 71 BPM | SYSTOLIC BLOOD PRESSURE: 132 MMHG | TEMPERATURE: 98.5 F | RESPIRATION RATE: 16 BRPM

## 2023-02-15 DIAGNOSIS — L97.413 DIABETIC ULCER OF RIGHT MIDFOOT ASSOCIATED WITH TYPE 2 DIABETES MELLITUS, WITH NECROSIS OF MUSCLE (HCC): Primary | ICD-10-CM

## 2023-02-15 DIAGNOSIS — I70.234 ATHEROSCLEROSIS OF NATIVE ARTERY OF RIGHT LOWER EXTREMITY WITH ULCERATION OF MIDFOOT (HCC): ICD-10-CM

## 2023-02-15 DIAGNOSIS — E11.621 DIABETIC ULCER OF RIGHT MIDFOOT ASSOCIATED WITH TYPE 2 DIABETES MELLITUS, WITH NECROSIS OF MUSCLE (HCC): Primary | ICD-10-CM

## 2023-02-15 PROCEDURE — 99204 OFFICE O/P NEW MOD 45 MIN: CPT | Performed by: SURGERY

## 2023-02-15 PROCEDURE — 99214 OFFICE O/P EST MOD 30 MIN: CPT

## 2023-02-15 NOTE — PLAN OF CARE
Problem: Chronic Conditions and Co-morbidities  Goal: Patient's chronic conditions and co-morbidity symptoms are monitored and maintained or improved  Outcome: Progressing     Problem: Cognitive:  Goal: Knowledge of wound care  Description: Knowledge of wound care  Outcome: Progressing  Goal: Understands risk factors for wounds  Description: Understands risk factors for wounds  Outcome: Progressing     Problem: Wound:  Goal: Will show signs of wound healing; wound closure and no evidence of infection  Description: Will show signs of wound healing; wound closure and no evidence of infection  Outcome: Progressing     Problem: Pressure Ulcer:  Goal: Signs of wound healing will improve  Description: Signs of wound healing will improve  Outcome: Progressing  Goal: Absence of new pressure ulcer  Description: Absence of new pressure ulcer  Outcome: Progressing  Goal: Will show no infection signs and symptoms  Description: Will show no infection signs and symptoms  Outcome: Progressing     Problem: Arterial:  Goal: Optimize blood flow for wound healing  Description: Optimize blood flow for wound healing  Outcome: Progressing     Problem: Blood Glucose:  Goal: Ability to maintain appropriate glucose levels will improve  Description: Ability to maintain appropriate glucose levels will improve  Outcome: Progressing

## 2023-02-15 NOTE — PROGRESS NOTES
Wound Healing Center /Hyperbarics   History and Physical/Consultation  Vascular    Referring Physician : Natalia Arreola MD  [address] [facility] RECORD NUMBER:  [id number]  AGE: 69 y.o. GENDER: male  : 1945  EPISODE DATE:  2/15/2023  Subjective:     Chief Complaint   Patient presents with    Wound Check     Right foot         HISTORY of PRESENT ILLNESS HPI     Neel Frias is a 69 y.o. male who presents today for wound/ulcer evaluation. History of Wound Context:  The patient has had a wound of right foot since 23. He has been following with Dr. Lizz Belcher who referred him for evaluation. This has been treated local wound care, [facility]. On their initial visit to the wound healing center, 2/15/23,  the patient has noted that the wound has not been improving. The patient has had similar previous wounds in the past.      Pt is not on abx at time of initial visit. He was previously seen last in  in regards to L LE wounds. Previous Vascular Procedure  18 L LE angio  L SFA popliteal atherectomy, DCB plasty      In  he had developed a DM, PVD ulceration of his left mid foot. He underwent L L angiogram with intervention. He was also treated with hyperbarics. He underwent debridements, wound vac, and integra per podiatry, Dr. Mona Sunshine. His wounds healed at that time. Dr. Mona Sunshine left [facility] and patient decided to start following with [facility] from [id number]. He than established with Dr. Tressa Paez in [facility] 2019 and stopped going to [facility]. He had previous R plantar foot callous which did heal.      Since last seen he has developed issues with CHF and his last EF was 15%. He has some renal insufficiency. He follows with Dr. Richy Pedroza in regards to his heart. He does have monitor in place currently. He does not follow with nephrology.       2/15/23  Pt would benefit from angiogram  Will need prehydration   Will discuss with Dr Richy Pedroza re degree of hydration        Wound/Ulcer Pain Timing/Severity: waxing and waning, mild  Quality of pain: aching, throbbing, shooting, tender  Severity:  2 / 10   Modifying Factors: Pain worsens with dressing changes, debridement  Associated Signs/Symptoms: drainage and pain    Ulcer Identification:  Ulcer Type: arterial, diabetic, and pressure  Contributing Factors: diabetes, chronic pressure, and arterial insufficiency    Diabetic/Pressure/Non Pressure Ulcers only:  Ulcer: Diabetic ulcer, muscle necrosis    If patient has diabetic lower extremity wounds  [place] Classification of diabetic lower extremity wounds:    Grade Description   []  0 No open wound   []  1 Superficial ulcer involving the full skin thickness   [x]  2 Deep ulcer involves ligament, tendon, joint capsule, or fascia  No bone involvement or abscess presence   []  3 Deep Ulcer with abcess formation and/or osteomyelitis   []  4 Localized gangrene   []  5 Extensive gangrene of the foot         PAST MEDICAL HISTORY      Diagnosis Date    CAD (coronary artery disease) 2009    CHF (congestive heart failure) [facility]     Diabetes mellitus ([facility]     Diabetic neuropathy ([facility]     Diabetic ulcer of left foot associated with type 2 diabetes mellitus ([facility] 03/29/2017    Hx of blood clots     Lumbar pain     for procedure 3/7/22    Peripheral vascular disease [facility])      Past Surgical History:   Procedure Laterality Date    CARDIAC SURGERY  2004    double bypass    COLONOSCOPY      FOOT SURGERY Left     multiple    NERVE BLOCK Bilateral 02/07/2022    #1 BILATERAL LUMBAR MEDIAL BRANCH NERVE BLOCK UNDER FLUOROSCOPIC GUIDANCE AT L3, L4, AND L5 DORSAL RAMI performed by Usman Gallardo MD at [address] [address],[address] Bilateral 03/07/2022    BILATERAL LUMBAR MEDIAL BRANCH NERVE BLOCK UNDER FLUORO AT L3 L4 L5 DORSAL RAMI performed by Usman Gallardo MD at [address] [facility] [address]  03/10/2017    left foot application integra graft, application of wound vac, delayed primax closure    OTHER SURGICAL HISTORY      fracture of left temporal part of skull    OTHER SURGICAL HISTORY Left 06/16/2017    delayed closure with bone debridement and biopsy, application of hemovac with Dr. Yordy Mendoza N/A 11/22/2021    LUMBAR EPIDURAL STEROID INJECTION UNDER FLUOROSCOPIC GUIDANCE AT L5-S1 performed by Danyell Clifford MD at [facility] N/A 01/06/2022    # 2 LUMBAR EPIDURAL STEROID INJECTION UNDER FLUOROSCOPIC GUIDANCE AT L5-S1 performed by Danyell Clifford MD at [facility] OR    PAIN MANAGEMENT PROCEDURE Bilateral 04/11/2022    BILATERAL LUMBAR RADIOFREQUENCY ABLATION UNDER FLUORO AT L3 L4 L5 AND DORSAL RAMI performed by Danyell Clifford MD at [facility] ARTERIAL INTERVENTION Left 05/2017    SFA, pop atherectomy, Dr. Graciela Corral Left 2009    2nd and 5th toe    TONSILLECTOMY       Family History   Problem Relation Age of Onset    High Blood Pressure Mother     COPD Father     Diabetes Father     Heart Disease Brother         chf    Seizures Brother      Social History     Tobacco Use    Smoking status: Never    Smokeless tobacco: Never   Vaping Use    Vaping Use: Never used   Substance Use Topics    Alcohol use: Not Currently     Alcohol/week: 3.0 standard drinks     Types: 2 Shots of liquor, 1 Standard drinks or equivalent per week    Drug use: No     Allergies   Allergen Reactions    Ciprofloxacin Hives           Pcn [Penicillins] Hives    Sulfa Antibiotics Hives and Other (See Comments)     Current Outpatient Medications on File Prior to Encounter   Medication Sig Dispense Refill    Misc Natural Products (ADV TURMERIC CURCUMIN COMPLEX PO) Take 1 tablet by mouth      sacubitril-valsartan (ENTRESTO) 49-51 MG per tablet Take 1 tablet by mouth 2 times daily 60 tablet 3    apixaban (ELIQUIS) 2.5 MG TABS tablet Take 1 tablet by mouth 2 times daily 60 tablet 5    JANUVIA 100 MG tablet TAKE 1 TABLET DAILY 90 tablet 3    metFORMIN (GLUCOPHAGE) 1000 MG tablet TAKE 1 TABLET TWICE A DAY WITH MEALS 180 tablet 3    insulin glargine (LANTUS SOLOSTAR) 100 UNIT/ML injection pen Inject 10 Units into the skin nightly 5 Adjustable Dose Pre-filled Pen Syringe 5    rosuvastatin (CRESTOR) 10 MG tablet TAKE 1 TABLET DAILY 90 tablet 3    metoprolol succinate (TOPROL XL) 25 MG extended release tablet TAKE 1 TABLET TWICE A  tablet 3    glipiZIDE (GLUCOTROL) 10 MG tablet TAKE 1 TABLET TWICE A DAY BEFORE MEALS 180 tablet 3    spironolactone (ALDACTONE) 25 MG tablet TAKE ONE-HALF (1/2) TABLET DAILY 45 tablet 3    Multiple Vitamins-Minerals (THERAPEUTIC MULTIVITAMIN-MINERALS) tablet Take 1 tablet by mouth daily      traZODone (DESYREL) 50 MG tablet TAKE 1 TABLET DAILY EVERY NIGHT (Patient taking differently: nightly as needed TAKE 1 TABLET DAILY EVERY NIGHT) 90 tablet 3     Current Facility-Administered Medications on File Prior to Encounter   Medication Dose Route Frequency Provider Last Rate Last Admin    perflutren lipid microspheres (DEFINITY) injection 1.5 mL  1.5 mL IntraVENous ONCE PRN Jess Laboy MD         REVIEW OF SYSTEMS   ROS : All others Negative if blank [], Positive if [x]  General Urinary   [] Fevers [] Hematuria   [] Chills [] Dysuria   [] Weight Loss Vascular   Skin [] Claudication   [x] Tissue Loss [] Rest Pain   Eyes Neurologic   [] Wears Glasses/Contacts [] Stroke/TIA   [] Vision Changes [] Focal weakness   Respiratory [] Slurred Speech    [x] Shortness of breath ENT   Cardiovascular [] Difficulty swallowing   [] Chest Pain Gastrointestinal   [x] Shortness of breath with exertion [] Abdominal Pain    [] Melena       [] Hematochezia               Objective:    /61   Pulse 71   Temp 98.5 °F (36.9 °C) (Temporal)   Resp 16   Ht 5' 9\" (1.753 m)   Wt 125 lb (56.7 kg)   BMI 18.46 kg/m²   Wt Readings from Last 3 Encounters:   02/15/23 125 lb (56.7 kg)   02/06/23 138 lb (62.6 kg)   02/03/23 138 lb (62.6 kg)       PHYSICAL EXAM  CONSTITUTIONAL:   Awake, alert, cooperative   PSYCHIATRIC :  Oriented to time, place and person      normal insight to disease process  ENT:  External ears and nose without lesions    Hearing deficits is not noted  NECK: Supple, symmetrical, trachea midline    Thyroid goiter not appreciated   LUNGS:  No increased work of breathing                  Clear to auscultation bilaterally   CARDIOVASCULAR:  S1S2  ABDOMEN:  soft, non-distended, non-tender    Aorta is not palpable   SKIN:   Skin color is abormal with wounds of right foot   Texture and turgor is not normal   Induration is not noted  EXTREMITIES:   R UE Edema is not noted  L UE Edema is not noted  R LE Edema is not noted  L LE Edema is not noted  R dorsalis pedis monophasic L dorsalis pedis Weakly biphasic   R posterior tibial monophasic L posterior tibial monophasic     Assessment:     Problem List Items Addressed This Visit       Atherosclerosis of native artery of right lower extremity with ulceration of midfoot ([address]    Diabetic ulcer of right midfoot associated with type 2 diabetes mellitus, with necrosis of muscle ([address] - Primary       Pre Debridement Measurements:  Are located in the [place]  Documentation Flow Sheet  Post Debridement Measurements:  Wound/Ulcer Descriptions are Pre Debridement except measurements:     Incision 03/10/17 Foot Left (Active)   Number of days: 2167       Puncture Back Left; Lower (Active)   Number of days:        Puncture 02/07/22 Back Lower;Right (Active)   Number of days: 373       Puncture 03/07/22 Back Lower;Medial (Active)   Number of days: 344       Wound 12/23/22 Foot Anterior;Right (Active)   Number of days: 54       Wound 12/23/22 Pedal Anterior;Right (Active)   Number of days: 54       Wound 02/15/23 Foot Right;Lateral #1 (Active)   Wound Image   02/15/23 0949   Wound Length (cm) 1.8 cm 02/15/23 0949   Wound Width (cm) 1.4 cm 02/15/23 0949   Wound Depth (cm) 0.2 cm 02/15/23 0949   Wound Surface Area (cm^2) 2.52 cm^2 02/15/23 0949   Wound Volume (cm^3) 0.504 cm^3 02/15/23 0949   Wound Assessment Eschar moist;Slough 02/15/23 0949   Drainage Amount Scant 02/15/23 0949   Drainage Description Serosanguinous 02/15/23 0949   Odor None 02/15/23 0949   Iva-wound Assessment Maceration 02/15/23 0949   Number of days: 0       Wound 02/15/23 Toe (Comment  which one) Right great toe #2 (Active)   Wound Image   02/15/23 0949   Wound Length (cm) 0.4 cm 02/15/23 0949   Wound Width (cm) 0.6 cm 02/15/23 0949   Wound Depth (cm) 0.1 cm 02/15/23 0949   Wound Surface Area (cm^2) 0.24 cm^2 02/15/23 0949   Wound Volume (cm^3) 0.024 cm^3 02/15/23 0949   Wound Assessment Fibrin;Pink/red 02/15/23 0949   Drainage Amount Scant 02/15/23 0949   Drainage Description Serosanguinous 02/15/23 0949   Odor None 02/15/23 0949   Iva-wound Assessment Dry/flaky 02/15/23 0949   Number of days: 0       Wound 02/15/23 Heel Right #3 (Active)   Wound Image   02/15/23 0949   Wound Length (cm) 0.4 cm 02/15/23 0949   Wound Width (cm) 0.3 cm 02/15/23 0949   Wound Depth (cm) 0.1 cm 02/15/23 0949   Wound Surface Area (cm^2) 0.12 cm^2 02/15/23 0949   Wound Volume (cm^3) 0.012 cm^3 02/15/23 0949   Wound Assessment Eschar dry 02/15/23 0949   Drainage Amount None 02/15/23 0949   Odor None 02/15/23 0949   Iva-wound Assessment Intact 02/15/23 0949   Number of days: 0     Incision 11/22/21 Back (Active)   Number of days: 449       Incision 01/06/22 Back (Active)   Number of days: 405       Incision 04/11/22 Back (Active)   Number of days: 310     No debridement    Plan:     Pt is not a smoker   - Discussed relationship of smoking and negative affects on wound healing   - Emphasized importance of tobacco avoidace/cessation    In my professional opinion and based off the information that is available at this time this patient has appropriate indication for HBO Therapy:  He is a candidate but due to CHF its not possible.      Treatment Note please see attached Discharge Instructions    Written patient dismissal instructions given to patient and signed by patient or POA. Discharge Instructions         Visit Discharge/Physician Orders    Discharge condition: Stable    Assessment of pain at discharge:yes    Anesthetic used: 4% lidocaine solution    Discharge to: Home    Left via:Private automobile    Accompanied by:  spouse    ECF/HHA: n/a    Dressing Orders: RIGHT FOOT/HEEL/GREAT TOE-Cleanse with normal saline, apply calcium alginate at [facility] today. Continue to follow with Dr. Kaitlin Oliver and her dressing orders. Treatment Orders:Eat a diet high in protein and vitamin C. Take a multiple vitamin daily unless contraindicated. Angiogram in future- Dr. Saurabh Hammond office will call for date and instructions. [facility] followup visit : Dr. Becca Lane to follow _____________________________  (Please note your next appointment above and if you are unable to keep, kindly give a 24 hour notice. Thank you.)    Physician signature:__________________________    If you experience any of the following, please call the [address] [place] [address] during business hours:    * Increase in Pain  * Temperature over 101  * Increase in drainage from your wound  * Drainage with a foul odor  * Bleeding  * Increase in swelling  * Need for compression bandage changes due to slippage, breakthrough drainage. If you need medical attention outside of the business hours of the [address] [place] [address] please contact your PCP or go to the nearest emergency room.          Electronically signed by Gio Owens MD on 2/15/2023 at 10:30 AM

## 2023-02-16 ENCOUNTER — TELEPHONE (OUTPATIENT)
Dept: VASCULAR SURGERY | Age: 78
End: 2023-02-16

## 2023-02-16 NOTE — TELEPHONE ENCOUNTER
Spoke with patient, scheduled angiogram on Tuesday, 3-7-23. Report to [facility] admitting office at 6:30 am (needs hydration prior to angiogram). NPO after midnight except for heart and BP meds with sips of water, hold Eliquis 2 days prior to angiogram. Hold Metformin day of angiogram and for 2 days after.

## 2023-02-16 NOTE — TELEPHONE ENCOUNTER
Message left on pt's answering machine for a return call to schedule an angiogram with Dr. Ivana Burkett. Will need pre-hydration and to hold Eliquis and metformin.

## 2023-02-20 ENCOUNTER — HOSPITAL ENCOUNTER (OUTPATIENT)
Dept: OTHER | Age: 78
Setting detail: THERAPIES SERIES
Discharge: HOME | End: 2023-02-20
Payer: MEDICARE

## 2023-02-20 VITALS
OXYGEN SATURATION: 96 % | DIASTOLIC BLOOD PRESSURE: 57 MMHG | RESPIRATION RATE: 17 BRPM | HEART RATE: 60 BPM | BODY MASS INDEX: 21.12 KG/M2 | WEIGHT: 143 LBS | SYSTOLIC BLOOD PRESSURE: 123 MMHG

## 2023-02-20 LAB
ANION GAP SERPL CALCULATED.3IONS-SCNC: 8 MMOL/L (ref 7–16)
BUN BLDV-MCNC: 23 MG/DL (ref 6–23)
CALCIUM SERPL-MCNC: 9.6 MG/DL (ref 8.6–10.2)
CHLORIDE BLD-SCNC: 103 MMOL/L (ref 98–107)
CO2: 25 MMOL/L (ref 22–29)
CREAT SERPL-MCNC: 1.1 MG/DL (ref 0.7–1.2)
GFR SERPL CREATININE-BSD FRML MDRD: >60 ML/MIN/1.73
GLUCOSE BLD-MCNC: 70 MG/DL (ref 74–99)
POTASSIUM SERPL-SCNC: 4.9 MMOL/L (ref 3.5–5)
PRO-BNP: 4428 PG/ML (ref 0–450)
SODIUM BLD-SCNC: 136 MMOL/L (ref 132–146)

## 2023-02-20 PROCEDURE — 99214 OFFICE O/P EST MOD 30 MIN: CPT

## 2023-02-20 PROCEDURE — 6360000002 HC RX W HCPCS: Performed by: NURSE PRACTITIONER

## 2023-02-20 PROCEDURE — 96374 THER/PROPH/DIAG INJ IV PUSH: CPT

## 2023-02-20 PROCEDURE — 83880 ASSAY OF NATRIURETIC PEPTIDE: CPT

## 2023-02-20 PROCEDURE — 36415 COLL VENOUS BLD VENIPUNCTURE: CPT

## 2023-02-20 PROCEDURE — 80048 BASIC METABOLIC PNL TOTAL CA: CPT

## 2023-02-20 PROCEDURE — 2580000003 HC RX 258: Performed by: NURSE PRACTITIONER

## 2023-02-20 RX ORDER — SODIUM CHLORIDE 0.9 % (FLUSH) 0.9 %
SYRINGE (ML) INJECTION
Status: DISPENSED
Start: 2023-02-20 | End: 2023-02-20

## 2023-02-20 RX ORDER — FUROSEMIDE 10 MG/ML
40 INJECTION INTRAMUSCULAR; INTRAVENOUS ONCE
Status: COMPLETED | OUTPATIENT
Start: 2023-02-20 | End: 2023-02-20

## 2023-02-20 RX ORDER — FUROSEMIDE 10 MG/ML
INJECTION INTRAMUSCULAR; INTRAVENOUS
Status: DISPENSED
Start: 2023-02-20 | End: 2023-02-20

## 2023-02-20 RX ORDER — SODIUM CHLORIDE 0.9 % (FLUSH) 0.9 %
10 SYRINGE (ML) INJECTION PRN
Status: DISCONTINUED | OUTPATIENT
Start: 2023-02-20 | End: 2023-02-21 | Stop reason: HOSPADM

## 2023-02-20 RX ADMIN — SODIUM CHLORIDE, PRESERVATIVE FREE 10 ML: 5 INJECTION INTRAVENOUS at 09:19

## 2023-02-20 RX ADMIN — FUROSEMIDE 40 MG: 10 INJECTION, SOLUTION INTRAMUSCULAR; INTRAVENOUS at 09:19

## 2023-02-20 NOTE — PROGRESS NOTES
Congestive Heart Failure [facility] 63   1945          Referring Provider: Dr. Aziza Parker  Primary Care Physician: Dr. Stan Maldonado  Cardiologist: Dr. Lilly Patrick  Nephrologist:         History of Present Illness:     Morris Dietz is a 69 y.o. male with a history of HFrEF, most recent EF 15% on 12/23/2022. Patient Story:    He does not  have dyspnea with exertion, shortness of breath, or decline in overall functional capacity. He does not have orthopnea, PND, nocturnal cough or hemoptysis. He does not have abdominal distention or bloating, early satiety, anorexia/change in appetite. He does has a good urinary response to PRN  oral diuretic but has not needed a dose in several weeks he says. He does not have  lower extremity edema. He denies lightheadedness, dizziness. He denies palpitations, syncope or near syncope. He does not complain of chest pain, pressure, discomfort.          Allergies   Allergen Reactions    Ciprofloxacin Hives           Pcn [Penicillins] Hives    Sulfa Antibiotics Hives and Other (See Comments)           Current Outpatient Medications on File Prior to Encounter   Medication Sig Dispense Refill    Misc Natural Products (ADV TURMERIC CURCUMIN COMPLEX PO) Take 1 tablet by mouth      sacubitril-valsartan (ENTRESTO) 49-51 MG per tablet Take 1 tablet by mouth 2 times daily 60 tablet 3    apixaban (ELIQUIS) 2.5 MG TABS tablet Take 1 tablet by mouth 2 times daily 60 tablet 5    JANUVIA 100 MG tablet TAKE 1 TABLET DAILY 90 tablet 3    metFORMIN (GLUCOPHAGE) 1000 MG tablet TAKE 1 TABLET TWICE A DAY WITH MEALS 180 tablet 3    insulin glargine (LANTUS SOLOSTAR) 100 UNIT/ML injection pen Inject 10 Units into the skin nightly 5 Adjustable Dose Pre-filled Pen Syringe 5    rosuvastatin (CRESTOR) 10 MG tablet TAKE 1 TABLET DAILY 90 tablet 3    metoprolol succinate (TOPROL XL) 25 MG extended release tablet TAKE 1 TABLET TWICE A  tablet 3    glipiZIDE (GLUCOTROL) 10 MG tablet TAKE 1 TABLET TWICE A DAY BEFORE MEALS 180 tablet 3    spironolactone (ALDACTONE) 25 MG tablet TAKE ONE-HALF (1/2) TABLET DAILY 45 tablet 3    Multiple Vitamins-Minerals (THERAPEUTIC MULTIVITAMIN-MINERALS) tablet Take 1 tablet by mouth daily      traZODone (DESYREL) 50 MG tablet TAKE 1 TABLET DAILY EVERY NIGHT (Patient taking differently: nightly as needed TAKE 1 TABLET DAILY EVERY NIGHT) 90 tablet 3     Current Facility-Administered Medications on File Prior to Encounter   Medication Dose Route Frequency Provider Last Rate Last Admin    perflutren lipid microspheres (DEFINITY) injection 1.5 mL  1.5 mL IntraVENous ONCE PRN Adrian Campos MD                 Guideline directed medical:  ARNI/ACE I/ARB: Yes  Beta blocker: Yes  Aldosterone antagonist:  Yes  SGLT2i: no-per patient it was stopped by PCP        Physical Examination:     BP (!) 123/57   Pulse 60   Resp 17   Wt 143 lb (64.9 kg)   SpO2 96%   BMI 21.12 kg/m²                         Respiratory  L Breath Sounds: Clear  R Breath Sounds: Clear              Cardiac  Cardiac Rhythm: Sinus rhythm    Rhythm Interpretation  Heart Rate: 60         Gastrointestinal  Abdomen Inspection: Rounded, Soft  RUQ Bowel Sounds: Active  LUQ Bowel Sounds: Active  RLQ Bowel Sounds: Active  LLQ Bowel Sounds: Active          Bowel Sounds  RUQ Bowel Sounds: Active  LUQ Bowel Sounds: Active  RLQ Bowel Sounds: Active  LLQ Bowel Sounds:  Active    Peripheral Vascular  RLE Edema: None  LLE Edema: None              Musculoskeletal  RL Extremity: Brace  LL Extremity: Brace    Genitourinary  Genitourinary (WDL): Within Defined Limits    Psychosocial  Psychosocial (WDL): Within Defined Limits                        Heart Rate: 60                     LAB DATA:    Last 3 BMP      Sodium (mmol/L)   Date Value   02/20/2023 136   02/03/2023 138   01/27/2023 135     Potassium (mmol/L)   Date Value   02/20/2023 4.9   02/03/2023 4.8   01/27/2023 4.7 Potassium reflex Magnesium (mmol/L)   Date Value   12/25/2022 3.7   12/24/2022 4.0   12/23/2022 4.9     Chloride (mmol/L)   Date Value   02/20/2023 103   02/03/2023 106   01/27/2023 102     CO2 (mmol/L)   Date Value   02/20/2023 25   02/03/2023 18 (L)   01/27/2023 22     BUN (mg/dL)   Date Value   02/20/2023 23   02/03/2023 29 (H)   01/27/2023 37 (H)     Glucose (mg/dL)   Date Value   02/20/2023 70 (L)   02/03/2023 66 (L)   01/27/2023 118 (H)     Calcium (mg/dL)   Date Value   02/20/2023 9.6   02/03/2023 9.5   01/27/2023 10.1       Last 3 BNP       Pro-BNP (pg/mL)   Date Value   02/20/2023 4,428 (H)   02/03/2023 3,616 (H)   01/27/2023 2,749 (H)          CBC: No results for input(s): WBC, HGB, PLT in the last 72 hours. BMP:    Recent Labs     02/20/23  0918      K 4.9      CO2 25   BUN 23   CREATININE 1.1   GLUCOSE 70*             Hepatic: No results for input(s): AST, ALT, ALB, BILITOT, ALKPHOS in the last 72 hours. Troponin: No results for input(s): TROPONINI in the last 72 hours. BNP: No results for input(s): BNP in the last 72 hours. Lipids: No results for input(s): CHOL, HDL in the last 72 hours. Invalid input(s): LDLCALCU  INR: No results for input(s): INR in the last 72 hours. WEIGHTS:    Wt Readings from Last 3 Encounters:   02/20/23 143 lb (64.9 kg)   02/15/23 125 lb (56.7 kg)   02/06/23 138 lb (62.6 kg)         TELEMETRY:  Cardiac Regularity: Regular  Cardiac Rhythm/Interpretation: NSR        ASSESSMENT:  Bekah Kate Blakeslee weight is up 5 pounds since his last visit with us. He is positive for JVD on exam. Abdomen is distended according to the patient. He has no edema to BLE. Joselyn Mew was stopped by PCP per patient due to \"concern about my kidneys\". Patient is weighing himself and taking his blood pressure daily, Trend sheet brought to clinic . Life Vest on at clinic.     Patient denies taking any PRN torsemide since his last visit and not currently on a daily loop diuretic. Patient was educated on when to use his torsemide at home, when necessary. Patient and wife verbalize understanding. Interventions completed this visit:  IV diuretics given- yes lasix, 40 mg. Lab work obtained yes, BMP, proBNP   Reviewed currently prescribed medications with patient, educated on importance of compliance and answered any questions regarding their medication  Educated on signs and symptoms of HF  Educated on low sodium diet    PLAN:  Scheduled to follow up in CHF clinic on   Future Appointments   Date Time Provider Cecy Marie   3/1/2023  8:15 AM [facility] ROOM 2 [facility]   3/7/2023  9:30 AM [facility] CVL PERIPHERAL LAB [facility] CATH [facility] [place]   4/3/2023  8:00 AM [facility] ECHO  [facility]   4/3/2023  9:00 AM Stephan Kearns MD [address] [facility]   6/27/2023 11:00 AM Zain Upton MD [facility]     Given clinic phone number [phone] and aware of signs and symptoms to call with any HF change in symptoms.

## 2023-03-01 ENCOUNTER — HOSPITAL ENCOUNTER (OUTPATIENT)
Dept: OTHER | Age: 78
Setting detail: THERAPIES SERIES
Discharge: HOME | End: 2023-03-01
Payer: MEDICARE

## 2023-03-01 VITALS
BODY MASS INDEX: 20.56 KG/M2 | OXYGEN SATURATION: 93 % | RESPIRATION RATE: 16 BRPM | HEART RATE: 60 BPM | DIASTOLIC BLOOD PRESSURE: 60 MMHG | SYSTOLIC BLOOD PRESSURE: 124 MMHG | WEIGHT: 139.2 LBS

## 2023-03-01 LAB
ANION GAP SERPL CALCULATED.3IONS-SCNC: 9 MMOL/L (ref 7–16)
BUN BLDV-MCNC: 32 MG/DL (ref 6–23)
CALCIUM SERPL-MCNC: 9.4 MG/DL (ref 8.6–10.2)
CHLORIDE BLD-SCNC: 103 MMOL/L (ref 98–107)
CO2: 26 MMOL/L (ref 22–29)
CREAT SERPL-MCNC: 1.4 MG/DL (ref 0.7–1.2)
GFR SERPL CREATININE-BSD FRML MDRD: 52 ML/MIN/1.73
GLUCOSE BLD-MCNC: 92 MG/DL (ref 74–99)
POTASSIUM SERPL-SCNC: 4.4 MMOL/L (ref 3.5–5)
PRO-BNP: 2738 PG/ML (ref 0–450)
SODIUM BLD-SCNC: 138 MMOL/L (ref 132–146)

## 2023-03-01 PROCEDURE — 36415 COLL VENOUS BLD VENIPUNCTURE: CPT

## 2023-03-01 PROCEDURE — 99214 OFFICE O/P EST MOD 30 MIN: CPT

## 2023-03-01 PROCEDURE — 80048 BASIC METABOLIC PNL TOTAL CA: CPT

## 2023-03-01 PROCEDURE — 83880 ASSAY OF NATRIURETIC PEPTIDE: CPT

## 2023-03-01 RX ORDER — TORSEMIDE 20 MG/1
20 TABLET ORAL DAILY PRN
COMMUNITY

## 2023-03-01 NOTE — PROGRESS NOTES
Congestive Heart Failure [facility] 63   1945          Referring Provider: Dr. Liborio Kim  Primary Care Physician: Dr. Lashaun Gardner  Cardiologist: Dr. Mendel Kitchens  Nephrologist:         History of Present Illness:     Valentino Simmonds is a 69 y.o. male with a history of HFrEF, most recent EF 15% on 12/23/2022. Patient Story:    He does not  have dyspnea with exertion, shortness of breath, or decline in overall functional capacity. He does not have orthopnea, PND, nocturnal cough or hemoptysis. He does not have abdominal distention or bloating, early satiety, anorexia/change in appetite. He does has a good urinary response to PRN  oral diuretic and has taken last week for weight gain He does not have  lower extremity edema. He denies lightheadedness, dizziness. He denies palpitations, syncope or near syncope. He does not complain of chest pain, pressure, discomfort.          Allergies   Allergen Reactions    Ciprofloxacin Hives           Pcn [Penicillins] Hives    Sulfa Antibiotics Hives and Other (See Comments)           Current Outpatient Medications on File Prior to Encounter   Medication Sig Dispense Refill    torsemide (DEMADEX) 20 MG tablet Take 20 mg by mouth daily as needed      Misc Natural Products (ADV TURMERIC CURCUMIN COMPLEX PO) Take 1 tablet by mouth      sacubitril-valsartan (ENTRESTO) 49-51 MG per tablet Take 1 tablet by mouth 2 times daily 60 tablet 3    apixaban (ELIQUIS) 2.5 MG TABS tablet Take 1 tablet by mouth 2 times daily 60 tablet 5    JANUVIA 100 MG tablet TAKE 1 TABLET DAILY 90 tablet 3    metFORMIN (GLUCOPHAGE) 1000 MG tablet TAKE 1 TABLET TWICE A DAY WITH MEALS 180 tablet 3    insulin glargine (LANTUS SOLOSTAR) 100 UNIT/ML injection pen Inject 10 Units into the skin nightly 5 Adjustable Dose Pre-filled Pen Syringe 5    rosuvastatin (CRESTOR) 10 MG tablet TAKE 1 TABLET DAILY 90 tablet 3    metoprolol succinate (TOPROL XL) 25 MG extended release tablet TAKE 1 TABLET TWICE A  tablet 3    glipiZIDE (GLUCOTROL) 10 MG tablet TAKE 1 TABLET TWICE A DAY BEFORE MEALS 180 tablet 3    spironolactone (ALDACTONE) 25 MG tablet TAKE ONE-HALF (1/2) TABLET DAILY 45 tablet 3    Multiple Vitamins-Minerals (THERAPEUTIC MULTIVITAMIN-MINERALS) tablet Take 1 tablet by mouth daily      traZODone (DESYREL) 50 MG tablet TAKE 1 TABLET DAILY EVERY NIGHT (Patient taking differently: nightly as needed TAKE 1 TABLET DAILY EVERY NIGHT) 90 tablet 3     Current Facility-Administered Medications on File Prior to Encounter   Medication Dose Route Frequency Provider Last Rate Last Admin    perflutren lipid microspheres (DEFINITY) injection 1.5 mL  1.5 mL IntraVENous ONCE PRN Cristopher Don MD                 Guideline directed medical:  ARNI/ACE I/ARB: Yes  Beta blocker:   Yes  Aldosterone antagonist:  Yes  SGLT2i: no-per patient it was stopped by PCP        Physical Examination:     /60   Pulse 60   Resp 16   Wt 139 lb 3.2 oz (63.1 kg)   SpO2 93%   BMI 20.56 kg/m²     Assessment  Charting Type: Shift assessment (chf clinic)    Neurological  Level of Consciousness: Alert (0)                   Breath Sounds  Right Upper Lobe: Clear  Right Middle Lobe: Clear  Right Lower Lobe: Clear  Left Upper Lobe: Clear  Left Lower Lobe: Fine Crackles         Cardiac  Cardiac Rhythm: Sinus rhythm    Rhythm Interpretation  Heart Rate: 60         Gastrointestinal  Abdominal (WDL): Within Defined Limits  Abdomen Inspection: Soft               Peripheral Vascular  Peripheral Vascular (WDL): Within Defined Limits  Edema: Right lower extremity, Left lower extremity  RLE Edema: None  LLE Edema: None              Musculoskeletal  RL Extremity: Brace  LL Extremity: Brace    Genitourinary  Genitourinary (WDL): Within Defined Limits    Psychosocial  Psychosocial (WDL): Within Defined Limits    Pain Assessment  Pain Assessment: None - Denies Pain                   Heart Rate: 60                     LAB DATA:    Last 3 BMP      Sodium (mmol/L)   Date Value   03/01/2023 138   02/20/2023 136   02/03/2023 138     Potassium (mmol/L)   Date Value   03/01/2023 4.4   02/20/2023 4.9   02/03/2023 4.8     Potassium reflex Magnesium (mmol/L)   Date Value   12/25/2022 3.7   12/24/2022 4.0   12/23/2022 4.9     Chloride (mmol/L)   Date Value   03/01/2023 103   02/20/2023 103   02/03/2023 106     CO2 (mmol/L)   Date Value   03/01/2023 26   02/20/2023 25   02/03/2023 18 (L)     BUN (mg/dL)   Date Value   03/01/2023 32 (H)   02/20/2023 23   02/03/2023 29 (H)     Glucose (mg/dL)   Date Value   03/01/2023 92   02/20/2023 70 (L)   02/03/2023 66 (L)     Calcium (mg/dL)   Date Value   03/01/2023 9.4   02/20/2023 9.6   02/03/2023 9.5       Last 3 BNP       Pro-BNP (pg/mL)   Date Value   03/01/2023 2,738 (H)   02/20/2023 4,428 (H)   02/03/2023 3,616 (H)          CBC: No results for input(s): WBC, HGB, PLT in the last 72 hours. BMP:    Recent Labs     03/01/23  0910      K 4.4      CO2 26   BUN 32*   CREATININE 1.4*   GLUCOSE 92             Hepatic: No results for input(s): AST, ALT, ALB, BILITOT, ALKPHOS in the last 72 hours. Troponin: No results for input(s): TROPONINI in the last 72 hours. BNP: No results for input(s): BNP in the last 72 hours. Lipids: No results for input(s): CHOL, HDL in the last 72 hours. Invalid input(s): LDLCALCU  INR: No results for input(s): INR in the last 72 hours. WEIGHTS:    Wt Readings from Last 3 Encounters:   03/01/23 139 lb 3.2 oz (63.1 kg)   02/20/23 143 lb (64.9 kg)   02/15/23 125 lb (56.7 kg)         TELEMETRY:  Cardiac Regularity: Regular  Cardiac Rhythm/Interpretation: NSR        ASSESSMENT:  Michael Boyd has stable weight, down 4lbs since last CHF Clinic visit 2-20-23 and no complaint of shortness of breath. Abdomen is soft and no edema in bilateral lower extremities when leg braces were removed to assess.  Leydi Roosevelt was stopped by PCP per patient due to \"concern about my kidneys\". Patient is weighing himself and taking his blood pressure daily, Trend sheet brought to clinic . Life Vest on at clinic and denies any alarms going off. Interventions completed this visit:  IV diuretics given- No  Lab work obtained yes, BMP, proBNP   Reviewed currently prescribed medications with patient, educated on importance of compliance and answered any questions regarding their medication  Educated on signs and symptoms of HF  Educated on low sodium diet    PLAN:  Scheduled to follow up in CHF clinic on   Future Appointments   Date Time Provider Cecy Marie   3/7/2023  9:30 AM [facility] CVL PERIPHERAL LAB SEYZ CATH [facility] Sveta   3/22/2023  8:45 AM [facility] [place] ROOM 2 [facility]   3/27/2023  1:00 PM SCHEDULE, [facility]   4/3/2023  8:00 AM [facility] ECHO  [facility]   4/3/2023  9:00 AM Meeta Shannon MD [address] [place] [address]   6/27/2023 11:00 AM Jorgito Ramírez MD [facility]     Given clinic phone number [phone] and aware of signs and symptoms to call with any HF change in symptoms.

## 2023-03-06 ENCOUNTER — TELEPHONE (OUTPATIENT)
Dept: CARDIAC CATH/INVASIVE PROCEDURES | Age: 78
End: 2023-03-06

## 2023-03-06 NOTE — TELEPHONE ENCOUNTER
Reminded patient of scheduled procedure on 3/7 . Instructions given and COVID questionnaire completed.

## 2023-03-07 ENCOUNTER — HOSPITAL ENCOUNTER (OUTPATIENT)
Dept: CARDIAC CATH/INVASIVE PROCEDURES | Age: 78
Discharge: HOME | End: 2023-03-07
Payer: MEDICARE

## 2023-03-07 DIAGNOSIS — I70.234 ATHEROSCLEROSIS OF NATIVE ARTERY OF RIGHT LOWER EXTREMITY WITH ULCERATION OF MIDFOOT (HCC): Primary | ICD-10-CM

## 2023-03-07 PROBLEM — I70.239 ATHEROSCLEROSIS OF NATIVE ARTERY OF RIGHT LOWER EXTREMITY WITH ULCERATION (HCC): Status: ACTIVE | Noted: 2023-03-07

## 2023-03-07 LAB
ABO/RH: NORMAL
ANION GAP SERPL CALCULATED.3IONS-SCNC: 10 MMOL/L (ref 7–16)
ANTIBODY SCREEN: NORMAL
BUN BLDV-MCNC: 27 MG/DL (ref 6–23)
CALCIUM SERPL-MCNC: 9.6 MG/DL (ref 8.6–10.2)
CHLORIDE BLD-SCNC: 102 MMOL/L (ref 98–107)
CO2: 27 MMOL/L (ref 22–29)
CREAT SERPL-MCNC: 1.3 MG/DL (ref 0.7–1.2)
GFR SERPL CREATININE-BSD FRML MDRD: 57 ML/MIN/1.73
GLUCOSE BLD-MCNC: 181 MG/DL (ref 74–99)
HCT VFR BLD CALC: 40.4 % (ref 37–54)
HEMOGLOBIN: 13.3 G/DL (ref 12.5–16.5)
INR BLD: 1.2
MCH RBC QN AUTO: 29.9 PG (ref 26–35)
MCHC RBC AUTO-ENTMCNC: 32.9 % (ref 32–34.5)
MCV RBC AUTO: 90.8 FL (ref 80–99.9)
PDW BLD-RTO: 16.3 FL (ref 11.5–15)
PLATELET # BLD: 229 E9/L (ref 130–450)
PMV BLD AUTO: 10.2 FL (ref 7–12)
POTASSIUM REFLEX MAGNESIUM: 4.1 MMOL/L (ref 3.5–5)
PROTHROMBIN TIME: 12.9 SEC (ref 9.3–12.4)
RBC # BLD: 4.45 E12/L (ref 3.8–5.8)
SODIUM BLD-SCNC: 139 MMOL/L (ref 132–146)
WBC # BLD: 10.8 E9/L (ref 4.5–11.5)

## 2023-03-07 PROCEDURE — C2623 CATH, TRANSLUMIN, DRUG-COAT: HCPCS

## 2023-03-07 PROCEDURE — C1760 CLOSURE DEV, VASC: HCPCS

## 2023-03-07 PROCEDURE — 85027 COMPLETE CBC AUTOMATED: CPT

## 2023-03-07 PROCEDURE — C1887 CATHETER, GUIDING: HCPCS

## 2023-03-07 PROCEDURE — 85610 PROTHROMBIN TIME: CPT

## 2023-03-07 PROCEDURE — 75774 ARTERY X-RAY EACH VESSEL: CPT

## 2023-03-07 PROCEDURE — 2500000003 HC RX 250 WO HCPCS

## 2023-03-07 PROCEDURE — 86901 BLOOD TYPING SEROLOGIC RH(D): CPT

## 2023-03-07 PROCEDURE — 37224 PR REVSC OPN/PRG FEM/POP W/ANGIOPLASTY UNI: CPT | Performed by: SURGERY

## 2023-03-07 PROCEDURE — C9764 REVASC INTRAVASC LITHOTRIPSY: HCPCS

## 2023-03-07 PROCEDURE — C1725 CATH, TRANSLUMIN NON-LASER: HCPCS

## 2023-03-07 PROCEDURE — C1894 INTRO/SHEATH, NON-LASER: HCPCS

## 2023-03-07 PROCEDURE — 36415 COLL VENOUS BLD VENIPUNCTURE: CPT

## 2023-03-07 PROCEDURE — 6370000000 HC RX 637 (ALT 250 FOR IP)

## 2023-03-07 PROCEDURE — 80048 BASIC METABOLIC PNL TOTAL CA: CPT

## 2023-03-07 PROCEDURE — C1769 GUIDE WIRE: HCPCS

## 2023-03-07 PROCEDURE — 75710 ARTERY X-RAYS ARM/LEG: CPT

## 2023-03-07 PROCEDURE — 86900 BLOOD TYPING SEROLOGIC ABO: CPT

## 2023-03-07 PROCEDURE — 2709999900 HC NON-CHARGEABLE SUPPLY

## 2023-03-07 PROCEDURE — 86850 RBC ANTIBODY SCREEN: CPT

## 2023-03-07 PROCEDURE — 6360000002 HC RX W HCPCS

## 2023-03-07 RX ORDER — OXYCODONE HYDROCHLORIDE AND ACETAMINOPHEN 5; 325 MG/1; MG/1
1 TABLET ORAL EVERY 4 HOURS PRN
Status: DISCONTINUED | OUTPATIENT
Start: 2023-03-07 | End: 2023-03-08 | Stop reason: HOSPADM

## 2023-03-07 RX ORDER — SODIUM CHLORIDE 9 MG/ML
INJECTION, SOLUTION INTRAVENOUS CONTINUOUS
Status: DISCONTINUED | OUTPATIENT
Start: 2023-03-07 | End: 2023-03-08 | Stop reason: HOSPADM

## 2023-03-07 RX ORDER — OXYCODONE HYDROCHLORIDE AND ACETAMINOPHEN 5; 325 MG/1; MG/1
2 TABLET ORAL EVERY 4 HOURS PRN
Status: DISCONTINUED | OUTPATIENT
Start: 2023-03-07 | End: 2023-03-08 | Stop reason: HOSPADM

## 2023-03-07 RX ORDER — SODIUM CHLORIDE 0.9 % (FLUSH) 0.9 %
5-40 SYRINGE (ML) INJECTION PRN
Status: DISCONTINUED | OUTPATIENT
Start: 2023-03-07 | End: 2023-03-08 | Stop reason: HOSPADM

## 2023-03-07 RX ORDER — SODIUM CHLORIDE 0.9 % (FLUSH) 0.9 %
5-40 SYRINGE (ML) INJECTION EVERY 12 HOURS SCHEDULED
Status: DISCONTINUED | OUTPATIENT
Start: 2023-03-07 | End: 2023-03-08 | Stop reason: HOSPADM

## 2023-03-07 RX ORDER — ACETAMINOPHEN 325 MG/1
650 TABLET ORAL EVERY 4 HOURS PRN
Status: DISCONTINUED | OUTPATIENT
Start: 2023-03-07 | End: 2023-03-08 | Stop reason: HOSPADM

## 2023-03-07 RX ORDER — SODIUM CHLORIDE 9 MG/ML
INJECTION, SOLUTION INTRAVENOUS PRN
Status: DISCONTINUED | OUTPATIENT
Start: 2023-03-07 | End: 2023-03-08 | Stop reason: HOSPADM

## 2023-03-07 RX ORDER — ONDANSETRON 2 MG/ML
4 INJECTION INTRAMUSCULAR; INTRAVENOUS EVERY 8 HOURS PRN
Status: DISCONTINUED | OUTPATIENT
Start: 2023-03-07 | End: 2023-03-08 | Stop reason: HOSPADM

## 2023-03-07 NOTE — OP NOTE
Cardiovascular Lab Procedure Report    Silvana Herman  1945    Date : 3/7/2023  Surgeon: Salma Stanley M.D. Pre-procedure Diagnosis: R LE tissue loss  Post-procedure Diagnosis: Same  Procedure:      Left  common femoral artery access   with US guidance    8 fr angioseal used for closure    TF Right lower extremity angiogram   47526 Angiogram with catheter in Right   common femoral popliteal artery   07276 Right SFA Popliteal   Predilation with 3.5-3x210 nanocross - ruptured  Predilation with 5x200 0.18 balloon  Shockwave lithotripsy with 6x60   Postdilation with 6x250 DCB impact   Anesthesia: Local with IV sedation  Assistants: Cath Lab Staff  Estimated Blood Loss: Minimal  Complications: none  Findings:   Right Right s/p Intervention   Common Femoral Art Patent Patent   Superficial Femoral Art occluded Patent but diseased   Profunda Femoral Art Patent patent   AK Popliteal Art Occluded, trace flow intermittently Patent but diseased   BK Popliteal Art occluded patent   Anterior Tibial Art Trace flow Patent but diffusely diseased, flow to foot   Tibioperoneal Trunk occluded Patent, proximal high grade stenosis   Peroneal Art occluded Flow to mid calf   Posterior Tibial Art occluded occluded     Procedure Details : There was not previous CTA  or catheter based diagnostic imaging preformed prior to today's procedure. Timeout preformed identifying pt and procedure. Groins prepped and draped in sterile fashion. Patient given sedation as needed throughout the case. Left common femoral artery was noted to be patent and was accessed under ultrasound guidance after infiltrating with local.  Micropuncture placed, exchanged out for 5 fr sheath. Advantage glide wire and contra catheter advanced into distal aorta. Bilateral lower extremity angiogram preformed. Catheter and wire used to access Right  iliac system and catheter placed at common femoral and angiogram of theRight  lower extremity preformed. Significant occlusive disease was noted in the sfa, pop and tibials. Patient was given 7000 units of heparin and than a 7 fr destination sheath advanced to common femoral artery on the Right. Imaging with the catheter in the sfa to further evaluate the sfa, popliteal lesion. 0.35 [place] blazer catheter and advantage glide wire, than 0.35 angle dtrail, than 018 advantage glide and 0.18 trail used to traverse stenosis. Extended amount of time was spent trying to cross these complex denley calcified occlusions of the sfa and pop segment. The catheter was placed in the below knee popliteal artery and angiogram was preformed to further evaluate the tibial arteries and runoff. The sfa, pop lesion was treated with plasty. A nanocross was initially used but ruptured. A 5 x200 0.18 balloon was used for predilation. Shockwave lithrotripsy with 6x60 was done, Post dilation with 6x250 DCB impact. There was evidence of some residual stenosis. Completion angiogram noted much improved filling distally and brisk flow though the stenotic area. The R AT was unable to be crossed proximally. Sheath and wire pulled back to Left iliac system.   After femoral angiogram a 8 fr angioseal device used to close the Left common femoral artery    Postop Exam  Right DP monophasic  PT no signal  Left DP monophasic  PT monophasic    Plan  Continue Medical Management with statin  One dose of plavix post  Resume eliquis 3/8 am   May need further tibial intervention in future  65 ml of contrast used    Emigdio Dejesus MD    PCP : Natalia Arreola MD  Cardiology : dr. Richy Pedroza  Podiatry : Dr. Lizz Belcher    Previous Vascular Procedure  5/2/17 L LE angio  L SFA popliteal atherectomy, DCB plasty   3/7/23 R LE angiogram   R SFA, Popliteal   6x60 Shockwave lithotripsy  6x250 DCB impact

## 2023-03-07 NOTE — DISCHARGE INSTRUCTIONS
Discharge Instructions for Lower extremity angiogram    Call Dr. Elroy Box office [phone] for follow-up appointment. RESUME METFORMIN 3/9     RESUME ELIQUIS 3/8     Groin Care  - keep clean and dry  - ok to shower or sponge bath  - ok to clean site with lukewarm water and mild soap  - use a soft wash cloth to gently wipe the incision area  - do not scrub the incision areas  - no swimming or baths    Home Care    Follow these guidelines after surgery:   Rest. Try to move as tolerated. A mix of rest and light activity improves healing. The incision area may be sore for a few days. To minimize pain and soreness: Take pain medicine as directed. Avoid strenuous activity and heavy lifting. Diet    You can return to your regular diet. You may work with a dietician who will help you follow a heart-healthy diet. Physical Activity    You will feel sore after the surgery. Try to walk steadily within two weeks. You may be able to return to normal activities within 1-3 weeks. While recovering, you will need to avoid strenuous activities, like heavy lifting. Ask your doctor when you will be able to return to work. Do not drive unless your doctor has given you permission to do so. Medications    Your doctor may recommend:   Over-the-counter or prescription pain medicine   Aspirin or a cholesterol-lowering drug to prevent complications   If you had to stop medicines before the procedure, ask your doctor when you can start again. Medicines that may have been stopped include:   Anti-inflammatory drugs (eg, aspirin, ibuprofen)   Blood thinners, like warfarin (Coumadin)   Clopidogrel (Plavix)   When taking medicines:   Take your medicine as directed. Do not change the amount or the schedule. Do not stop taking them without talking to your doctor. Do not share them. Know what results and side effects to look for. Report them to your doctor. Some drugs can be dangerous when mixed.  Talk to a doctor or pharmacist if you are taking more than one drug. This includes over-the-counter medicines and herb or dietary supplements. Plan ahead for refills so you do not run out. Lifestyle Changes    You and your doctor will plan lifestyle changes that will help you recover. Some things to keep in mind include: Atherosclerosis and high blood pressure should be carefully managed. This can be done with medicines and a healthy lifestyle. If you smoke, talk to your doctor about quitting. Follow-up   Call Your Doctor If Any of the Following Occurs   After you leave the hospital, call your doctor if any of the following occurs:   Redness, swelling, increasing pain, excessive bleeding, or discharge at the incision site   Signs of infection, including fever and chills   Any change of color or sensation in your legs or feet   Burning, pain, or other problems when urinating   Nausea or vomiting   Abdominal cramps or diarrhea   Unusual fatigue or depression   Disorientation or confusion   Numbness or tingling in the legs   New, unexplained symptoms   Cough   Call 911 or go to the emergency room right away if you have:   Shortness of breath   Chest pain   If you think you have an emergency,  CALL 911.

## 2023-03-07 NOTE — H&P
Vascular Surgery History & Physical Exam      Chief Complaint: Peripheral vascular disease, R LE tissue loss    HISTORY OF PRESENT ILLNESS:                The patient is a 69 y.o. male who presents to the hospital for elective arteriogram with possible intervention. The patient has a history of peripheral vascular disease and R LE tissue loss. IMPRESSION:    Active Hospital Problems    Diagnosis     Atherosclerosis of native artery of right lower extremity with ulceration of midfoot ([facility]) [I70.234]        PLAN:  Aortogram,  Right lower extremity arteriogram, possible intervention. I reviewed the procedure with the patient. I discussed the risks, benefits, and alternatives of the procedure. The patient understands and consents. All questions were answered.     Patient Active Problem List   Diagnosis Code    Hypertension I10    Mixed hyperlipidemia E78.2    RBBB I45.10    Coronary artery disease involving native coronary artery of native heart without angina pectoris I25.10    Atherosclerosis of native artery of right lower extremity with ulceration of midfoot ([facility]) I70.234    PVD (peripheral vascular disease) ([facility]) I73.9    Diabetic ulcer of right midfoot associated with type 2 diabetes mellitus, with necrosis of muscle ([facility]) E11.621, L97.413    Diabetes mellitus type 2 in nonobese ([facility]) E11.9    Lumbar radiculopathy M54.16    Spinal stenosis of lumbar region M48.061    Lumbosacral spondylosis without myelopathy M47.817    Stage 3 chronic kidney disease ([facility]) N18.30    DVT (deep venous thrombosis) ([facility]) I82.409    HFrEF (heart failure with reduced ejection fraction) ([facility]) I50.20    Atherosclerosis of native artery of right lower extremity with ulceration ([facility]) I70.239       Past Medical History:   Diagnosis Date    CAD (coronary artery disease) 2009    CHF (congestive heart failure) ([facility])     Diabetes mellitus ([facility])     Diabetic neuropathy ([facility])     Diabetic ulcer of left foot associated with type 2 diabetes mellitus ([facility] [address] 03/29/2017    Hx of blood clots     Lumbar pain     for procedure 3/7/22    Peripheral vascular disease [facility])         Past Surgical History:   Procedure Laterality Date    CARDIAC SURGERY  2004    double bypass    COLONOSCOPY      FOOT SURGERY Left     multiple    NERVE BLOCK Bilateral 02/07/2022    #1 BILATERAL LUMBAR MEDIAL BRANCH NERVE BLOCK UNDER FLUOROSCOPIC GUIDANCE AT L3, L4, AND L5 DORSAL RAMI performed by Lucy Bowles MD at [address] Bilateral 03/07/2022    BILATERAL LUMBAR MEDIAL BRANCH NERVE BLOCK UNDER FLUORO AT L3 L4 L5 DORSAL RAMI performed by Lucy Bowles MD at [address] [facility]  03/10/2017    left foot application integra graft, application of wound vac, delayed primax closure    OTHER SURGICAL HISTORY      fracture of left temporal part of skull    OTHER SURGICAL HISTORY Left 06/16/2017    delayed closure with bone debridement and biopsy, application of hemovac with Dr. Claudeen Andes N/A 11/22/2021    LUMBAR EPIDURAL STEROID INJECTION UNDER FLUOROSCOPIC GUIDANCE AT L5-S1 performed by Lucy Bowles MD at [address] N/A 01/06/2022    # 2 LUMBAR EPIDURAL STEROID INJECTION UNDER FLUOROSCOPIC GUIDANCE AT L5-S1 performed by Lucy Bowles MD at [address] Bilateral 04/11/2022    BILATERAL LUMBAR RADIOFREQUENCY ABLATION UNDER FLUORO AT L3 L4 L5 AND DORSAL RAMI performed by Lucy Bowles MD at [address] [facility] Left 05/2017    SFA, pop atherectomy, Dr. Pina Pain Left 2009    2nd and 5th toe    TONSILLECTOMY         Current Medications:     Current Outpatient Medications:     torsemide (DEMADEX) 20 MG tablet, Take 20 mg by mouth daily as needed, Disp: , Rfl:     Misc Natural Products (ADV TURMERIC CURCUMIN COMPLEX PO), Take 1 tablet by mouth, Disp: , Rfl:     sacubitril-valsartan (ENTRESTO) 49-51 MG per tablet, Take 1 tablet by mouth 2 times daily, Disp: 60 tablet, Rfl: 3    apixaban (ELIQUIS) 2.5 MG TABS tablet, Take 1 tablet by mouth 2 times daily, Disp: 60 tablet, Rfl: 5    JANUVIA 100 MG tablet, TAKE 1 TABLET DAILY, Disp: 90 tablet, Rfl: 3    metFORMIN (GLUCOPHAGE) 1000 MG tablet, TAKE 1 TABLET TWICE A DAY WITH MEALS, Disp: 180 tablet, Rfl: 3    insulin glargine (LANTUS SOLOSTAR) 100 UNIT/ML injection pen, Inject 10 Units into the skin nightly, Disp: 5 Adjustable Dose Pre-filled Pen Syringe, Rfl: 5    rosuvastatin (CRESTOR) 10 MG tablet, TAKE 1 TABLET DAILY, Disp: 90 tablet, Rfl: 3    metoprolol succinate (TOPROL XL) 25 MG extended release tablet, TAKE 1 TABLET TWICE A DAY, Disp: 180 tablet, Rfl: 3    glipiZIDE (GLUCOTROL) 10 MG tablet, TAKE 1 TABLET TWICE A DAY BEFORE MEALS, Disp: 180 tablet, Rfl: 3    spironolactone (ALDACTONE) 25 MG tablet, TAKE ONE-HALF (1/2) TABLET DAILY, Disp: 45 tablet, Rfl: 3    Multiple Vitamins-Minerals (THERAPEUTIC MULTIVITAMIN-MINERALS) tablet, Take 1 tablet by mouth daily, Disp: , Rfl:     traZODone (DESYREL) 50 MG tablet, TAKE 1 TABLET DAILY EVERY NIGHT (Patient taking differently: nightly as needed TAKE 1 TABLET DAILY EVERY NIGHT), Disp: 90 tablet, Rfl: 3    Current Facility-Administered Medications:     0.9 % sodium chloride infusion, , IntraVENous, Continuous, GEMINI Garcia CNP    sodium chloride flush 0.9 % injection 5-40 mL, 5-40 mL, IntraVENous, 2 times per day, GEMINI Garcia CNP    sodium chloride flush 0.9 % injection 5-40 mL, 5-40 mL, IntraVENous, PRN, GEMINI Garcia CNP    0.9 % sodium chloride infusion, , IntraVENous, PRN, GEMINI Garcia CNP    ceFAZolin (ANCEF) 2,000 mg in sterile water 20 mL IV syringe, 2,000 mg, IntraVENous, On Call to OR, Rolly Camuso, APRN - CNP    perflutren lipid microspheres (DEFINITY) injection 1.5 mL, 1.5 mL, IntraVENous, ONCE PRN, Lansing Fothergill, MD    Allergies:  Ciprofloxacin, Pcn [penicillins], and Sulfa antibiotics    Social History     Socioeconomic History    Marital status:      Spouse name: Not on file    Number of children: Not on file    Years of education: Not on file    Highest education level: Not on file   Occupational History    Not on file   Tobacco Use    Smoking status: Never    Smokeless tobacco: Never   Vaping Use    Vaping Use: Never used   Substance and Sexual Activity    Alcohol use: Not Currently     Alcohol/week: 3.0 standard drinks     Types: 2 Shots of liquor, 1 Standard drinks or equivalent per week    Drug use: No    Sexual activity: Not on file   Other Topics Concern    Not on file   Social History Narrative    Not on file     Social Determinants of Health     Financial Resource Strain: Not on file   Food Insecurity: Not on file   Transportation Needs: Not on file   Physical Activity: Inactive    Days of Exercise per Week: 0 days    Minutes of Exercise per Session: 0 min   Stress: Not on file   Social Connections: Not on file   Intimate Partner Violence: Not on file   Housing Stability: Not on file        Family History   Problem Relation Age of Onset    High Blood Pressure Mother     COPD Father     Diabetes Father     Heart Disease Brother         chf    Seizures Brother        REVIEW OF SYSTEMS:  The chart was reviewed. PHYSICAL EXAM:    There were no vitals filed for this visit. CONSTITUTIONAL:  awake, alert, cooperative, no apparent distress, and appears stated age  NECK:  supple, symmetrical, trachea midline  LUNGS:  no increased work of breathing, good air exchange   CARDIOVASCULAR:  regular rate and rhythm   ABDOMEN:  soft, non-distended and non-tender  R LE: monophasic DP/PT.  Wound present to midfoot  L LE: weakly biphasic DP, monophasic PT    LABS:    Lab Results   Component Value Date    WBC 10.8 03/07/2023    HGB 13.3 03/07/2023    HCT 40.4 03/07/2023     03/07/2023    PROTIME 12.9 (H) 03/07/2023    INR 1.2 03/07/2023    APTT 46.0 (H) 12/23/2022    K 4.4 03/01/2023    BUN 32 (H) 03/01/2023    CREATININE 1.4 (H) 03/01/2023     I reviewed the procedure with the patient and family as available. I discussed the procedure, risks, benefits, complications, and alternatives of the procedure. They understand and consent.   All questions were answered    Carrie Giordano MD

## 2023-03-09 RX ORDER — TORSEMIDE 20 MG/1
TABLET ORAL
Qty: 30 TABLET | Refills: 5 | Status: SHIPPED | OUTPATIENT
Start: 2023-03-09

## 2023-03-22 ENCOUNTER — HOSPITAL ENCOUNTER (OUTPATIENT)
Dept: OTHER | Age: 78
Setting detail: THERAPIES SERIES
Discharge: HOME | End: 2023-03-22
Payer: MEDICARE

## 2023-03-22 VITALS
BODY MASS INDEX: 20.94 KG/M2 | WEIGHT: 141.8 LBS | RESPIRATION RATE: 18 BRPM | OXYGEN SATURATION: 95 % | SYSTOLIC BLOOD PRESSURE: 116 MMHG | DIASTOLIC BLOOD PRESSURE: 58 MMHG | HEART RATE: 60 BPM

## 2023-03-22 LAB
ANION GAP SERPL CALCULATED.3IONS-SCNC: 15 MMOL/L (ref 7–16)
BNP BLD-MCNC: 4585 PG/ML (ref 0–450)
BUN SERPL-MCNC: 27 MG/DL (ref 6–23)
CALCIUM SERPL-MCNC: 9.2 MG/DL (ref 8.6–10.2)
CHLORIDE SERPL-SCNC: 100 MMOL/L (ref 98–107)
CO2 SERPL-SCNC: 21 MMOL/L (ref 22–29)
CREAT SERPL-MCNC: 1.3 MG/DL (ref 0.7–1.2)
GLUCOSE SERPL-MCNC: 201 MG/DL (ref 74–99)
POTASSIUM SERPL-SCNC: 4.1 MMOL/L (ref 3.5–5)
SODIUM SERPL-SCNC: 136 MMOL/L (ref 132–146)

## 2023-03-22 PROCEDURE — 36415 COLL VENOUS BLD VENIPUNCTURE: CPT

## 2023-03-22 PROCEDURE — 99214 OFFICE O/P EST MOD 30 MIN: CPT

## 2023-03-22 PROCEDURE — 83880 ASSAY OF NATRIURETIC PEPTIDE: CPT

## 2023-03-22 PROCEDURE — 80048 BASIC METABOLIC PNL TOTAL CA: CPT

## 2023-03-22 RX ORDER — TORSEMIDE 20 MG/1
20 TABLET ORAL
Qty: 30 TABLET | Refills: 5 | Status: SHIPPED | OUTPATIENT
Start: 2023-03-22

## 2023-03-22 NOTE — PROGRESS NOTES
Notified BABITA Torres APRN-CNP of BNP 4585. Patient is to take Torsemide 20mg MWF. Was taking it as needed. Notified patient to take Torsemide 20mg. three time per week or MWF and made a one week appt. To check labs and followup.
(TOPROL XL) 25 MG extended release tablet TAKE 1 TABLET TWICE A  tablet 3    glipiZIDE (GLUCOTROL) 10 MG tablet TAKE 1 TABLET TWICE A DAY BEFORE MEALS 180 tablet 3    spironolactone (ALDACTONE) 25 MG tablet TAKE ONE-HALF (1/2) TABLET DAILY 45 tablet 3    Multiple Vitamins-Minerals (THERAPEUTIC MULTIVITAMIN-MINERALS) tablet Take 1 tablet by mouth daily      traZODone (DESYREL) 50 MG tablet TAKE 1 TABLET DAILY EVERY NIGHT (Patient taking differently: nightly as needed TAKE 1 TABLET DAILY EVERY NIGHT) 90 tablet 3     Current Facility-Administered Medications on File Prior to Encounter   Medication Dose Route Frequency Provider Last Rate Last Admin    perflutren lipid microspheres (DEFINITY) injection 1.5 mL  1.5 mL IntraVENous ONCE PRN Sj Parikh MD                 Guideline directed medical:  ARNI/ACE I/ARB: Yes  Beta blocker: Yes  Aldosterone antagonist:  Yes  SGLT2i: no-per patient it was stopped by PCP        Physical Examination:     Pulse 60   Resp 18   Wt 141 lb 12.8 oz (64.3 kg)   SpO2 95%   BMI 20.94 kg/m²     Assessment  Charting Type: Shift assessment    Neurological  Level of Consciousness: Alert (0)              Respiratory  Respiratory Pattern: Regular  Respiratory Depth: Normal  Respiratory Quality/Effort: Unlabored  L Breath Sounds: Clear  R Breath Sounds: Clear    Breath Sounds  Right Upper Lobe: Clear  Right Middle Lobe: Clear  Right Lower Lobe: Clear  Left Upper Lobe: Clear  Left Lower Lobe: Clear         Cardiac  Cardiac Rhythm: Sinus rhythm    Rhythm Interpretation  Heart Rate: 60         Gastrointestinal  Abdominal (WDL): Within Defined Limits  Abdomen Inspection: Soft  RUQ Bowel Sounds: Active  LUQ Bowel Sounds: Active  RLQ Bowel Sounds: Active  LLQ Bowel Sounds: Active          Bowel Sounds  RUQ Bowel Sounds: Active  LUQ Bowel Sounds: Active  RLQ Bowel Sounds: Active  LLQ Bowel Sounds:  Active    Peripheral Vascular  Peripheral Vascular (WDL): Within Defined Limits  Edema: Right

## 2023-03-22 NOTE — RESULT ENCOUNTER NOTE
Received call from CHF clinic and spoke with Addie Hameed RN  Patient mildly hypervolemic with rise in probnp - was on torsemide PRN however query if he knows when to take it     Will have him take torsemide 20 mg three times weekly (Yteear-Kiaexowsz-Jnkfoy)  Follow up with CHF clinic in 7-10 days    Thank you
23.9

## 2023-03-27 ENCOUNTER — OFFICE VISIT (OUTPATIENT)
Dept: VASCULAR SURGERY | Age: 78
End: 2023-03-27
Payer: MEDICARE

## 2023-03-27 VITALS — WEIGHT: 129 LBS | HEIGHT: 69 IN | BODY MASS INDEX: 19.11 KG/M2

## 2023-03-27 DIAGNOSIS — I70.234 ATHEROSCLEROSIS OF NATIVE ARTERY OF RIGHT LOWER EXTREMITY WITH ULCERATION OF MIDFOOT (HCC): Primary | ICD-10-CM

## 2023-03-27 DIAGNOSIS — E11.621 DIABETIC ULCER OF RIGHT MIDFOOT ASSOCIATED WITH TYPE 2 DIABETES MELLITUS, WITH NECROSIS OF MUSCLE (HCC): ICD-10-CM

## 2023-03-27 DIAGNOSIS — L97.413 DIABETIC ULCER OF RIGHT MIDFOOT ASSOCIATED WITH TYPE 2 DIABETES MELLITUS, WITH NECROSIS OF MUSCLE (HCC): ICD-10-CM

## 2023-03-27 PROCEDURE — G8427 DOCREV CUR MEDS BY ELIG CLIN: HCPCS | Performed by: SURGERY

## 2023-03-27 PROCEDURE — G8420 CALC BMI NORM PARAMETERS: HCPCS | Performed by: SURGERY

## 2023-03-27 PROCEDURE — G8484 FLU IMMUNIZE NO ADMIN: HCPCS | Performed by: SURGERY

## 2023-03-27 PROCEDURE — 99213 OFFICE O/P EST LOW 20 MIN: CPT | Performed by: SURGERY

## 2023-03-27 PROCEDURE — 1124F ACP DISCUSS-NO DSCNMKR DOCD: CPT | Performed by: SURGERY

## 2023-03-27 PROCEDURE — 1036F TOBACCO NON-USER: CPT | Performed by: SURGERY

## 2023-03-27 NOTE — PROGRESS NOTES
mouth      sacubitril-valsartan (ENTRESTO) 49-51 MG per tablet Take 1 tablet by mouth 2 times daily 60 tablet 3    apixaban (ELIQUIS) 2.5 MG TABS tablet Take 1 tablet by mouth 2 times daily 60 tablet 5    JANUVIA 100 MG tablet TAKE 1 TABLET DAILY 90 tablet 3    metFORMIN (GLUCOPHAGE) 1000 MG tablet TAKE 1 TABLET TWICE A DAY WITH MEALS 180 tablet 3    insulin glargine (LANTUS SOLOSTAR) 100 UNIT/ML injection pen Inject 10 Units into the skin nightly 5 Adjustable Dose Pre-filled Pen Syringe 5    rosuvastatin (CRESTOR) 10 MG tablet TAKE 1 TABLET DAILY 90 tablet 3    metoprolol succinate (TOPROL XL) 25 MG extended release tablet TAKE 1 TABLET TWICE A  tablet 3    glipiZIDE (GLUCOTROL) 10 MG tablet TAKE 1 TABLET TWICE A DAY BEFORE MEALS 180 tablet 3    spironolactone (ALDACTONE) 25 MG tablet TAKE ONE-HALF (1/2) TABLET DAILY 45 tablet 3    Multiple Vitamins-Minerals (THERAPEUTIC MULTIVITAMIN-MINERALS) tablet Take 1 tablet by mouth daily      traZODone (DESYREL) 50 MG tablet TAKE 1 TABLET DAILY EVERY NIGHT (Patient taking differently: nightly as needed TAKE 1 TABLET DAILY EVERY NIGHT) 90 tablet 3     Current Facility-Administered Medications   Medication Dose Route Frequency Provider Last Rate Last Admin    perflutren lipid microspheres (DEFINITY) injection 1.5 mL  1.5 mL IntraVENous ONCE PRN Ady Alvarez MD         Allergies:  Ciprofloxacin, Pcn [penicillins], and Sulfa antibiotics  Social History     Socioeconomic History    Marital status:      Spouse name: Not on file    Number of children: Not on file    Years of education: Not on file    Highest education level: Not on file   Occupational History    Not on file   Tobacco Use    Smoking status: Never    Smokeless tobacco: Never   Vaping Use    Vaping Use: Never used   Substance and Sexual Activity    Alcohol use: Not Currently     Alcohol/week: 3.0 standard drinks     Types: 2 Shots of liquor, 1 Standard drinks or equivalent per week    Drug use:  No

## 2023-03-31 ENCOUNTER — HOSPITAL ENCOUNTER (OUTPATIENT)
Dept: OTHER | Age: 78
Setting detail: THERAPIES SERIES
Discharge: HOME | End: 2023-03-31
Payer: MEDICARE

## 2023-03-31 VITALS
SYSTOLIC BLOOD PRESSURE: 139 MMHG | OXYGEN SATURATION: 94 % | WEIGHT: 140.13 LBS | BODY MASS INDEX: 20.69 KG/M2 | RESPIRATION RATE: 16 BRPM | DIASTOLIC BLOOD PRESSURE: 64 MMHG | HEART RATE: 67 BPM

## 2023-03-31 LAB
ANION GAP SERPL CALCULATED.3IONS-SCNC: 11 MMOL/L (ref 7–16)
BNP BLD-MCNC: 3354 PG/ML (ref 0–450)
BUN SERPL-MCNC: 36 MG/DL (ref 6–23)
CALCIUM SERPL-MCNC: 8.8 MG/DL (ref 8.6–10.2)
CHLORIDE SERPL-SCNC: 101 MMOL/L (ref 98–107)
CO2 SERPL-SCNC: 23 MMOL/L (ref 22–29)
CREAT SERPL-MCNC: 1.5 MG/DL (ref 0.7–1.2)
GLUCOSE SERPL-MCNC: 126 MG/DL (ref 74–99)
POTASSIUM SERPL-SCNC: 4.7 MMOL/L (ref 3.5–5)
SODIUM SERPL-SCNC: 135 MMOL/L (ref 132–146)

## 2023-03-31 PROCEDURE — 36415 COLL VENOUS BLD VENIPUNCTURE: CPT

## 2023-03-31 PROCEDURE — 80048 BASIC METABOLIC PNL TOTAL CA: CPT

## 2023-03-31 PROCEDURE — 99214 OFFICE O/P EST MOD 30 MIN: CPT

## 2023-03-31 PROCEDURE — 83880 ASSAY OF NATRIURETIC PEPTIDE: CPT

## 2023-03-31 RX ORDER — TORSEMIDE 20 MG/1
20 TABLET ORAL
Qty: 30 TABLET | Refills: 5 | Status: SHIPPED | OUTPATIENT
Start: 2023-04-03

## 2023-03-31 NOTE — PROGRESS NOTES
Per L. Melissa APRN-CNP, change Torsemide to twice weekly (Monday and Friday), due to creatinine increase. Called patient, left a detailed msg. Asked to call back to confirm.
tablet TAKE 1 TABLET DAILY 90 tablet 3    metoprolol succinate (TOPROL XL) 25 MG extended release tablet TAKE 1 TABLET TWICE A  tablet 3    glipiZIDE (GLUCOTROL) 10 MG tablet TAKE 1 TABLET TWICE A DAY BEFORE MEALS 180 tablet 3    spironolactone (ALDACTONE) 25 MG tablet TAKE ONE-HALF (1/2) TABLET DAILY 45 tablet 3    Multiple Vitamins-Minerals (THERAPEUTIC MULTIVITAMIN-MINERALS) tablet Take 1 tablet by mouth daily      traZODone (DESYREL) 50 MG tablet TAKE 1 TABLET DAILY EVERY NIGHT (Patient taking differently: nightly as needed TAKE 1 TABLET DAILY EVERY NIGHT) 90 tablet 3     Current Facility-Administered Medications on File Prior to Encounter   Medication Dose Route Frequency Provider Last Rate Last Admin    perflutren lipid microspheres (DEFINITY) injection 1.5 mL  1.5 mL IntraVENous ONCE PRN Michael Shea MD                 Guideline directed medical:  ARNI/ACE I/ARB: Yes  Beta blocker: Yes  Aldosterone antagonist:  Yes  SGLT2i: yes          Physical Examination:     /64   Pulse 67   Resp 16   Wt 140 lb 2 oz (63.6 kg)   SpO2 94%   BMI 20.69 kg/m²     Assessment  Charting Type: Shift assessment    Neurological  Level of Consciousness: Alert (0)              Respiratory  Respiratory Pattern: Regular  Respiratory Depth: Normal  Respiratory Quality/Effort: Unlabored  L Breath Sounds: Clear  R Breath Sounds: Clear    Breath Sounds  Right Upper Lobe: Clear  Right Middle Lobe: Clear  Right Lower Lobe: Clear  Left Upper Lobe: Clear  Left Lower Lobe: Clear         Cardiac  Cardiac Rhythm: Sinus rhythm    Rhythm Interpretation  Heart Rate: 67         Gastrointestinal  Abdominal (WDL): Within Defined Limits  Abdomen Inspection: Soft  RUQ Bowel Sounds: Active  LUQ Bowel Sounds: Active  RLQ Bowel Sounds: Active  LLQ Bowel Sounds: Active          Bowel Sounds  RUQ Bowel Sounds: Active  LUQ Bowel Sounds: Active  RLQ Bowel Sounds: Active  LLQ Bowel Sounds:  Active    Peripheral Vascular  Peripheral Vascular

## 2023-03-31 NOTE — RESULT ENCOUNTER NOTE
Labs and CHF Clinic note reviewed  [place] Steen [place] [address]   Will reduce torsemide to 20 mg twice weekly   Stay hydrated  Follow up as scheduled    Thank you

## 2023-04-03 ENCOUNTER — OFFICE VISIT (OUTPATIENT)
Dept: CARDIOLOGY CLINIC | Age: 78
End: 2023-04-03
Payer: MEDICARE

## 2023-04-03 ENCOUNTER — HOSPITAL ENCOUNTER (OUTPATIENT)
Dept: CARDIOLOGY | Age: 78
Discharge: HOME | End: 2023-04-03
Payer: MEDICARE

## 2023-04-03 ENCOUNTER — TELEPHONE (OUTPATIENT)
Dept: CARDIAC CATH/INVASIVE PROCEDURES | Age: 78
End: 2023-04-03

## 2023-04-03 VITALS
WEIGHT: 140.8 LBS | DIASTOLIC BLOOD PRESSURE: 63 MMHG | BODY MASS INDEX: 20.86 KG/M2 | RESPIRATION RATE: 18 BRPM | HEART RATE: 75 BPM | SYSTOLIC BLOOD PRESSURE: 148 MMHG | HEIGHT: 69 IN

## 2023-04-03 DIAGNOSIS — I50.20 HFREF (HEART FAILURE WITH REDUCED EJECTION FRACTION) (HCC): ICD-10-CM

## 2023-04-03 DIAGNOSIS — I50.20 HFREF (HEART FAILURE WITH REDUCED EJECTION FRACTION) (HCC): Primary | ICD-10-CM

## 2023-04-03 DIAGNOSIS — I25.10 CORONARY ARTERY DISEASE INVOLVING NATIVE CORONARY ARTERY OF NATIVE HEART WITHOUT ANGINA PECTORIS: ICD-10-CM

## 2023-04-03 DIAGNOSIS — I25.10 CORONARY ARTERY DISEASE INVOLVING NATIVE CORONARY ARTERY OF NATIVE HEART WITHOUT ANGINA PECTORIS: Primary | ICD-10-CM

## 2023-04-03 PROBLEM — E11.621 DIABETIC ULCER OF RIGHT MIDFOOT ASSOCIATED WITH TYPE 2 DIABETES MELLITUS, WITH NECROSIS OF MUSCLE (HCC): Status: RESOLVED | Noted: 2019-10-07 | Resolved: 2023-04-03

## 2023-04-03 PROBLEM — L97.413 DIABETIC ULCER OF RIGHT MIDFOOT ASSOCIATED WITH TYPE 2 DIABETES MELLITUS, WITH NECROSIS OF MUSCLE (HCC): Status: RESOLVED | Noted: 2019-10-07 | Resolved: 2023-04-03

## 2023-04-03 LAB
LV EF: 33 %
LVEF MODALITY: NORMAL

## 2023-04-03 PROCEDURE — G8427 DOCREV CUR MEDS BY ELIG CLIN: HCPCS | Performed by: INTERNAL MEDICINE

## 2023-04-03 PROCEDURE — C8929 TTE W OR WO FOL WCON,DOPPLER: HCPCS | Performed by: PSYCHIATRY & NEUROLOGY

## 2023-04-03 PROCEDURE — G8420 CALC BMI NORM PARAMETERS: HCPCS | Performed by: INTERNAL MEDICINE

## 2023-04-03 PROCEDURE — 1036F TOBACCO NON-USER: CPT | Performed by: INTERNAL MEDICINE

## 2023-04-03 PROCEDURE — 2580000003 HC RX 258: Performed by: INTERNAL MEDICINE

## 2023-04-03 PROCEDURE — 3077F SYST BP >= 140 MM HG: CPT | Performed by: INTERNAL MEDICINE

## 2023-04-03 PROCEDURE — 99215 OFFICE O/P EST HI 40 MIN: CPT | Performed by: INTERNAL MEDICINE

## 2023-04-03 PROCEDURE — 6360000004 HC RX CONTRAST MEDICATION: Performed by: INTERNAL MEDICINE

## 2023-04-03 PROCEDURE — 1124F ACP DISCUSS-NO DSCNMKR DOCD: CPT | Performed by: INTERNAL MEDICINE

## 2023-04-03 PROCEDURE — 93000 ELECTROCARDIOGRAM COMPLETE: CPT | Performed by: INTERNAL MEDICINE

## 2023-04-03 PROCEDURE — 3078F DIAST BP <80 MM HG: CPT | Performed by: INTERNAL MEDICINE

## 2023-04-03 RX ORDER — SODIUM CHLORIDE 0.9 % (FLUSH) 0.9 %
10 SYRINGE (ML) INJECTION PRN
Status: DISCONTINUED | OUTPATIENT
Start: 2023-04-03 | End: 2023-04-04 | Stop reason: HOSPADM

## 2023-04-03 RX ADMIN — PERFLUTREN 1.5 ML: 6.52 INJECTION, SUSPENSION INTRAVENOUS at 09:05

## 2023-04-03 RX ADMIN — SODIUM CHLORIDE, PRESERVATIVE FREE 10 ML: 5 INJECTION INTRAVENOUS at 09:05

## 2023-04-03 RX ADMIN — SODIUM CHLORIDE, PRESERVATIVE FREE 10 ML: 5 INJECTION INTRAVENOUS at 09:11

## 2023-04-03 NOTE — PROGRESS NOTES
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 TTE 12/24/2022 Gerald) : EF is 15%. Severe LVH, grade 3 diastolic dysfunction. Moderate to severe RV dysfunction. Moderate mitral as well as tricuspid regurgitation  B. 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 ACB 2004 Greg Anton): 3 vessel, no other details   B.   Lexiscan 2021:  Large fixed inferior defect      Mixed hyperlipidemia 10/06/2018    Hypertension 06/11/2018       Current Outpatient Medications   Medication Sig Dispense Refill    torsemide (DEMADEX) 20 MG tablet Take 1 tablet by mouth Twice a Week 30 tablet 5    Offloading Shoe MISC by Does not apply route DFU - Offloading shoe  [place] orthotic 1 each 0    Misc Natural Products (ADV TURMERIC CURCUMIN COMPLEX PO) Take 1 tablet by mouth      sacubitril-valsartan (ENTRESTO) 49-51 MG per tablet Take 1 tablet by mouth 2 times daily 60 tablet 3    apixaban (ELIQUIS) 2.5 MG TABS tablet Take 1 tablet by mouth 2 times daily 60 tablet 5    JANUVIA 100 MG tablet TAKE 1 TABLET DAILY

## 2023-04-04 ENCOUNTER — HOSPITAL ENCOUNTER (OUTPATIENT)
Dept: CARDIAC CATH/INVASIVE PROCEDURES | Age: 78
Discharge: HOME | End: 2023-04-04
Attending: SURGERY | Admitting: SURGERY
Payer: MEDICARE

## 2023-04-04 VITALS
SYSTOLIC BLOOD PRESSURE: 148 MMHG | DIASTOLIC BLOOD PRESSURE: 70 MMHG | HEART RATE: 92 BPM | TEMPERATURE: 98 F | RESPIRATION RATE: 18 BRPM | OXYGEN SATURATION: 97 %

## 2023-04-04 LAB
ABO + RH BLD: NORMAL
ANION GAP SERPL CALCULATED.3IONS-SCNC: 13 MMOL/L (ref 7–16)
BLD GP AB SCN SERPL QL: NORMAL
BUN SERPL-MCNC: 26 MG/DL (ref 6–23)
CALCIUM SERPL-MCNC: 9.6 MG/DL (ref 8.6–10.2)
CHLORIDE SERPL-SCNC: 99 MMOL/L (ref 98–107)
CO2 SERPL-SCNC: 23 MMOL/L (ref 22–29)
CREAT SERPL-MCNC: 1.2 MG/DL (ref 0.7–1.2)
ERYTHROCYTE [DISTWIDTH] IN BLOOD BY AUTOMATED COUNT: 14.6 FL (ref 11.5–15)
GLUCOSE SERPL-MCNC: 270 MG/DL (ref 74–99)
HCT VFR BLD AUTO: 34.5 % (ref 37–54)
HGB BLD-MCNC: 11.1 G/DL (ref 12.5–16.5)
MCH RBC QN AUTO: 29.9 PG (ref 26–35)
MCHC RBC AUTO-ENTMCNC: 32.2 % (ref 32–34.5)
MCV RBC AUTO: 93 FL (ref 80–99.9)
PLATELET # BLD AUTO: 259 E9/L (ref 130–450)
PMV BLD AUTO: 10.3 FL (ref 7–12)
POTASSIUM SERPL-SCNC: 4.1 MMOL/L (ref 3.5–5)
RBC # BLD AUTO: 3.71 E12/L (ref 3.8–5.8)
SODIUM SERPL-SCNC: 135 MMOL/L (ref 132–146)
WBC # BLD: 11.8 E9/L (ref 4.5–11.5)

## 2023-04-04 PROCEDURE — 6360000002 HC RX W HCPCS

## 2023-04-04 PROCEDURE — 2709999900 HC NON-CHARGEABLE SUPPLY

## 2023-04-04 PROCEDURE — C2623 CATH, TRANSLUMIN, DRUG-COAT: HCPCS

## 2023-04-04 PROCEDURE — C9764 REVASC INTRAVASC LITHOTRIPSY: HCPCS

## 2023-04-04 PROCEDURE — 37232 HC TIB PER TERR ADDL PLASTY: CPT

## 2023-04-04 PROCEDURE — 6360000002 HC RX W HCPCS: Performed by: SURGERY

## 2023-04-04 PROCEDURE — C1769 GUIDE WIRE: HCPCS

## 2023-04-04 PROCEDURE — C1725 CATH, TRANSLUMIN NON-LASER: HCPCS

## 2023-04-04 PROCEDURE — 80048 BASIC METABOLIC PNL TOTAL CA: CPT

## 2023-04-04 PROCEDURE — 86900 BLOOD TYPING SEROLOGIC ABO: CPT

## 2023-04-04 PROCEDURE — 2500000003 HC RX 250 WO HCPCS

## 2023-04-04 PROCEDURE — 86850 RBC ANTIBODY SCREEN: CPT

## 2023-04-04 PROCEDURE — 36415 COLL VENOUS BLD VENIPUNCTURE: CPT

## 2023-04-04 PROCEDURE — 37228 HC TIB PER TERRITORY PLASTY: CPT

## 2023-04-04 PROCEDURE — C1760 CLOSURE DEV, VASC: HCPCS

## 2023-04-04 PROCEDURE — 2580000003 HC RX 258

## 2023-04-04 PROCEDURE — C1894 INTRO/SHEATH, NON-LASER: HCPCS

## 2023-04-04 PROCEDURE — 75710 ARTERY X-RAYS ARM/LEG: CPT

## 2023-04-04 PROCEDURE — 85027 COMPLETE CBC AUTOMATED: CPT

## 2023-04-04 PROCEDURE — 86901 BLOOD TYPING SEROLOGIC RH(D): CPT

## 2023-04-04 PROCEDURE — 36245 INS CATH ABD/L-EXT ART 1ST: CPT

## 2023-04-04 PROCEDURE — C1887 CATHETER, GUIDING: HCPCS

## 2023-04-04 RX ORDER — SODIUM CHLORIDE 0.9 % (FLUSH) 0.9 %
5-40 SYRINGE (ML) INJECTION EVERY 12 HOURS SCHEDULED
Status: DISCONTINUED | OUTPATIENT
Start: 2023-04-04 | End: 2023-04-04 | Stop reason: HOSPADM

## 2023-04-04 RX ORDER — ACETAMINOPHEN 325 MG/1
650 TABLET ORAL EVERY 4 HOURS PRN
Status: DISCONTINUED | OUTPATIENT
Start: 2023-04-04 | End: 2023-04-04 | Stop reason: HOSPADM

## 2023-04-04 RX ORDER — OXYCODONE HYDROCHLORIDE AND ACETAMINOPHEN 5; 325 MG/1; MG/1
1 TABLET ORAL EVERY 4 HOURS PRN
Status: DISCONTINUED | OUTPATIENT
Start: 2023-04-04 | End: 2023-04-04 | Stop reason: HOSPADM

## 2023-04-04 RX ORDER — SODIUM CHLORIDE 9 MG/ML
INJECTION, SOLUTION INTRAVENOUS CONTINUOUS
Status: DISCONTINUED | OUTPATIENT
Start: 2023-04-04 | End: 2023-04-04 | Stop reason: HOSPADM

## 2023-04-04 RX ORDER — OXYCODONE HYDROCHLORIDE AND ACETAMINOPHEN 5; 325 MG/1; MG/1
2 TABLET ORAL EVERY 4 HOURS PRN
Status: DISCONTINUED | OUTPATIENT
Start: 2023-04-04 | End: 2023-04-04 | Stop reason: HOSPADM

## 2023-04-04 RX ORDER — ONDANSETRON 2 MG/ML
4 INJECTION INTRAMUSCULAR; INTRAVENOUS EVERY 8 HOURS PRN
Status: DISCONTINUED | OUTPATIENT
Start: 2023-04-04 | End: 2023-04-04 | Stop reason: HOSPADM

## 2023-04-04 RX ORDER — SODIUM CHLORIDE 9 MG/ML
INJECTION, SOLUTION INTRAVENOUS PRN
Status: DISCONTINUED | OUTPATIENT
Start: 2023-04-04 | End: 2023-04-04 | Stop reason: HOSPADM

## 2023-04-04 RX ORDER — SODIUM CHLORIDE 0.9 % (FLUSH) 0.9 %
5-40 SYRINGE (ML) INJECTION PRN
Status: DISCONTINUED | OUTPATIENT
Start: 2023-04-04 | End: 2023-04-04 | Stop reason: HOSPADM

## 2023-04-04 RX ORDER — HYDRALAZINE HYDROCHLORIDE 20 MG/ML
10 INJECTION INTRAMUSCULAR; INTRAVENOUS ONCE
Status: COMPLETED | OUTPATIENT
Start: 2023-04-04 | End: 2023-04-04

## 2023-04-04 RX ADMIN — SODIUM CHLORIDE: 9 INJECTION, SOLUTION INTRAVENOUS at 13:07

## 2023-04-04 RX ADMIN — HYDRALAZINE HYDROCHLORIDE 10 MG: 20 INJECTION INTRAMUSCULAR; INTRAVENOUS at 14:51

## 2023-04-04 NOTE — PROGRESS NOTES
Extended Stay Recovery Discharge Documentation    Final procedure site check completed, stable for discharge- Yes  Ambulated without issue post recovery completion, gait steady. Peripheral IV sites removed (see IV Flowsheet) and site asymptomatic- Yes  Patient dressed self without assistance. Discharge instructions reviewed with Patient and verbalized understanding.    Patient discharged Home with spouse and parent at 6:30 PM  Monitor cleaned and placed back in nurses' station- Yes

## 2023-04-04 NOTE — OP NOTE
typical LE angiogram.      Sheath and wire pulled back to Left iliac system.  After femoral angiogram a 8 fr angioseal device used to close the Left common femoral artery    Postop Exam  Right DP no signal  PT no signal  Left DP monophasic  PT monophasic    Plan  Continue Medical Management with statin  One dose of plavix post  Resume eliquis 4/5 am   May need further tibial intervention in future  110 ml contrast used    Discussed with pt and wife re  Low likelihood of limb salvage as flow into the foot is minimal   Overall flow is significantly improved proximally   Over time flow may get better but even with improvement I am concerned it will not be enough to heal  He is not a candidate for toe amputations as he wouldn't heal this  We discussed he high risk for limb loss  He understands that he has low likelihood of walking on below knee amputation with prosthetic considering issues with his left lower extremity, and his heart function last ef recently was 30-35%  Patient states he wouldn't want amputation of his lower extremity at this time  Will fu in wound center per pt and wife request - per their report Dr. Tirado had also asked that they fu with me in wound center    Tiffanie Hutchins MD    PCP : ZAKIYA BECKWITH MD  Cardiology : dr. Lara  Podiatry : Dr. Tirado    Previous Vascular Procedure  5/2/17 L LE angio  L SFA popliteal atherectomy, DCB plasty   3/7/23 R LE angiogram   R SFA, Popliteal   6x60 Shockwave lithotripsy  6x250 DCB impact   4/4/23 R le ANGIOGRAM  R sfa, pop 7x60 shockwave lithotripsy, 7x80 DCB  R AT plasty 3x120  R TP trunk preoneal plasty with 4x80 Chocholate, 3x120 nanocross

## 2023-04-04 NOTE — DISCHARGE INSTRUCTIONS
doctor or pharmacist if you are taking more than one drug. This includes over-the-counter medicines and herb or dietary supplements. Plan ahead for refills so you do not run out. Lifestyle Changes    You and your doctor will plan lifestyle changes that will help you recover. Some things to keep in mind include: Atherosclerosis and high blood pressure should be carefully managed. This can be done with medicines and a healthy lifestyle. If you smoke, talk to your doctor about quitting. Follow-up   Call Your Doctor If Any of the Following Occurs   After you leave the hospital, call your doctor if any of the following occurs:   Redness, swelling, increasing pain, excessive bleeding, or discharge at the incision site   Signs of infection, including fever and chills   Any change of color or sensation in your legs or feet   Burning, pain, or other problems when urinating   Nausea or vomiting   Abdominal cramps or diarrhea   Unusual fatigue or depression   Disorientation or confusion   Numbness or tingling in the legs   New, unexplained symptoms   Cough   Call 911 or go to the emergency room right away if you have:   Shortness of breath   Chest pain   If you think you have an emergency,  CALL 911.

## 2023-04-04 NOTE — H&P
Take 1 tablet by mouth, Disp: , Rfl:     sacubitril-valsartan (ENTRESTO) 49-51 MG per tablet, Take 1 tablet by mouth 2 times daily, Disp: 60 tablet, Rfl: 3    apixaban (ELIQUIS) 2.5 MG TABS tablet, Take 1 tablet by mouth 2 times daily, Disp: 60 tablet, Rfl: 5    JANUVIA 100 MG tablet, TAKE 1 TABLET DAILY, Disp: 90 tablet, Rfl: 3    metFORMIN (GLUCOPHAGE) 1000 MG tablet, TAKE 1 TABLET TWICE A DAY WITH MEALS, Disp: 180 tablet, Rfl: 3    insulin glargine (LANTUS SOLOSTAR) 100 UNIT/ML injection pen, Inject 10 Units into the skin nightly, Disp: 5 Adjustable Dose Pre-filled Pen Syringe, Rfl: 5    rosuvastatin (CRESTOR) 10 MG tablet, TAKE 1 TABLET DAILY, Disp: 90 tablet, Rfl: 3    metoprolol succinate (TOPROL XL) 25 MG extended release tablet, TAKE 1 TABLET TWICE A DAY, Disp: 180 tablet, Rfl: 3    glipiZIDE (GLUCOTROL) 10 MG tablet, TAKE 1 TABLET TWICE A DAY BEFORE MEALS, Disp: 180 tablet, Rfl: 3    spironolactone (ALDACTONE) 25 MG tablet, TAKE ONE-HALF (1/2) TABLET DAILY, Disp: 45 tablet, Rfl: 3    Multiple Vitamins-Minerals (THERAPEUTIC MULTIVITAMIN-MINERALS) tablet, Take 1 tablet by mouth daily, Disp: , Rfl:     traZODone (DESYREL) 50 MG tablet, TAKE 1 TABLET DAILY EVERY NIGHT (Patient taking differently: nightly as needed TAKE 1 TABLET DAILY EVERY NIGHT), Disp: 90 tablet, Rfl: 3    Current Facility-Administered Medications:     0.9 % sodium chloride infusion, , IntraVENous, Continuous, GEMINI Garcia CNP    sodium chloride flush 0.9 % injection 5-40 mL, 5-40 mL, IntraVENous, 2 times per day, GEMINI Garcia CNP    sodium chloride flush 0.9 % injection 5-40 mL, 5-40 mL, IntraVENous, PRN, GEMINI Garcia CNP    0.9 % sodium chloride infusion, , IntraVENous, PRN, GEMINI Garcia CNP    ceFAZolin (ANCEF) 2,000 mg in sterile water 20 mL IV syringe, 2,000 mg, IntraVENous, On Call to OR, GEMINI Clark - CNP    Allergies:  Ciprofloxacin, Pcn [penicillins], and Sulfa antibiotics    Social History

## 2023-04-12 PROBLEM — Z98.62 S/P PERIPHERAL ARTERY ANGIOPLASTY: Status: ACTIVE | Noted: 2023-04-12

## 2023-04-12 PROBLEM — I82.409 DVT (DEEP VENOUS THROMBOSIS) (HCC): Status: RESOLVED | Noted: 2022-12-23 | Resolved: 2023-04-12

## 2023-04-12 PROBLEM — Z86.718 HISTORY OF DEEP VEIN THROMBOSIS: Status: ACTIVE | Noted: 2023-04-12

## 2023-04-12 PROBLEM — L97.512 DIABETIC ULCER OF RIGHT FOOT ASSOCIATED WITH TYPE 2 DIABETES MELLITUS, WITH FAT LAYER EXPOSED (HCC): Status: ACTIVE | Noted: 2023-04-12

## 2023-04-12 PROBLEM — E11.621 DIABETIC ULCER OF RIGHT FOOT ASSOCIATED WITH TYPE 2 DIABETES MELLITUS, WITH FAT LAYER EXPOSED (HCC): Status: ACTIVE | Noted: 2023-04-12

## 2023-04-17 ENCOUNTER — HOSPITAL ENCOUNTER (OUTPATIENT)
Dept: OTHER | Age: 78
Setting detail: THERAPIES SERIES
Discharge: HOME | End: 2023-04-17
Payer: MEDICARE

## 2023-04-17 VITALS
WEIGHT: 139.6 LBS | SYSTOLIC BLOOD PRESSURE: 124 MMHG | RESPIRATION RATE: 16 BRPM | HEART RATE: 68 BPM | BODY MASS INDEX: 20.62 KG/M2 | OXYGEN SATURATION: 94 % | DIASTOLIC BLOOD PRESSURE: 58 MMHG

## 2023-04-17 LAB
ANION GAP SERPL CALCULATED.3IONS-SCNC: 13 MMOL/L (ref 7–16)
BNP BLD-MCNC: 3348 PG/ML (ref 0–450)
BUN SERPL-MCNC: 33 MG/DL (ref 6–23)
CALCIUM SERPL-MCNC: 9.3 MG/DL (ref 8.6–10.2)
CHLORIDE SERPL-SCNC: 104 MMOL/L (ref 98–107)
CO2 SERPL-SCNC: 21 MMOL/L (ref 22–29)
CREAT SERPL-MCNC: 1.5 MG/DL (ref 0.7–1.2)
GLUCOSE SERPL-MCNC: 90 MG/DL (ref 74–99)
POTASSIUM SERPL-SCNC: 4.6 MMOL/L (ref 3.5–5)
SODIUM SERPL-SCNC: 138 MMOL/L (ref 132–146)

## 2023-04-17 PROCEDURE — 80048 BASIC METABOLIC PNL TOTAL CA: CPT

## 2023-04-17 PROCEDURE — 99214 OFFICE O/P EST MOD 30 MIN: CPT

## 2023-04-17 PROCEDURE — 36415 COLL VENOUS BLD VENIPUNCTURE: CPT

## 2023-04-17 PROCEDURE — 83880 ASSAY OF NATRIURETIC PEPTIDE: CPT

## 2023-04-17 NOTE — PROGRESS NOTES
Congestive Heart Failure [facility] 63   1945          Referring Provider: Dr. Silvina Salazar  Primary Care Physician: Dr. Rees Red  Cardiologist: Dr. Pizano [facility]  Nephrologist:         History of Present Illness:     Kevin Gee is a 69 y.o. male with a history of HFrEF, most recent EF 15% on 12/23/2022. Patient Story:    He does not  have dyspnea with exertion, shortness of breath, or decline in overall functional capacity. He does not have orthopnea, PND, nocturnal cough or hemoptysis. He does not have abdominal distention or bloating, early satiety, anorexia/change in appetite. He does has a good urinary response to oral diuretic. He does not have lower extremity edema. He denies lightheadedness, dizziness. He denies palpitations, syncope or near syncope. He does not complain of chest pain, pressure, discomfort.          Allergies   Allergen Reactions    Ciprofloxacin Hives           Pcn [Penicillins] Hives    Sulfa Antibiotics Hives and Other (See Comments)           Current Outpatient Medications on File Prior to Encounter   Medication Sig Dispense Refill    torsemide (DEMADEX) 20 MG tablet Take 1 tablet by mouth Twice a Week (Patient taking differently: Take 1 tablet by mouth Twice a Week Wednesday/Friday) 30 tablet 5    Offloading Shoe MISC by Does not apply route DFU - Offloading shoe  [place] orthotic 1 each 0    Misc Natural Products (ADV TURMERIC CURCUMIN COMPLEX PO) Take 1 tablet by mouth      sacubitril-valsartan (ENTRESTO) 49-51 MG per tablet Take 1 tablet by mouth 2 times daily 60 tablet 3    apixaban (ELIQUIS) 2.5 MG TABS tablet Take 1 tablet by mouth 2 times daily 60 tablet 5    JANUVIA 100 MG tablet TAKE 1 TABLET DAILY 90 tablet 3    metFORMIN (GLUCOPHAGE) 1000 MG tablet TAKE 1 TABLET TWICE A DAY WITH MEALS 180 tablet 3    insulin glargine (LANTUS SOLOSTAR) 100 UNIT/ML injection pen Inject 10 Units into the skin nightly 5

## 2023-04-17 NOTE — DISCHARGE INSTRUCTIONS
Visit Discharge/Physician Orders     Discharge condition: Stable     Assessment of pain at discharge: yes     Anesthetic used: 4% lidocaine solution     Discharge to: Home     Left via:Private automobile     Accompanied by: spouse     ECF/HHA: n/a     Dressing Orders: RIGHT GREAT TOE/LATERAL FOOT/HEELCleanse with normal saline, apply calcium alginate, dry dressing and secure. Change daily. Treatment Orders:Eat a diet high in protein and vitamin C. Take a multiple vitamin daily unless contraindicated. Wear offloading boot. Consider amputation in future- call his office when make a decision. [address] [place] [address],[address] followup visit : none-discharge_____________________________  (Please note your next appointment above and if you are unable to keep, kindly give a 24 hour notice. Thank you.)     Physician signature:__________________________     If you experience any of the following, please call the [facility] during business hours:     * Increase in Pain  * Temperature over 101  * Increase in drainage from your wound  * Drainage with a foul odor  * Bleeding  * Increase in swelling  * Need for compression bandage changes due to slippage, breakthrough drainage. If you need medical attention outside of the business hours of the [facility] please contact your PCP or go to the nearest emergency room.

## 2023-04-18 ENCOUNTER — HOSPITAL ENCOUNTER (OUTPATIENT)
Dept: INTERVENTIONAL RADIOLOGY/VASCULAR | Age: 78
Discharge: HOME | End: 2023-04-20
Payer: MEDICARE

## 2023-04-18 DIAGNOSIS — L97.512 DIABETIC ULCER OF OTHER PART OF RIGHT FOOT ASSOCIATED WITH TYPE 2 DIABETES MELLITUS, WITH FAT LAYER EXPOSED (HCC): ICD-10-CM

## 2023-04-18 DIAGNOSIS — Z98.62 S/P PERIPHERAL ARTERY ANGIOPLASTY: ICD-10-CM

## 2023-04-18 DIAGNOSIS — E11.621 DIABETIC ULCER OF OTHER PART OF RIGHT FOOT ASSOCIATED WITH TYPE 2 DIABETES MELLITUS, WITH FAT LAYER EXPOSED (HCC): ICD-10-CM

## 2023-04-18 DIAGNOSIS — I70.235 ATHEROSCLEROSIS OF NATIVE ARTERY OF RIGHT LOWER EXTREMITY WITH ULCERATION OF OTHER PART OF FOOT (HCC): ICD-10-CM

## 2023-04-18 DIAGNOSIS — I70.234 ATHEROSCLEROSIS OF NATIVE ARTERY OF RIGHT LOWER EXTREMITY WITH ULCERATION OF MIDFOOT (HCC): ICD-10-CM

## 2023-04-18 PROCEDURE — 93923 UPR/LXTR ART STDY 3+ LVLS: CPT

## 2023-04-19 ENCOUNTER — HOSPITAL ENCOUNTER (OUTPATIENT)
Dept: WOUND CARE | Age: 78
Discharge: HOME | End: 2023-04-19
Payer: MEDICARE

## 2023-04-19 VITALS
HEART RATE: 40 BPM | BODY MASS INDEX: 20.62 KG/M2 | WEIGHT: 139.6 LBS | DIASTOLIC BLOOD PRESSURE: 80 MMHG | RESPIRATION RATE: 16 BRPM | TEMPERATURE: 97.6 F | SYSTOLIC BLOOD PRESSURE: 132 MMHG

## 2023-04-19 DIAGNOSIS — I70.234 ATHEROSCLEROSIS OF NATIVE ARTERY OF RIGHT LOWER EXTREMITY WITH ULCERATION OF MIDFOOT (HCC): ICD-10-CM

## 2023-04-19 DIAGNOSIS — E11.621 DIABETIC ULCER OF OTHER PART OF RIGHT FOOT ASSOCIATED WITH TYPE 2 DIABETES MELLITUS, WITH FAT LAYER EXPOSED (HCC): Primary | ICD-10-CM

## 2023-04-19 DIAGNOSIS — L97.512 DIABETIC ULCER OF OTHER PART OF RIGHT FOOT ASSOCIATED WITH TYPE 2 DIABETES MELLITUS, WITH FAT LAYER EXPOSED (HCC): Primary | ICD-10-CM

## 2023-04-19 PROCEDURE — 99213 OFFICE O/P EST LOW 20 MIN: CPT

## 2023-04-19 PROCEDURE — 99214 OFFICE O/P EST MOD 30 MIN: CPT | Performed by: SURGERY

## 2023-04-19 RX ORDER — GINSENG 100 MG
CAPSULE ORAL ONCE
Status: CANCELLED | OUTPATIENT
Start: 2023-04-19 | End: 2023-04-19

## 2023-04-19 RX ORDER — BACITRACIN, NEOMYCIN, POLYMYXIN B 400; 3.5; 5 [USP'U]/G; MG/G; [USP'U]/G
OINTMENT TOPICAL ONCE
Status: CANCELLED | OUTPATIENT
Start: 2023-04-19 | End: 2023-04-19

## 2023-04-19 RX ORDER — LIDOCAINE HYDROCHLORIDE 40 MG/ML
SOLUTION TOPICAL ONCE
Status: CANCELLED | OUTPATIENT
Start: 2023-04-19 | End: 2023-04-19

## 2023-04-19 RX ORDER — CLOBETASOL PROPIONATE 0.5 MG/G
OINTMENT TOPICAL ONCE
Status: CANCELLED | OUTPATIENT
Start: 2023-04-19 | End: 2023-04-19

## 2023-04-19 RX ORDER — LIDOCAINE 40 MG/G
CREAM TOPICAL ONCE
Status: CANCELLED | OUTPATIENT
Start: 2023-04-19 | End: 2023-04-19

## 2023-04-19 RX ORDER — LIDOCAINE 50 MG/G
OINTMENT TOPICAL ONCE
Status: CANCELLED | OUTPATIENT
Start: 2023-04-19 | End: 2023-04-19

## 2023-04-19 RX ORDER — BACITRACIN ZINC AND POLYMYXIN B SULFATE 500; 1000 [USP'U]/G; [USP'U]/G
OINTMENT TOPICAL ONCE
Status: CANCELLED | OUTPATIENT
Start: 2023-04-19 | End: 2023-04-19

## 2023-04-19 RX ORDER — LIDOCAINE HYDROCHLORIDE 40 MG/ML
SOLUTION TOPICAL ONCE
Status: COMPLETED | OUTPATIENT
Start: 2023-04-19 | End: 2023-04-19

## 2023-04-19 RX ORDER — GENTAMICIN SULFATE 1 MG/G
OINTMENT TOPICAL ONCE
Status: CANCELLED | OUTPATIENT
Start: 2023-04-19 | End: 2023-04-19

## 2023-04-19 RX ORDER — BETAMETHASONE DIPROPIONATE 0.05 %
OINTMENT (GRAM) TOPICAL ONCE
Status: CANCELLED | OUTPATIENT
Start: 2023-04-19 | End: 2023-04-19

## 2023-04-19 RX ORDER — LIDOCAINE HYDROCHLORIDE 20 MG/ML
JELLY TOPICAL ONCE
Status: CANCELLED | OUTPATIENT
Start: 2023-04-19 | End: 2023-04-19

## 2023-04-19 RX ADMIN — LIDOCAINE HYDROCHLORIDE: 40 SOLUTION TOPICAL at 09:03

## 2023-04-19 NOTE — PLAN OF CARE
Problem: Chronic Conditions and Co-morbidities  Goal: Patient's chronic conditions and co-morbidity symptoms are monitored and maintained or improved  4/19/2023 1704 by Toma Dillard RN  Outcome: Completed  4/19/2023 0936 by Toma Dillard RN  Outcome: Progressing     Problem: Cognitive:  Goal: Knowledge of wound care  Description: Knowledge of wound care  4/19/2023 1704 by Toma Dillard RN  Outcome: Completed  4/19/2023 0936 by Toma Dillard RN  Outcome: Progressing  Goal: Understands risk factors for wounds  Description: Understands risk factors for wounds  4/19/2023 1704 by Toma Dillard RN  Outcome: Completed  4/19/2023 0936 by Toma Dillard RN  Outcome: Progressing     Problem: Wound:  Goal: Will show signs of wound healing; wound closure and no evidence of infection  Description: Will show signs of wound healing; wound closure and no evidence of infection  4/19/2023 1704 by Toma Dillard RN  Outcome: Completed  4/19/2023 0936 by Toma Dillard RN  Outcome: Progressing     Problem: Arterial:  Goal: Optimize blood flow for wound healing  Description: Optimize blood flow for wound healing  4/19/2023 1704 by Toma Dillard RN  Outcome: Completed  4/19/2023 0936 by Toma Dillard RN  Outcome: Progressing     Problem: Blood Glucose:  Goal: Ability to maintain appropriate glucose levels will improve  Description: Ability to maintain appropriate glucose levels will improve  4/19/2023 1704 by Toma Dillard RN  Outcome: Completed  4/19/2023 0936 by Toma Dillard RN  Outcome: Adequate for Discharge

## 2023-04-19 NOTE — PROGRESS NOTES
vascular disease [facility])      Past Surgical History:   Procedure Laterality Date    CARDIAC SURGERY  2004    double bypass    COLONOSCOPY      FOOT SURGERY Left     multiple    NERVE BLOCK Bilateral 02/07/2022    #1 BILATERAL LUMBAR MEDIAL BRANCH NERVE BLOCK UNDER FLUOROSCOPIC GUIDANCE AT L3, L4, AND L5 DORSAL RAMI performed by Lukas Thomas MD at [address] Bilateral 03/07/2022    BILATERAL LUMBAR MEDIAL BRANCH NERVE BLOCK UNDER FLUORO AT L3 L4 L5 DORSAL RAMI performed by Lukas Thomas MD at [address] [facility]  03/10/2017    left foot application integra graft, application of wound vac, delayed primax closure    OTHER SURGICAL HISTORY      fracture of left temporal part of skull    OTHER SURGICAL HISTORY Left 06/16/2017    delayed closure with bone debridement and biopsy, application of hemovac with Dr. Elsa Patel N/A 11/22/2021    LUMBAR EPIDURAL STEROID INJECTION UNDER FLUOROSCOPIC GUIDANCE AT L5-S1 performed by Lukas Thomas MD at [address] N/A 01/06/2022    # 2 LUMBAR EPIDURAL STEROID INJECTION UNDER FLUOROSCOPIC GUIDANCE AT L5-S1 performed by Lukas Thomas MD at [address] Bilateral 04/11/2022    BILATERAL LUMBAR RADIOFREQUENCY ABLATION UNDER FLUORO AT L3 L4 L5 AND DORSAL RAMI performed by Lukas Thomas MD at [facility] ARTERIAL INTERVENTION Left 05/2017    SFA, pop atherectomy, Dr. Jerson Cooper Left 2009    2nd and 5th toe    TONSILLECTOMY       Family History   Problem Relation Age of Onset    High Blood Pressure Mother     COPD Father     Diabetes Father     Heart Disease Brother         chf    Seizures Brother      Social History     Tobacco Use    Smoking status: Never    Smokeless tobacco: Never   Vaping Use    Vaping Use: Never used   Substance Use Topics    Alcohol use: Not Currently     Alcohol/week: 3.0 standard drinks

## 2023-04-19 NOTE — PLAN OF CARE
Problem: Chronic Conditions and Co-morbidities  Goal: Patient's chronic conditions and co-morbidity symptoms are monitored and maintained or improved  Outcome: Progressing     Problem: Cognitive:  Goal: Knowledge of wound care  Description: Knowledge of wound care  Outcome: Progressing  Goal: Understands risk factors for wounds  Description: Understands risk factors for wounds  Outcome: Progressing     Problem: Wound:  Goal: Will show signs of wound healing; wound closure and no evidence of infection  Description: Will show signs of wound healing; wound closure and no evidence of infection  Outcome: Progressing     Problem: Arterial:  Goal: Optimize blood flow for wound healing  Description: Optimize blood flow for wound healing  Outcome: Progressing     Problem: Blood Glucose:  Goal: Ability to maintain appropriate glucose levels will improve  Description: Ability to maintain appropriate glucose levels will improve  Outcome: Adequate for Discharge

## 2023-04-21 RX ORDER — GLIPIZIDE 10 MG/1
TABLET ORAL
Qty: 180 TABLET | Refills: 3 | Status: SHIPPED | OUTPATIENT
Start: 2023-04-21

## 2023-04-25 ENCOUNTER — TELEPHONE (OUTPATIENT)
Dept: VASCULAR SURGERY | Age: 78
End: 2023-04-25

## 2023-04-25 NOTE — TELEPHONE ENCOUNTER
Pt phoned stating he still does not want to proceed with BKA but would like to continue follow up at wound care. OK to schedule?     Yes that's fine    Thanks pk

## 2023-04-27 DIAGNOSIS — Z79.899 MEDICATION DOSE INCREASED: ICD-10-CM

## 2023-04-27 DIAGNOSIS — I50.9 ACUTE DECOMPENSATED HEART FAILURE (HCC): ICD-10-CM

## 2023-04-27 RX ORDER — SACUBITRIL AND VALSARTAN 49; 51 MG/1; MG/1
TABLET, FILM COATED ORAL
Qty: 180 TABLET | Refills: 1 | Status: SHIPPED | OUTPATIENT
Start: 2023-04-27

## 2023-05-10 ENCOUNTER — HOSPITAL ENCOUNTER (OUTPATIENT)
Dept: WOUND CARE | Age: 78
Discharge: HOME | End: 2023-05-10
Payer: MEDICARE

## 2023-05-10 VITALS
DIASTOLIC BLOOD PRESSURE: 70 MMHG | SYSTOLIC BLOOD PRESSURE: 166 MMHG | RESPIRATION RATE: 16 BRPM | HEART RATE: 74 BPM | TEMPERATURE: 97.7 F

## 2023-05-10 PROCEDURE — 99213 OFFICE O/P EST LOW 20 MIN: CPT

## 2023-05-10 PROCEDURE — 99213 OFFICE O/P EST LOW 20 MIN: CPT | Performed by: SURGERY

## 2023-05-10 RX ORDER — LIDOCAINE HYDROCHLORIDE 40 MG/ML
SOLUTION TOPICAL ONCE
Status: COMPLETED | OUTPATIENT
Start: 2023-05-10 | End: 2023-05-10

## 2023-05-10 RX ADMIN — LIDOCAINE HYDROCHLORIDE 5 ML: 40 SOLUTION TOPICAL at 09:11

## 2023-05-10 ASSESSMENT — PAIN SCALES - GENERAL: PAINLEVEL_OUTOF10: 3

## 2023-05-10 ASSESSMENT — PAIN DESCRIPTION - ORIENTATION: ORIENTATION: RIGHT

## 2023-05-10 ASSESSMENT — PAIN DESCRIPTION - LOCATION: LOCATION: FOOT

## 2023-05-10 NOTE — PLAN OF CARE
Problem: Cognitive:  Goal: Knowledge of wound care  Description: Knowledge of wound care  Outcome: Progressing  Goal: Understands risk factors for wounds  Description: Understands risk factors for wounds  Outcome: Progressing     Problem: Wound:  Goal: Will show signs of wound healing; wound closure and no evidence of infection  Description: Will show signs of wound healing; wound closure and no evidence of infection  Outcome: Progressing     Problem: Arterial:  Goal: Optimize blood flow for wound healing  Description: Optimize blood flow for wound healing  Outcome: Progressing     Problem: Venous:  Goal: Signs of wound healing will improve  Description: Signs of wound healing will improve  Outcome: Progressing

## 2023-05-12 NOTE — PROGRESS NOTES
Wound Healing Center /Hyperbarics   History and Physical/Consultation  Vascular    Referring Physician : Kulwinder Zamora MD  [address] [place] [address] RECORD NUMBER:  [id number]  AGE: 69 y.o. GENDER: male  : 1945  EPISODE DATE:  5/10/2023  Subjective:     Chief Complaint   Patient presents with    Wound Check     Right legs         HISTORY of PRESENT ILLNESS HPI     Junior Burdick is a 69 y.o. male who presents today for wound/ulcer evaluation. History of Wound Context:  The patient has had a wounds of right foot which was first noted approximately a few months. This has been treated by Dr. Romero Olivares, underwent right femoral angiogram on 2 separate occasions, including March and April with the shockwave lithotripsy, drug coated balloon angioplasty. On their initial visit to the wound healing center, 23, the patient has noted that the wound has not been improving. The patient has had similar previous wounds in the past.      Pt is currently not on abx.       Wound/Ulcer Pain Timing/Severity: constant  Quality of pain: dull, aching  Severity:  3 / 10   Modifying Factors: Pain worsens with walking  Associated Signs/Symptoms: drainage and pain    Ulcer Identification:  Ulcer Type: arterial, diabetic, and non-healing/non-surgical  Contributing Factors: diabetes, chronic pressure, and arterial insufficiency    Diabetic/Pressure/Non Pressure Ulcers only:  Ulcer: Diabetic ulcer, fat layer exposed    If patient has diabetic lower extremity wounds  [place] Classification of diabetic lower extremity wounds:    Grade Description   []  0 No open wound   []  1 Superficial ulcer involving the full skin thickness   []  2 Deep ulcer involves ligament, tendon, joint capsule, or fascia  No bone involvement or abscess presence   []  3 Deep Ulcer with abcess formation and/or osteomyelitis   []  4 Localized gangrene   []  5 Extensive gangrene of the foot     Wound: Patient has multiple wounds of the
Wound Depth (cm) 0.1 cm 05/10/23 0907   Wound Surface Area (cm^2) 19.14 cm^2 05/10/23 0907   Change in Wound Size % (l*w) -7.41 05/10/23 0907   Wound Volume (cm^3) 1.914 cm^3 05/10/23 0907   Wound Healing % -7 05/10/23 0907   Post-Procedure Length (cm) 5.5 cm 04/12/23 1101   Post-Procedure Width (cm) 3.4 cm 04/12/23 1101   Post-Procedure Depth (cm) 0.2 cm 04/12/23 1101   Post-Procedure Surface Area (cm^2) 18.7 cm^2 04/12/23 1101   Post-Procedure Volume (cm^3) 3.74 cm^3 04/12/23 1101   Wound Assessment Eschar dry;Pink/red 05/10/23 0907   Drainage Amount Scant 05/10/23 0907   Drainage Description Serosanguinous 05/10/23 0907   Odor None 05/10/23 0907   Iva-wound Assessment Intact 05/10/23 0907   Number of days: 28       Wound 04/12/23 #2 Right Lateral Foot (Active)   Wound Image   05/10/23 0907   Dressing Status New dressing applied 05/10/23 1000   Wound Cleansed Betadine/povidone iodine 05/10/23 1000   Dressing/Treatment ABD; Alginate;Roll gauze 05/10/23 1000   Wound Length (cm) 5.5 cm 05/10/23 0907   Wound Width (cm) 3.3 cm 05/10/23 0907   Wound Depth (cm) 0.1 cm 05/10/23 0907   Wound Surface Area (cm^2) 18.15 cm^2 05/10/23 0907   Change in Wound Size % (l*w) -52.65 05/10/23 0907   Wound Volume (cm^3) 1.815 cm^3 05/10/23 0907   Wound Healing % -53 05/10/23 0907   Post-Procedure Length (cm) 4.1 cm 04/12/23 1101   Post-Procedure Width (cm) 2.9 cm 04/12/23 1101   Post-Procedure Depth (cm) 0.1 cm 04/12/23 1101   Post-Procedure Surface Area (cm^2) 11.89 cm^2 04/12/23 1101   Post-Procedure Volume (cm^3) 1.189 cm^3 04/12/23 1101   Wound Assessment Eschar dry 05/10/23 0907   Drainage Amount None 05/10/23 0907   Odor None 05/10/23 0907   Iva-wound Assessment Dry/flaky 05/10/23 0907   Number of days: 28       Wound 04/12/23 #3 Right Heel (Active)   Wound Image   05/10/23 1598   Dressing Status New dressing applied;Clean;Dry; Intact 05/10/23 1000   Wound Cleansed Betadine/povidone iodine 05/10/23 1000   Dressing/Treatment

## 2023-05-17 ENCOUNTER — HOSPITAL ENCOUNTER (OUTPATIENT)
Dept: WOUND CARE | Age: 78
Discharge: HOME | End: 2023-05-17
Payer: MEDICARE

## 2023-05-17 VITALS
SYSTOLIC BLOOD PRESSURE: 137 MMHG | DIASTOLIC BLOOD PRESSURE: 54 MMHG | HEIGHT: 69 IN | RESPIRATION RATE: 14 BRPM | TEMPERATURE: 97.1 F | WEIGHT: 139 LBS | HEART RATE: 80 BPM | BODY MASS INDEX: 20.59 KG/M2

## 2023-05-17 DIAGNOSIS — I70.234 ATHEROSCLEROSIS OF NATIVE ARTERY OF RIGHT LOWER EXTREMITY WITH ULCERATION OF MIDFOOT (HCC): Primary | ICD-10-CM

## 2023-05-17 PROBLEM — L97.513 DIABETIC ULCER OF RIGHT FOOT ASSOCIATED WITH TYPE 2 DIABETES MELLITUS, WITH NECROSIS OF MUSCLE (HCC): Chronic | Status: ACTIVE | Noted: 2023-04-12

## 2023-05-17 PROBLEM — E11.621 DIABETIC ULCER OF RIGHT FOOT ASSOCIATED WITH TYPE 2 DIABETES MELLITUS, WITH NECROSIS OF MUSCLE (HCC): Chronic | Status: ACTIVE | Noted: 2023-04-12

## 2023-05-17 PROCEDURE — 11042 DBRDMT SUBQ TIS 1ST 20SQCM/<: CPT | Performed by: SURGERY

## 2023-05-17 PROCEDURE — 11042 DBRDMT SUBQ TIS 1ST 20SQCM/<: CPT

## 2023-05-17 RX ORDER — BACITRACIN ZINC AND POLYMYXIN B SULFATE 500; 1000 [USP'U]/G; [USP'U]/G
OINTMENT TOPICAL ONCE
OUTPATIENT
Start: 2023-05-17 | End: 2023-05-17

## 2023-05-17 RX ORDER — GENTAMICIN SULFATE 1 MG/G
OINTMENT TOPICAL ONCE
OUTPATIENT
Start: 2023-05-17 | End: 2023-05-17

## 2023-05-17 RX ORDER — LIDOCAINE 50 MG/G
OINTMENT TOPICAL ONCE
OUTPATIENT
Start: 2023-05-17 | End: 2023-05-17

## 2023-05-17 RX ORDER — BACITRACIN, NEOMYCIN, POLYMYXIN B 400; 3.5; 5 [USP'U]/G; MG/G; [USP'U]/G
OINTMENT TOPICAL ONCE
OUTPATIENT
Start: 2023-05-17 | End: 2023-05-17

## 2023-05-17 RX ORDER — CLOBETASOL PROPIONATE 0.5 MG/G
OINTMENT TOPICAL ONCE
OUTPATIENT
Start: 2023-05-17 | End: 2023-05-17

## 2023-05-17 RX ORDER — LIDOCAINE 40 MG/G
CREAM TOPICAL ONCE
OUTPATIENT
Start: 2023-05-17 | End: 2023-05-17

## 2023-05-17 RX ORDER — LIDOCAINE HYDROCHLORIDE 40 MG/ML
SOLUTION TOPICAL ONCE
OUTPATIENT
Start: 2023-05-17 | End: 2023-05-17

## 2023-05-17 RX ORDER — LIDOCAINE HYDROCHLORIDE 40 MG/ML
SOLUTION TOPICAL ONCE
Status: COMPLETED | OUTPATIENT
Start: 2023-05-17 | End: 2023-05-17

## 2023-05-17 RX ORDER — LIDOCAINE HYDROCHLORIDE 20 MG/ML
JELLY TOPICAL ONCE
OUTPATIENT
Start: 2023-05-17 | End: 2023-05-17

## 2023-05-17 RX ORDER — BETAMETHASONE DIPROPIONATE 0.05 %
OINTMENT (GRAM) TOPICAL ONCE
OUTPATIENT
Start: 2023-05-17 | End: 2023-05-17

## 2023-05-17 RX ORDER — SODIUM CHLOR/HYPOCHLOROUS ACID 0.033 %
SOLUTION, IRRIGATION IRRIGATION ONCE
OUTPATIENT
Start: 2023-05-17 | End: 2023-05-17

## 2023-05-17 RX ORDER — GINSENG 100 MG
CAPSULE ORAL ONCE
OUTPATIENT
Start: 2023-05-17 | End: 2023-05-17

## 2023-05-17 RX ORDER — APIXABAN 2.5 MG/1
TABLET, FILM COATED ORAL
Qty: 180 TABLET | Refills: 3 | Status: SHIPPED | OUTPATIENT
Start: 2023-05-17

## 2023-05-17 RX ADMIN — LIDOCAINE HYDROCHLORIDE 10 ML: 40 SOLUTION TOPICAL at 09:21

## 2023-05-17 NOTE — PLAN OF CARE
Problem: Blood Glucose:  Goal: Ability to maintain appropriate glucose levels will improve  Description: Ability to maintain appropriate glucose levels will improve  Outcome: Progressing

## 2023-05-17 NOTE — PLAN OF CARE
Problem: Chronic Conditions and Co-morbidities  Goal: Patient's chronic conditions and co-morbidity symptoms are monitored and maintained or improved  Outcome: Progressing     Problem: Cognitive:  Goal: Knowledge of wound care  Description: Knowledge of wound care  Outcome: Progressing  Goal: Understands risk factors for wounds  Description: Understands risk factors for wounds  Outcome: Progressing     Problem: Wound:  Goal: Will show signs of wound healing; wound closure and no evidence of infection  Description: Will show signs of wound healing; wound closure and no evidence of infection  Outcome: Progressing     Problem: Arterial:  Goal: Optimize blood flow for wound healing  Description: Optimize blood flow for wound healing  Outcome: Adequate for Discharge     Problem: Venous:  Goal: Signs of wound healing will improve  Description: Signs of wound healing will improve  Outcome: Progressing     Problem: Venous:  Goal: Signs of wound healing will improve  Description: Signs of wound healing will improve  Outcome: Progressing

## 2023-05-17 NOTE — DISCHARGE INSTRUCTIONS
Visit Discharge/Physician Orders     Discharge condition: Stable     Assessment of pain at discharge: yes     Anesthetic used: 4% lidocaine solution     Discharge to: Home     Left via:Private automobile     Accompanied by: spouse     ECF/HHA: supplies from [employer]     Dressing Orders: RIGHT GREAT TOE/LATERAL FOOT/HEEL- Paint with betadine to blackened areas, apply calcium alginate open areas, dry dressing and secure. Change daily. Guaze between toes to keep toes      Treatment Orders:Eat a diet high in protein and vitamin C. Take a multiple vitamin daily unless contraindicated. Wear offloading boot. [address] [place] [address],[address] followup visit : 1 week___________________________________  (Please note your next appointment above and if you are unable to keep, kindly give a 24 hour notice. Thank you.)     Physician signature:__________________________     If you experience any of the following, please call the [facility] during business hours:     * Increase in Pain  * Temperature over 101  * Increase in drainage from your wound  * Drainage with a foul odor  * Bleeding  * Increase in swelling  * Need for compression bandage changes due to slippage, breakthrough drainage. If you need medical attention outside of the business hours of the [facility] please contact your PCP or go to the nearest emergency room.

## 2023-05-17 NOTE — PROGRESS NOTES
Dressing/Treatment ABD; Alginate;Roll gauze 05/17/23 1049   Wound Length (cm) 4.3 cm 05/17/23 0914   Wound Width (cm) 3.7 cm 05/17/23 0914   Wound Depth (cm) 0.1 cm 05/17/23 0914   Wound Surface Area (cm^2) 15.91 cm^2 05/17/23 0914   Change in Wound Size % (l*w) -679.9 05/17/23 0914   Wound Volume (cm^3) 1.591 cm^3 05/17/23 0914   Wound Healing % -680 05/17/23 0914   Wound Assessment Eschar dry;Pink/red 05/17/23 0914   Drainage Amount Scant 05/17/23 0914   Drainage Description Serosanguinous 05/17/23 0914   Odor None 05/17/23 0914   Iva-wound Assessment Dry/flaky 05/17/23 0914   Number of days: 35     Incision 11/22/21 Back (Active)   Number of days: 541       Incision 01/06/22 Back (Active)   Number of days: 496       Incision 04/11/22 Back (Active)   Number of days: 401       Procedure Note  Indications:  Based on my examination of this patient's wound(s)/ulcer(s) today, debridement is required to promote healing and evaluate the wound base. Performed by: Alyse Cole MD    Consent obtained:  Yes    Time out taken:  Yes    Pain Control: Anesthetic  Anesthetic: 4% Lidocaine Liquid Topical     Debridement:Excisional Debridement    Using # 10 blade scalpel the wound(s)/ulcer(s) was/were sharply debrided down through and including the removal of epidermis, dermis, and subcutaneous tissue. Devitalized Tissue Debrided:  fibrin, biofilm, and necrotic/eschar to stimulate bleeding to promote healing, post debridement good bleeding base and wound edges noted    Wound/Ulcer #: 1 and 2    Percent of Wound/Ulcer Debrided: 30%    Total Surface Area Debrided:  20 sq cm     Estimated Blood Loss:  Minimal  Hemostasis Achieved:  by pressure    Procedural Pain:  4  / 10   Post Procedural Pain:  3 / 10     Response to treatment:  Well tolerated by patient.    Plan:     Pt is not a smoker     In my professional opinion and based off the information that is available at this time this patient has appropriate

## 2023-05-22 NOTE — DISCHARGE INSTRUCTIONS
Visit Discharge/Physician Orders     Discharge condition: Stable     Assessment of pain at discharge: yes     Anesthetic used: 4% lidocaine solution     Discharge to: Home     Left via:Private automobile     Accompanied by: spouse     ECF/HHA: supplies from [employer]     Dressing Orders: RIGHT GREAT TOE/LATERAL FOOT/HEEL- Paint with betadine to blackened areas, apply calcium alginate open areas, dry dressing and secure. Change daily. Guaze between toes to keep toes      Treatment Orders:Eat a diet high in protein and vitamin C. Take a multiple vitamin daily unless contraindicated. Wear offloading boot. [address] [place] [address],[address] followup visit : 1 week___________________________________  (Please note your next appointment above and if you are unable to keep, kindly give a 24 hour notice. Thank you.)     Physician signature:__________________________     If you experience any of the following, please call the [facility] during business hours:     * Increase in Pain  * Temperature over 101  * Increase in drainage from your wound  * Drainage with a foul odor  * Bleeding  * Increase in swelling  * Need for compression bandage changes due to slippage, breakthrough drainage. If you need medical attention outside of the business hours of the [facility] please contact your PCP or go to the nearest emergency room.

## 2023-05-24 ENCOUNTER — HOSPITAL ENCOUNTER (OUTPATIENT)
Dept: WOUND CARE | Age: 78
Discharge: HOME | End: 2023-05-24
Payer: MEDICARE

## 2023-05-24 VITALS
DIASTOLIC BLOOD PRESSURE: 60 MMHG | SYSTOLIC BLOOD PRESSURE: 135 MMHG | WEIGHT: 139 LBS | HEIGHT: 69 IN | TEMPERATURE: 98.1 F | RESPIRATION RATE: 16 BRPM | HEART RATE: 75 BPM | BODY MASS INDEX: 20.59 KG/M2

## 2023-05-24 DIAGNOSIS — I70.234 ATHEROSCLEROSIS OF NATIVE ARTERY OF RIGHT LOWER EXTREMITY WITH ULCERATION OF MIDFOOT (HCC): Primary | ICD-10-CM

## 2023-05-24 DIAGNOSIS — E11.621 DIABETIC ULCER OF OTHER PART OF RIGHT FOOT ASSOCIATED WITH TYPE 2 DIABETES MELLITUS, WITH NECROSIS OF MUSCLE (HCC): Chronic | ICD-10-CM

## 2023-05-24 DIAGNOSIS — L97.513 DIABETIC ULCER OF OTHER PART OF RIGHT FOOT ASSOCIATED WITH TYPE 2 DIABETES MELLITUS, WITH NECROSIS OF MUSCLE (HCC): Chronic | ICD-10-CM

## 2023-05-24 PROCEDURE — 11042 DBRDMT SUBQ TIS 1ST 20SQCM/<: CPT

## 2023-05-24 PROCEDURE — 11042 DBRDMT SUBQ TIS 1ST 20SQCM/<: CPT | Performed by: SURGERY

## 2023-05-24 RX ORDER — BACITRACIN, NEOMYCIN, POLYMYXIN B 400; 3.5; 5 [USP'U]/G; MG/G; [USP'U]/G
OINTMENT TOPICAL ONCE
OUTPATIENT
Start: 2023-05-24 | End: 2023-05-24

## 2023-05-24 RX ORDER — SODIUM CHLOR/HYPOCHLOROUS ACID 0.033 %
SOLUTION, IRRIGATION IRRIGATION ONCE
OUTPATIENT
Start: 2023-05-24 | End: 2023-05-24

## 2023-05-24 RX ORDER — LIDOCAINE HYDROCHLORIDE 20 MG/ML
JELLY TOPICAL ONCE
OUTPATIENT
Start: 2023-05-24 | End: 2023-05-24

## 2023-05-24 RX ORDER — LIDOCAINE HYDROCHLORIDE 40 MG/ML
SOLUTION TOPICAL ONCE
Status: COMPLETED | OUTPATIENT
Start: 2023-05-24 | End: 2023-05-24

## 2023-05-24 RX ORDER — LIDOCAINE HYDROCHLORIDE 40 MG/ML
SOLUTION TOPICAL ONCE
OUTPATIENT
Start: 2023-05-24 | End: 2023-05-24

## 2023-05-24 RX ORDER — BETAMETHASONE DIPROPIONATE 0.05 %
OINTMENT (GRAM) TOPICAL ONCE
OUTPATIENT
Start: 2023-05-24 | End: 2023-05-24

## 2023-05-24 RX ORDER — BACITRACIN ZINC AND POLYMYXIN B SULFATE 500; 1000 [USP'U]/G; [USP'U]/G
OINTMENT TOPICAL ONCE
OUTPATIENT
Start: 2023-05-24 | End: 2023-05-24

## 2023-05-24 RX ORDER — CLOBETASOL PROPIONATE 0.5 MG/G
OINTMENT TOPICAL ONCE
OUTPATIENT
Start: 2023-05-24 | End: 2023-05-24

## 2023-05-24 RX ORDER — LIDOCAINE 50 MG/G
OINTMENT TOPICAL ONCE
OUTPATIENT
Start: 2023-05-24 | End: 2023-05-24

## 2023-05-24 RX ORDER — LIDOCAINE 40 MG/G
CREAM TOPICAL ONCE
OUTPATIENT
Start: 2023-05-24 | End: 2023-05-24

## 2023-05-24 RX ORDER — GENTAMICIN SULFATE 1 MG/G
OINTMENT TOPICAL ONCE
OUTPATIENT
Start: 2023-05-24 | End: 2023-05-24

## 2023-05-24 RX ORDER — GINSENG 100 MG
CAPSULE ORAL ONCE
OUTPATIENT
Start: 2023-05-24 | End: 2023-05-24

## 2023-05-24 RX ADMIN — LIDOCAINE HYDROCHLORIDE 10 ML: 40 SOLUTION TOPICAL at 09:00

## 2023-05-24 NOTE — PROGRESS NOTES
and necrotic/eschar to stimulate bleeding to promote healing, post debridement good bleeding base and wound edges noted    Wound/Ulcer #: 1 and 2, 3    Percent of Wound/Ulcer Debrided: 30%    Total Surface Area Debrided:  20 sq cm     Estimated Blood Loss:  Minimal  Hemostasis Achieved:  by pressure    Procedural Pain:  4  / 10   Post Procedural Pain:  3 / 10     Response to treatment:  Well tolerated by patient. Plan:     Pt is not a smoker     In my professional opinion and based off the information that is available at this time this patient has appropriate indication for HBO Therapy: No    Treatment Note please see attached Discharge Instructions    Written patient dismissal instructions given to patient and signed by patient or POA. Visit Discharge/Physician Orders    Discharge condition: Stable    Assessment of pain at discharge: yes    Anesthetic used: 4% lidocaine solution    Discharge to: Home    Left via:Private automobile    Accompanied by: spouse    ECF/HHA: n/a    Dressing Orders: RIGHT GREAT TOE/LATERAL FOOT/HEELCleanse with normal saline, apply calcium alginate, dry dressing and secure. Change daily. Treatment Orders:Eat a diet high in protein and vitamin C. Take a multiple vitamin daily unless contraindicated. Wear offloading boot. Arterial studies to be scheduled- post angiogram    [facility] followup visit : 1 week_____________________________  (Please note your next appointment above and if you are unable to keep, kindly give a 24 hour notice. Thank you.)    Physician signature:__________________________    If you experience any of the following, please call the [address] [place] [address] Road during business hours:    * Increase in Pain  * Temperature over 101  * Increase in drainage from your wound  * Drainage with a foul odor  * Bleeding  * Increase in swelling  * Need for compression bandage changes due to slippage, breakthrough drainage.     If you need medical attention outside of the

## 2023-05-24 NOTE — PLAN OF CARE
Problem: Cognitive:  Goal: Knowledge of wound care  Description: Knowledge of wound care  Outcome: Progressing  Goal: Understands risk factors for wounds  Description: Understands risk factors for wounds  Outcome: Progressing     Problem: Wound:  Goal: Will show signs of wound healing; wound closure and no evidence of infection  Description: Will show signs of wound healing; wound closure and no evidence of infection  Outcome: Progressing     Problem: Arterial:  Goal: Optimize blood flow for wound healing  Description: Optimize blood flow for wound healing  Outcome: Adequate for Discharge     Problem: Blood Glucose:  Goal: Ability to maintain appropriate glucose levels will improve  Description: Ability to maintain appropriate glucose levels will improve  Outcome: Adequate for Discharge     Problem: Venous:  Goal: Signs of wound healing will improve  Description: Signs of wound healing will improve  Outcome: Adequate for Discharge

## 2023-05-30 ENCOUNTER — HOSPITAL ENCOUNTER (OUTPATIENT)
Dept: OTHER | Age: 78
Setting detail: THERAPIES SERIES
Discharge: HOME | End: 2023-05-30
Payer: MEDICARE

## 2023-05-30 VITALS
HEART RATE: 71 BPM | BODY MASS INDEX: 21.24 KG/M2 | RESPIRATION RATE: 16 BRPM | OXYGEN SATURATION: 95 % | DIASTOLIC BLOOD PRESSURE: 62 MMHG | WEIGHT: 143.8 LBS | SYSTOLIC BLOOD PRESSURE: 140 MMHG

## 2023-05-30 LAB
ANION GAP SERPL CALCULATED.3IONS-SCNC: 11 MMOL/L (ref 7–16)
BNP BLD-MCNC: 4321 PG/ML (ref 0–450)
BUN SERPL-MCNC: 31 MG/DL (ref 6–23)
CALCIUM SERPL-MCNC: 9.6 MG/DL (ref 8.6–10.2)
CHLORIDE SERPL-SCNC: 105 MMOL/L (ref 98–107)
CO2 SERPL-SCNC: 20 MMOL/L (ref 22–29)
CREAT SERPL-MCNC: 1.6 MG/DL (ref 0.7–1.2)
GLUCOSE SERPL-MCNC: 179 MG/DL (ref 74–99)
POTASSIUM SERPL-SCNC: 4.5 MMOL/L (ref 3.5–5)
SODIUM SERPL-SCNC: 136 MMOL/L (ref 132–146)

## 2023-05-30 PROCEDURE — 2580000003 HC RX 258

## 2023-05-30 PROCEDURE — 99214 OFFICE O/P EST MOD 30 MIN: CPT

## 2023-05-30 PROCEDURE — 83880 ASSAY OF NATRIURETIC PEPTIDE: CPT

## 2023-05-30 PROCEDURE — 80048 BASIC METABOLIC PNL TOTAL CA: CPT

## 2023-05-30 PROCEDURE — 96374 THER/PROPH/DIAG INJ IV PUSH: CPT

## 2023-05-30 PROCEDURE — 36415 COLL VENOUS BLD VENIPUNCTURE: CPT

## 2023-05-30 PROCEDURE — 6360000002 HC RX W HCPCS

## 2023-05-30 RX ORDER — SODIUM CHLORIDE 0.9 % (FLUSH) 0.9 %
10 SYRINGE (ML) INJECTION ONCE
Status: COMPLETED | OUTPATIENT
Start: 2023-05-30 | End: 2023-05-30

## 2023-05-30 RX ORDER — SODIUM CHLORIDE 0.9 % (FLUSH) 0.9 %
SYRINGE (ML) INJECTION
Status: COMPLETED
Start: 2023-05-30 | End: 2023-05-30

## 2023-05-30 RX ORDER — FUROSEMIDE 10 MG/ML
INJECTION INTRAMUSCULAR; INTRAVENOUS
Status: COMPLETED
Start: 2023-05-30 | End: 2023-05-30

## 2023-05-30 RX ORDER — FUROSEMIDE 10 MG/ML
40 INJECTION INTRAMUSCULAR; INTRAVENOUS ONCE
Status: COMPLETED | OUTPATIENT
Start: 2023-05-30 | End: 2023-05-30

## 2023-05-30 RX ADMIN — Medication 10 ML: at 08:42

## 2023-05-30 RX ADMIN — FUROSEMIDE 40 MG: 10 INJECTION, SOLUTION INTRAMUSCULAR; INTRAVENOUS at 08:42

## 2023-05-30 RX ADMIN — FUROSEMIDE 40 MG: 10 INJECTION INTRAMUSCULAR; INTRAVENOUS at 08:42

## 2023-05-30 RX ADMIN — SODIUM CHLORIDE, PRESERVATIVE FREE 10 ML: 5 INJECTION INTRAVENOUS at 08:42

## 2023-05-30 NOTE — RESULT ENCOUNTER NOTE
Labs and CHF clinic note reviewed  Spoke with Aurea Mitchell RN in [facility]  Patient missed diuretic last week and admitted to high sodium diet over the weekend    Rise in pro-bnp  Decreased CO2 with rise in SCR (suspect cardiorenal)    Will have him take a dose of Torsemide x 3 days   Stay hydrated, monitor diet for sodium content   Follow up with CHF clinic as scheduled in 10 days - sooner if needed

## 2023-05-30 NOTE — PROGRESS NOTES
[facility]   1945          Referring Provider: Dr. Hayde Beasley  Primary Care Physician: Dr. Maranda Howell  Cardiologist: Dr. Sriram White  Nephrologist:         History of Present Illness:     Dejuan Reed is a 69 y.o. male with a history of HFiEF post Lifevest, most recent EF 30-35% on 4/3/2023. Patient Story:    He does not  have dyspnea with exertion, shortness of breath, or decline in overall functional capacity. He does not have orthopnea, PND, nocturnal cough or hemoptysis. He does not have abdominal distention or bloating, early satiety, anorexia/change in appetite. He does has a good urinary response to oral diuretic. He does not have lower extremity edema. He denies lightheadedness, dizziness. He denies palpitations, syncope or near syncope. He does not complain of chest pain, pressure, discomfort.          Allergies   Allergen Reactions    Ciprofloxacin Hives           Pcn [Penicillins] Hives    Sulfa Antibiotics Hives and Other (See Comments)           Current Outpatient Medications on File Prior to Encounter   Medication Sig Dispense Refill    ELIQUIS 2.5 MG TABS tablet TAKE 1 TABLET BY MOUTH TWICE  DAILY 180 tablet 3    ENTRESTO 49-51 MG per tablet TAKE 1 TABLET BY MOUTH TWICE  DAILY 180 tablet 1    glipiZIDE (GLUCOTROL) 10 MG tablet TAKE 1 TABLET TWICE A DAY BEFORE MEALS 180 tablet 3    torsemide (DEMADEX) 20 MG tablet Take 1 tablet by mouth Twice a Week (Patient taking differently: Take 1 tablet by mouth Twice a Week Wednesday/Friday) 30 tablet 5    Offloading Shoe MISC by Does not apply route DFU - Offloading shoe  Ida orthotic 1 each 0    Misc Natural Products (ADV TURMERIC CURCUMIN COMPLEX PO) Take 1 tablet by mouth      JANUVIA 100 MG tablet TAKE 1 TABLET DAILY 90 tablet 3    metFORMIN (GLUCOPHAGE) 1000 MG tablet TAKE 1 TABLET TWICE A DAY WITH MEALS 180 tablet 3    insulin glargine (LANTUS

## 2023-05-31 ENCOUNTER — HOSPITAL ENCOUNTER (OUTPATIENT)
Dept: WOUND CARE | Age: 78
Discharge: HOME | End: 2023-05-31
Payer: MEDICARE

## 2023-05-31 VITALS
RESPIRATION RATE: 16 BRPM | HEART RATE: 83 BPM | SYSTOLIC BLOOD PRESSURE: 140 MMHG | TEMPERATURE: 96.8 F | DIASTOLIC BLOOD PRESSURE: 56 MMHG

## 2023-05-31 DIAGNOSIS — E11.621 DIABETIC ULCER OF OTHER PART OF RIGHT FOOT ASSOCIATED WITH TYPE 2 DIABETES MELLITUS, WITH NECROSIS OF MUSCLE (HCC): Primary | ICD-10-CM

## 2023-05-31 DIAGNOSIS — I70.234 ATHEROSCLEROSIS OF NATIVE ARTERY OF RIGHT LOWER EXTREMITY WITH ULCERATION OF MIDFOOT (HCC): ICD-10-CM

## 2023-05-31 DIAGNOSIS — L97.513 DIABETIC ULCER OF OTHER PART OF RIGHT FOOT ASSOCIATED WITH TYPE 2 DIABETES MELLITUS, WITH NECROSIS OF MUSCLE (HCC): Primary | ICD-10-CM

## 2023-05-31 PROCEDURE — 11042 DBRDMT SUBQ TIS 1ST 20SQCM/<: CPT

## 2023-05-31 RX ORDER — BETAMETHASONE DIPROPIONATE 0.05 %
OINTMENT (GRAM) TOPICAL ONCE
OUTPATIENT
Start: 2023-05-31 | End: 2023-05-31

## 2023-05-31 RX ORDER — SODIUM CHLOR/HYPOCHLOROUS ACID 0.033 %
SOLUTION, IRRIGATION IRRIGATION ONCE
OUTPATIENT
Start: 2023-05-31 | End: 2023-05-31

## 2023-05-31 RX ORDER — LIDOCAINE HYDROCHLORIDE 20 MG/ML
JELLY TOPICAL ONCE
OUTPATIENT
Start: 2023-05-31 | End: 2023-05-31

## 2023-05-31 RX ORDER — CLOBETASOL PROPIONATE 0.5 MG/G
OINTMENT TOPICAL ONCE
OUTPATIENT
Start: 2023-05-31 | End: 2023-05-31

## 2023-05-31 RX ORDER — BACITRACIN ZINC AND POLYMYXIN B SULFATE 500; 1000 [USP'U]/G; [USP'U]/G
OINTMENT TOPICAL ONCE
OUTPATIENT
Start: 2023-05-31 | End: 2023-05-31

## 2023-05-31 RX ORDER — GENTAMICIN SULFATE 1 MG/G
OINTMENT TOPICAL ONCE
OUTPATIENT
Start: 2023-05-31 | End: 2023-05-31

## 2023-05-31 RX ORDER — LIDOCAINE HYDROCHLORIDE 40 MG/ML
SOLUTION TOPICAL ONCE
OUTPATIENT
Start: 2023-05-31 | End: 2023-05-31

## 2023-05-31 RX ORDER — LIDOCAINE 50 MG/G
OINTMENT TOPICAL ONCE
OUTPATIENT
Start: 2023-05-31 | End: 2023-05-31

## 2023-05-31 RX ORDER — LIDOCAINE 40 MG/G
CREAM TOPICAL ONCE
OUTPATIENT
Start: 2023-05-31 | End: 2023-05-31

## 2023-05-31 RX ORDER — BACITRACIN, NEOMYCIN, POLYMYXIN B 400; 3.5; 5 [USP'U]/G; MG/G; [USP'U]/G
OINTMENT TOPICAL ONCE
OUTPATIENT
Start: 2023-05-31 | End: 2023-05-31

## 2023-05-31 RX ORDER — LIDOCAINE HYDROCHLORIDE 40 MG/ML
SOLUTION TOPICAL ONCE
Status: COMPLETED | OUTPATIENT
Start: 2023-05-31 | End: 2023-05-31

## 2023-05-31 RX ORDER — GINSENG 100 MG
CAPSULE ORAL ONCE
OUTPATIENT
Start: 2023-05-31 | End: 2023-05-31

## 2023-05-31 RX ADMIN — LIDOCAINE HYDROCHLORIDE 5 ML: 40 SOLUTION TOPICAL at 08:51

## 2023-05-31 ASSESSMENT — PAIN SCALES - GENERAL: PAINLEVEL_OUTOF10: 4

## 2023-05-31 ASSESSMENT — PAIN DESCRIPTION - DESCRIPTORS: DESCRIPTORS: ACHING

## 2023-05-31 ASSESSMENT — PAIN DESCRIPTION - ORIENTATION: ORIENTATION: RIGHT

## 2023-05-31 ASSESSMENT — PAIN DESCRIPTION - LOCATION: LOCATION: FOOT

## 2023-05-31 NOTE — PROGRESS NOTES
right foot, including heel eschar, eschar over the lateral aspect foot, wound, medial aspect right foot at the metatarsophalangeal joint level, with fat layer exposed    Other pertinent information:    1. Lab work done on 4 April 2023, BMP, elevated blood glucose level of 270, CBC satisfactory    2.   Patient underwent angiogram procedures of right lower extremity disease as outlined below    March 7, 2023, shockwave lithotripsy right popliteal artery with DCB, Dr. Freddi Goldmann    April 4, 2023 superficial femoral artery shockwave therapy with lithotripsy and angioplasty of the anterior tibial and tibioperoneal trunk, Dr. Freddi Goldmann    3.  2D echo of the heart, February 2023, ejection fraction of 30 to 35%    4/12/2023  Discussed the patient and the family, continue the dressing changes as ordered by Dr. Freddi Goldmann with Aquacel  Patient recommended lower extremity artery Doppler study to assess distal arterial perfusion status post vascular intervention of the right lower extremity by Dr. Freddi Goldmann done recently in March and April  All the questions were answered  4/19/23  I do not believe pt's R LE is salvageable  I do not believe that further attempts at angiogram and intervention would be successful  Tissue loss has progressed  Recommended R BKA  Pt is going to be high risk most likely for amputation - disc with Dr. Isis Scanlon who will review  Patient is very reluctant to proceed with R BKA  He will contact our office in regards to whether he wants to proceed  5/10/23  Pt would like to follow in wound center re wounds  Wounds worse than previous  Paint eschar, areas of gangrene with betadyne  Keep wounds clean and dry  5/17/23  Wounds slightly improved  Eschar debrided and underlying tissue appeared much more healthy than expected  Betadyne to eschar, alginate to open areas  5/24/23  Wounds slightly improved  Eschar debrided and underlying tissue appeared much more healthy than expected  Betadyne to eschar, alginate

## 2023-05-31 NOTE — PLAN OF CARE
Problem: Chronic Conditions and Co-morbidities  Goal: Patient's chronic conditions and co-morbidity symptoms are monitored and maintained or improved  Outcome: Progressing     Problem: Pain  Goal: Verbalizes/displays adequate comfort level or baseline comfort level  Outcome: Progressing     Problem: Cognitive:  Goal: Knowledge of wound care  Description: Knowledge of wound care  Outcome: Progressing  Goal: Understands risk factors for wounds  Description: Understands risk factors for wounds  Outcome: Progressing     Problem: Wound:  Goal: Will show signs of wound healing; wound closure and no evidence of infection  Description: Will show signs of wound healing; wound closure and no evidence of infection  Outcome: Progressing     Problem: Arterial:  Goal: Optimize blood flow for wound healing  Description: Optimize blood flow for wound healing  Outcome: Not Progressing     Problem: Venous:  Goal: Signs of wound healing will improve  Description: Signs of wound healing will improve  Outcome: Adequate for Discharge     Problem: Arterial:  Goal: Optimize blood flow for wound healing  Description: Optimize blood flow for wound healing  Outcome: Not Progressing

## 2023-06-06 NOTE — DISCHARGE INSTRUCTIONS
Written             Visit Discharge/Physician Orders     Discharge condition: Stable     Assessment of pain at discharge: yes     Anesthetic used: 4% lidocaine solution     Discharge to: Home     Left via:Private automobile     Accompanied by: spouse     ECF/HHA: supplies from [employer] acquired by [employer]     Dressing Orders: RIGHT GREAT TOE/LATERAL FOOT/HEEL- Paint with betadine to blackened areas, apply calcium alginate open areas, dry dressing and secure. Change daily. Guaze between toes to keep toes      Treatment Orders:Eat a diet high in protein and vitamin C. Take a multiple vitamin daily unless contraindicated. Wear offloading boot. [address] [place] [address],[address] followup visit : 1 week___________________________________  (Please note your next appointment above and if you are unable to keep, kindly give a 24 hour notice. Thank you.)     Physician signature:__________________________     If you experience any of the following, please call the [facility] during business hours:     * Increase in Pain  * Temperature over 101  * Increase in drainage from your wound  * Drainage with a foul odor  * Bleeding  * Increase in swelling  * Need for compression bandage changes due to slippage, breakthrough drainage. If you need medical attention outside of the business hours of the [facility] please contact your PCP or go to the nearest emergency room.

## 2023-06-07 ENCOUNTER — HOSPITAL ENCOUNTER (OUTPATIENT)
Dept: WOUND CARE | Age: 78
Discharge: HOME | End: 2023-06-07
Payer: MEDICARE

## 2023-06-07 VITALS
HEIGHT: 69 IN | WEIGHT: 143 LBS | TEMPERATURE: 98.2 F | RESPIRATION RATE: 16 BRPM | HEART RATE: 79 BPM | SYSTOLIC BLOOD PRESSURE: 138 MMHG | BODY MASS INDEX: 21.18 KG/M2 | DIASTOLIC BLOOD PRESSURE: 58 MMHG

## 2023-06-07 DIAGNOSIS — I70.234 ATHEROSCLEROSIS OF NATIVE ARTERY OF RIGHT LOWER EXTREMITY WITH ULCERATION OF MIDFOOT (HCC): Primary | ICD-10-CM

## 2023-06-07 DIAGNOSIS — L97.513 DIABETIC ULCER OF OTHER PART OF RIGHT FOOT ASSOCIATED WITH TYPE 2 DIABETES MELLITUS, WITH NECROSIS OF MUSCLE (HCC): ICD-10-CM

## 2023-06-07 DIAGNOSIS — E11.621 DIABETIC ULCER OF OTHER PART OF RIGHT FOOT ASSOCIATED WITH TYPE 2 DIABETES MELLITUS, WITH NECROSIS OF MUSCLE (HCC): ICD-10-CM

## 2023-06-07 PROCEDURE — 11042 DBRDMT SUBQ TIS 1ST 20SQCM/<: CPT

## 2023-06-07 RX ORDER — LIDOCAINE 50 MG/G
OINTMENT TOPICAL ONCE
OUTPATIENT
Start: 2023-06-07 | End: 2023-06-07

## 2023-06-07 RX ORDER — IBUPROFEN 200 MG
TABLET ORAL ONCE
OUTPATIENT
Start: 2023-06-07 | End: 2023-06-07

## 2023-06-07 RX ORDER — LIDOCAINE 40 MG/G
CREAM TOPICAL ONCE
OUTPATIENT
Start: 2023-06-07 | End: 2023-06-07

## 2023-06-07 RX ORDER — CLOBETASOL PROPIONATE 0.5 MG/G
OINTMENT TOPICAL ONCE
OUTPATIENT
Start: 2023-06-07 | End: 2023-06-07

## 2023-06-07 RX ORDER — LIDOCAINE HYDROCHLORIDE 20 MG/ML
JELLY TOPICAL ONCE
OUTPATIENT
Start: 2023-06-07 | End: 2023-06-07

## 2023-06-07 RX ORDER — LIDOCAINE HYDROCHLORIDE 40 MG/ML
SOLUTION TOPICAL ONCE
Status: COMPLETED | OUTPATIENT
Start: 2023-06-07 | End: 2023-06-07

## 2023-06-07 RX ORDER — BACITRACIN ZINC AND POLYMYXIN B SULFATE 500; 1000 [USP'U]/G; [USP'U]/G
OINTMENT TOPICAL ONCE
OUTPATIENT
Start: 2023-06-07 | End: 2023-06-07

## 2023-06-07 RX ORDER — GINSENG 100 MG
CAPSULE ORAL ONCE
OUTPATIENT
Start: 2023-06-07 | End: 2023-06-07

## 2023-06-07 RX ORDER — GENTAMICIN SULFATE 1 MG/G
OINTMENT TOPICAL ONCE
OUTPATIENT
Start: 2023-06-07 | End: 2023-06-07

## 2023-06-07 RX ORDER — BETAMETHASONE DIPROPIONATE 0.05 %
OINTMENT (GRAM) TOPICAL ONCE
OUTPATIENT
Start: 2023-06-07 | End: 2023-06-07

## 2023-06-07 RX ORDER — LIDOCAINE HYDROCHLORIDE 40 MG/ML
SOLUTION TOPICAL ONCE
OUTPATIENT
Start: 2023-06-07 | End: 2023-06-07

## 2023-06-07 RX ORDER — SODIUM CHLOR/HYPOCHLOROUS ACID 0.033 %
SOLUTION, IRRIGATION IRRIGATION ONCE
OUTPATIENT
Start: 2023-06-07 | End: 2023-06-07

## 2023-06-07 RX ADMIN — LIDOCAINE HYDROCHLORIDE 10 ML: 40 SOLUTION TOPICAL at 08:54

## 2023-06-07 ASSESSMENT — PAIN DESCRIPTION - ONSET: ONSET: ON-GOING

## 2023-06-07 ASSESSMENT — PAIN DESCRIPTION - DESCRIPTORS: DESCRIPTORS: ACHING

## 2023-06-07 ASSESSMENT — PAIN DESCRIPTION - FREQUENCY: FREQUENCY: CONTINUOUS

## 2023-06-07 ASSESSMENT — PAIN DESCRIPTION - ORIENTATION: ORIENTATION: RIGHT

## 2023-06-07 ASSESSMENT — PAIN DESCRIPTION - LOCATION: LOCATION: FOOT

## 2023-06-07 ASSESSMENT — PAIN SCALES - GENERAL: PAINLEVEL_OUTOF10: 5

## 2023-06-07 ASSESSMENT — PAIN DESCRIPTION - PAIN TYPE: TYPE: CHRONIC PAIN

## 2023-06-07 NOTE — PLAN OF CARE
Problem: Chronic Conditions and Co-morbidities  Goal: Patient's chronic conditions and co-morbidity symptoms are monitored and maintained or improved  Outcome: Progressing     Problem: Pain  Goal: Verbalizes/displays adequate comfort level or baseline comfort level  Outcome: Progressing     Problem: Cognitive:  Goal: Knowledge of wound care  Description: Knowledge of wound care  Outcome: Progressing  Goal: Understands risk factors for wounds  Description: Understands risk factors for wounds  Outcome: Progressing     Problem: Wound:  Goal: Will show signs of wound healing; wound closure and no evidence of infection  Description: Will show signs of wound healing; wound closure and no evidence of infection  Outcome: Progressing     Problem: Arterial:  Goal: Optimize blood flow for wound healing  Description: Optimize blood flow for wound healing  Outcome: Adequate for Discharge     Problem: Venous:  Goal: Signs of wound healing will improve  Description: Signs of wound healing will improve  Outcome: Adequate for Discharge

## 2023-06-07 NOTE — PROGRESS NOTES
Kulwant Ferrer MD    Consent obtained:  Yes    Time out taken:  Yes    Pain Control: Anesthetic  Anesthetic: 4% Lidocaine Liquid Topical     Debridement:Excisional Debridement    Using # 10 blade scalpel the wound(s)/ulcer(s) was/were sharply debrided down through and including the removal of epidermis, dermis, and subcutaneous tissue. Devitalized Tissue Debrided:  fibrin, biofilm, and necrotic/eschar to stimulate bleeding to promote healing, post debridement good bleeding base and wound edges noted    Wound/Ulcer #: 1 and 2, 3    Percent of Wound/Ulcer Debrided: 30%    Total Surface Area Debrided:  20 sq cm     Estimated Blood Loss:  Minimal  Hemostasis Achieved:  by pressure    Procedural Pain:  4  / 10   Post Procedural Pain:  3 / 10     Response to treatment:  Well tolerated by patient. Plan:     Pt is not a smoker     In my professional opinion and based off the information that is available at this time this patient has appropriate indication for HBO Therapy: No    Treatment Note please see attached Discharge Instructions    Written patient dismissal instructions given to patient and signed by patient or POA. Visit Discharge/Physician Orders    Discharge condition: Stable    Assessment of pain at discharge: yes    Anesthetic used: 4% lidocaine solution    Discharge to: Home    Left via:Private automobile    Accompanied by: spouse    ECF/HHA: n/a    Dressing Orders: RIGHT GREAT TOE/LATERAL FOOT/HEELCleanse with normal saline, apply calcium alginate, dry dressing and secure. Change daily. Treatment Orders:Eat a diet high in protein and vitamin C. Take a multiple vitamin daily unless contraindicated. Wear offloading boot. Arterial studies to be scheduled- post angiogram    [facility] followup visit : 1 week_____________________________  (Please note your next appointment above and if you are unable to keep, kindly give a 24 hour notice.  Thank you.)    Physician

## 2023-06-19 NOTE — DISCHARGE INSTRUCTIONS
Visit Discharge/Physician Orders     Discharge condition: Stable     Assessment of pain at discharge: yes     Anesthetic used: 4% lidocaine solution     Discharge to: Home     Left via:Private automobile     Accompanied by: spouse     ECF/HHA: supplies from [employer]     Dressing Orders: RIGHT GREAT TOE/LATERAL FOOT/HEEL- Paint with betadine to blackened areas, apply calcium alginate open areas, dry dressing and secure. Change daily. Guaze between toes to keep toes      Treatment Orders:Eat a diet high in protein and vitamin C. Take a multiple vitamin daily unless contraindicated. Wear offloading boot. [address] [place] [address],[address] followup visit : 1 week___________________________________  (Please note your next appointment above and if you are unable to keep, kindly give a 24 hour notice. Thank you.)     Physician signature:__________________________     If you experience any of the following, please call the [facility] during business hours:     * Increase in Pain  * Temperature over 101  * Increase in drainage from your wound  * Drainage with a foul odor  * Bleeding  * Increase in swelling  * Need for compression bandage changes due to slippage, breakthrough drainage. If you need medical attention outside of the business hours of the [facility] please contact your PCP or go to the nearest emergency room.

## 2023-06-21 ENCOUNTER — HOSPITAL ENCOUNTER (OUTPATIENT)
Dept: WOUND CARE | Age: 78
Discharge: HOME | End: 2023-06-21
Payer: MEDICARE

## 2023-06-21 VITALS
HEART RATE: 72 BPM | SYSTOLIC BLOOD PRESSURE: 106 MMHG | DIASTOLIC BLOOD PRESSURE: 61 MMHG | RESPIRATION RATE: 18 BRPM | TEMPERATURE: 96.7 F

## 2023-06-21 DIAGNOSIS — E11.621 DIABETIC ULCER OF OTHER PART OF RIGHT FOOT ASSOCIATED WITH TYPE 2 DIABETES MELLITUS, WITH NECROSIS OF MUSCLE (HCC): ICD-10-CM

## 2023-06-21 DIAGNOSIS — I70.234 ATHEROSCLEROSIS OF NATIVE ARTERY OF RIGHT LOWER EXTREMITY WITH ULCERATION OF MIDFOOT (HCC): Primary | ICD-10-CM

## 2023-06-21 DIAGNOSIS — L97.513 DIABETIC ULCER OF OTHER PART OF RIGHT FOOT ASSOCIATED WITH TYPE 2 DIABETES MELLITUS, WITH NECROSIS OF MUSCLE (HCC): ICD-10-CM

## 2023-06-21 PROCEDURE — 11042 DBRDMT SUBQ TIS 1ST 20SQCM/<: CPT

## 2023-06-21 RX ORDER — LIDOCAINE 40 MG/G
CREAM TOPICAL ONCE
OUTPATIENT
Start: 2023-06-21 | End: 2023-06-21

## 2023-06-21 RX ORDER — LIDOCAINE 50 MG/G
OINTMENT TOPICAL ONCE
OUTPATIENT
Start: 2023-06-21 | End: 2023-06-21

## 2023-06-21 RX ORDER — GENTAMICIN SULFATE 1 MG/G
OINTMENT TOPICAL ONCE
OUTPATIENT
Start: 2023-06-21 | End: 2023-06-21

## 2023-06-21 RX ORDER — CLOBETASOL PROPIONATE 0.5 MG/G
OINTMENT TOPICAL ONCE
OUTPATIENT
Start: 2023-06-21 | End: 2023-06-21

## 2023-06-21 RX ORDER — IBUPROFEN 200 MG
TABLET ORAL ONCE
OUTPATIENT
Start: 2023-06-21 | End: 2023-06-21

## 2023-06-21 RX ORDER — SODIUM CHLOR/HYPOCHLOROUS ACID 0.033 %
SOLUTION, IRRIGATION IRRIGATION ONCE
OUTPATIENT
Start: 2023-06-21 | End: 2023-06-21

## 2023-06-21 RX ORDER — GINSENG 100 MG
CAPSULE ORAL ONCE
OUTPATIENT
Start: 2023-06-21 | End: 2023-06-21

## 2023-06-21 RX ORDER — LIDOCAINE HYDROCHLORIDE 40 MG/ML
SOLUTION TOPICAL ONCE
Status: COMPLETED | OUTPATIENT
Start: 2023-06-21 | End: 2023-06-21

## 2023-06-21 RX ORDER — LIDOCAINE HYDROCHLORIDE 20 MG/ML
JELLY TOPICAL ONCE
OUTPATIENT
Start: 2023-06-21 | End: 2023-06-21

## 2023-06-21 RX ORDER — BACITRACIN ZINC AND POLYMYXIN B SULFATE 500; 1000 [USP'U]/G; [USP'U]/G
OINTMENT TOPICAL ONCE
OUTPATIENT
Start: 2023-06-21 | End: 2023-06-21

## 2023-06-21 RX ORDER — LIDOCAINE HYDROCHLORIDE 40 MG/ML
SOLUTION TOPICAL ONCE
OUTPATIENT
Start: 2023-06-21 | End: 2023-06-21

## 2023-06-21 RX ORDER — BETAMETHASONE DIPROPIONATE 0.05 %
OINTMENT (GRAM) TOPICAL ONCE
OUTPATIENT
Start: 2023-06-21 | End: 2023-06-21

## 2023-06-21 RX ADMIN — LIDOCAINE HYDROCHLORIDE 5 ML: 40 SOLUTION TOPICAL at 08:46

## 2023-06-21 ASSESSMENT — PAIN SCALES - GENERAL: PAINLEVEL_OUTOF10: 2

## 2023-06-21 ASSESSMENT — PAIN DESCRIPTION - ORIENTATION: ORIENTATION: RIGHT

## 2023-06-21 ASSESSMENT — PAIN DESCRIPTION - DESCRIPTORS: DESCRIPTORS: BURNING

## 2023-06-21 ASSESSMENT — PAIN DESCRIPTION - LOCATION: LOCATION: FOOT

## 2023-06-21 NOTE — PLAN OF CARE
Problem: Chronic Conditions and Co-morbidities  Goal: Patient's chronic conditions and co-morbidity symptoms are monitored and maintained or improved  Outcome: Progressing     Problem: Pain  Goal: Verbalizes/displays adequate comfort level or baseline comfort level  Outcome: Progressing     Problem: Cognitive:  Goal: Knowledge of wound care  Description: Knowledge of wound care  Outcome: Progressing  Goal: Understands risk factors for wounds  Description: Understands risk factors for wounds  Outcome: Progressing     Problem: Wound:  Goal: Will show signs of wound healing; wound closure and no evidence of infection  Description: Will show signs of wound healing; wound closure and no evidence of infection  Outcome: Progressing     Problem: Arterial:  Goal: Optimize blood flow for wound healing  Description: Optimize blood flow for wound healing  Outcome: Adequate for Discharge     Problem: Blood Glucose:  Goal: Ability to maintain appropriate glucose levels will improve  Description: Ability to maintain appropriate glucose levels will improve  Outcome: Adequate for Discharge     Problem: Venous:  Goal: Signs of wound healing will improve  Description: Signs of wound healing will improve  Outcome: Adequate for Discharge

## 2023-06-21 NOTE — PROGRESS NOTES
of this patient's wound(s)/ulcer(s) today, debridement is required to promote healing and evaluate the wound base. Performed by: Chino Ann MD    Consent obtained:  Yes    Time out taken:  Yes    Pain Control: Anesthetic  Anesthetic: 4% Lidocaine Liquid Topical     Debridement:Excisional Debridement    Using # 10 blade scalpel the wound(s)/ulcer(s) was/were sharply debrided down through and including the removal of epidermis, dermis, and subcutaneous tissue. Devitalized Tissue Debrided:  fibrin, biofilm, and necrotic/eschar to stimulate bleeding to promote healing, post debridement good bleeding base and wound edges noted    Wound/Ulcer #: 1 and 2, 3    Percent of Wound/Ulcer Debrided: 30%    Total Surface Area Debrided:  20 sq cm     Estimated Blood Loss:  Minimal  Hemostasis Achieved:  by pressure    Procedural Pain:  4  / 10   Post Procedural Pain:  3 / 10     Response to treatment:  Well tolerated by patient. Plan:     Pt is not a smoker     In my professional opinion and based off the information that is available at this time this patient has appropriate indication for HBO Therapy: No    Treatment Note please see attached Discharge Instructions    Written patient dismissal instructions given to patient and signed by patient or POA. Visit Discharge/Physician Orders    Discharge condition: Stable    Assessment of pain at discharge: yes    Anesthetic used: 4% lidocaine solution    Discharge to: Home    Left via:Private automobile    Accompanied by: spouse    ECF/HHA: n/a    Dressing Orders: RIGHT GREAT TOE/LATERAL FOOT/HEELCleanse with normal saline, apply calcium alginate, dry dressing and secure. Change daily. Treatment Orders:Eat a diet high in protein and vitamin C. Take a multiple vitamin daily unless contraindicated. Wear offloading boot.      Arterial studies to be scheduled- post angiogram    [facility] followup visit : 1 week_____________________________  (Please note your

## 2023-06-21 NOTE — PROGRESS NOTES
preparation for the clinic visit, reviewing records/tests, counseling/education of the patient, ordering medications/tests/procedures, coordinating care, and documenting clinical information in the EHR. Thank you for allowing me to participate in your patient's care. Please call me if there are any questions or concerns.       Joshua Lopez MD  Cardiac Electrophysiology  [address]  [facility]: [facility]  6/22/23   7:39 AM

## 2023-06-22 ENCOUNTER — OFFICE VISIT (OUTPATIENT)
Dept: NON INVASIVE DIAGNOSTICS | Age: 78
End: 2023-06-22
Payer: MEDICARE

## 2023-06-22 VITALS
DIASTOLIC BLOOD PRESSURE: 60 MMHG | HEIGHT: 69 IN | HEART RATE: 66 BPM | BODY MASS INDEX: 20.94 KG/M2 | WEIGHT: 141.4 LBS | SYSTOLIC BLOOD PRESSURE: 110 MMHG

## 2023-06-22 DIAGNOSIS — I50.20 HFREF (HEART FAILURE WITH REDUCED EJECTION FRACTION) (HCC): Primary | ICD-10-CM

## 2023-06-22 PROCEDURE — 1124F ACP DISCUSS-NO DSCNMKR DOCD: CPT | Performed by: INTERNAL MEDICINE

## 2023-06-22 PROCEDURE — 99205 OFFICE O/P NEW HI 60 MIN: CPT | Performed by: INTERNAL MEDICINE

## 2023-06-22 PROCEDURE — 1036F TOBACCO NON-USER: CPT | Performed by: INTERNAL MEDICINE

## 2023-06-22 PROCEDURE — G8420 CALC BMI NORM PARAMETERS: HCPCS | Performed by: INTERNAL MEDICINE

## 2023-06-22 PROCEDURE — 3074F SYST BP LT 130 MM HG: CPT | Performed by: INTERNAL MEDICINE

## 2023-06-22 PROCEDURE — 3078F DIAST BP <80 MM HG: CPT | Performed by: INTERNAL MEDICINE

## 2023-06-22 PROCEDURE — 93000 ELECTROCARDIOGRAM COMPLETE: CPT | Performed by: INTERNAL MEDICINE

## 2023-06-22 PROCEDURE — G8427 DOCREV CUR MEDS BY ELIG CLIN: HCPCS | Performed by: INTERNAL MEDICINE

## 2023-06-23 ENCOUNTER — TELEPHONE (OUTPATIENT)
Dept: NON INVASIVE DIAGNOSTICS | Age: 78
End: 2023-06-23

## 2023-06-23 NOTE — TELEPHONE ENCOUNTER
----- Message from Nena Macias MD sent at 6/22/2023  4:13 PM EDT -----  Below is PK's answer. Could you please let the patient knows. Thanks.  ----- Message -----  From: Erika Pantoja MD  Sent: 6/22/2023  11:49 AM EDT  To: Nena Macias MD    He is refusing amputation of his leg    He will not ever improve or resolve these foot wounds without an amputation    Ok to proceed but he is at risk for infection associated with his foot    Realistically if his foot causes a problem/ infection he will go hospice     If implant will significantly improve his overall health may be worth it but with the foot the way it is unlikely he has years of life left. Though honestly surprised it hasn't gone more south to this point. pk      ----- Message -----  From: Nena Macias MD  Sent: 6/22/2023   8:12 AM EDT  To: Erika Pantoja MD    Hi PK, Mr. Leal needs CRT-D implantation for primary prophylaxis of SCD and treatment of HF. Do you think anything need to be done with his right foot first ? Amputation before proceed with the procedure. I would need clearance form you before I can proceed with the procedure. Thank you very much.  Zander

## 2023-06-23 NOTE — TELEPHONE ENCOUNTER
Patient's wife notified of recommendations. The patient's wife verbalized understanding and states she will discussed with her  about what they wish to proceed with.

## 2023-06-27 ENCOUNTER — OFFICE VISIT (OUTPATIENT)
Dept: FAMILY MEDICINE CLINIC | Age: 78
End: 2023-06-27
Payer: MEDICARE

## 2023-06-27 VITALS
TEMPERATURE: 97.4 F | SYSTOLIC BLOOD PRESSURE: 131 MMHG | HEIGHT: 69 IN | OXYGEN SATURATION: 97 % | BODY MASS INDEX: 20.14 KG/M2 | WEIGHT: 136 LBS | HEART RATE: 77 BPM | RESPIRATION RATE: 16 BRPM | DIASTOLIC BLOOD PRESSURE: 55 MMHG

## 2023-06-27 DIAGNOSIS — E11.51 TYPE 2 DIABETES MELLITUS WITH DIABETIC PERIPHERAL ANGIOPATHY WITHOUT GANGRENE, WITH LONG-TERM CURRENT USE OF INSULIN (HCC): ICD-10-CM

## 2023-06-27 DIAGNOSIS — Z79.4 TYPE 2 DIABETES MELLITUS WITH DIABETIC PERIPHERAL ANGIOPATHY WITHOUT GANGRENE, WITH LONG-TERM CURRENT USE OF INSULIN (HCC): ICD-10-CM

## 2023-06-27 DIAGNOSIS — L89.301 PRESSURE INJURY OF BUTTOCK, STAGE 1, UNSPECIFIED LATERALITY: ICD-10-CM

## 2023-06-27 DIAGNOSIS — Z00.00 MEDICARE ANNUAL WELLNESS VISIT, SUBSEQUENT: Primary | ICD-10-CM

## 2023-06-27 PROCEDURE — 1124F ACP DISCUSS-NO DSCNMKR DOCD: CPT | Performed by: FAMILY MEDICINE

## 2023-06-27 PROCEDURE — 3075F SYST BP GE 130 - 139MM HG: CPT | Performed by: FAMILY MEDICINE

## 2023-06-27 PROCEDURE — 3078F DIAST BP <80 MM HG: CPT | Performed by: FAMILY MEDICINE

## 2023-06-27 PROCEDURE — G0439 PPPS, SUBSEQ VISIT: HCPCS | Performed by: FAMILY MEDICINE

## 2023-06-27 RX ORDER — PETROLATUM 42 G/100G
OINTMENT TOPICAL
Qty: 100 G | Refills: 0 | Status: SHIPPED | OUTPATIENT
Start: 2023-06-27

## 2023-06-27 SDOH — ECONOMIC STABILITY: INCOME INSECURITY: HOW HARD IS IT FOR YOU TO PAY FOR THE VERY BASICS LIKE FOOD, HOUSING, MEDICAL CARE, AND HEATING?: NOT HARD AT ALL

## 2023-06-27 SDOH — ECONOMIC STABILITY: FOOD INSECURITY: WITHIN THE PAST 12 MONTHS, YOU WORRIED THAT YOUR FOOD WOULD RUN OUT BEFORE YOU GOT MONEY TO BUY MORE.: NEVER TRUE

## 2023-06-27 SDOH — ECONOMIC STABILITY: HOUSING INSECURITY
IN THE LAST 12 MONTHS, WAS THERE A TIME WHEN YOU DID NOT HAVE A STEADY PLACE TO SLEEP OR SLEPT IN A SHELTER (INCLUDING NOW)?: NO

## 2023-06-27 SDOH — ECONOMIC STABILITY: FOOD INSECURITY: WITHIN THE PAST 12 MONTHS, THE FOOD YOU BOUGHT JUST DIDN'T LAST AND YOU DIDN'T HAVE MONEY TO GET MORE.: NEVER TRUE

## 2023-06-27 ASSESSMENT — PATIENT HEALTH QUESTIONNAIRE - PHQ9
SUM OF ALL RESPONSES TO PHQ QUESTIONS 1-9: 0
SUM OF ALL RESPONSES TO PHQ QUESTIONS 1-9: 0
2. FEELING DOWN, DEPRESSED OR HOPELESS: 0
SUM OF ALL RESPONSES TO PHQ QUESTIONS 1-9: 0
SUM OF ALL RESPONSES TO PHQ QUESTIONS 1-9: 0
1. LITTLE INTEREST OR PLEASURE IN DOING THINGS: 0
SUM OF ALL RESPONSES TO PHQ9 QUESTIONS 1 & 2: 0

## 2023-06-27 ASSESSMENT — LIFESTYLE VARIABLES: HOW OFTEN DO YOU HAVE A DRINK CONTAINING ALCOHOL: NEVER

## 2023-06-28 ENCOUNTER — HOSPITAL ENCOUNTER (OUTPATIENT)
Dept: WOUND CARE | Age: 78
Discharge: HOME | End: 2023-06-28
Payer: MEDICARE

## 2023-06-28 VITALS
RESPIRATION RATE: 18 BRPM | DIASTOLIC BLOOD PRESSURE: 42 MMHG | SYSTOLIC BLOOD PRESSURE: 132 MMHG | WEIGHT: 136 LBS | HEART RATE: 76 BPM | BODY MASS INDEX: 20.14 KG/M2 | TEMPERATURE: 96.6 F | HEIGHT: 69 IN

## 2023-06-28 DIAGNOSIS — E11.621 DIABETIC ULCER OF OTHER PART OF RIGHT FOOT ASSOCIATED WITH TYPE 2 DIABETES MELLITUS, WITH NECROSIS OF MUSCLE (HCC): ICD-10-CM

## 2023-06-28 DIAGNOSIS — E11.51 TYPE 2 DIABETES MELLITUS WITH DIABETIC PERIPHERAL ANGIOPATHY WITHOUT GANGRENE, WITH LONG-TERM CURRENT USE OF INSULIN (HCC): Primary | ICD-10-CM

## 2023-06-28 DIAGNOSIS — Z79.4 TYPE 2 DIABETES MELLITUS WITH DIABETIC PERIPHERAL ANGIOPATHY WITHOUT GANGRENE, WITH LONG-TERM CURRENT USE OF INSULIN (HCC): Primary | ICD-10-CM

## 2023-06-28 DIAGNOSIS — I70.234 ATHEROSCLEROSIS OF NATIVE ARTERY OF RIGHT LOWER EXTREMITY WITH ULCERATION OF MIDFOOT (HCC): Primary | ICD-10-CM

## 2023-06-28 DIAGNOSIS — L97.513 DIABETIC ULCER OF OTHER PART OF RIGHT FOOT ASSOCIATED WITH TYPE 2 DIABETES MELLITUS, WITH NECROSIS OF MUSCLE (HCC): ICD-10-CM

## 2023-06-28 PROCEDURE — 11042 DBRDMT SUBQ TIS 1ST 20SQCM/<: CPT

## 2023-06-28 RX ORDER — IBUPROFEN 200 MG
TABLET ORAL ONCE
OUTPATIENT
Start: 2023-06-28 | End: 2023-06-28

## 2023-06-28 RX ORDER — LIDOCAINE HYDROCHLORIDE 40 MG/ML
SOLUTION TOPICAL ONCE
Status: COMPLETED | OUTPATIENT
Start: 2023-06-28 | End: 2023-06-28

## 2023-06-28 RX ORDER — GINSENG 100 MG
CAPSULE ORAL ONCE
OUTPATIENT
Start: 2023-06-28 | End: 2023-06-28

## 2023-06-28 RX ORDER — BACITRACIN ZINC AND POLYMYXIN B SULFATE 500; 1000 [USP'U]/G; [USP'U]/G
OINTMENT TOPICAL ONCE
OUTPATIENT
Start: 2023-06-28 | End: 2023-06-28

## 2023-06-28 RX ORDER — SODIUM CHLOR/HYPOCHLOROUS ACID 0.033 %
SOLUTION, IRRIGATION IRRIGATION ONCE
OUTPATIENT
Start: 2023-06-28 | End: 2023-06-28

## 2023-06-28 RX ORDER — LIDOCAINE HYDROCHLORIDE 20 MG/ML
JELLY TOPICAL ONCE
OUTPATIENT
Start: 2023-06-28 | End: 2023-06-28

## 2023-06-28 RX ORDER — LIDOCAINE HYDROCHLORIDE 40 MG/ML
SOLUTION TOPICAL ONCE
OUTPATIENT
Start: 2023-06-28 | End: 2023-06-28

## 2023-06-28 RX ORDER — LIDOCAINE 40 MG/G
CREAM TOPICAL ONCE
OUTPATIENT
Start: 2023-06-28 | End: 2023-06-28

## 2023-06-28 RX ORDER — BLOOD SUGAR DIAGNOSTIC
1 STRIP MISCELLANEOUS DAILY
Qty: 100 EACH | Refills: 3 | Status: SHIPPED | OUTPATIENT
Start: 2023-06-28

## 2023-06-28 RX ORDER — GENTAMICIN SULFATE 1 MG/G
OINTMENT TOPICAL ONCE
OUTPATIENT
Start: 2023-06-28 | End: 2023-06-28

## 2023-06-28 RX ORDER — CLOBETASOL PROPIONATE 0.5 MG/G
OINTMENT TOPICAL ONCE
OUTPATIENT
Start: 2023-06-28 | End: 2023-06-28

## 2023-06-28 RX ORDER — LIDOCAINE 50 MG/G
OINTMENT TOPICAL ONCE
OUTPATIENT
Start: 2023-06-28 | End: 2023-06-28

## 2023-06-28 RX ORDER — BETAMETHASONE DIPROPIONATE 0.05 %
OINTMENT (GRAM) TOPICAL ONCE
OUTPATIENT
Start: 2023-06-28 | End: 2023-06-28

## 2023-06-28 RX ADMIN — LIDOCAINE HYDROCHLORIDE 10 ML: 40 SOLUTION TOPICAL at 08:54

## 2023-06-28 ASSESSMENT — PAIN DESCRIPTION - ORIENTATION: ORIENTATION: RIGHT

## 2023-06-28 ASSESSMENT — PAIN SCALES - GENERAL: PAINLEVEL_OUTOF10: 4

## 2023-06-28 ASSESSMENT — PAIN DESCRIPTION - ONSET: ONSET: ON-GOING

## 2023-06-28 ASSESSMENT — PAIN DESCRIPTION - FREQUENCY: FREQUENCY: CONTINUOUS

## 2023-06-28 ASSESSMENT — PAIN DESCRIPTION - PAIN TYPE: TYPE: CHRONIC PAIN

## 2023-06-28 ASSESSMENT — PAIN - FUNCTIONAL ASSESSMENT: PAIN_FUNCTIONAL_ASSESSMENT: PREVENTS OR INTERFERES SOME ACTIVE ACTIVITIES AND ADLS

## 2023-06-28 ASSESSMENT — PAIN DESCRIPTION - DESCRIPTORS: DESCRIPTORS: BURNING

## 2023-06-28 ASSESSMENT — PAIN DESCRIPTION - LOCATION: LOCATION: FOOT

## 2023-07-03 ENCOUNTER — OFFICE VISIT (OUTPATIENT)
Dept: CARDIOLOGY CLINIC | Age: 78
End: 2023-07-03
Payer: MEDICARE

## 2023-07-03 VITALS
DIASTOLIC BLOOD PRESSURE: 77 MMHG | HEIGHT: 69 IN | WEIGHT: 136 LBS | HEART RATE: 71 BPM | SYSTOLIC BLOOD PRESSURE: 147 MMHG | RESPIRATION RATE: 16 BRPM | BODY MASS INDEX: 20.14 KG/M2

## 2023-07-03 DIAGNOSIS — I50.20 HFREF (HEART FAILURE WITH REDUCED EJECTION FRACTION) (HCC): ICD-10-CM

## 2023-07-03 DIAGNOSIS — I25.10 CORONARY ARTERY DISEASE INVOLVING NATIVE CORONARY ARTERY OF NATIVE HEART WITHOUT ANGINA PECTORIS: Primary | ICD-10-CM

## 2023-07-03 PROBLEM — I45.10 RBBB: Status: RESOLVED | Noted: 2018-10-09 | Resolved: 2023-07-03

## 2023-07-03 PROBLEM — Z98.62 S/P PERIPHERAL ARTERY ANGIOPLASTY: Status: RESOLVED | Noted: 2023-04-12 | Resolved: 2023-07-03

## 2023-07-03 PROCEDURE — G8427 DOCREV CUR MEDS BY ELIG CLIN: HCPCS | Performed by: INTERNAL MEDICINE

## 2023-07-03 PROCEDURE — 99214 OFFICE O/P EST MOD 30 MIN: CPT | Performed by: INTERNAL MEDICINE

## 2023-07-03 PROCEDURE — 3077F SYST BP >= 140 MM HG: CPT | Performed by: INTERNAL MEDICINE

## 2023-07-03 PROCEDURE — 1124F ACP DISCUSS-NO DSCNMKR DOCD: CPT | Performed by: INTERNAL MEDICINE

## 2023-07-03 PROCEDURE — 93000 ELECTROCARDIOGRAM COMPLETE: CPT | Performed by: INTERNAL MEDICINE

## 2023-07-03 PROCEDURE — 3078F DIAST BP <80 MM HG: CPT | Performed by: INTERNAL MEDICINE

## 2023-07-03 PROCEDURE — G8420 CALC BMI NORM PARAMETERS: HCPCS | Performed by: INTERNAL MEDICINE

## 2023-07-03 PROCEDURE — 1036F TOBACCO NON-USER: CPT | Performed by: INTERNAL MEDICINE

## 2023-07-03 NOTE — PROGRESS NOTES
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 TTE 12/24/2022 Gerald) : EF is 15%. Severe LVH, grade 3 diastolic dysfunction. Moderate to severe RV dysfunction. Moderate mitral as well as tricuspid regurgitation  B. TTE 4/3/2023: EF 30-35%, mild - mod MR, mild RV dysfunction         Stage 3 chronic kidney disease ([address]) 05/18/2022    Spinal stenosis of lumbar region 11/16/2021    Lumbosacral spondylosis without myelopathy 11/16/2021    Lumbar radiculopathy 09/21/2020    Diabetes mellitus type 2 in nonobese [facility]) 09/10/2020    PVD (peripheral vascular disease) ([address]) 10/07/2019    Atherosclerosis of native artery of right lower extremity with ulceration of midfoot ([address]) 10/19/2018    Coronary artery disease involving native coronary artery of native heart without angina pectoris 10/09/2018     Overview Note:     A. ACB 2004 [facility]): 3 vessel, no other details   B. Lexiscan 2021:  Large fixed inferior defect        Mixed hyperlipidemia 10/06/2018    Hypertension 06/11/2018       Current Outpatient Medications   Medication Sig Dispense Refill    torsemide (DEMADEX) 20 MG tablet Only take a tablet on the days you gain more than 3lbs or have worse swelling or worse shortness of breath, Heart failure symptoms like you did before.  90 tablet 1    ELIQUIS 2.5 MG TABS tablet TAKE 1 TABLET BY MOUTH TWICE  DAILY 180 tablet 3    ENTRESTO 49-51 MG per tablet TAKE 1 TABLET BY MOUTH TWICE  DAILY 180 tablet 1    glipiZIDE (GLUCOTROL) 10 MG tablet TAKE 1 TABLET TWICE A DAY BEFORE MEALS

## 2023-07-11 ENCOUNTER — HOSPITAL ENCOUNTER (OUTPATIENT)
Dept: OTHER | Age: 78
Setting detail: THERAPIES SERIES
Discharge: HOME | End: 2023-07-11
Payer: MEDICARE

## 2023-07-11 VITALS
DIASTOLIC BLOOD PRESSURE: 64 MMHG | BODY MASS INDEX: 20.08 KG/M2 | HEART RATE: 71 BPM | SYSTOLIC BLOOD PRESSURE: 134 MMHG | WEIGHT: 136 LBS | OXYGEN SATURATION: 95 % | RESPIRATION RATE: 16 BRPM

## 2023-07-11 LAB
ANION GAP SERPL CALCULATED.3IONS-SCNC: 12 MMOL/L (ref 7–16)
BNP BLD-MCNC: 2749 PG/ML (ref 0–450)
BUN SERPL-MCNC: 30 MG/DL (ref 6–23)
CALCIUM SERPL-MCNC: 10.3 MG/DL (ref 8.6–10.2)
CHLORIDE SERPL-SCNC: 103 MMOL/L (ref 98–107)
CO2 SERPL-SCNC: 20 MMOL/L (ref 22–29)
CREAT SERPL-MCNC: 1.8 MG/DL (ref 0.7–1.2)
GLUCOSE SERPL-MCNC: 160 MG/DL (ref 74–99)
POTASSIUM SERPL-SCNC: 5.1 MMOL/L (ref 3.5–5)
SODIUM SERPL-SCNC: 135 MMOL/L (ref 132–146)

## 2023-07-11 PROCEDURE — 96374 THER/PROPH/DIAG INJ IV PUSH: CPT

## 2023-07-11 PROCEDURE — 80048 BASIC METABOLIC PNL TOTAL CA: CPT

## 2023-07-11 PROCEDURE — 83880 ASSAY OF NATRIURETIC PEPTIDE: CPT

## 2023-07-11 PROCEDURE — 99214 OFFICE O/P EST MOD 30 MIN: CPT

## 2023-07-11 PROCEDURE — 6360000002 HC RX W HCPCS

## 2023-07-11 PROCEDURE — 2580000003 HC RX 258

## 2023-07-11 PROCEDURE — 36415 COLL VENOUS BLD VENIPUNCTURE: CPT

## 2023-07-11 RX ORDER — SODIUM CHLORIDE 0.9 % (FLUSH) 0.9 %
SYRINGE (ML) INJECTION
Status: COMPLETED
Start: 2023-07-11 | End: 2023-07-11

## 2023-07-11 RX ORDER — FUROSEMIDE 10 MG/ML
INJECTION INTRAMUSCULAR; INTRAVENOUS
Status: COMPLETED
Start: 2023-07-11 | End: 2023-07-11

## 2023-07-11 RX ORDER — FUROSEMIDE 10 MG/ML
40 INJECTION INTRAMUSCULAR; INTRAVENOUS ONCE
Status: DISCONTINUED | OUTPATIENT
Start: 2023-07-11 | End: 2023-07-12 | Stop reason: HOSPADM

## 2023-07-11 RX ORDER — SODIUM CHLORIDE 0.9 % (FLUSH) 0.9 %
10 SYRINGE (ML) INJECTION ONCE
Status: DISCONTINUED | OUTPATIENT
Start: 2023-07-11 | End: 2023-07-12 | Stop reason: HOSPADM

## 2023-07-11 RX ADMIN — FUROSEMIDE 40 MG: 10 INJECTION, SOLUTION INTRAMUSCULAR; INTRAVENOUS at 10:16

## 2023-07-11 RX ADMIN — SODIUM CHLORIDE, PRESERVATIVE FREE 10 ML: 5 INJECTION INTRAVENOUS at 10:17

## 2023-07-11 ASSESSMENT — PATIENT HEALTH QUESTIONNAIRE - PHQ9
1. LITTLE INTEREST OR PLEASURE IN DOING THINGS: NOT AT ALL
2. FEELING DOWN, DEPRESSED OR HOPELESS: NOT AT ALL
SUM OF ALL RESPONSES TO PHQ9 QUESTIONS 1 & 2: 0

## 2023-07-11 NOTE — PROGRESS NOTES
Congestive Heart Failure [address]   1945          Referring Provider: Dr. Jennifer Alexandra  Primary Care Physician: Dr. Jayden Munson  Cardiologist: Dr. Real Fields  Nephrologist:         History of Present Illness:     Rehan Fregoso is a 69 y.o. male with a history of HFiEF post Lifevest, most recent EF 30-35% on 4/3/2023. Patient Story:    He does not  have dyspnea with exertion, shortness of breath, or decline in overall functional capacity. He have orthopnea, sleeps both in bed and recliner PND, nocturnal cough or hemoptysis. He does not have abdominal distention or bloating, early satiety, anorexia/change in appetite. He does has a good urinary response to oral diuretic. He does not have lower extremity edema. He denies lightheadedness, dizziness. He denies palpitations, syncope or near syncope. He does not complain of chest pain, pressure, discomfort. Allergies   Allergen Reactions    Ciprofloxacin Hives           Pcn [Penicillins] Hives    Sulfa Antibiotics Hives and Other (See Comments)           Current Outpatient Medications on File Prior to Encounter   Medication Sig Dispense Refill    torsemide (DEMADEX) 20 MG tablet Only take a tablet on the days you gain more than 3lbs or have worse swelling or worse shortness of breath, Heart failure symptoms like you did before.  90 tablet 1    ELIQUIS 2.5 MG TABS tablet TAKE 1 TABLET BY MOUTH TWICE  DAILY 180 tablet 3    ENTRESTO 49-51 MG per tablet TAKE 1 TABLET BY MOUTH TWICE  DAILY 180 tablet 1    glipiZIDE (GLUCOTROL) 10 MG tablet TAKE 1 TABLET TWICE A DAY BEFORE MEALS 180 tablet 3    JANUVIA 100 MG tablet TAKE 1 TABLET DAILY 90 tablet 3    metFORMIN (GLUCOPHAGE) 1000 MG tablet TAKE 1 TABLET TWICE A DAY WITH MEALS 180 tablet 3    insulin glargine (LANTUS SOLOSTAR) 100 UNIT/ML injection pen Inject 10 Units into the skin nightly 5 Adjustable Dose Pre-filled Pen Syringe 5    rosuvastatin

## 2023-07-12 ENCOUNTER — HOSPITAL ENCOUNTER (OUTPATIENT)
Dept: WOUND CARE | Age: 78
Discharge: HOME | End: 2023-07-12
Payer: MEDICARE

## 2023-07-12 VITALS
TEMPERATURE: 98.2 F | BODY MASS INDEX: 20.14 KG/M2 | RESPIRATION RATE: 18 BRPM | HEIGHT: 69 IN | WEIGHT: 136 LBS | DIASTOLIC BLOOD PRESSURE: 45 MMHG | HEART RATE: 71 BPM | SYSTOLIC BLOOD PRESSURE: 90 MMHG

## 2023-07-12 DIAGNOSIS — L97.513 DIABETIC ULCER OF OTHER PART OF RIGHT FOOT ASSOCIATED WITH TYPE 2 DIABETES MELLITUS, WITH NECROSIS OF MUSCLE (HCC): ICD-10-CM

## 2023-07-12 DIAGNOSIS — I70.234 ATHEROSCLEROSIS OF NATIVE ARTERY OF RIGHT LOWER EXTREMITY WITH ULCERATION OF MIDFOOT (HCC): Primary | ICD-10-CM

## 2023-07-12 DIAGNOSIS — E11.621 DIABETIC ULCER OF OTHER PART OF RIGHT FOOT ASSOCIATED WITH TYPE 2 DIABETES MELLITUS, WITH NECROSIS OF MUSCLE (HCC): ICD-10-CM

## 2023-07-12 PROCEDURE — 11042 DBRDMT SUBQ TIS 1ST 20SQCM/<: CPT

## 2023-07-12 RX ORDER — SODIUM CHLOR/HYPOCHLOROUS ACID 0.033 %
SOLUTION, IRRIGATION IRRIGATION ONCE
OUTPATIENT
Start: 2023-07-12 | End: 2023-07-12

## 2023-07-12 RX ORDER — HYDRALAZINE HYDROCHLORIDE 25 MG/1
25 TABLET, FILM COATED ORAL 3 TIMES DAILY
Qty: 90 TABLET | Refills: 3 | Status: SHIPPED | OUTPATIENT
Start: 2023-07-12

## 2023-07-12 RX ORDER — ISOSORBIDE DINITRATE 10 MG/1
5 TABLET ORAL 3 TIMES DAILY
Qty: 90 TABLET | Refills: 3 | Status: SHIPPED | OUTPATIENT
Start: 2023-07-12

## 2023-07-12 RX ORDER — LIDOCAINE HYDROCHLORIDE 40 MG/ML
SOLUTION TOPICAL ONCE
OUTPATIENT
Start: 2023-07-12 | End: 2023-07-12

## 2023-07-12 RX ORDER — BETAMETHASONE DIPROPIONATE 0.05 %
OINTMENT (GRAM) TOPICAL ONCE
OUTPATIENT
Start: 2023-07-12 | End: 2023-07-12

## 2023-07-12 RX ORDER — GINSENG 100 MG
CAPSULE ORAL ONCE
OUTPATIENT
Start: 2023-07-12 | End: 2023-07-12

## 2023-07-12 RX ORDER — LIDOCAINE HYDROCHLORIDE 40 MG/ML
SOLUTION TOPICAL ONCE
Status: COMPLETED | OUTPATIENT
Start: 2023-07-12 | End: 2023-07-12

## 2023-07-12 RX ORDER — BACITRACIN ZINC AND POLYMYXIN B SULFATE 500; 1000 [USP'U]/G; [USP'U]/G
OINTMENT TOPICAL ONCE
OUTPATIENT
Start: 2023-07-12 | End: 2023-07-12

## 2023-07-12 RX ORDER — GENTAMICIN SULFATE 1 MG/G
OINTMENT TOPICAL ONCE
OUTPATIENT
Start: 2023-07-12 | End: 2023-07-12

## 2023-07-12 RX ORDER — LIDOCAINE 50 MG/G
OINTMENT TOPICAL ONCE
OUTPATIENT
Start: 2023-07-12 | End: 2023-07-12

## 2023-07-12 RX ORDER — CLOBETASOL PROPIONATE 0.5 MG/G
OINTMENT TOPICAL ONCE
OUTPATIENT
Start: 2023-07-12 | End: 2023-07-12

## 2023-07-12 RX ORDER — LIDOCAINE 40 MG/G
CREAM TOPICAL ONCE
OUTPATIENT
Start: 2023-07-12 | End: 2023-07-12

## 2023-07-12 RX ORDER — LIDOCAINE HYDROCHLORIDE 20 MG/ML
JELLY TOPICAL ONCE
OUTPATIENT
Start: 2023-07-12 | End: 2023-07-12

## 2023-07-12 RX ORDER — IBUPROFEN 200 MG
TABLET ORAL ONCE
OUTPATIENT
Start: 2023-07-12 | End: 2023-07-12

## 2023-07-12 RX ADMIN — LIDOCAINE HYDROCHLORIDE 10 ML: 40 SOLUTION TOPICAL at 08:53

## 2023-07-12 ASSESSMENT — PAIN DESCRIPTION - ORIENTATION: ORIENTATION: RIGHT

## 2023-07-12 ASSESSMENT — PAIN DESCRIPTION - DESCRIPTORS: DESCRIPTORS: BURNING

## 2023-07-12 ASSESSMENT — PAIN SCALES - GENERAL: PAINLEVEL_OUTOF10: 4

## 2023-07-12 ASSESSMENT — PAIN DESCRIPTION - LOCATION: LOCATION: FOOT

## 2023-07-12 ASSESSMENT — PAIN DESCRIPTION - ONSET: ONSET: ON-GOING

## 2023-07-12 ASSESSMENT — PAIN DESCRIPTION - PAIN TYPE: TYPE: CHRONIC PAIN

## 2023-07-12 ASSESSMENT — PAIN DESCRIPTION - FREQUENCY: FREQUENCY: CONTINUOUS

## 2023-07-12 ASSESSMENT — PAIN - FUNCTIONAL ASSESSMENT: PAIN_FUNCTIONAL_ASSESSMENT: PREVENTS OR INTERFERES SOME ACTIVE ACTIVITIES AND ADLS

## 2023-07-12 NOTE — PLAN OF CARE
Problem: Blood Glucose:  Goal: Ability to maintain appropriate glucose levels will improve  Description: Ability to maintain appropriate glucose levels will improve  Outcome: Progressing     Problem: Cognitive:  Goal: Knowledge of wound care  Description: Knowledge of wound care  Outcome: Adequate for Discharge  Goal: Understands risk factors for wounds  Description: Understands risk factors for wounds  Outcome: Adequate for Discharge     Problem: Wound:  Goal: Will show signs of wound healing; wound closure and no evidence of infection  Description: Will show signs of wound healing; wound closure and no evidence of infection  Outcome: Not Progressing     Problem: Arterial:  Goal: Optimize blood flow for wound healing  Description: Optimize blood flow for wound healing  Outcome: Not Progressing     Problem: Venous:  Goal: Signs of wound healing will improve  Description: Signs of wound healing will improve  Outcome: Not Progressing

## 2023-07-12 NOTE — PROGRESS NOTES
I spoke to Mr Paradise Narayan and instructed him on orders from [facility] [place] Aldactone and  Agnes Meyers, Start Hydralazine 25mg TID and Isordil 5mg TID and may discontinue LIfe Vest and send back to company, Mr Paradise Narayan verbalizes understanding of above orders

## 2023-07-13 DIAGNOSIS — I10 HYPERTENSION, UNSPECIFIED TYPE: ICD-10-CM

## 2023-07-13 RX ORDER — METOPROLOL SUCCINATE 25 MG/1
TABLET, EXTENDED RELEASE ORAL
Qty: 180 TABLET | Refills: 3 | Status: SHIPPED | OUTPATIENT
Start: 2023-07-13

## 2023-07-13 RX ORDER — ROSUVASTATIN CALCIUM 10 MG/1
TABLET, COATED ORAL
Qty: 90 TABLET | Refills: 3 | Status: SHIPPED | OUTPATIENT
Start: 2023-07-13

## 2023-07-13 NOTE — PROGRESS NOTES
cm 07/12/23 0931   Post-Procedure Width (cm) 8 cm 07/12/23 0931   Post-Procedure Depth (cm) 0.2 cm 07/12/23 0931   Post-Procedure Surface Area (cm^2) 33.6 cm^2 07/12/23 0931   Post-Procedure Volume (cm^3) 6.72 cm^3 07/12/23 0931   Wound Assessment Eschar dry 07/12/23 0838   Drainage Amount None 07/12/23 0838   Odor None 07/12/23 0838   Iva-wound Assessment Dry/flaky 07/12/23 0838   Number of days: 91       Wound 04/12/23 #3 Right Heel (Active)   Wound Image   06/07/23 0843   Wound Etiology Diabetic 06/21/23 0839   Dressing Status New dressing applied;Clean;Dry; Intact 07/12/23 0948   Wound Cleansed Betadine/povidone iodine 07/12/23 0948   Dressing/Treatment ABD; Alginate;Roll gauze 07/12/23 0948   Offloading for Diabetic Foot Ulcers Air cast boot 06/28/23 1000   Wound Length (cm) 5.5 cm 07/12/23 0838   Wound Width (cm) 5.5 cm 07/12/23 0838   Wound Depth (cm) 0.1 cm 07/12/23 0838   Wound Surface Area (cm^2) 30.25 cm^2 07/12/23 0838   Change in Wound Size % (l*w) -1382.84 07/12/23 0838   Wound Volume (cm^3) 3.025 cm^3 07/12/23 0838   Wound Healing % -1383 07/12/23 0838   Post-Procedure Length (cm) 5.5 cm 07/12/23 0931   Post-Procedure Width (cm) 5.5 cm 07/12/23 0931   Post-Procedure Depth (cm) 0.2 cm 07/12/23 0931   Post-Procedure Surface Area (cm^2) 30.25 cm^2 07/12/23 0931   Post-Procedure Volume (cm^3) 6.05 cm^3 07/12/23 0931   Wound Assessment Eschar dry 07/12/23 0838   Drainage Amount None 07/12/23 0838   Drainage Description Serosanguinous 05/24/23 0853   Odor None 07/12/23 0838   Iva-wound Assessment Dry/flaky 07/12/23 0838   Number of days: 91     Incision 11/22/21 Back (Active)   Number of days: 597       Incision 01/06/22 Back (Active)   Number of days: 552       Incision 04/11/22 Back (Active)   Number of days: 457       Procedure Note  Indications:  Based on my examination of this patient's wound(s)/ulcer(s) today, debridement is required to promote healing and evaluate the wound base.     Performed by:

## 2023-07-14 DIAGNOSIS — E11.51 TYPE 2 DIABETES MELLITUS WITH DIABETIC PERIPHERAL ANGIOPATHY WITHOUT GANGRENE, WITH LONG-TERM CURRENT USE OF INSULIN (HCC): ICD-10-CM

## 2023-07-14 DIAGNOSIS — Z79.4 TYPE 2 DIABETES MELLITUS WITH DIABETIC PERIPHERAL ANGIOPATHY WITHOUT GANGRENE, WITH LONG-TERM CURRENT USE OF INSULIN (HCC): ICD-10-CM

## 2023-07-14 RX ORDER — FLURBIPROFEN SODIUM 0.3 MG/ML
SOLUTION/ DROPS OPHTHALMIC
Refills: 3 | OUTPATIENT
Start: 2023-07-14

## 2023-07-20 ENCOUNTER — HOSPITAL ENCOUNTER (OUTPATIENT)
Dept: OTHER | Age: 78
Setting detail: THERAPIES SERIES
Discharge: HOME | End: 2023-07-20
Payer: MEDICARE

## 2023-07-20 ENCOUNTER — TELEPHONE (OUTPATIENT)
Dept: OTHER | Age: 78
End: 2023-07-20

## 2023-07-20 VITALS
WEIGHT: 136 LBS | BODY MASS INDEX: 20.08 KG/M2 | RESPIRATION RATE: 18 BRPM | SYSTOLIC BLOOD PRESSURE: 143 MMHG | DIASTOLIC BLOOD PRESSURE: 72 MMHG | OXYGEN SATURATION: 98 % | HEART RATE: 80 BPM

## 2023-07-20 LAB
ANION GAP SERPL CALCULATED.3IONS-SCNC: 12 MMOL/L (ref 7–16)
BNP SERPL-MCNC: 7797 PG/ML (ref 0–450)
BUN SERPL-MCNC: 27 MG/DL (ref 6–23)
CALCIUM SERPL-MCNC: 9.7 MG/DL (ref 8.6–10.2)
CHLORIDE SERPL-SCNC: 104 MMOL/L (ref 98–107)
CO2 SERPL-SCNC: 20 MMOL/L (ref 22–29)
CREAT SERPL-MCNC: 1.5 MG/DL (ref 0.7–1.2)
GFR SERPL CREATININE-BSD FRML MDRD: 49 ML/MIN/1.73M2
GLUCOSE SERPL-MCNC: 224 MG/DL (ref 74–99)
POTASSIUM SERPL-SCNC: 4.6 MMOL/L (ref 3.5–5)
SODIUM SERPL-SCNC: 136 MMOL/L (ref 132–146)

## 2023-07-20 PROCEDURE — 36415 COLL VENOUS BLD VENIPUNCTURE: CPT

## 2023-07-20 PROCEDURE — 99214 OFFICE O/P EST MOD 30 MIN: CPT

## 2023-07-20 PROCEDURE — 80048 BASIC METABOLIC PNL TOTAL CA: CPT

## 2023-07-20 PROCEDURE — 83880 ASSAY OF NATRIURETIC PEPTIDE: CPT

## 2023-07-20 RX ORDER — ISOSORBIDE DINITRATE 10 MG/1
10 TABLET ORAL 3 TIMES DAILY
Qty: 90 TABLET | Refills: 3 | Status: SHIPPED | OUTPATIENT
Start: 2023-07-20

## 2023-07-20 NOTE — PROGRESS NOTES
Potassium reflex Magnesium (mmol/L)   Date Value   04/04/2023 4.1   03/07/2023 4.1   12/25/2022 3.7     Chloride (mmol/L)   Date Value   07/20/2023 104   07/11/2023 103   06/13/2023 105     CO2 (mmol/L)   Date Value   07/20/2023 20 (L)   07/11/2023 20 (L)   06/13/2023 21 (L)     BUN (mg/dL)   Date Value   07/20/2023 27 (H)   07/11/2023 30 (H)   06/13/2023 38 (H)     Glucose (mg/dL)   Date Value   07/20/2023 224 (H)   07/11/2023 160 (H)   06/13/2023 85     Calcium (mg/dL)   Date Value   07/20/2023 9.7   07/11/2023 10.3 (H)   06/13/2023 9.9       Last 3 BNP       Pro-BNP (pg/mL)   Date Value   07/20/2023 7,797 (H)   07/11/2023 2,749 (H)   06/13/2023 3,084 (H)          CBC: No results for input(s): WBC, HGB, PLT in the last 72 hours. BMP:    Recent Labs     07/20/23  0917      K 4.6      CO2 20*   BUN 27*   CREATININE 1.5*   GLUCOSE 224*                       Hepatic: No results for input(s): AST, ALT, ALB, BILITOT, ALKPHOS in the last 72 hours. Troponin: No results for input(s): TROPONINI in the last 72 hours. BNP: No results for input(s): BNP in the last 72 hours. Lipids: No results for input(s): CHOL, HDL in the last 72 hours. Invalid input(s): LDLCALCU  INR: No results for input(s): INR in the last 72 hours. WEIGHTS:    Wt Readings from Last 3 Encounters:   07/20/23 136 lb (61.7 kg)   07/12/23 136 lb (61.7 kg)   07/11/23 136 lb (61.7 kg)         TELEMETRY:  Cardiac Regularity: Regular  Cardiac Rhythm/Interpretation: NSR        ASSESSMENT:  Lu Mcleod is euvolemic with stable weight. Since previous CHF clinic visit, cardiology discontinued Aldactone and Entresto. Patient was started on started on hydralazine 25mg TID and Isordil 5mg TID and was told he may discontinue LIfe Vest -awaiting CRT-D, see cardiology note 7/3/23. Spoke with Ariel Obrien regarding lab results.  Per Marie-despite rise in pro-bnp, euvolemic on examination and likely due to recent changes in

## 2023-07-20 NOTE — TELEPHONE ENCOUNTER
Labs and CHF clinic note reviewed  Vitals: BP (!) 143/72   Pulse 80     Spoke with Alize Desouza RN in [address] [place] [address] Blvd despite rise in pro-bnp. Euvolemic on examination and likely due to recent changes in meds.      Will increase isordil to 10 mg daily   Continue PRN diuretic   Follow up as scheduled    Thank you

## 2023-07-21 NOTE — DISCHARGE INSTRUCTIONS
Visit Discharge/Physician Orders     Discharge condition: Stable     Assessment of pain at discharge: yes     Anesthetic used: 4% lidocaine solution     Discharge to: Home     Left via:Private automobile     Accompanied by: spouse     ECF/HHA: supplies from [employer]     Dressing Orders: RIGHT GREAT TOE/LATERAL FOOT/HEEL- Paint with betadine to blackened areas, apply calcium alginate open areas, dry dressing and secure. Change daily. Place aquacel between 1st and 2nd toes and gauze between the rest.    Guaze between toes to keep toes . Treatment Orders:Eat a diet high in protein and vitamin C. Take a multiple vitamin daily unless contraindicated. Wear offloading boot. [facility] followup visit : 2 weeks___________________________  (Please note your next appointment above and if you are unable to keep, kindly give a 24 hour notice. Thank you.)     Physician signature:__________________________     If you experience any of the following, please call the [facility] during business hours:     * Increase in Pain  * Temperature over 101  * Increase in drainage from your wound  * Drainage with a foul odor  * Bleeding  * Increase in swelling  * Need for compression bandage changes due to slippage, breakthrough drainage. If you need medical attention outside of the business hours of the [facility] please contact your PCP or go to the nearest emergency room.

## 2023-07-26 ENCOUNTER — HOSPITAL ENCOUNTER (OUTPATIENT)
Dept: WOUND CARE | Age: 78
Discharge: HOME | End: 2023-07-26
Payer: MEDICARE

## 2023-07-26 VITALS
HEIGHT: 69 IN | WEIGHT: 136 LBS | HEART RATE: 86 BPM | TEMPERATURE: 100.1 F | SYSTOLIC BLOOD PRESSURE: 126 MMHG | BODY MASS INDEX: 20.14 KG/M2 | DIASTOLIC BLOOD PRESSURE: 58 MMHG | RESPIRATION RATE: 18 BRPM

## 2023-07-26 DIAGNOSIS — I70.234 ATHEROSCLEROSIS OF NATIVE ARTERY OF RIGHT LOWER EXTREMITY WITH ULCERATION OF MIDFOOT (HCC): Primary | ICD-10-CM

## 2023-07-26 DIAGNOSIS — L97.513 DIABETIC ULCER OF OTHER PART OF RIGHT FOOT ASSOCIATED WITH TYPE 2 DIABETES MELLITUS, WITH NECROSIS OF MUSCLE (HCC): ICD-10-CM

## 2023-07-26 DIAGNOSIS — E11.621 DIABETIC ULCER OF OTHER PART OF RIGHT FOOT ASSOCIATED WITH TYPE 2 DIABETES MELLITUS, WITH NECROSIS OF MUSCLE (HCC): ICD-10-CM

## 2023-07-26 PROCEDURE — 11042 DBRDMT SUBQ TIS 1ST 20SQCM/<: CPT | Performed by: SURGERY

## 2023-07-26 PROCEDURE — 11042 DBRDMT SUBQ TIS 1ST 20SQCM/<: CPT

## 2023-07-26 RX ORDER — LIDOCAINE HYDROCHLORIDE 40 MG/ML
SOLUTION TOPICAL ONCE
OUTPATIENT
Start: 2023-07-26 | End: 2023-07-26

## 2023-07-26 RX ORDER — LIDOCAINE 50 MG/G
OINTMENT TOPICAL ONCE
OUTPATIENT
Start: 2023-07-26 | End: 2023-07-26

## 2023-07-26 RX ORDER — LIDOCAINE HYDROCHLORIDE 20 MG/ML
JELLY TOPICAL ONCE
OUTPATIENT
Start: 2023-07-26 | End: 2023-07-26

## 2023-07-26 RX ORDER — LIDOCAINE 40 MG/G
CREAM TOPICAL ONCE
OUTPATIENT
Start: 2023-07-26 | End: 2023-07-26

## 2023-07-26 RX ORDER — IBUPROFEN 200 MG
TABLET ORAL ONCE
OUTPATIENT
Start: 2023-07-26 | End: 2023-07-26

## 2023-07-26 RX ORDER — SODIUM CHLOR/HYPOCHLOROUS ACID 0.033 %
SOLUTION, IRRIGATION IRRIGATION ONCE
OUTPATIENT
Start: 2023-07-26 | End: 2023-07-26

## 2023-07-26 RX ORDER — GENTAMICIN SULFATE 1 MG/G
OINTMENT TOPICAL ONCE
OUTPATIENT
Start: 2023-07-26 | End: 2023-07-26

## 2023-07-26 RX ORDER — BETAMETHASONE DIPROPIONATE 0.05 %
OINTMENT (GRAM) TOPICAL ONCE
OUTPATIENT
Start: 2023-07-26 | End: 2023-07-26

## 2023-07-26 RX ORDER — LIDOCAINE HYDROCHLORIDE 40 MG/ML
SOLUTION TOPICAL ONCE
Status: COMPLETED | OUTPATIENT
Start: 2023-07-26 | End: 2023-07-26

## 2023-07-26 RX ORDER — GINSENG 100 MG
CAPSULE ORAL ONCE
OUTPATIENT
Start: 2023-07-26 | End: 2023-07-26

## 2023-07-26 RX ORDER — CLOBETASOL PROPIONATE 0.5 MG/G
OINTMENT TOPICAL ONCE
OUTPATIENT
Start: 2023-07-26 | End: 2023-07-26

## 2023-07-26 RX ORDER — BACITRACIN ZINC AND POLYMYXIN B SULFATE 500; 1000 [USP'U]/G; [USP'U]/G
OINTMENT TOPICAL ONCE
OUTPATIENT
Start: 2023-07-26 | End: 2023-07-26

## 2023-07-26 RX ADMIN — LIDOCAINE HYDROCHLORIDE 12 ML: 40 SOLUTION TOPICAL at 08:51

## 2023-07-26 ASSESSMENT — PAIN DESCRIPTION - ORIENTATION: ORIENTATION: RIGHT

## 2023-07-26 ASSESSMENT — PAIN DESCRIPTION - FREQUENCY: FREQUENCY: INTERMITTENT

## 2023-07-26 ASSESSMENT — PAIN DESCRIPTION - PAIN TYPE: TYPE: CHRONIC PAIN

## 2023-07-26 ASSESSMENT — PAIN DESCRIPTION - LOCATION: LOCATION: FOOT

## 2023-07-26 ASSESSMENT — PAIN DESCRIPTION - ONSET: ONSET: ON-GOING

## 2023-07-26 ASSESSMENT — PAIN - FUNCTIONAL ASSESSMENT: PAIN_FUNCTIONAL_ASSESSMENT: PREVENTS OR INTERFERES SOME ACTIVE ACTIVITIES AND ADLS

## 2023-07-26 ASSESSMENT — PAIN SCALES - GENERAL: PAINLEVEL_OUTOF10: 5

## 2023-07-26 ASSESSMENT — PAIN DESCRIPTION - DESCRIPTORS: DESCRIPTORS: ACHING

## 2023-07-26 NOTE — PLAN OF CARE
Problem: Chronic Conditions and Co-morbidities  Goal: Patient's chronic conditions and co-morbidity symptoms are monitored and maintained or improved  Outcome: Progressing     Problem: Pain  Goal: Verbalizes/displays adequate comfort level or baseline comfort level  Outcome: Progressing     Problem: Cognitive:  Goal: Knowledge of wound care  Description: Knowledge of wound care  Outcome: Progressing  Goal: Understands risk factors for wounds  Description: Understands risk factors for wounds  Outcome: Progressing     Problem: Wound:  Goal: Will show signs of wound healing; wound closure and no evidence of infection  Description: Will show signs of wound healing; wound closure and no evidence of infection  Outcome: Progressing     Problem: Arterial:  Goal: Optimize blood flow for wound healing  Description: Optimize blood flow for wound healing  Outcome: Adequate for Discharge

## 2023-07-26 NOTE — PROGRESS NOTES
studies to be scheduled- post angiogram    [facility] followup visit : 1 week_____________________________  (Please note your next appointment above and if you are unable to keep, kindly give a 24 hour notice. Thank you.)    Physician signature:__________________________    If you experience any of the following, please call the [facility] during business hours:    * Increase in Pain  * Temperature over 101  * Increase in drainage from your wound  * Drainage with a foul odor  * Bleeding  * Increase in swelling  * Need for compression bandage changes due to slippage, breakthrough drainage. If you need medical attention outside of the business hours of the [facility] please contact your PCP or go to the nearest emergency room.      Electronically signed by Felipe Huerta MD

## 2023-07-30 ENCOUNTER — HOSPITAL ENCOUNTER (INPATIENT)
Age: 78
LOS: 2 days | Discharge: HOME | DRG: 872 | End: 2023-08-02
Attending: STUDENT IN AN ORGANIZED HEALTH CARE EDUCATION/TRAINING PROGRAM | Admitting: FAMILY MEDICINE
Payer: MEDICARE

## 2023-07-30 ENCOUNTER — APPOINTMENT (OUTPATIENT)
Dept: GENERAL RADIOLOGY | Age: 78
DRG: 872 | End: 2023-07-30
Payer: MEDICARE

## 2023-07-30 ENCOUNTER — APPOINTMENT (OUTPATIENT)
Dept: CT IMAGING | Age: 78
DRG: 872 | End: 2023-07-30
Payer: MEDICARE

## 2023-07-30 DIAGNOSIS — S81.801A LEG WOUND, RIGHT, INITIAL ENCOUNTER: ICD-10-CM

## 2023-07-30 DIAGNOSIS — R41.82 ALTERED MENTAL STATUS, UNSPECIFIED ALTERED MENTAL STATUS TYPE: Primary | ICD-10-CM

## 2023-07-30 DIAGNOSIS — R73.9 HYPERGLYCEMIA: ICD-10-CM

## 2023-07-30 LAB
ALBUMIN SERPL-MCNC: 3.4 G/DL (ref 3.5–5.2)
ALP SERPL-CCNC: 174 U/L (ref 40–129)
ALT SERPL-CCNC: 10 U/L (ref 0–40)
ANION GAP SERPL CALCULATED.3IONS-SCNC: 17 MMOL/L (ref 7–16)
AST SERPL-CCNC: 35 U/L (ref 0–39)
B-OH-BUTYR SERPL-MCNC: 1.09 MMOL/L (ref 0.02–0.27)
BASOPHILS # BLD: 0.07 K/UL (ref 0–0.2)
BASOPHILS NFR BLD: 0 % (ref 0–2)
BILIRUB SERPL-MCNC: 0.9 MG/DL (ref 0–1.2)
BNP SERPL-MCNC: ABNORMAL PG/ML (ref 0–450)
BUN SERPL-MCNC: 39 MG/DL (ref 6–23)
CALCIUM SERPL-MCNC: 9.2 MG/DL (ref 8.6–10.2)
CHLORIDE SERPL-SCNC: 92 MMOL/L (ref 98–107)
CK SERPL-CCNC: 75 U/L (ref 20–200)
CO2 SERPL-SCNC: 19 MMOL/L (ref 22–29)
CREAT SERPL-MCNC: 1.5 MG/DL (ref 0.7–1.2)
EOSINOPHIL # BLD: 0 K/UL (ref 0.05–0.5)
EOSINOPHILS RELATIVE PERCENT: 0 % (ref 0–6)
ERYTHROCYTE [DISTWIDTH] IN BLOOD BY AUTOMATED COUNT: 15.2 % (ref 11.5–15)
GFR SERPL CREATININE-BSD FRML MDRD: 47 ML/MIN/1.73M2
GLUCOSE SERPL-MCNC: 465 MG/DL (ref 74–99)
HCT VFR BLD AUTO: 26.5 % (ref 37–54)
HGB BLD-MCNC: 8.2 G/DL (ref 12.5–16.5)
IMM GRANULOCYTES # BLD AUTO: 0.22 K/UL (ref 0–0.58)
IMM GRANULOCYTES NFR BLD: 1 % (ref 0–5)
LACTATE BLDV-SCNC: 2.7 MMOL/L (ref 0.5–1.9)
LIPASE SERPL-CCNC: 29 U/L (ref 13–60)
LYMPHOCYTES NFR BLD: 1.02 K/UL (ref 1.5–4)
LYMPHOCYTES RELATIVE PERCENT: 5 % (ref 20–42)
MCH RBC QN AUTO: 26.6 PG (ref 26–35)
MCHC RBC AUTO-ENTMCNC: 30.9 G/DL (ref 32–34.5)
MCV RBC AUTO: 86 FL (ref 80–99.9)
METER GLUCOSE: 454 MG/DL (ref 74–99)
MONOCYTES NFR BLD: 1.17 K/UL (ref 0.1–0.95)
MONOCYTES NFR BLD: 6 % (ref 2–12)
NEUTROPHILS NFR BLD: 88 % (ref 43–80)
NEUTS SEG NFR BLD: 17.81 K/UL (ref 1.8–7.3)
PH VENOUS: 7.36 (ref 7.35–7.45)
PLATELET # BLD AUTO: 385 K/UL (ref 130–450)
PMV BLD AUTO: 10.2 FL (ref 7–12)
POTASSIUM SERPL-SCNC: 5 MMOL/L (ref 3.5–5)
PROT SERPL-MCNC: 7.8 G/DL (ref 6.4–8.3)
RBC # BLD AUTO: 3.08 M/UL (ref 3.8–5.8)
SODIUM SERPL-SCNC: 128 MMOL/L (ref 132–146)
TROPONIN I SERPL HS-MCNC: 103 NG/L (ref 0–11)
WBC OTHER # BLD: 20.3 K/UL (ref 4.5–11.5)

## 2023-07-30 PROCEDURE — 80053 COMPREHEN METABOLIC PANEL: CPT

## 2023-07-30 PROCEDURE — 72125 CT NECK SPINE W/O DYE: CPT

## 2023-07-30 PROCEDURE — 82550 ASSAY OF CK (CPK): CPT

## 2023-07-30 PROCEDURE — 93005 ELECTROCARDIOGRAM TRACING: CPT | Performed by: STUDENT IN AN ORGANIZED HEALTH CARE EDUCATION/TRAINING PROGRAM

## 2023-07-30 PROCEDURE — 87040 BLOOD CULTURE FOR BACTERIA: CPT

## 2023-07-30 PROCEDURE — 85027 COMPLETE CBC AUTOMATED: CPT

## 2023-07-30 PROCEDURE — 96361 HYDRATE IV INFUSION ADD-ON: CPT

## 2023-07-30 PROCEDURE — 82800 BLOOD PH: CPT

## 2023-07-30 PROCEDURE — 70450 CT HEAD/BRAIN W/O DYE: CPT

## 2023-07-30 PROCEDURE — 72170 X-RAY EXAM OF PELVIS: CPT

## 2023-07-30 PROCEDURE — 2580000003 HC RX 258: Performed by: STUDENT IN AN ORGANIZED HEALTH CARE EDUCATION/TRAINING PROGRAM

## 2023-07-30 PROCEDURE — 83880 ASSAY OF NATRIURETIC PEPTIDE: CPT

## 2023-07-30 PROCEDURE — 82947 ASSAY GLUCOSE BLOOD QUANT: CPT

## 2023-07-30 PROCEDURE — 99285 EMERGENCY DEPT VISIT HI MDM: CPT

## 2023-07-30 PROCEDURE — 83690 ASSAY OF LIPASE: CPT

## 2023-07-30 PROCEDURE — 71045 X-RAY EXAM CHEST 1 VIEW: CPT

## 2023-07-30 PROCEDURE — 84484 ASSAY OF TROPONIN QUANT: CPT

## 2023-07-30 PROCEDURE — 82010 KETONE BODYS QUAN: CPT

## 2023-07-30 PROCEDURE — 83605 ASSAY OF LACTIC ACID: CPT

## 2023-07-30 RX ORDER — 0.9 % SODIUM CHLORIDE 0.9 %
500 INTRAVENOUS SOLUTION INTRAVENOUS ONCE
Status: COMPLETED | OUTPATIENT
Start: 2023-07-30 | End: 2023-07-31

## 2023-07-30 RX ORDER — 0.9 % SODIUM CHLORIDE 0.9 %
1000 INTRAVENOUS SOLUTION INTRAVENOUS ONCE
Status: COMPLETED | OUTPATIENT
Start: 2023-07-30 | End: 2023-07-31

## 2023-07-30 RX ADMIN — SODIUM CHLORIDE 1000 ML: 9 INJECTION, SOLUTION INTRAVENOUS at 23:56

## 2023-07-30 RX ADMIN — SODIUM CHLORIDE 500 ML: 9 INJECTION, SOLUTION INTRAVENOUS at 22:49

## 2023-07-30 ASSESSMENT — LIFESTYLE VARIABLES
HOW MANY STANDARD DRINKS CONTAINING ALCOHOL DO YOU HAVE ON A TYPICAL DAY: PATIENT DOES NOT DRINK
HOW OFTEN DO YOU HAVE A DRINK CONTAINING ALCOHOL: NEVER

## 2023-07-31 ENCOUNTER — APPOINTMENT (OUTPATIENT)
Dept: CT IMAGING | Age: 78
DRG: 872 | End: 2023-07-31
Payer: MEDICARE

## 2023-07-31 ENCOUNTER — APPOINTMENT (OUTPATIENT)
Dept: GENERAL RADIOLOGY | Age: 78
DRG: 872 | End: 2023-07-31
Payer: MEDICARE

## 2023-07-31 PROBLEM — S81.801A LEG WOUND, RIGHT, INITIAL ENCOUNTER: Status: ACTIVE | Noted: 2023-07-31

## 2023-07-31 PROBLEM — R41.82 ALTERED MENTAL STATUS: Status: ACTIVE | Noted: 2023-07-31

## 2023-07-31 PROBLEM — R73.9 HYPERGLYCEMIA: Status: ACTIVE | Noted: 2023-07-31

## 2023-07-31 PROBLEM — L03.90 CELLULITIS: Status: ACTIVE | Noted: 2023-07-31

## 2023-07-31 LAB
ALBUMIN SERPL-MCNC: 2.9 G/DL (ref 3.5–5.2)
ALP SERPL-CCNC: 148 U/L (ref 40–129)
ALT SERPL-CCNC: 12 U/L (ref 0–40)
ANION GAP SERPL CALCULATED.3IONS-SCNC: 14 MMOL/L (ref 7–16)
AST SERPL-CCNC: 37 U/L (ref 0–39)
BASOPHILS # BLD: 0.07 K/UL (ref 0–0.2)
BASOPHILS NFR BLD: 0 % (ref 0–2)
BILIRUB SERPL-MCNC: 0.6 MG/DL (ref 0–1.2)
BILIRUB UR QL STRIP: NEGATIVE
BUN SERPL-MCNC: 38 MG/DL (ref 6–23)
CALCIUM SERPL-MCNC: 8.8 MG/DL (ref 8.6–10.2)
CASTS #/AREA URNS LPF: ABNORMAL /LPF
CHLORIDE SERPL-SCNC: 100 MMOL/L (ref 98–107)
CHOLEST SERPL-MCNC: 69 MG/DL
CHP ED QC CHECK: NORMAL
CHP ED QC CHECK: NORMAL
CLARITY UR: CLEAR
CO2 SERPL-SCNC: 19 MMOL/L (ref 22–29)
COLOR UR: YELLOW
CREAT SERPL-MCNC: 1.5 MG/DL (ref 0.7–1.2)
EKG ATRIAL RATE: 69 BPM
EKG P AXIS: 95 DEGREES
EKG P-R INTERVAL: 256 MS
EKG Q-T INTERVAL: 470 MS
EKG QRS DURATION: 144 MS
EKG QTC CALCULATION (BAZETT): 503 MS
EKG R AXIS: -79 DEGREES
EKG T AXIS: 99 DEGREES
EKG VENTRICULAR RATE: 69 BPM
EOSINOPHIL # BLD: 0.03 K/UL (ref 0.05–0.5)
EOSINOPHILS RELATIVE PERCENT: 0 % (ref 0–6)
EPI CELLS #/AREA URNS HPF: ABNORMAL /HPF
ERYTHROCYTE [DISTWIDTH] IN BLOOD BY AUTOMATED COUNT: 15.3 % (ref 11.5–15)
ERYTHROCYTE [SEDIMENTATION RATE] IN BLOOD BY WESTERGREN METHOD: 113 MM/HR (ref 0–15)
GFR SERPL CREATININE-BSD FRML MDRD: 50 ML/MIN/1.73M2
GLUCOSE BLD-MCNC: 430 MG/DL
GLUCOSE BLD-MCNC: 430 MG/DL
GLUCOSE SERPL-MCNC: 233 MG/DL (ref 74–99)
GLUCOSE UR STRIP-MCNC: >=1000 MG/DL
HCT VFR BLD AUTO: 25.6 % (ref 37–54)
HDLC SERPL-MCNC: 32 MG/DL
HGB BLD-MCNC: 7.9 G/DL (ref 12.5–16.5)
HGB UR QL STRIP.AUTO: ABNORMAL
IMM GRANULOCYTES # BLD AUTO: 0.11 K/UL (ref 0–0.58)
IMM GRANULOCYTES NFR BLD: 1 % (ref 0–5)
IRON SATN MFR SERPL: ABNORMAL % (ref 20–55)
IRON SERPL-MCNC: 24 UG/DL (ref 59–158)
KETONES UR STRIP-MCNC: ABNORMAL MG/DL
LACTATE BLDV-SCNC: 1.6 MMOL/L (ref 0.5–1.9)
LACTATE BLDV-SCNC: 1.7 MMOL/L (ref 0.5–2.2)
LDLC SERPL CALC-MCNC: 20 MG/DL
LEUKOCYTE ESTERASE UR QL STRIP: NEGATIVE
LYMPHOCYTES NFR BLD: 1.7 K/UL (ref 1.5–4)
LYMPHOCYTES RELATIVE PERCENT: 10 % (ref 20–42)
MCH RBC QN AUTO: 26.4 PG (ref 26–35)
MCHC RBC AUTO-ENTMCNC: 30.9 G/DL (ref 32–34.5)
MCV RBC AUTO: 85.6 FL (ref 80–99.9)
METER GLUCOSE: 169 MG/DL (ref 74–99)
METER GLUCOSE: 235 MG/DL (ref 74–99)
METER GLUCOSE: 241 MG/DL (ref 74–99)
METER GLUCOSE: 303 MG/DL (ref 74–99)
METER GLUCOSE: 332 MG/DL (ref 74–99)
MONOCYTES NFR BLD: 1.46 K/UL (ref 0.1–0.95)
MONOCYTES NFR BLD: 9 % (ref 2–12)
NEUTROPHILS NFR BLD: 80 % (ref 43–80)
NEUTS SEG NFR BLD: 13.45 K/UL (ref 1.8–7.3)
NITRITE UR QL STRIP: NEGATIVE
PH UR STRIP: 6 [PH] (ref 5–9)
PLATELET # BLD AUTO: 341 K/UL (ref 130–450)
PMV BLD AUTO: 10.1 FL (ref 7–12)
POTASSIUM SERPL-SCNC: 4.7 MMOL/L (ref 3.5–5)
PROT SERPL-MCNC: 7 G/DL (ref 6.4–8.3)
PROT UR STRIP-MCNC: >=300 MG/DL
RBC # BLD AUTO: 2.99 M/UL (ref 3.8–5.8)
RBC #/AREA URNS HPF: ABNORMAL /HPF
SODIUM SERPL-SCNC: 133 MMOL/L (ref 132–146)
SP GR UR STRIP: 1.02 (ref 1–1.03)
T4 FREE SERPL-MCNC: 1.2 NG/DL (ref 0.9–1.7)
TIBC SERPL-MCNC: 278 UG/DL (ref 250–450)
TRIGL SERPL-MCNC: 84 MG/DL
TROPONIN I SERPL HS-MCNC: 109 NG/L (ref 0–11)
TROPONIN I SERPL HS-MCNC: 120 NG/L (ref 0–11)
TSH SERPL DL<=0.05 MIU/L-ACNC: 2.47 UIU/ML (ref 0.27–4.2)
UROBILINOGEN UR STRIP-ACNC: 2 EU/DL (ref 0–1)
VLDLC SERPL CALC-MCNC: 17 MG/DL
WBC #/AREA URNS HPF: ABNORMAL /HPF
WBC OTHER # BLD: 16.8 K/UL (ref 4.5–11.5)

## 2023-07-31 PROCEDURE — 83605 ASSAY OF LACTIC ACID: CPT

## 2023-07-31 PROCEDURE — 80061 LIPID PANEL: CPT

## 2023-07-31 PROCEDURE — APPSS60 APP SPLIT SHARED TIME 46-60 MINUTES: Performed by: NURSE PRACTITIONER

## 2023-07-31 PROCEDURE — 85652 RBC SED RATE AUTOMATED: CPT

## 2023-07-31 PROCEDURE — 83540 ASSAY OF IRON: CPT

## 2023-07-31 PROCEDURE — 96375 TX/PRO/DX INJ NEW DRUG ADDON: CPT

## 2023-07-31 PROCEDURE — 2580000003 HC RX 258: Performed by: STUDENT IN AN ORGANIZED HEALTH CARE EDUCATION/TRAINING PROGRAM

## 2023-07-31 PROCEDURE — 2580000003 HC RX 258

## 2023-07-31 PROCEDURE — 80053 COMPREHEN METABOLIC PANEL: CPT

## 2023-07-31 PROCEDURE — 71275 CT ANGIOGRAPHY CHEST: CPT

## 2023-07-31 PROCEDURE — 6370000000 HC RX 637 (ALT 250 FOR IP): Performed by: STUDENT IN AN ORGANIZED HEALTH CARE EDUCATION/TRAINING PROGRAM

## 2023-07-31 PROCEDURE — 73630 X-RAY EXAM OF FOOT: CPT

## 2023-07-31 PROCEDURE — 82947 ASSAY GLUCOSE BLOOD QUANT: CPT

## 2023-07-31 PROCEDURE — 85027 COMPLETE CBC AUTOMATED: CPT

## 2023-07-31 PROCEDURE — 96366 THER/PROPH/DIAG IV INF ADDON: CPT

## 2023-07-31 PROCEDURE — 84439 ASSAY OF FREE THYROXINE: CPT

## 2023-07-31 PROCEDURE — 83036 HEMOGLOBIN GLYCOSYLATED A1C: CPT

## 2023-07-31 PROCEDURE — 6370000000 HC RX 637 (ALT 250 FOR IP)

## 2023-07-31 PROCEDURE — 6360000004 HC RX CONTRAST MEDICATION: Performed by: RADIOLOGY

## 2023-07-31 PROCEDURE — 96365 THER/PROPH/DIAG IV INF INIT: CPT

## 2023-07-31 PROCEDURE — 2500000003 HC RX 250 WO HCPCS: Performed by: NURSE PRACTITIONER

## 2023-07-31 PROCEDURE — 84484 ASSAY OF TROPONIN QUANT: CPT

## 2023-07-31 PROCEDURE — 97165 OT EVAL LOW COMPLEX 30 MIN: CPT

## 2023-07-31 PROCEDURE — 6360000002 HC RX W HCPCS: Performed by: STUDENT IN AN ORGANIZED HEALTH CARE EDUCATION/TRAINING PROGRAM

## 2023-07-31 PROCEDURE — 2060000000 HC ICU INTERMEDIATE R&B

## 2023-07-31 PROCEDURE — 97161 PT EVAL LOW COMPLEX 20 MIN: CPT

## 2023-07-31 PROCEDURE — 6370000000 HC RX 637 (ALT 250 FOR IP): Performed by: EMERGENCY MEDICINE

## 2023-07-31 PROCEDURE — 81001 URINALYSIS AUTO W/SCOPE: CPT

## 2023-07-31 PROCEDURE — 86140 C-REACTIVE PROTEIN: CPT

## 2023-07-31 PROCEDURE — 6360000002 HC RX W HCPCS: Performed by: REGISTERED NURSE

## 2023-07-31 PROCEDURE — 83550 IRON BINDING TEST: CPT

## 2023-07-31 PROCEDURE — 93010 ELECTROCARDIOGRAM REPORT: CPT | Performed by: INTERNAL MEDICINE

## 2023-07-31 PROCEDURE — 96367 TX/PROPH/DG ADDL SEQ IV INF: CPT

## 2023-07-31 PROCEDURE — 99222 1ST HOSP IP/OBS MODERATE 55: CPT | Performed by: FAMILY MEDICINE

## 2023-07-31 PROCEDURE — 84443 ASSAY THYROID STIM HORMONE: CPT

## 2023-07-31 PROCEDURE — 99223 1ST HOSP IP/OBS HIGH 75: CPT | Performed by: INTERNAL MEDICINE

## 2023-07-31 PROCEDURE — 2580000003 HC RX 258: Performed by: REGISTERED NURSE

## 2023-07-31 RX ORDER — SODIUM CHLORIDE 0.9 % (FLUSH) 0.9 %
10 SYRINGE (ML) INJECTION EVERY 12 HOURS SCHEDULED
Status: DISCONTINUED | OUTPATIENT
Start: 2023-07-31 | End: 2023-08-02 | Stop reason: HOSPADM

## 2023-07-31 RX ORDER — INSULIN LISPRO 100 [IU]/ML
0-4 INJECTION, SOLUTION INTRAVENOUS; SUBCUTANEOUS NIGHTLY
Status: DISCONTINUED | OUTPATIENT
Start: 2023-07-31 | End: 2023-08-01

## 2023-07-31 RX ORDER — BUMETANIDE 0.25 MG/ML
1 INJECTION INTRAMUSCULAR; INTRAVENOUS 2 TIMES DAILY
Status: DISCONTINUED | OUTPATIENT
Start: 2023-07-31 | End: 2023-08-01

## 2023-07-31 RX ORDER — ONDANSETRON 2 MG/ML
4 INJECTION INTRAMUSCULAR; INTRAVENOUS EVERY 6 HOURS PRN
Status: DISCONTINUED | OUTPATIENT
Start: 2023-07-31 | End: 2023-08-02 | Stop reason: HOSPADM

## 2023-07-31 RX ORDER — METOPROLOL SUCCINATE 25 MG/1
25 TABLET, EXTENDED RELEASE ORAL 2 TIMES DAILY
Status: DISCONTINUED | OUTPATIENT
Start: 2023-07-31 | End: 2023-08-02 | Stop reason: HOSPADM

## 2023-07-31 RX ORDER — ACETAMINOPHEN 650 MG/1
650 SUPPOSITORY RECTAL EVERY 6 HOURS PRN
Status: DISCONTINUED | OUTPATIENT
Start: 2023-07-31 | End: 2023-08-02 | Stop reason: HOSPADM

## 2023-07-31 RX ORDER — DEXTROSE MONOHYDRATE 100 MG/ML
INJECTION, SOLUTION INTRAVENOUS CONTINUOUS PRN
Status: DISCONTINUED | OUTPATIENT
Start: 2023-07-31 | End: 2023-08-02 | Stop reason: HOSPADM

## 2023-07-31 RX ORDER — ISOSORBIDE DINITRATE 10 MG/1
10 TABLET ORAL 3 TIMES DAILY
Status: DISCONTINUED | OUTPATIENT
Start: 2023-07-31 | End: 2023-08-02 | Stop reason: HOSPADM

## 2023-07-31 RX ORDER — HYDRALAZINE HYDROCHLORIDE 25 MG/1
25 TABLET, FILM COATED ORAL 3 TIMES DAILY
Status: DISCONTINUED | OUTPATIENT
Start: 2023-07-31 | End: 2023-08-02 | Stop reason: HOSPADM

## 2023-07-31 RX ORDER — ACETAMINOPHEN 325 MG/1
650 TABLET ORAL EVERY 6 HOURS PRN
Status: DISCONTINUED | OUTPATIENT
Start: 2023-07-31 | End: 2023-08-02 | Stop reason: HOSPADM

## 2023-07-31 RX ORDER — SODIUM CHLORIDE 0.9 % (FLUSH) 0.9 %
10 SYRINGE (ML) INJECTION PRN
Status: DISCONTINUED | OUTPATIENT
Start: 2023-07-31 | End: 2023-08-02 | Stop reason: HOSPADM

## 2023-07-31 RX ORDER — POLYETHYLENE GLYCOL 3350 17 G/17G
17 POWDER, FOR SOLUTION ORAL DAILY PRN
Status: DISCONTINUED | OUTPATIENT
Start: 2023-07-31 | End: 2023-08-02 | Stop reason: HOSPADM

## 2023-07-31 RX ORDER — ASPIRIN 81 MG/1
324 TABLET, CHEWABLE ORAL ONCE
Status: COMPLETED | OUTPATIENT
Start: 2023-07-31 | End: 2023-07-31

## 2023-07-31 RX ORDER — SODIUM CHLORIDE 9 MG/ML
INJECTION, SOLUTION INTRAVENOUS PRN
Status: DISCONTINUED | OUTPATIENT
Start: 2023-07-31 | End: 2023-08-02 | Stop reason: HOSPADM

## 2023-07-31 RX ORDER — ROSUVASTATIN CALCIUM 10 MG/1
10 TABLET, COATED ORAL DAILY
Status: DISCONTINUED | OUTPATIENT
Start: 2023-07-31 | End: 2023-08-02 | Stop reason: HOSPADM

## 2023-07-31 RX ORDER — INSULIN LISPRO 100 [IU]/ML
0-4 INJECTION, SOLUTION INTRAVENOUS; SUBCUTANEOUS
Status: DISCONTINUED | OUTPATIENT
Start: 2023-07-31 | End: 2023-08-01

## 2023-07-31 RX ORDER — ONDANSETRON 4 MG/1
4 TABLET, ORALLY DISINTEGRATING ORAL EVERY 8 HOURS PRN
Status: DISCONTINUED | OUTPATIENT
Start: 2023-07-31 | End: 2023-08-02 | Stop reason: HOSPADM

## 2023-07-31 RX ADMIN — APIXABAN 2.5 MG: 2.5 TABLET, FILM COATED ORAL at 09:21

## 2023-07-31 RX ADMIN — INSULIN HUMAN 6 UNITS: 100 INJECTION, SOLUTION PARENTERAL at 02:47

## 2023-07-31 RX ADMIN — Medication 10 ML: at 09:25

## 2023-07-31 RX ADMIN — ROSUVASTATIN CALCIUM 10 MG: 10 TABLET, FILM COATED ORAL at 22:39

## 2023-07-31 RX ADMIN — WATER 2000 MG: 1 INJECTION INTRAMUSCULAR; INTRAVENOUS; SUBCUTANEOUS at 19:37

## 2023-07-31 RX ADMIN — VANCOMYCIN HYDROCHLORIDE 1250 MG: 10 INJECTION, POWDER, LYOPHILIZED, FOR SOLUTION INTRAVENOUS at 02:09

## 2023-07-31 RX ADMIN — HYDRALAZINE HYDROCHLORIDE 25 MG: 25 TABLET, FILM COATED ORAL at 22:39

## 2023-07-31 RX ADMIN — METOPROLOL SUCCINATE 25 MG: 25 TABLET, FILM COATED, EXTENDED RELEASE ORAL at 09:21

## 2023-07-31 RX ADMIN — HYDRALAZINE HYDROCHLORIDE 25 MG: 25 TABLET, FILM COATED ORAL at 09:21

## 2023-07-31 RX ADMIN — ISOSORBIDE DINITRATE 10 MG: 10 TABLET ORAL at 15:21

## 2023-07-31 RX ADMIN — INSULIN LISPRO 3 UNITS: 100 INJECTION, SOLUTION INTRAVENOUS; SUBCUTANEOUS at 09:22

## 2023-07-31 RX ADMIN — ASPIRIN 81 MG CHEWABLE TABLET 324 MG: 81 TABLET CHEWABLE at 01:19

## 2023-07-31 RX ADMIN — Medication 10 ML: at 22:39

## 2023-07-31 RX ADMIN — HYDRALAZINE HYDROCHLORIDE 25 MG: 25 TABLET, FILM COATED ORAL at 15:21

## 2023-07-31 RX ADMIN — WATER 2000 MG: 1 INJECTION INTRAMUSCULAR; INTRAVENOUS; SUBCUTANEOUS at 11:26

## 2023-07-31 RX ADMIN — ISOSORBIDE DINITRATE 10 MG: 10 TABLET ORAL at 09:21

## 2023-07-31 RX ADMIN — CEFEPIME 2000 MG: 2 INJECTION, POWDER, FOR SOLUTION INTRAVENOUS at 01:33

## 2023-07-31 RX ADMIN — METOPROLOL SUCCINATE 25 MG: 25 TABLET, FILM COATED, EXTENDED RELEASE ORAL at 22:39

## 2023-07-31 RX ADMIN — ISOSORBIDE DINITRATE 10 MG: 10 TABLET ORAL at 22:39

## 2023-07-31 RX ADMIN — INSULIN LISPRO 1 UNITS: 100 INJECTION, SOLUTION INTRAVENOUS; SUBCUTANEOUS at 11:16

## 2023-07-31 RX ADMIN — IOPAMIDOL 75 ML: 755 INJECTION, SOLUTION INTRAVENOUS at 03:16

## 2023-07-31 RX ADMIN — BUMETANIDE 1 MG: 0.25 INJECTION INTRAMUSCULAR; INTRAVENOUS at 22:39

## 2023-07-31 RX ADMIN — BUMETANIDE 1 MG: 0.25 INJECTION INTRAMUSCULAR; INTRAVENOUS at 10:52

## 2023-07-31 RX ADMIN — APIXABAN 2.5 MG: 2.5 TABLET, FILM COATED ORAL at 22:39

## 2023-07-31 ASSESSMENT — ENCOUNTER SYMPTOMS
RHINORRHEA: 0
SHORTNESS OF BREATH: 0
CHOKING: 0
DIARRHEA: 0
NAUSEA: 0
WHEEZING: 0
EYE PAIN: 0
COUGH: 0
SORE THROAT: 0
VOMITING: 0
EYE DISCHARGE: 0
CHEST TIGHTNESS: 0

## 2023-07-31 ASSESSMENT — PAIN SCALES - GENERAL
PAINLEVEL_OUTOF10: 0
PAINLEVEL_OUTOF10: 2
PAINLEVEL_OUTOF10: 0

## 2023-07-31 NOTE — DISCHARGE INSTRUCTIONS
HEART FAILURE  / CONGESTIVE HEART FAILURE  DISCHARGE INSTRUCTIONS:  GUIDELINES TO FOLLOW AT HOME    Self- Managed Care:     MEDICATIONS:  Take your medication as directed. If you are experiencing any side effects, inform your doctor, Do not stop taking any of your medications without letting your doctor know. Check with your doctor before taking any over-the-counter medications / herbal / or dietary supplements. They may interfere with your other medications. Do not take ibuprofen (Advil or Motrin) and naproxen (Aleve) without talking to your doctor first. They could make your heart failure worse. WEIGHT MONITORING:   Weigh yourself everyday (with the same scale) around the same time of the day and write it down. (you can chart them on a calendar or keep track of them on paper. Notify your doctor of a weight gain of 3 pounds or more in 1 day   OR a total of 5 pounds or more in 1 week    Take your weight record to your doctor visits  Also, the same goes if you loose more than 3# in one day, let your heart doctor know. DIET:   Cardiac heart healthy diet- Low saturated / low trans fat, no added salt, caffeine restricted, Low sodium diet-   No more than 2,000mg (2 grams) of salt / sodium per day (which equals to a little less than  a teaspoon of salt)  If your doctor wants you on a fluid restriction. ..it is usually recommended a fluid limit of 2,000cc -  Fluid restriction- 2,000 ml (milliliters) = 64 ounces = you can have 8 glasses of fluid per day (each glass 8 ounces)    Follow a low salt diet - avoid using salt at the table, avoid / limit use of canned soups, processed / packaged foods, salted snacks, olives and pickles. Do not use a salt substitute without checking with your doctor, they may contain a high amount of potassioum. (Mrs. Bj Flores is safe to use).     Limit the use of alcohol       CALL YOUR DOCTOR THE FIRST DAY YOU NOTICE ANY OF THESE   SYMPTOMS:  You have a

## 2023-07-31 NOTE — CONSULTS
Inpatient Cardiology Consultation      Reason for Consult:  Elevated troponin, elevated BNP, recent HF med change    Consulting Physician: Dr Mya Chiu    Requesting Physician:  Dr Fidel Reid    Date of Consultation: 7/31/2023    HISTORY OF PRESENT ILLNESS: 70 yo  male known to Dr Pablo Watson and Also Dr Katelynn Taylor (EP) and follows with HF clinic. PMH: CAD s/p CABG x 3 in 2004, chronic HFrEF, trifascicular block, wheelchair bound 2/2 R heel ulcer(declined amputation in past), non-ischemic stress 5/21/2021 (EF 23%), TTE in 3/2023 EF 30-35%, CKD, PAD s/p L superficial femoral artery popliteal artery atherectomy and angioplasty 5/2017, RLE DVT 4/12/2023,  HTN, HLD, and T2DM insulin requiring, lumbar radiculopathy, lifelong non-smoker, [place] wears eharing aides, and DNR-CCA    Life-Vest discontinued 2/2 insurance    7/11/2023 Aldactone/Entresto stopped. Hydralazine 25 mg ZTID initiated and Isordil 5 mg TID, SCr 1.8    7/20/2023 Isordil increased to 10 mg     BS elevated 2 days prior to ED visit with AMS, and two falls with no LOC. Denies any CP, SOB palpitations or dizziness. Denies any fever, chills, or diarrhea. Reports he is drinking well but has poor appetite. Uses scooter to ambulate. Only checks BS twice a day. Patient states he is not interested in CRT-D OR having his leg amputated. [facility] ED 7/30/2023 /56 HR 's SR-ST, temperature 99.0, and O2 saturation 100% on RA. Troponin 109>103>120( 74>70 in 12/2022), CPK 75, p-BNP 52686, Bun/Cr 39/1.5, K+ 5.0, Na 128, lactic acid 2.7>1.7, , Albumin 3.4, Alk phos 174, Beta-hydroxybutyrate 1.09, WBC 20.3, Hgb 8.2, Plt 385. UA negative for UTI.       mg, IV ATB's, Humulin R IV, 1500 cc NSS    Isordil 10 mg TID, Hydralazine 25 mg TID, Toprol XL 25 mg BID, Crestor 10 mg QD, Eliquis 2.5 mg BID      Currently /65 HR 70's SR, afebrile, and O2 saturation 96% on RA      Please note: past medical records were reviewed per electronic HEENT:   Head- Normocephalic, atraumatic   Eyes- Conjunctivae pink, anicteric  Throat- Oral mucosa pink and moist  Neck-  No stridor, trachea midline, no jugular venous distention. No carotid bruit. CHEST: Chest symmetrical and non-tender to palpation. No accessory muscle use or intercostal retractions  RESPIRATORY: Lung sounds - clear throughout fields   CARDIOVASCULAR:     Heart Ausculation- Regular rate and rhythm, no murmur heard. PV: No lower extremity edema. No varicosities. Pedal pulses not palpable. 2nd/5th toe amputation. L charcot foot  ABDOMEN: Soft, non-tender to light palpation. Bowel sounds present. No palpable masses ; no abdominal bruit  MS: Good muscle strength and tone. No atrophy or abnormal movements. : Deferred  SKIN: L foot scabbed areas. Skin pale, warm and dry. NEURO / PSYCH: Oriented to person, place and time. Speech clear and appropriate. Follows all commands. Pleasant affect. +[place]    DATA:    ECG 7/30/2023: as per Dr Dumont's interpretation  Tele strips: See HPI    Diagnostic:    CXR 7/30/2023 No acute process    Xray Pelvis 7/30/2023: No acute abnormality of the pelvis    CT Head 7/30/2023: No acute intracranial abnormality. CT Cervical Spine 7/30/2023: No acute abnormality of the cervical spine    R Foot Xray 7/31/2023: No acute osseous abnormality. Plantar soft tissue wound adjacent to the 3rd metatarsal bone. CTA Pulmonary W 7/31/2023: No evidence of acute pulmonary embolism to the proximal segmental level. Evaluation beyond this is limited due to respiratory motion artifact. Advise close clinical follow-up. Cardiomegaly with reflux of contrast into the IVC suggesting right heart dysfunction. Dilatation of the ascending thoracic aorta measuring 4 cm. Partially visualized gallbladder is distended and there may be pericholecystic stranding. Correlate with ultrasound and/or HIDA if there is concern for acute cholecystitis. Small amount of perihepatic fluid.

## 2023-07-31 NOTE — PROGRESS NOTES
4 Eyes Skin Assessment     NAME:  Guillermo Fermin  YOB: 1945  MEDICAL RECORD NUMBER:  [id number]    The patient is being assessed for  Admission    I agree that at least one RN has performed a thorough Head to Toe Skin Assessment on the patient. ALL assessment sites listed below have been assessed. Areas assessed by both nurses:    Head, Face, Ears, Shoulders, Back, Chest, Arms, Elbows, Hands, Sacrum. Buttock, Coccyx, Ischium, Legs. Feet and Heels, and Under Medical Devices         Does the Patient have a Wound? Yes wound(s) were present on assessment.  LDA wound assessment was Initiated and completed by RN       Geoffrey Prevention initiated by RN: Yes  Wound Care Orders initiated by RN: No    Pressure Injury (Stage 3,4, Unstageable, DTI, NWPT, and Complex wounds) if present, place Wound referral order by RN under : Yes    New Ostomies, if present place, Ostomy referral order under : No     Nurse 1 eSignature: Electronically signed by Giana Gaitan RN on 7/31/23 at 6:49 AM EDT    **SHARE this note so that the co-signing nurse can place an eSignature**    Nurse 2 eSignature: Electronically signed by Breanna White RN on 7/31/23 at 6:50 AM EDT

## 2023-07-31 NOTE — ED PROVIDER NOTES
BLOCK Bilateral 03/07/2022    BILATERAL LUMBAR MEDIAL BRANCH NERVE BLOCK UNDER FLUORO AT L3 L4 L5 DORSAL RAMI performed by Mary Cabezas MD at [facility]  03/10/2017    left foot application integra graft, application of wound vac, delayed primax closure    OTHER SURGICAL HISTORY      fracture of left temporal part of skull    OTHER SURGICAL HISTORY Left 06/16/2017    delayed closure with bone debridement and biopsy, application of hemovac with Dr. Devan Robison N/A 11/22/2021    LUMBAR EPIDURAL STEROID INJECTION UNDER FLUOROSCOPIC GUIDANCE AT L5-S1 performed by Mary Cabezas MD at [address] [place] [address] N/A 01/06/2022    # 2 LUMBAR EPIDURAL STEROID INJECTION UNDER FLUOROSCOPIC GUIDANCE AT L5-S1 performed by Mayr Cabezas MD at [address] [place] [address] Bilateral 04/11/2022    BILATERAL LUMBAR RADIOFREQUENCY ABLATION UNDER FLUORO AT L3 L4 L5 AND DORSAL RAMI performed by Mary Cabezas MD at [address] [place] [address] Left 05/2017    SFA, pop atherectomy, Dr. Rasheeda Villarreal Left 2009    2nd and 5th toe    TONSILLECTOMY         CURRENTMEDICATIONS       Current Discharge Medication List        CONTINUE these medications which have NOT CHANGED    Details   Ethylenediamine 99 % LIQD by Does not apply route every 30 days \"IV chelation\" monthly - holistic medicine    With weekly \"IV vit 6 infusions\"      isosorbide dinitrate (ISORDIL) 10 MG tablet Take 1 tablet by mouth 3 times daily  Qty: 90 tablet, Refills: 3      rosuvastatin (CRESTOR) 10 MG tablet TAKE 1 TABLET DAILY  Qty: 90 tablet, Refills: 3    Comments: Requesting 1 year supply      metoprolol succinate (TOPROL XL) 25 MG extended release tablet TAKE 1 TABLET TWICE A DAY  Qty: 180 tablet, Refills: 3    Comments: Requesting 1 year supply  Associated Diagnoses: Hypertension, unspecified type      hydrALAZINE (APRESOLINE) 25 MG reconstruction, and/or weight based adjustment of the mA/kV was utilized to reduce the radiation dose to as low as reasonably achievable. COMPARISON: None. HISTORY: ORDERING SYSTEM PROVIDED HISTORY: neck pain, r/o fx TECHNOLOGIST PROVIDED HISTORY: Reason for exam:->neck pain, r/o fx FINDINGS: BONES/ALIGNMENT: There is no acute fracture or traumatic malalignment. DEGENERATIVE CHANGES: Multilevel degenerative changes. SOFT TISSUES: There is no prevertebral soft tissue swelling. No acute abnormality of the cervical spine. XR CHEST PORTABLE    Result Date: 7/30/2023  EXAMINATION: ONE XRAY VIEW OF THE CHEST 7/30/2023 9:58 pm COMPARISON: 12/22/2022 HISTORY: ORDERING SYSTEM PROVIDED HISTORY: fall TECHNOLOGIST PROVIDED HISTORY: Reason for exam:->fall FINDINGS: The lungs are without acute focal process. There is no effusion or pneumothorax. The cardiomediastinal silhouette is without acute process. The osseous structures are without acute process. Sternotomy wires noted     No acute process. No results found. PROCEDURES   Unless otherwise noted below, none     CRITICAL CARE TIME (.cct)   Per attending attestation    PAST MEDICAL HISTORY/Chronic Conditions Affecting Care    has a past medical history of CAD (coronary artery disease) (2009), CHF (congestive heart failure) ([address]), Diabetes mellitus ([address]), Diabetic neuropathy ([address]), Diabetic ulcer of left foot associated with type 2 diabetes mellitus ([address]) (03/29/2017), Diabetic ulcer of right foot associated with type 2 diabetes mellitus, with fat layer exposed ([address]) (4/12/2023), Diabetic ulcer of right foot associated with type 2 diabetes mellitus, with necrosis of muscle ([address]) (4/12/2023), blood clots, Lumbar pain, and Peripheral vascular disease ([address]).      EMERGENCY DEPARTMENT COURSE    Vitals:    Vitals:    08/01/23 1625 08/01/23 1640 08/01/23 1834 08/01/23 2006   BP: (!) 126/54 (!) 119/57 (!) 107/58 (!) 105/49   Pulse: 86 81 73 72   Resp: 16 18 18 18

## 2023-07-31 NOTE — ACP (ADVANCE CARE PLANNING)
Advance Care Planning   Healthcare Decision Maker:    Primary Decision Maker: Casimiro Contreras - [phone]    Secondary Decision Maker: Lexy [facility] - [phone]

## 2023-07-31 NOTE — ED NOTES
Patient able to urinate in urinal. Urine obtained and sent to lab.       Jermaine Guevara RN  07/31/23 6690

## 2023-07-31 NOTE — PROGRESS NOTES
35.0 pg    MCHC 30.9 (L) 32.0 - 34.5 g/dL    RDW 15.2 (H) 11.5 - 15.0 %    Platelets 854 844 - 226 k/uL    MPV 10.2 7.0 - 12.0 fL    Neutrophils % 88 (H) 43.0 - 80.0 %    Lymphocytes % 5 (L) 20.0 - 42.0 %    Monocytes % 6 2.0 - 12.0 %    Eosinophils % 0 0 - 6 %    Basophils % 0 0.0 - 2.0 %    Immature Granulocytes 1 0.0 - 5.0 %    Neutrophils Absolute 17.81 (H) 1.80 - 7.30 k/uL    Lymphocytes Absolute 1.02 (L) 1.50 - 4.00 k/uL    Monocytes Absolute 1.17 (H) 0.10 - 0.95 k/uL    Eosinophils Absolute 0.00 (L) 0.05 - 0.50 k/uL    Basophils Absolute 0.07 0.00 - 0.20 k/uL    Absolute Immature Granulocyte 0.22 0.00 - 0.58 k/uL   Comprehensive Metabolic Panel w/ Reflex to MG    Collection Time: 07/30/23  9:25 PM   Result Value Ref Range    Glucose 465 (H) 74 - 99 mg/dL    BUN 39 (H) 6 - 23 mg/dL    Creatinine 1.5 (H) 0.70 - 1.20 mg/dL    Est, Glom Filt Rate 47 (L) >60 mL/min/1.73m2    Calcium 9.2 8.6 - 10.2 mg/dL    Sodium 128 (L) 132 - 146 mmol/L    Potassium 5.0 3.5 - 5.0 mmol/L    Chloride 92 (L) 98 - 107 mmol/L    CO2 19 (L) 22 - 29 mmol/L    Anion Gap 17 (H) 7 - 16 mmol/L    Alkaline Phosphatase 174 (H) 40 - 129 U/L    ALT 10 0 - 40 U/L    AST 35 0 - 39 U/L    Total Bilirubin 0.9 0.0 - 1.2 mg/dL    Total Protein 7.8 6.4 - 8.3 g/dL    Albumin 3.4 (L) 3.5 - 5.2 g/dL   Lactate, Sepsis    Collection Time: 07/30/23  9:25 PM   Result Value Ref Range    Lactic Acid, Sepsis 2.7 (H) 0.5 - 1.9 mmol/L   Lipase    Collection Time: 07/30/23  9:25 PM   Result Value Ref Range    Lipase 29 13 - 60 U/L   Troponin    Collection Time: 07/30/23  9:25 PM   Result Value Ref Range    Troponin, High Sensitivity 103 (H) 0 - 11 ng/L   Brain Natriuretic Peptide    Collection Time: 07/30/23  9:25 PM   Result Value Ref Range    Pro-BNP 41,242 (H) 0 - 450 pg/mL   Beta-Hydroxybutyrate    Collection Time: 07/30/23  9:25 PM   Result Value Ref Range    Beta-Hydroxybutyrate 1.09 (H) 0.02 - 0.27 mmol/L   PH, VENOUS    Collection Time: 07/30/23  9:25 PM Life vest due to insurance. Spironolactone, Entresto dc'd dt renal fxn. EP for CRT-D implantation eval done. Ultimately, patient does not wish to proceed with implantation given risks. - Repeat Troponin this morning  - Torsemide 20 mg prn --> not continued as pt reports has not been using it recently  - Isordil 10mg TID  - Metoprolol 25 mg BID  - Cardiology consulted; will appreciate rec's    Anemia  Hb 8.2. baseline ~11-13. No active bleed noted  - trend morning labs    CKD 3b  Cr 1.5. baseline ~ 1.5-1.6  - trend morning labs    Pseudohyponatremia  Na 134 corrected, bG 465  - cont to monitor  - follow daily labs    HLD  - Continue Crestor 10 mg    Hx of DVT  - Cont Eliquis 2.5 mg    HTN  - Cont Hydralazine 25 mg TID    Chronic wound  Diabetic right foot ulcers s/p multiple debridements, most recent 7/26. Sees Dr. Jamey Byrne.  - Wound care    Consultations Ordered:  IP CONSULT TO IV TEAM  IP CONSULT TO FAMILY MEDICINE  IP CONSULT TO CARDIOLOGY  IP CONSULT TO VASCULAR SURGERY    DVT ppx: Eliquis  GI ppx:  none  Diet: General  Code: DNR-CC    Case discussed with attending physician Dr. Odell Paez.     Electronically signed by Darren Reyes MD on 7/30/23 at 11:11 PM EDT

## 2023-07-31 NOTE — CARE COORDINATION
Transition of Care-Met with patient and his wife at bedside, introduced myself and CM role in discharge planning. Patient and wife reside in a [place] style home. Patient reports being independent, however has been using a cane and walker ( borrowed, will need a walker prior to discharge.) PCP is Dr. Feliberto Subramanian, preferred pharmacy is [facility] in [place]. ID consulted, affected limb is not salvageable, await further plans/input. PT/OT ordered to assist in discharge planning. Wife provided [address]  pamphlet, patient will likely require follow up care. Cm following.      Yash NELSONN, RN  [facility]

## 2023-07-31 NOTE — PROGRESS NOTES
Consults sent out to Dr. Estefany Crespo and Dr. Shital Palma via perfect serve.     7710: notified cardiology of elevated am troponin

## 2023-07-31 NOTE — PROGRESS NOTES
in bed . At end of session, patient returned to bed  with call light and phone within reach, all lines and tubes intact. *ALARM ON ,wife present in room     Overall patient demonstrated  decreased independence and safety during completion of ADL/functional transfer/mobility tasks. Pt would benefit from continued skilled OT to increase safety and independence with completion of ADL/IADL tasks for functional independence and quality of life. Rehab Potential: good for established goals     Patient / Family Goal: return home      Patient and/or family were instructed on functional diagnosis, prognosis/goals and OT plan of care. Demonstrated good  understanding. Eval Complexity: Low    Time In: 1519  Time Out: 1540      Min Units   OT Eval Low 97165 x  1   OT Eval Medium 04041      OT Eval High 29859      OT Re-Eval [id number]       Therapeutic Ex 31338      Therapeutic Activities 89406      ADL/Self Care 40292       Orthotic Management 00844       Manual 39270     Neuro Re-Ed 67975       Non-Billable Time         Evaluation Time additionally includes thorough review of current medical information, gathering information on past medical history/social history and prior level of function, interpretation of standardized testing/informal observation of tasks, assessment of data and development of plan of care and goals.             Tyrese Soliz  OTR/L  OT [id number]

## 2023-07-31 NOTE — PROGRESS NOTES
Spoke to patient regarding hemoglobin drop and blood transfusion. Educated patient that given history of CAD, transfusion is recommended if hemoglobin drops below 8.0. Current hemoglobin at 7.9 from 8.2, trending downward. Patient was educated on the importance and complications of blood transfusion, patient declines a transfusion at this time but states he will discuss and decide later.      Johana Hensley  PGY1 Family Medicine

## 2023-07-31 NOTE — PLAN OF CARE
Patient's chart updated to reflect:      . - HF care plan, HF education points and HF discharge instructions.  -Orders: 2 gram sodium diet, daily weights, I/O.  -PCP and cardiology follow up appointments to be scheduled within 7 days of hospital discharge. -CHF education session will be provided to the patient prior to hospital discharge.     Christopher Villarreal RN BSN  Heart Failure Navigator

## 2023-08-01 LAB
ALBUMIN SERPL-MCNC: 3 G/DL (ref 3.5–5.2)
ALP SERPL-CCNC: 166 U/L (ref 40–129)
ALT SERPL-CCNC: 9 U/L (ref 0–40)
ANION GAP SERPL CALCULATED.3IONS-SCNC: 17 MMOL/L (ref 7–16)
AST SERPL-CCNC: 35 U/L (ref 0–39)
BASOPHILS # BLD: 0.06 K/UL (ref 0–0.2)
BASOPHILS NFR BLD: 0 % (ref 0–2)
BILIRUB SERPL-MCNC: 0.5 MG/DL (ref 0–1.2)
BUN SERPL-MCNC: 39 MG/DL (ref 6–23)
CALCIUM SERPL-MCNC: 8.8 MG/DL (ref 8.6–10.2)
CHLORIDE SERPL-SCNC: 96 MMOL/L (ref 98–107)
CO2 SERPL-SCNC: 20 MMOL/L (ref 22–29)
CREAT SERPL-MCNC: 1.6 MG/DL (ref 0.7–1.2)
EOSINOPHIL # BLD: 0.06 K/UL (ref 0.05–0.5)
EOSINOPHILS RELATIVE PERCENT: 0 % (ref 0–6)
ERYTHROCYTE [DISTWIDTH] IN BLOOD BY AUTOMATED COUNT: 15.3 % (ref 11.5–15)
FERRITIN SERPL-MCNC: 174 NG/ML
GFR SERPL CREATININE-BSD FRML MDRD: 43 ML/MIN/1.73M2
GLUCOSE SERPL-MCNC: 246 MG/DL (ref 74–99)
HBA1C MFR BLD: 8.8 % (ref 4–5.6)
HBA1C MFR BLD: 8.9 % (ref 4–5.6)
HCT VFR BLD AUTO: 25.3 % (ref 37–54)
HCT VFR BLD AUTO: 27.2 % (ref 37–54)
HGB BLD-MCNC: 7.9 G/DL (ref 12.5–16.5)
HGB BLD-MCNC: 8.8 G/DL (ref 12.5–16.5)
IMM GRANULOCYTES # BLD AUTO: 0.13 K/UL (ref 0–0.58)
IMM GRANULOCYTES NFR BLD: 1 % (ref 0–5)
LYMPHOCYTES NFR BLD: 1.03 K/UL (ref 1.5–4)
LYMPHOCYTES RELATIVE PERCENT: 6 % (ref 20–42)
MCH RBC QN AUTO: 26 PG (ref 26–35)
MCHC RBC AUTO-ENTMCNC: 31.2 G/DL (ref 32–34.5)
MCV RBC AUTO: 83.2 FL (ref 80–99.9)
METER GLUCOSE: 269 MG/DL (ref 74–99)
METER GLUCOSE: 395 MG/DL (ref 74–99)
METER GLUCOSE: 453 MG/DL (ref 74–99)
METER GLUCOSE: 495 MG/DL (ref 74–99)
MONOCYTES NFR BLD: 1.13 K/UL (ref 0.1–0.95)
MONOCYTES NFR BLD: 7 % (ref 2–12)
NEUTROPHILS NFR BLD: 85 % (ref 43–80)
NEUTS SEG NFR BLD: 13.75 K/UL (ref 1.8–7.3)
PLATELET # BLD AUTO: 390 K/UL (ref 130–450)
PMV BLD AUTO: 10.2 FL (ref 7–12)
POTASSIUM SERPL-SCNC: 3.8 MMOL/L (ref 3.5–5)
PROT SERPL-MCNC: 7.1 G/DL (ref 6.4–8.3)
RBC # BLD AUTO: 3.04 M/UL (ref 3.8–5.8)
SODIUM SERPL-SCNC: 133 MMOL/L (ref 132–146)
WBC OTHER # BLD: 16.2 K/UL (ref 4.5–11.5)

## 2023-08-01 PROCEDURE — 6370000000 HC RX 637 (ALT 250 FOR IP)

## 2023-08-01 PROCEDURE — 85014 HEMATOCRIT: CPT

## 2023-08-01 PROCEDURE — 82947 ASSAY GLUCOSE BLOOD QUANT: CPT

## 2023-08-01 PROCEDURE — 2060000000 HC ICU INTERMEDIATE R&B

## 2023-08-01 PROCEDURE — 36430 TRANSFUSION BLD/BLD COMPNT: CPT

## 2023-08-01 PROCEDURE — 86850 RBC ANTIBODY SCREEN: CPT

## 2023-08-01 PROCEDURE — 86923 COMPATIBILITY TEST ELECTRIC: CPT

## 2023-08-01 PROCEDURE — 85018 HEMOGLOBIN: CPT

## 2023-08-01 PROCEDURE — 6370000000 HC RX 637 (ALT 250 FOR IP): Performed by: REGISTERED NURSE

## 2023-08-01 PROCEDURE — 82728 ASSAY OF FERRITIN: CPT

## 2023-08-01 PROCEDURE — P9016 RBC LEUKOCYTES REDUCED: HCPCS

## 2023-08-01 PROCEDURE — 85025 COMPLETE CBC W/AUTO DIFF WBC: CPT

## 2023-08-01 PROCEDURE — 86900 BLOOD TYPING SEROLOGIC ABO: CPT

## 2023-08-01 PROCEDURE — 2500000003 HC RX 250 WO HCPCS: Performed by: NURSE PRACTITIONER

## 2023-08-01 PROCEDURE — 83036 HEMOGLOBIN GLYCOSYLATED A1C: CPT

## 2023-08-01 PROCEDURE — 2580000003 HC RX 258

## 2023-08-01 PROCEDURE — 6360000002 HC RX W HCPCS: Performed by: REGISTERED NURSE

## 2023-08-01 PROCEDURE — 99232 SBSQ HOSP IP/OBS MODERATE 35: CPT | Performed by: FAMILY MEDICINE

## 2023-08-01 PROCEDURE — 80053 COMPREHEN METABOLIC PANEL: CPT

## 2023-08-01 PROCEDURE — 2580000003 HC RX 258: Performed by: REGISTERED NURSE

## 2023-08-01 PROCEDURE — 86901 BLOOD TYPING SEROLOGIC RH(D): CPT

## 2023-08-01 PROCEDURE — 99232 SBSQ HOSP IP/OBS MODERATE 35: CPT | Performed by: INTERNAL MEDICINE

## 2023-08-01 RX ORDER — INSULIN LISPRO 100 [IU]/ML
4 INJECTION, SOLUTION INTRAVENOUS; SUBCUTANEOUS ONCE
Status: COMPLETED | OUTPATIENT
Start: 2023-08-01 | End: 2023-08-01

## 2023-08-01 RX ORDER — ETHYLENEDIAMINE 100 %
LIQUID (ML) MISCELLANEOUS
Status: ON HOLD | COMMUNITY
End: 2023-08-02 | Stop reason: HOSPADM

## 2023-08-01 RX ORDER — SODIUM CHLORIDE 9 MG/ML
INJECTION, SOLUTION INTRAVENOUS PRN
Status: DISCONTINUED | OUTPATIENT
Start: 2023-08-01 | End: 2023-08-02 | Stop reason: HOSPADM

## 2023-08-01 RX ORDER — BUMETANIDE 1 MG/1
1 TABLET ORAL DAILY
Status: DISCONTINUED | OUTPATIENT
Start: 2023-08-02 | End: 2023-08-02 | Stop reason: HOSPADM

## 2023-08-01 RX ORDER — INSULIN LISPRO 100 [IU]/ML
0-4 INJECTION, SOLUTION INTRAVENOUS; SUBCUTANEOUS NIGHTLY
Status: DISCONTINUED | OUTPATIENT
Start: 2023-08-01 | End: 2023-08-02 | Stop reason: HOSPADM

## 2023-08-01 RX ORDER — DOXYCYCLINE HYCLATE 100 MG/1
100 CAPSULE ORAL EVERY 12 HOURS SCHEDULED
Status: DISCONTINUED | OUTPATIENT
Start: 2023-08-01 | End: 2023-08-02 | Stop reason: HOSPADM

## 2023-08-01 RX ORDER — INSULIN LISPRO 100 [IU]/ML
0-8 INJECTION, SOLUTION INTRAVENOUS; SUBCUTANEOUS
Status: DISCONTINUED | OUTPATIENT
Start: 2023-08-01 | End: 2023-08-02 | Stop reason: HOSPADM

## 2023-08-01 RX ADMIN — METOPROLOL SUCCINATE 25 MG: 25 TABLET, FILM COATED, EXTENDED RELEASE ORAL at 20:16

## 2023-08-01 RX ADMIN — INSULIN LISPRO 2 UNITS: 100 INJECTION, SOLUTION INTRAVENOUS; SUBCUTANEOUS at 06:22

## 2023-08-01 RX ADMIN — WATER 2000 MG: 1 INJECTION INTRAMUSCULAR; INTRAVENOUS; SUBCUTANEOUS at 12:09

## 2023-08-01 RX ADMIN — METOPROLOL SUCCINATE 25 MG: 25 TABLET, FILM COATED, EXTENDED RELEASE ORAL at 09:56

## 2023-08-01 RX ADMIN — Medication 10 ML: at 09:57

## 2023-08-01 RX ADMIN — INSULIN LISPRO 4 UNITS: 100 INJECTION, SOLUTION INTRAVENOUS; SUBCUTANEOUS at 20:16

## 2023-08-01 RX ADMIN — ACETAMINOPHEN 650 MG: 325 TABLET ORAL at 04:01

## 2023-08-01 RX ADMIN — WATER 2000 MG: 1 INJECTION INTRAMUSCULAR; INTRAVENOUS; SUBCUTANEOUS at 03:55

## 2023-08-01 RX ADMIN — INSULIN LISPRO 4 UNITS: 100 INJECTION, SOLUTION INTRAVENOUS; SUBCUTANEOUS at 12:09

## 2023-08-01 RX ADMIN — INSULIN LISPRO 8 UNITS: 100 INJECTION, SOLUTION INTRAVENOUS; SUBCUTANEOUS at 17:57

## 2023-08-01 RX ADMIN — ISOSORBIDE DINITRATE 10 MG: 10 TABLET ORAL at 14:50

## 2023-08-01 RX ADMIN — APIXABAN 2.5 MG: 2.5 TABLET, FILM COATED ORAL at 20:16

## 2023-08-01 RX ADMIN — DOXYCYCLINE HYCLATE 100 MG: 100 CAPSULE ORAL at 14:50

## 2023-08-01 RX ADMIN — BUMETANIDE 1 MG: 0.25 INJECTION INTRAMUSCULAR; INTRAVENOUS at 09:55

## 2023-08-01 RX ADMIN — ISOSORBIDE DINITRATE 10 MG: 10 TABLET ORAL at 09:55

## 2023-08-01 RX ADMIN — APIXABAN 2.5 MG: 2.5 TABLET, FILM COATED ORAL at 09:56

## 2023-08-01 RX ADMIN — ROSUVASTATIN CALCIUM 10 MG: 10 TABLET, FILM COATED ORAL at 20:15

## 2023-08-01 RX ADMIN — HYDRALAZINE HYDROCHLORIDE 25 MG: 25 TABLET, FILM COATED ORAL at 14:50

## 2023-08-01 RX ADMIN — WATER 2000 MG: 1 INJECTION INTRAMUSCULAR; INTRAVENOUS; SUBCUTANEOUS at 20:15

## 2023-08-01 RX ADMIN — HYDRALAZINE HYDROCHLORIDE 25 MG: 25 TABLET, FILM COATED ORAL at 09:55

## 2023-08-01 RX ADMIN — ACETAMINOPHEN 650 MG: 325 TABLET ORAL at 14:51

## 2023-08-01 RX ADMIN — ISOSORBIDE DINITRATE 10 MG: 10 TABLET ORAL at 20:15

## 2023-08-01 RX ADMIN — HYDRALAZINE HYDROCHLORIDE 25 MG: 25 TABLET, FILM COATED ORAL at 20:16

## 2023-08-01 RX ADMIN — Medication 10 ML: at 20:17

## 2023-08-01 RX ADMIN — INSULIN LISPRO 4 UNITS: 100 INJECTION, SOLUTION INTRAVENOUS; SUBCUTANEOUS at 20:35

## 2023-08-01 ASSESSMENT — PAIN SCALES - GENERAL
PAINLEVEL_OUTOF10: 3
PAINLEVEL_OUTOF10: 5
PAINLEVEL_OUTOF10: 3

## 2023-08-01 ASSESSMENT — PAIN - FUNCTIONAL ASSESSMENT
PAIN_FUNCTIONAL_ASSESSMENT: PREVENTS OR INTERFERES SOME ACTIVE ACTIVITIES AND ADLS
PAIN_FUNCTIONAL_ASSESSMENT: ACTIVITIES ARE NOT PREVENTED

## 2023-08-01 ASSESSMENT — PAIN DESCRIPTION - DESCRIPTORS
DESCRIPTORS: ACHING;DULL
DESCRIPTORS: DISCOMFORT;SORE

## 2023-08-01 ASSESSMENT — PAIN DESCRIPTION - LOCATION
LOCATION: HEAD

## 2023-08-01 NOTE — PLAN OF CARE
Problem: Discharge Planning  Goal: Discharge to home or other facility with appropriate resources  Outcome: Progressing  Flowsheets (Taken 7/31/2023 1933)  Discharge to home or other facility with appropriate resources:   Identify barriers to discharge with patient and caregiver   Arrange for needed discharge resources and transportation as appropriate   Identify discharge learning needs (meds, wound care, etc)     Problem: Safety - Adult  Goal: Free from fall injury  Outcome: Progressing     Problem: Skin/Tissue Integrity  Goal: Absence of new skin breakdown  Description: 1. Monitor for areas of redness and/or skin breakdown  2. Assess vascular access sites hourly  3. Every 4-6 hours minimum:  Change oxygen saturation probe site  4. Every 4-6 hours:  If on nasal continuous positive airway pressure, respiratory therapy assess nares and determine need for appliance change or resting period.   Outcome: Progressing     Problem: Chronic Conditions and Co-morbidities  Goal: Patient's chronic conditions and co-morbidity symptoms are monitored and maintained or improved  Outcome: Progressing  Flowsheets (Taken 7/31/2023 1933)  Care Plan - Patient's Chronic Conditions and Co-Morbidity Symptoms are Monitored and Maintained or Improved: Monitor and assess patient's chronic conditions and comorbid symptoms for stability, deterioration, or improvement     Problem: Pain  Goal: Verbalizes/displays adequate comfort level or baseline comfort level  Outcome: Progressing

## 2023-08-01 NOTE — PROGRESS NOTES
INPATIENT CARDIOLOGY FOLLOW-UP    Name: Jd Huynh    Age: 69 y.o. Date of Admission: 7/30/2023  8:45 PM    Date of Service: 8/1/2023    Chief Complaint: Follow-up for Elevated troponin, elevated BNP, recent HF med change    Interim History:  No new overnight cardiac complaints. Currently with no complaints of CP, SOB, palpitations, dizziness, or lightheadedness. SR on telemetry. Review of Systems:   Cardiac: As per HPI  General: No fever, chills  Pulmonary: As per HPI  HEENT: No visual disturbances, difficult swallowing  GI: No nausea, vomiting  Endocrine: No thyroid disease or DM  Musculoskeletal: CANDELARIA x 4, no focal motor deficits  Skin: Intact, no rashes  Neuro/Psych: No headache or seizures    Problem List:  Patient Active Problem List   Diagnosis    Hypertension    Mixed hyperlipidemia    Coronary artery disease involving native coronary artery of native heart without angina pectoris    Atherosclerosis of native artery of right lower extremity with ulceration of midfoot ([facility])    PVD (peripheral vascular disease) ([address])    Diabetes mellitus type 2 in nonobese ([facility])    Lumbar radiculopathy    Spinal stenosis of lumbar region    Lumbosacral spondylosis without myelopathy    Stage 3 chronic kidney disease ([facility])    HFrEF (heart failure with reduced ejection fraction) ([address])    History of deep vein thrombosis    Diabetic ulcer of right foot associated with type 2 diabetes mellitus, with necrosis of muscle ([facility])    Hyperglycemia    Cellulitis    Leg wound, right, initial encounter    Altered mental status       Allergies:   Allergies   Allergen Reactions    Ciprofloxacin Hives           Pcn [Penicillins] Hives    Sulfa Antibiotics Hives and Other (See Comments)       Current Medications:  Current Facility-Administered Medications   Medication Dose Route Frequency Provider Last Rate Last Admin    0.9 % sodium chloride infusion   IntraVENous PRN Romina Mcintosh MD        isosorbide dinitrate (ISORDIL) tablet systolic dysfunction. VIOLETA. Moderate MR. At least moderate TR. Mild PHTN PASP 43 mmHg,   4/3/2023 TTE: EF 30-35%. Akinetic inferior wall. Mild to moderate MR. No RV measurements  TAPSE 1.2 cm  Probable mild right ventricle enlargement and systolic dysfunction  3/77/5527 Lexiscan MPS: Non-ischemic. EF 23%. Fixed inferior defect. Assessment:  Chronic heart failure with reduced EF with with slightly increased volume overload>>improved  Ischemic cardiomyopathy EF 30-35%  History of PAD status post multiple PCI's with no gangrenous changes of the right foot, patient declining amputation  Increased troponin secondary CKD and chronic heart failure. 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 DNR CCA        Plan:   Change Bumex to oral therapy 1 mg po daily,   No ischemic work-up as per patient. Management of ischemic ulcer and possible osteomyelitis as per primary, consider vascular surgery and podiatry consult. Rest of as per primary service. Continue dose adjusted Eliquis 2.5 mg p.o. twice daily  On limited GDMT medication for HFrEF, continue Toprol-XL 25 mg p.o. twice daily hydralazine 25 mg p.o. 3 times a day and Isordil 10 mg p.o. 3 times a day  Continue rosuvastatin 10 mg p.o. daily  Patient stable from the cardiology standpoint, will sign off, please call with any questions or concerns      More  than 35 minutes  was spent counseling the patient, reviewing the medications, results, assessment and the rationale for the above recommendations. Wanda Hernandez MD., Carlos Fernandes.   [facility]) Cardiology

## 2023-08-01 NOTE — PROGRESS NOTES
Date:2023  Patient Name: Lu Mcleod  MRN: [id number]  : 1945  ROOM #: [id number]    Occupational treatment attempted this PM. Patient declined OT due to just getting back into bed. Will re-attempt OT tx at a later time. Thank you.      Corazon Aranda OTR/L #[id number]

## 2023-08-01 NOTE — PROGRESS NOTES
I have discussed with the patient the rationale for blood component transfusion; its benefits in treating or preventing fatigue, organ damage, or death; and its risk which includes mild transfusion reactions, rare risk of blood borne infection, or more serious but rare reactions. I have discussed the alternatives to transfusion, including the risk and consequences of not receiving transfusion. The patient had an opportunity to ask questions and had agreed to proceed with transfusion of blood components. Sima Yanez MD.   PGY2, FM.

## 2023-08-01 NOTE — PROGRESS NOTES
During bedside report patient declared that he was refusing blood transfusion. RN spoke with patient again regarding new orders and documentation of agreement by Dr. Theron Whaley. Patient still wavering and doesn't understand why he needs it. RN explained that his normal hgb is reportedly 11-13 and he has been hovering around 8 for 4 lab checks. Explained that this is low for him and further explained that while it hasn't dropped more, it also has not improved. He verifies he is agreeable.

## 2023-08-02 VITALS
HEIGHT: 69 IN | BODY MASS INDEX: 20.77 KG/M2 | WEIGHT: 140.25 LBS | DIASTOLIC BLOOD PRESSURE: 56 MMHG | RESPIRATION RATE: 18 BRPM | HEART RATE: 73 BPM | SYSTOLIC BLOOD PRESSURE: 132 MMHG | TEMPERATURE: 98.4 F | OXYGEN SATURATION: 98 %

## 2023-08-02 PROBLEM — R41.82 ALTERED MENTAL STATUS: Status: RESOLVED | Noted: 2023-07-31 | Resolved: 2023-08-02

## 2023-08-02 LAB
ABO/RH: NORMAL
ALBUMIN SERPL-MCNC: 2.8 G/DL (ref 3.5–5.2)
ALP SERPL-CCNC: 164 U/L (ref 40–129)
ALT SERPL-CCNC: 5 U/L (ref 0–40)
ANION GAP SERPL CALCULATED.3IONS-SCNC: 12 MMOL/L (ref 7–16)
ANTIBODY SCREEN: NEGATIVE
ARM BAND NUMBER: NORMAL
AST SERPL-CCNC: 44 U/L (ref 0–39)
BASOPHILS # BLD: 0.06 K/UL (ref 0–0.2)
BASOPHILS NFR BLD: 0 % (ref 0–2)
BILIRUB SERPL-MCNC: 0.5 MG/DL (ref 0–1.2)
BLOOD BANK BLOOD PRODUCT EXPIRATION DATE: NORMAL
BLOOD BANK DISPENSE STATUS: NORMAL
BLOOD BANK ISBT PRODUCT BLOOD TYPE: 5100
BLOOD BANK PRODUCT CODE: NORMAL
BLOOD BANK SAMPLE EXPIRATION: NORMAL
BLOOD BANK UNIT TYPE AND RH: NORMAL
BPU ID: NORMAL
BUN SERPL-MCNC: 45 MG/DL (ref 6–23)
CALCIUM SERPL-MCNC: 8.7 MG/DL (ref 8.6–10.2)
CHLORIDE SERPL-SCNC: 98 MMOL/L (ref 98–107)
CO2 SERPL-SCNC: 25 MMOL/L (ref 22–29)
COMPONENT: NORMAL
CREAT SERPL-MCNC: 1.9 MG/DL (ref 0.7–1.2)
CROSSMATCH RESULT: NORMAL
CRP SERPL HS-MCNC: 183 MG/L (ref 0–5)
EOSINOPHIL # BLD: 0.08 K/UL (ref 0.05–0.5)
EOSINOPHILS RELATIVE PERCENT: 1 % (ref 0–6)
ERYTHROCYTE [DISTWIDTH] IN BLOOD BY AUTOMATED COUNT: 15.4 % (ref 11.5–15)
GFR SERPL CREATININE-BSD FRML MDRD: 36 ML/MIN/1.73M2
GLUCOSE SERPL-MCNC: 230 MG/DL (ref 74–99)
HCT VFR BLD AUTO: 27.9 % (ref 37–54)
HGB BLD-MCNC: 8.8 G/DL (ref 12.5–16.5)
IMM GRANULOCYTES # BLD AUTO: 0.12 K/UL (ref 0–0.58)
IMM GRANULOCYTES NFR BLD: 1 % (ref 0–5)
LYMPHOCYTES NFR BLD: 1.25 K/UL (ref 1.5–4)
LYMPHOCYTES RELATIVE PERCENT: 8 % (ref 20–42)
MAGNESIUM SERPL-MCNC: 1.6 MG/DL (ref 1.6–2.6)
MCH RBC QN AUTO: 26.5 PG (ref 26–35)
MCHC RBC AUTO-ENTMCNC: 31.5 G/DL (ref 32–34.5)
MCV RBC AUTO: 84 FL (ref 80–99.9)
METER GLUCOSE: 286 MG/DL (ref 74–99)
METER GLUCOSE: 342 MG/DL (ref 74–99)
METER GLUCOSE: 441 MG/DL (ref 74–99)
MONOCYTES NFR BLD: 1.2 K/UL (ref 0.1–0.95)
MONOCYTES NFR BLD: 8 % (ref 2–12)
NEUTROPHILS NFR BLD: 82 % (ref 43–80)
NEUTS SEG NFR BLD: 12.33 K/UL (ref 1.8–7.3)
PLATELET # BLD AUTO: 378 K/UL (ref 130–450)
PMV BLD AUTO: 10.1 FL (ref 7–12)
POTASSIUM SERPL-SCNC: 3.4 MMOL/L (ref 3.5–5)
PROT SERPL-MCNC: 6.7 G/DL (ref 6.4–8.3)
RBC # BLD AUTO: 3.32 M/UL (ref 3.8–5.8)
SODIUM SERPL-SCNC: 135 MMOL/L (ref 132–146)
TRANSFUSION STATUS: NORMAL
UNIT DIVISION: 0
UNIT ISSUE DATE/TIME: NORMAL
WBC OTHER # BLD: 15 K/UL (ref 4.5–11.5)

## 2023-08-02 PROCEDURE — 6370000000 HC RX 637 (ALT 250 FOR IP)

## 2023-08-02 PROCEDURE — 2580000003 HC RX 258

## 2023-08-02 PROCEDURE — 6370000000 HC RX 637 (ALT 250 FOR IP): Performed by: INTERNAL MEDICINE

## 2023-08-02 PROCEDURE — 2580000003 HC RX 258: Performed by: REGISTERED NURSE

## 2023-08-02 PROCEDURE — 82947 ASSAY GLUCOSE BLOOD QUANT: CPT

## 2023-08-02 PROCEDURE — 6370000000 HC RX 637 (ALT 250 FOR IP): Performed by: REGISTERED NURSE

## 2023-08-02 PROCEDURE — 99238 HOSP IP/OBS DSCHRG MGMT 30/<: CPT | Performed by: FAMILY MEDICINE

## 2023-08-02 PROCEDURE — 6360000002 HC RX W HCPCS: Performed by: REGISTERED NURSE

## 2023-08-02 PROCEDURE — 80053 COMPREHEN METABOLIC PANEL: CPT

## 2023-08-02 PROCEDURE — 85025 COMPLETE CBC W/AUTO DIFF WBC: CPT

## 2023-08-02 PROCEDURE — 83735 ASSAY OF MAGNESIUM: CPT

## 2023-08-02 RX ORDER — INSULIN GLARGINE 100 [IU]/ML
5 INJECTION, SOLUTION SUBCUTANEOUS 2 TIMES DAILY
Status: DISCONTINUED | OUTPATIENT
Start: 2023-08-02 | End: 2023-08-02 | Stop reason: HOSPADM

## 2023-08-02 RX ORDER — POTASSIUM CHLORIDE 20 MEQ/1
20 TABLET, EXTENDED RELEASE ORAL ONCE
Status: COMPLETED | OUTPATIENT
Start: 2023-08-02 | End: 2023-08-02

## 2023-08-02 RX ORDER — INSULIN GLARGINE 100 [IU]/ML
10 INJECTION, SOLUTION SUBCUTANEOUS NIGHTLY
Status: DISCONTINUED | OUTPATIENT
Start: 2023-08-03 | End: 2023-08-02 | Stop reason: HOSPADM

## 2023-08-02 RX ORDER — DOXYCYCLINE HYCLATE 100 MG/1
100 CAPSULE ORAL EVERY 12 HOURS SCHEDULED
Qty: 20 CAPSULE | Refills: 0 | Status: SHIPPED | OUTPATIENT
Start: 2023-08-02 | End: 2023-08-11

## 2023-08-02 RX ORDER — CEPHALEXIN 500 MG/1
500 CAPSULE ORAL EVERY 8 HOURS SCHEDULED
Status: DISCONTINUED | OUTPATIENT
Start: 2023-08-02 | End: 2023-08-02 | Stop reason: HOSPADM

## 2023-08-02 RX ORDER — CEPHALEXIN 500 MG/1
500 CAPSULE ORAL EVERY 8 HOURS SCHEDULED
Qty: 30 CAPSULE | Refills: 0 | Status: SHIPPED | OUTPATIENT
Start: 2023-08-02 | End: 2023-08-12

## 2023-08-02 RX ADMIN — BUMETANIDE 1 MG: 1 TABLET ORAL at 08:26

## 2023-08-02 RX ADMIN — ACETAMINOPHEN 650 MG: 325 TABLET ORAL at 00:52

## 2023-08-02 RX ADMIN — APIXABAN 2.5 MG: 2.5 TABLET, FILM COATED ORAL at 08:27

## 2023-08-02 RX ADMIN — HYDRALAZINE HYDROCHLORIDE 25 MG: 25 TABLET, FILM COATED ORAL at 14:41

## 2023-08-02 RX ADMIN — INSULIN LISPRO 8 UNITS: 100 INJECTION, SOLUTION INTRAVENOUS; SUBCUTANEOUS at 11:05

## 2023-08-02 RX ADMIN — INSULIN LISPRO 4 UNITS: 100 INJECTION, SOLUTION INTRAVENOUS; SUBCUTANEOUS at 06:14

## 2023-08-02 RX ADMIN — INSULIN GLARGINE 5 UNITS: 100 INJECTION, SOLUTION SUBCUTANEOUS at 08:27

## 2023-08-02 RX ADMIN — DOXYCYCLINE HYCLATE 100 MG: 100 CAPSULE ORAL at 08:27

## 2023-08-02 RX ADMIN — Medication 10 ML: at 10:19

## 2023-08-02 RX ADMIN — HYDRALAZINE HYDROCHLORIDE 25 MG: 25 TABLET, FILM COATED ORAL at 08:27

## 2023-08-02 RX ADMIN — ACETAMINOPHEN 650 MG: 325 TABLET ORAL at 10:16

## 2023-08-02 RX ADMIN — ISOSORBIDE DINITRATE 10 MG: 10 TABLET ORAL at 08:26

## 2023-08-02 RX ADMIN — CEPHALEXIN 500 MG: 500 CAPSULE ORAL at 14:41

## 2023-08-02 RX ADMIN — WATER 2000 MG: 1 INJECTION INTRAMUSCULAR; INTRAVENOUS; SUBCUTANEOUS at 03:53

## 2023-08-02 RX ADMIN — METOPROLOL SUCCINATE 25 MG: 25 TABLET, FILM COATED, EXTENDED RELEASE ORAL at 08:26

## 2023-08-02 RX ADMIN — POTASSIUM CHLORIDE 20 MEQ: 1500 TABLET, EXTENDED RELEASE ORAL at 08:26

## 2023-08-02 RX ADMIN — INSULIN LISPRO 6 UNITS: 100 INJECTION, SOLUTION INTRAVENOUS; SUBCUTANEOUS at 15:57

## 2023-08-02 ASSESSMENT — PAIN SCALES - GENERAL
PAINLEVEL_OUTOF10: 7
PAINLEVEL_OUTOF10: 0
PAINLEVEL_OUTOF10: 3

## 2023-08-02 ASSESSMENT — PAIN DESCRIPTION - ORIENTATION: ORIENTATION: LEFT

## 2023-08-02 ASSESSMENT — PAIN DESCRIPTION - DESCRIPTORS
DESCRIPTORS: ACHING;DULL
DESCRIPTORS: ACHING;DISCOMFORT;DULL

## 2023-08-02 ASSESSMENT — PAIN DESCRIPTION - LOCATION
LOCATION: HEAD
LOCATION: HEAD

## 2023-08-02 ASSESSMENT — PAIN - FUNCTIONAL ASSESSMENT
PAIN_FUNCTIONAL_ASSESSMENT: ACTIVITIES ARE NOT PREVENTED
PAIN_FUNCTIONAL_ASSESSMENT: ACTIVITIES ARE NOT PREVENTED

## 2023-08-02 NOTE — DISCHARGE SUMMARY
Physician Discharge Summary  [facility]     Patient ID:  Pierre Okeefe  [id number]  69 y.o.  1945    Admit date: 7/30/2023    Discharge date and time:  8/3/2023    Admitting Physician: Erik Beard MD   Discharging physician: Dr. Agata Soares MD    Admission Diagnoses:   Cellulitis [L03.90]  Hyperglycemia [R73.9]  Leg wound, right, initial encounter [S81.801A]  Altered mental status, unspecified altered mental status type [R41.82]    Discharge Diagnoses:  Principal Problem: Atherosclerosis of native artery of right lower extremity with ulceration of midfoot ([facility])  Active Problems:    Stage 3 chronic kidney disease ([facility])    HFrEF (heart failure with reduced ejection fraction) ([facility])    PVD (peripheral vascular disease) ([facility])    Hyperglycemia    Cellulitis    Leg wound, right, initial encounter  Resolved Problems:    Altered mental status      Consultants:   IP CONSULT TO IV TEAM  IP CONSULT TO FAMILY MEDICINE  IP CONSULT TO CARDIOLOGY  IP CONSULT TO VASCULAR SURGERY  IP CONSULT TO INFECTIOUS DISEASES  IP CONSULT TO HEART FAILURE NURSE/COORDINATOR    Procedures: None    Hospital Course: Pierre Okeefe is a 69 y.o. male who presented for evaluation of right lower extremity wound and hypoglycemia. He was found to have cellulitis of the right foot. Infectious disease were consulted  and he was started on Ancef. Patient was known to vascular surgery and vascular surgery was consulted during hospitalization but patient refused amputation. For his HFrEF cardiology was also consulted and continued his home medication and started him on Bumex as well. Later on doxycycline was added on his Ancef regimen which was changed to oral antibiotics later on. Patient has history of anemia and during hospitalization his hemoglobin dropped to 7.9 as patient was having history of CAD so 1 unit of blood was transferred to the patient and repeat hemoglobin was 8.8.   Patient than 3lbs or have worse swelling or worse shortness of breath, Heart failure symptoms like you did before.             STOP taking these medications      Ethylenediamine 99 % Liqd               Where to Get Your Medications        These medications were sent to [address], [address], [address] [place] [address]      Phone: [phone]   cephALEXin 500 MG capsule  doxycycline hyclate 100 MG capsule          Activity: activity as tolerated  Diet: regular diet    Discharge instructions:   Alize Adams MD  [address] [address]  [phone]    Go on 8/9/2023  at 3 pm., [address] MD  [address] [address] [phone]    Schedule an appointment as soon as possible for a visit in 1 week(s)  HOSPITAL FOLLOW UP        Signed:  Baljinder Don MD PGY-2  8/3/2023  11:37 AM

## 2023-08-02 NOTE — PROGRESS NOTES
Physical Therapy    Pt NA both AM/PM attempts, R LE foot wounds not dressed, unable to renan walking boot. Will follow on another date/time.   Gilberto Tay PTA [id number]

## 2023-08-02 NOTE — CARE COORDINATION
Transition of Care-Cardiology signed off yesterday-no further ischemic workup. ID continues to follow-patient will remain on IV Ancef until discharge then transition to PO antibiotics, also remains on PO Doxycycline. Patient declined to work with OT, hopefully will allow therapy to eval and treat today. Patient requested walker-no preference on DME provider, will need DME order placed by attending. [facility] DME following once order placed will deliver. Discharge plan is home, CM following for DME and HHC needs if patient agreeable.      Mireya Schaeffer BSN, RN  [facility]

## 2023-08-02 NOTE — PLAN OF CARE
Problem: Discharge Planning  Goal: Discharge to home or other facility with appropriate resources  Outcome: Progressing  Flowsheets (Taken 8/1/2023 2006 by Bobby Brian RN)  Discharge to home or other facility with appropriate resources:   Identify barriers to discharge with patient and caregiver   Arrange for needed discharge resources and transportation as appropriate   Identify discharge learning needs (meds, wound care, etc)

## 2023-08-02 NOTE — PROGRESS NOTES
Occupational Therapy  Patient treatment attempted this PM.  Patient not available, waiting on dressing for foot. Will continue OT POC as able and appropriate.     Sangeeta PEREZ/YOVANI [id number]

## 2023-08-02 NOTE — PROGRESS NOTES
Consult rt foot wounds. Necrotic wound heel and toes  Picture from ED 7/31    Plantar area now with moderated old bloody drainage        Fith toe as above from 7/31. Remains dry  heel black, stable, periwound dry [id number]  Patient sitting with leg hung over side of bed  No dressing in place  Shelli Pickup.  Emilee Nielsen, CNS, Wound Care

## 2023-08-02 NOTE — PLAN OF CARE
Problem: Discharge Planning  Goal: Discharge to home or other facility with appropriate resources  8/2/2023 1455 by Oksana Freeman RN  Outcome: Adequate for Discharge  8/2/2023 0850 by Oksana Freeman RN  Outcome: Progressing  Flowsheets (Taken 8/1/2023 2006 by Renée Toscano RN)  Discharge to home or other facility with appropriate resources:   Identify barriers to discharge with patient and caregiver   Arrange for needed discharge resources and transportation as appropriate   Identify discharge learning needs (meds, wound care, etc)

## 2023-08-03 NOTE — PROGRESS NOTES
CLINICAL PHARMACY NOTE: MEDS TO BEDS    Total # of Prescriptions Filled: 2   The following medications were delivered to the patient:  Doxycycline 100 mg  Cephalexin 500 mg    Additional Documentation: to patient

## 2023-08-04 NOTE — PROGRESS NOTES
Physician Progress Note      PATIENT:               Vee Maldonado  CSN #:                  [id number]  :                       1945  ADMIT DATE:       2023 8:45 PM  [facility] DATE:        2023 4:50 PM  RESPONDING  PROVIDER #: Jessi Weaver MD          QUERY TEXT:    Patient admitted with cellulitis rt foot. Noted documentation of sepsis in    PN. In order to support the diagnosis of sepsis, please include   additional clinical indicators in your documentation. Or please document if   the diagnosis of sepsis has been ruled out after further study    The medical record reflects the following:  Risk Factors: cellulitis rt foot  Clinical Indicators: on admission lactic sepsis 2.7, WBC 20.3, .0, HR   103, T max 100.1   PN \"Probably systemic symptoms secondary to cellulitis and sepsis as   noted by mental status changes. \"  Treatment: IV antibiotics; Keflex and Doxy po at discharge    Thank you,  Ebony Duenas RN, CCDS  Clinical Documentation   [email or web address]  Options provided:  -- Sepsis POA as evidenced by, Please document evidence. -- Sepsis was ruled out after study  -- Other - I will add my own diagnosis  -- Disagree - Not applicable / Not valid  -- Disagree - Clinically unable to determine / Unknown  -- Refer to Clinical Documentation Reviewer    PROVIDER RESPONSE TEXT:    Sepsis POA is present as evidenced by SIRS (has 2 of) HR >90, WBC >12,000 and   source being cellulitis of the Rt Foot.     Query created by: Ebony Duenas on 2023 2:05 PM      Electronically signed by:  Jessi Weaver MD 2023 3:45 PM

## 2023-08-05 LAB
MICROORGANISM SPEC CULT: NORMAL
MICROORGANISM SPEC CULT: NORMAL
SERVICE CMNT-IMP: NORMAL
SERVICE CMNT-IMP: NORMAL
SPECIMEN DESCRIPTION: NORMAL
SPECIMEN DESCRIPTION: NORMAL

## 2023-08-07 RX ORDER — HYDRALAZINE HYDROCHLORIDE 25 MG/1
TABLET, FILM COATED ORAL
Qty: 90 TABLET | Refills: 3 | Status: SHIPPED | OUTPATIENT
Start: 2023-08-07

## 2023-08-07 NOTE — DISCHARGE INSTRUCTIONS
Visit Discharge/Physician Orders     Discharge condition: Stable     Assessment of pain at discharge: yes     Anesthetic used: 4% lidocaine solution     Discharge to: Home     Left via:Private automobile     Accompanied by: spouse     ECF/HHA: supplies from [employer]     Dressing Orders: RIGHT GREAT TOE/LATERAL FOOT/HEEL- Paint with betadine to blackened areas, apply  silver calcium alginate open areas, dry dressing and secure. Change daily. Place aquacel between 1st and 2nd toes and gauze between the rest.     Guaze between toes to keep toes . Treatment Orders:Eat a diet high in protein and vitamin C. Take a multiple vitamin daily unless contraindicated. Wear offloading boot. Take antibiotics as prescribed    Tissue culture obtained at [facility] today 8-9-23    [facility] followup visit : 2 weeks___________________________  (Please note your next appointment above and if you are unable to keep, kindly give a 24 hour notice. Thank you.)     Physician signature:__________________________     If you experience any of the following, please call the [facility] during business hours:     * Increase in Pain  * Temperature over 101  * Increase in drainage from your wound  * Drainage with a foul odor  * Bleeding  * Increase in swelling  * Need for compression bandage changes due to slippage, breakthrough drainage. If you need medical attention outside of the business hours of the [facility] please contact your PCP or go to the nearest emergency room.

## 2023-08-09 ENCOUNTER — HOSPITAL ENCOUNTER (OUTPATIENT)
Dept: WOUND CARE | Age: 78
Discharge: HOME | End: 2023-08-09
Payer: MEDICARE

## 2023-08-09 VITALS
RESPIRATION RATE: 18 BRPM | TEMPERATURE: 100.6 F | HEART RATE: 118 BPM | SYSTOLIC BLOOD PRESSURE: 161 MMHG | DIASTOLIC BLOOD PRESSURE: 73 MMHG | WEIGHT: 140 LBS | HEIGHT: 69 IN | BODY MASS INDEX: 20.73 KG/M2

## 2023-08-09 DIAGNOSIS — E11.621 DIABETIC ULCER OF OTHER PART OF RIGHT FOOT ASSOCIATED WITH TYPE 2 DIABETES MELLITUS, WITH NECROSIS OF MUSCLE (HCC): ICD-10-CM

## 2023-08-09 DIAGNOSIS — L97.513 DIABETIC ULCER OF OTHER PART OF RIGHT FOOT ASSOCIATED WITH TYPE 2 DIABETES MELLITUS, WITH NECROSIS OF MUSCLE (HCC): ICD-10-CM

## 2023-08-09 DIAGNOSIS — I70.234 ATHEROSCLEROSIS OF NATIVE ARTERY OF RIGHT LOWER EXTREMITY WITH ULCERATION OF MIDFOOT (HCC): Primary | ICD-10-CM

## 2023-08-09 DIAGNOSIS — I96 GANGRENE OF RIGHT FOOT (HCC): ICD-10-CM

## 2023-08-09 PROBLEM — R73.9 HYPERGLYCEMIA: Status: RESOLVED | Noted: 2023-07-31 | Resolved: 2023-08-09

## 2023-08-09 PROCEDURE — 87205 SMEAR GRAM STAIN: CPT

## 2023-08-09 PROCEDURE — 87075 CULTR BACTERIA EXCEPT BLOOD: CPT

## 2023-08-09 PROCEDURE — 11042 DBRDMT SUBQ TIS 1ST 20SQCM/<: CPT

## 2023-08-09 PROCEDURE — 87077 CULTURE AEROBIC IDENTIFY: CPT

## 2023-08-09 PROCEDURE — 87070 CULTURE OTHR SPECIMN AEROBIC: CPT

## 2023-08-09 RX ORDER — LIDOCAINE HYDROCHLORIDE 20 MG/ML
JELLY TOPICAL ONCE
OUTPATIENT
Start: 2023-08-09 | End: 2023-08-09

## 2023-08-09 RX ORDER — LIDOCAINE 50 MG/G
OINTMENT TOPICAL ONCE
OUTPATIENT
Start: 2023-08-09 | End: 2023-08-09

## 2023-08-09 RX ORDER — BACITRACIN ZINC AND POLYMYXIN B SULFATE 500; 1000 [USP'U]/G; [USP'U]/G
OINTMENT TOPICAL ONCE
OUTPATIENT
Start: 2023-08-09 | End: 2023-08-09

## 2023-08-09 RX ORDER — GINSENG 100 MG
CAPSULE ORAL ONCE
OUTPATIENT
Start: 2023-08-09 | End: 2023-08-09

## 2023-08-09 RX ORDER — CLOBETASOL PROPIONATE 0.5 MG/G
OINTMENT TOPICAL ONCE
OUTPATIENT
Start: 2023-08-09 | End: 2023-08-09

## 2023-08-09 RX ORDER — LIDOCAINE HYDROCHLORIDE 40 MG/ML
SOLUTION TOPICAL ONCE
Status: COMPLETED | OUTPATIENT
Start: 2023-08-09 | End: 2023-08-09

## 2023-08-09 RX ORDER — BETAMETHASONE DIPROPIONATE 0.05 %
OINTMENT (GRAM) TOPICAL ONCE
OUTPATIENT
Start: 2023-08-09 | End: 2023-08-09

## 2023-08-09 RX ORDER — LIDOCAINE HYDROCHLORIDE 40 MG/ML
SOLUTION TOPICAL ONCE
OUTPATIENT
Start: 2023-08-09 | End: 2023-08-09

## 2023-08-09 RX ORDER — LIDOCAINE 40 MG/G
CREAM TOPICAL ONCE
OUTPATIENT
Start: 2023-08-09 | End: 2023-08-09

## 2023-08-09 RX ORDER — IBUPROFEN 200 MG
TABLET ORAL ONCE
OUTPATIENT
Start: 2023-08-09 | End: 2023-08-09

## 2023-08-09 RX ORDER — GENTAMICIN SULFATE 1 MG/G
OINTMENT TOPICAL ONCE
OUTPATIENT
Start: 2023-08-09 | End: 2023-08-09

## 2023-08-09 RX ORDER — SODIUM CHLOR/HYPOCHLOROUS ACID 0.033 %
SOLUTION, IRRIGATION IRRIGATION ONCE
OUTPATIENT
Start: 2023-08-09 | End: 2023-08-09

## 2023-08-09 RX ADMIN — LIDOCAINE HYDROCHLORIDE 10 ML: 40 SOLUTION TOPICAL at 08:42

## 2023-08-09 NOTE — PLAN OF CARE
Problem: Chronic Conditions and Co-morbidities  Goal: Patient's chronic conditions and co-morbidity symptoms are monitored and maintained or improved  Outcome: Progressing     Problem: Cognitive:  Goal: Knowledge of wound care  Description: Knowledge of wound care  Outcome: Progressing  Goal: Understands risk factors for wounds  Description: Understands risk factors for wounds  Outcome: Progressing     Problem: Wound:  Goal: Will show signs of wound healing; wound closure and no evidence of infection  Description: Will show signs of wound healing; wound closure and no evidence of infection  Outcome: Progressing     Problem: Arterial:  Goal: Optimize blood flow for wound healing  Description: Optimize blood flow for wound healing  Outcome: Adequate for Discharge     Problem: Blood Glucose:  Goal: Ability to maintain appropriate glucose levels will improve  Description: Ability to maintain appropriate glucose levels will improve  Outcome: Adequate for Discharge     Problem: Venous:  Goal: Signs of wound healing will improve  Description: Signs of wound healing will improve  Outcome: Adequate for Discharge

## 2023-08-11 ENCOUNTER — HOSPITAL ENCOUNTER (OUTPATIENT)
Dept: OTHER | Age: 78
Setting detail: THERAPIES SERIES
Discharge: HOME | End: 2023-08-11
Payer: MEDICARE

## 2023-08-11 ENCOUNTER — OFFICE VISIT (OUTPATIENT)
Dept: FAMILY MEDICINE CLINIC | Age: 78
End: 2023-08-11
Payer: MEDICARE

## 2023-08-11 VITALS
BODY MASS INDEX: 20.14 KG/M2 | RESPIRATION RATE: 14 BRPM | HEIGHT: 69 IN | OXYGEN SATURATION: 94 % | HEART RATE: 82 BPM | SYSTOLIC BLOOD PRESSURE: 126 MMHG | DIASTOLIC BLOOD PRESSURE: 78 MMHG | WEIGHT: 136 LBS | TEMPERATURE: 98 F

## 2023-08-11 VITALS
HEART RATE: 86 BPM | SYSTOLIC BLOOD PRESSURE: 132 MMHG | BODY MASS INDEX: 19.79 KG/M2 | OXYGEN SATURATION: 96 % | WEIGHT: 134 LBS | RESPIRATION RATE: 16 BRPM | DIASTOLIC BLOOD PRESSURE: 70 MMHG

## 2023-08-11 DIAGNOSIS — G62.9 NEUROPATHY: ICD-10-CM

## 2023-08-11 DIAGNOSIS — L03.119 CELLULITIS OF FOOT: Primary | ICD-10-CM

## 2023-08-11 LAB
ANION GAP SERPL CALCULATED.3IONS-SCNC: 12 MMOL/L (ref 7–16)
BNP SERPL-MCNC: ABNORMAL PG/ML (ref 0–450)
BUN SERPL-MCNC: 39 MG/DL (ref 6–23)
CALCIUM SERPL-MCNC: 9.8 MG/DL (ref 8.6–10.2)
CHLORIDE SERPL-SCNC: 94 MMOL/L (ref 98–107)
CO2 SERPL-SCNC: 23 MMOL/L (ref 22–29)
CREAT SERPL-MCNC: 1.6 MG/DL (ref 0.7–1.2)
GFR SERPL CREATININE-BSD FRML MDRD: 44 ML/MIN/1.73M2
GLUCOSE SERPL-MCNC: 467 MG/DL (ref 74–99)
MICROORGANISM SPEC CULT: NORMAL
POTASSIUM SERPL-SCNC: 4.2 MMOL/L (ref 3.5–5)
SODIUM SERPL-SCNC: 129 MMOL/L (ref 132–146)
SPECIMEN DESCRIPTION: NORMAL

## 2023-08-11 PROCEDURE — 99214 OFFICE O/P EST MOD 30 MIN: CPT

## 2023-08-11 PROCEDURE — 1111F DSCHRG MED/CURRENT MED MERGE: CPT

## 2023-08-11 PROCEDURE — 1036F TOBACCO NON-USER: CPT

## 2023-08-11 PROCEDURE — 1123F ACP DISCUSS/DSCN MKR DOCD: CPT

## 2023-08-11 PROCEDURE — 36415 COLL VENOUS BLD VENIPUNCTURE: CPT

## 2023-08-11 PROCEDURE — G8420 CALC BMI NORM PARAMETERS: HCPCS

## 2023-08-11 PROCEDURE — 83880 ASSAY OF NATRIURETIC PEPTIDE: CPT

## 2023-08-11 PROCEDURE — G8427 DOCREV CUR MEDS BY ELIG CLIN: HCPCS

## 2023-08-11 PROCEDURE — 3074F SYST BP LT 130 MM HG: CPT

## 2023-08-11 PROCEDURE — 3078F DIAST BP <80 MM HG: CPT

## 2023-08-11 PROCEDURE — 80048 BASIC METABOLIC PNL TOTAL CA: CPT

## 2023-08-11 RX ORDER — PETROLATUM 420 MG/G
OINTMENT TOPICAL
COMMUNITY
Start: 2023-06-27

## 2023-08-11 RX ORDER — GABAPENTIN 100 MG/1
100 CAPSULE ORAL 3 TIMES DAILY
Qty: 90 CAPSULE | Refills: 0 | Status: SHIPPED | OUTPATIENT
Start: 2023-08-11 | End: 2023-09-10

## 2023-08-11 NOTE — PROGRESS NOTES
Congestive Heart Failure [address]   1945          Referring Provider: Dr. Diego Pang  Primary Care Physician: Dr. Cathy Garcia  Cardiologist: Dr. Phil Massey  Nephrologist:         History of Present Illness:     Dread Barrett is a 69 y.o. male with a history of HFiEF post Lifevest, most recent EF 30-35% on 4/3/2023. Patient Story:    He does not  have dyspnea with exertion, shortness of breath, or decline in overall functional capacity. He does not have orthopnea,  PND, nocturnal cough or hemoptysis. He does not have abdominal distention or bloating, early satiety, anorexia/change in appetite. He currently is not on a loop diuretic, only has it prn He does not have lower extremity edema. He denies lightheadedness, dizziness. He denies palpitations, syncope or near syncope. He does not complain of chest pain, pressure, discomfort. Allergies   Allergen Reactions    Doxycycline Hyclate Shortness Of Breath, Nausea And Vomiting and Other (See Comments)     Chest pain, abdominal pain    Ciprofloxacin Hives           Pcn [Penicillins] Hives    Sulfa Antibiotics Hives and Other (See Comments)           Current Outpatient Medications on File Prior to Encounter   Medication Sig Dispense Refill    hydrALAZINE (APRESOLINE) 25 MG tablet TAKE 1 TABLET BY MOUTH THREE TIMES A DAY 90 tablet 3    cephALEXin (KEFLEX) 500 MG capsule Take 1 capsule by mouth every 8 hours for 10 days 30 capsule 0    isosorbide dinitrate (ISORDIL) 10 MG tablet Take 1 tablet by mouth 3 times daily 90 tablet 3    rosuvastatin (CRESTOR) 10 MG tablet TAKE 1 TABLET DAILY 90 tablet 3    metoprolol succinate (TOPROL XL) 25 MG extended release tablet TAKE 1 TABLET TWICE A  tablet 3    torsemide (DEMADEX) 20 MG tablet Only take a tablet on the days you gain more than 3lbs or have worse swelling or worse shortness of breath, Heart failure symptoms like you did before.  [address],[place] [address]

## 2023-08-11 NOTE — PROGRESS NOTES
Post-Discharge Transitional Care Management Progress Note      Mark Davidson   YOB: 1945    Date of Office Visit:  8/11/2023  Date of Hospital Admission: 7/30  Date of Hospital Discharge: 8/3    Care management risk score Rising risk (score 2-5) and Complex Care (Scores >=6): No Risk Score On File     Non face to face  following discharge, date last encounter closed (first attempt may have been earlier): *No documented post hospital discharge outreach found in the last 14 days *No documented post hospital discharge outreach found in the last 14 days    Call initiated 2 business days of discharge: *No response recorded in the last 14 days    ASSESSMENT/PLAN:   {There are no diagnoses linked to this encounter. (Refresh or delete this SmartLink)}    Medical Decision Making: {[facility]4:69179}  No follow-ups on file. {Time Documentation Optional:[id number]}     Subjective:   HPI:  Follow up of Hospital problems/diagnosis(es):     hospital f/up 7/30-8/3 for cellulitis, hyperglycemia, leg wound, AMS  cellulitis right foot, ID started him on Ancef w/ doxycycline added during course. Vasc saw pt but pt refused amputation. Cardiology saw pt for his HFrEF, cont GDMT, started Bumex. Received 1 pRBC for anemia (7.9). sent home on 8/3. Doxycycline was taken at home (with donuts and milk) the next day 8/4 and patient started experiencing chest pain, nausea and vomiting three times. Was given coke which seemed to helped. Stopped taking Friday night. Juan Pablo Chatman seen pt on Wednesday who wanted him to go to ER to get blood work. But because he was told he might be admitted, patient refused to go to ER. Was sluggish over the weekend and began feeling better on 8/6. Keflex q 8 h    Inpatient course: Discharge summary reviewed- see chart.     Interval history/Current status:     Patient Active Problem List   Diagnosis    Hypertension    Mixed hyperlipidemia    Coronary artery disease involving native

## 2023-08-12 LAB
MICROORGANISM SPEC CULT: ABNORMAL
MICROORGANISM SPEC CULT: ABNORMAL
MICROORGANISM/AGENT SPEC: ABNORMAL
SPECIMEN DESCRIPTION: ABNORMAL

## 2023-08-13 ASSESSMENT — ENCOUNTER SYMPTOMS
NAUSEA: 0
DIARRHEA: 0
EYE PAIN: 0
SORE THROAT: 0
WHEEZING: 0
RHINORRHEA: 0
VOMITING: 0
SHORTNESS OF BREATH: 0

## 2023-08-13 NOTE — PROGRESS NOTES
Subjective:  Lu Mcleod is a 69 y.o. male with chief complaint of Follow-Up from Hospital, Wound Check, and Hyperglycemia      HPI:  Wound Check    Hyperglycemia  Associated symptoms include chills. Pertinent negatives include no arthralgias, chest pain, congestion, fever, headaches, myalgias, nausea, rash, sore throat or vomiting. hospital f/up 7/30-8/3 for cellulitis, hyperglycemia, leg wound, AMS  cellulitis right foot, ID started him on Ancef w/ doxycycline added during course. Vasc saw pt but pt refused amputation. Cardiology saw pt for his HFrEF, cont GDMT, started Bumex. Received 1 pRBC for anemia (7.9). sent home on 8/3. Doxycycline was taken at home (with donuts and milk) the next day 8/4 and patient started experiencing chest pain, nausea and vomiting three times. Was given coke which seemed to helped. Stopped taking Doxycycline Friday night. Dr. Paige Guzman seen pt on Wednesday who wanted him to go to ER to get blood work. But because he was told he might be admitted, patient refused to go to ER. Was sluggish over the weekend and began feeling better on 8/6. Patient presents today complaining of foot pain 4/10. Patient set to to finish Keflex tomorrow. Denies any fevers, CP, SOB, lower leg swelling. ROS:  Review of Systems   Constitutional:  Positive for chills. Negative for fever. HENT:  Negative for congestion, rhinorrhea and sore throat. Eyes:  Negative for pain and visual disturbance. Respiratory:  Negative for shortness of breath and wheezing. Cardiovascular:  Negative for chest pain and palpitations. Gastrointestinal:  Negative for diarrhea, nausea and vomiting. Genitourinary:  Negative for dysuria and hematuria. Musculoskeletal:  Negative for arthralgias and myalgias. Right foot pain   Skin:  Negative for rash. Neurological:  Negative for dizziness and headaches.      Objective:  Vitals:    08/11/23 1529   BP: 126/78   Pulse: 82   Resp: 14   Temp: 98

## 2023-08-18 ENCOUNTER — APPOINTMENT (OUTPATIENT)
Dept: GENERAL RADIOLOGY | Age: 78
End: 2023-08-18
Payer: MEDICARE

## 2023-08-18 ENCOUNTER — HOSPITAL ENCOUNTER (EMERGENCY)
Age: 78
Discharge: HOME | End: 2023-08-18
Attending: EMERGENCY MEDICINE
Payer: MEDICARE

## 2023-08-18 VITALS
TEMPERATURE: 98 F | HEART RATE: 72 BPM | BODY MASS INDEX: 20.44 KG/M2 | HEIGHT: 69 IN | DIASTOLIC BLOOD PRESSURE: 60 MMHG | SYSTOLIC BLOOD PRESSURE: 112 MMHG | WEIGHT: 138 LBS | OXYGEN SATURATION: 94 % | RESPIRATION RATE: 24 BRPM

## 2023-08-18 DIAGNOSIS — R79.89 ELEVATED BRAIN NATRIURETIC PEPTIDE (BNP) LEVEL: ICD-10-CM

## 2023-08-18 DIAGNOSIS — L89.301 PRESSURE ULCER OF UNSPECIFIED BUTTOCK, STAGE 1: ICD-10-CM

## 2023-08-18 DIAGNOSIS — Z51.5 HOSPICE CARE: ICD-10-CM

## 2023-08-18 DIAGNOSIS — I96 GANGRENE OF RIGHT FOOT (HCC): Primary | ICD-10-CM

## 2023-08-18 DIAGNOSIS — L97.514 CHRONIC ULCER OF RIGHT FOOT WITH NECROSIS OF BONE (HCC): Primary | ICD-10-CM

## 2023-08-18 DIAGNOSIS — Z51.5 HOSPICE CARE PATIENT: ICD-10-CM

## 2023-08-18 LAB
ALBUMIN SERPL-MCNC: 2.9 G/DL (ref 3.5–5.2)
ALP SERPL-CCNC: 284 U/L (ref 40–129)
ALT SERPL-CCNC: 20 U/L (ref 0–40)
ANION GAP SERPL CALCULATED.3IONS-SCNC: 11 MMOL/L (ref 7–16)
AST SERPL-CCNC: 77 U/L (ref 0–39)
BASOPHILS # BLD: 0.06 K/UL (ref 0–0.2)
BASOPHILS NFR BLD: 0 % (ref 0–2)
BILIRUB SERPL-MCNC: 0.5 MG/DL (ref 0–1.2)
BILIRUB UR QL STRIP: NEGATIVE
BNP SERPL-MCNC: ABNORMAL PG/ML (ref 0–450)
BUN SERPL-MCNC: 40 MG/DL (ref 6–23)
CALCIUM SERPL-MCNC: 9.6 MG/DL (ref 8.6–10.2)
CHLORIDE SERPL-SCNC: 95 MMOL/L (ref 98–107)
CLARITY UR: CLEAR
CO2 SERPL-SCNC: 25 MMOL/L (ref 22–29)
COLOR UR: YELLOW
CREAT SERPL-MCNC: 1.7 MG/DL (ref 0.7–1.2)
EKG ATRIAL RATE: 82 BPM
EKG P AXIS: 75 DEGREES
EKG P-R INTERVAL: 246 MS
EKG Q-T INTERVAL: 456 MS
EKG QRS DURATION: 148 MS
EKG QTC CALCULATION (BAZETT): 532 MS
EKG R AXIS: -100 DEGREES
EKG T AXIS: 77 DEGREES
EKG VENTRICULAR RATE: 82 BPM
EOSINOPHIL # BLD: 0.16 K/UL (ref 0.05–0.5)
EOSINOPHILS RELATIVE PERCENT: 1 % (ref 0–6)
ERYTHROCYTE [DISTWIDTH] IN BLOOD BY AUTOMATED COUNT: 15.9 % (ref 11.5–15)
GFR SERPL CREATININE-BSD FRML MDRD: 42 ML/MIN/1.73M2
GLUCOSE SERPL-MCNC: 182 MG/DL (ref 74–99)
GLUCOSE UR STRIP-MCNC: 100 MG/DL
HCT VFR BLD AUTO: 29.5 % (ref 37–54)
HGB BLD-MCNC: 9 G/DL (ref 12.5–16.5)
HGB UR QL STRIP.AUTO: ABNORMAL
IMM GRANULOCYTES # BLD AUTO: 0.09 K/UL (ref 0–0.58)
IMM GRANULOCYTES NFR BLD: 1 % (ref 0–5)
KETONES UR STRIP-MCNC: NEGATIVE MG/DL
LACTATE BLDV-SCNC: 1.2 MMOL/L (ref 0.5–1.9)
LEUKOCYTE ESTERASE UR QL STRIP: NEGATIVE
LYMPHOCYTES NFR BLD: 1.15 K/UL (ref 1.5–4)
LYMPHOCYTES RELATIVE PERCENT: 8 % (ref 20–42)
MCH RBC QN AUTO: 25.6 PG (ref 26–35)
MCHC RBC AUTO-ENTMCNC: 30.5 G/DL (ref 32–34.5)
MCV RBC AUTO: 83.8 FL (ref 80–99.9)
MONOCYTES NFR BLD: 1.07 K/UL (ref 0.1–0.95)
MONOCYTES NFR BLD: 7 % (ref 2–12)
NEUTROPHILS NFR BLD: 83 % (ref 43–80)
NEUTS SEG NFR BLD: 12.4 K/UL (ref 1.8–7.3)
NITRITE UR QL STRIP: NEGATIVE
PH UR STRIP: 6 [PH] (ref 5–9)
PLATELET # BLD AUTO: 448 K/UL (ref 130–450)
PMV BLD AUTO: 9.3 FL (ref 7–12)
POTASSIUM SERPL-SCNC: 4.7 MMOL/L (ref 3.5–5)
PROT SERPL-MCNC: 8 G/DL (ref 6.4–8.3)
PROT UR STRIP-MCNC: 100 MG/DL
RBC # BLD AUTO: 3.52 M/UL (ref 3.8–5.8)
RBC #/AREA URNS HPF: ABNORMAL /HPF
SODIUM SERPL-SCNC: 131 MMOL/L (ref 132–146)
SP GR UR STRIP: 1.02 (ref 1–1.03)
TROPONIN I SERPL HS-MCNC: 128 NG/L (ref 0–11)
TROPONIN I SERPL HS-MCNC: 135 NG/L (ref 0–11)
UROBILINOGEN UR STRIP-ACNC: 1 EU/DL (ref 0–1)
WBC OTHER # BLD: 14.9 K/UL (ref 4.5–11.5)

## 2023-08-18 PROCEDURE — 84484 ASSAY OF TROPONIN QUANT: CPT

## 2023-08-18 PROCEDURE — 83880 ASSAY OF NATRIURETIC PEPTIDE: CPT

## 2023-08-18 PROCEDURE — 6370000000 HC RX 637 (ALT 250 FOR IP): Performed by: EMERGENCY MEDICINE

## 2023-08-18 PROCEDURE — 83605 ASSAY OF LACTIC ACID: CPT

## 2023-08-18 PROCEDURE — 73630 X-RAY EXAM OF FOOT: CPT

## 2023-08-18 PROCEDURE — 93005 ELECTROCARDIOGRAM TRACING: CPT | Performed by: EMERGENCY MEDICINE

## 2023-08-18 PROCEDURE — 87040 BLOOD CULTURE FOR BACTERIA: CPT

## 2023-08-18 PROCEDURE — 87205 SMEAR GRAM STAIN: CPT

## 2023-08-18 PROCEDURE — 73610 X-RAY EXAM OF ANKLE: CPT

## 2023-08-18 PROCEDURE — 85025 COMPLETE CBC W/AUTO DIFF WBC: CPT

## 2023-08-18 PROCEDURE — 81001 URINALYSIS AUTO W/SCOPE: CPT

## 2023-08-18 PROCEDURE — 80053 COMPREHEN METABOLIC PANEL: CPT

## 2023-08-18 PROCEDURE — 71045 X-RAY EXAM CHEST 1 VIEW: CPT

## 2023-08-18 PROCEDURE — 87070 CULTURE OTHR SPECIMN AEROBIC: CPT

## 2023-08-18 PROCEDURE — 73590 X-RAY EXAM OF LOWER LEG: CPT

## 2023-08-18 PROCEDURE — 99285 EMERGENCY DEPT VISIT HI MDM: CPT

## 2023-08-18 PROCEDURE — 87075 CULTR BACTERIA EXCEPT BLOOD: CPT

## 2023-08-18 PROCEDURE — 87077 CULTURE AEROBIC IDENTIFY: CPT

## 2023-08-18 PROCEDURE — 93010 ELECTROCARDIOGRAM REPORT: CPT | Performed by: INTERNAL MEDICINE

## 2023-08-18 RX ORDER — MORPHINE SULFATE 100 MG/5ML
5 SOLUTION ORAL EVERY 6 HOURS PRN
Qty: 30 ML | Refills: 0 | Status: SHIPPED | OUTPATIENT
Start: 2023-08-18 | End: 2023-09-17

## 2023-08-18 RX ORDER — ACETAMINOPHEN 650 MG/1
650 SUPPOSITORY RECTAL EVERY 4 HOURS PRN
Qty: 3 SUPPOSITORY | Refills: 0 | Status: SHIPPED | OUTPATIENT
Start: 2023-08-18

## 2023-08-18 RX ORDER — LORAZEPAM 1 MG/1
1 TABLET ORAL EVERY 4 HOURS PRN
Qty: 30 TABLET | Refills: 0 | Status: SHIPPED | OUTPATIENT
Start: 2023-08-18 | End: 2023-08-18

## 2023-08-18 RX ORDER — OLANZAPINE 5 MG/1
5 TABLET, ORALLY DISINTEGRATING ORAL EVERY 12 HOURS PRN
Qty: 5 TABLET | Refills: 0 | Status: SHIPPED | OUTPATIENT
Start: 2023-08-18

## 2023-08-18 RX ORDER — HALOPERIDOL 1 MG/1
1 TABLET ORAL EVERY 6 HOURS PRN
Qty: 5 TABLET | Refills: 0 | Status: SHIPPED | OUTPATIENT
Start: 2023-08-18

## 2023-08-18 RX ORDER — GLYCOPYRROLATE 2 MG/1
2 TABLET ORAL 3 TIMES DAILY PRN
Qty: 5 TABLET | Refills: 0 | Status: SHIPPED | OUTPATIENT
Start: 2023-08-18

## 2023-08-18 RX ORDER — GLYCOPYRROLATE 1 MG/1
2 TABLET ORAL 4 TIMES DAILY
Qty: 90 TABLET | Refills: 3 | Status: SHIPPED | OUTPATIENT
Start: 2023-08-18 | End: 2023-08-18

## 2023-08-18 RX ORDER — LORAZEPAM 1 MG/1
1 TABLET ORAL EVERY 6 HOURS PRN
Qty: 5 TABLET | Refills: 0 | Status: SHIPPED | OUTPATIENT
Start: 2023-08-18 | End: 2023-09-17

## 2023-08-18 RX ORDER — ACETAMINOPHEN 325 MG/1
650 TABLET ORAL ONCE
Status: COMPLETED | OUTPATIENT
Start: 2023-08-18 | End: 2023-08-18

## 2023-08-18 RX ORDER — MORPHINE SULFATE 100 MG/5ML
5 SOLUTION ORAL EVERY 4 HOURS PRN
Qty: 4.5 ML | Refills: 0 | Status: SHIPPED | OUTPATIENT
Start: 2023-08-18 | End: 2023-08-18

## 2023-08-18 RX ADMIN — ACETAMINOPHEN 650 MG: 325 TABLET ORAL at 12:10

## 2023-08-18 ASSESSMENT — PAIN SCALES - GENERAL
PAINLEVEL_OUTOF10: 8
PAINLEVEL_OUTOF10: 5

## 2023-08-18 ASSESSMENT — PAIN DESCRIPTION - ORIENTATION
ORIENTATION: RIGHT
ORIENTATION: RIGHT

## 2023-08-18 ASSESSMENT — PAIN DESCRIPTION - LOCATION
LOCATION: FOOT
LOCATION: FOOT

## 2023-08-18 NOTE — ED PROVIDER NOTES
[address] ENCOUNTER        Pt Name: Hung Pickard  MRN: [id number]  [address] [facility] 1945  Date of evaluation: 8/18/2023  Provider: Michelle Estevez DO  PCP: Shana Ruby MD  Note Started: 7:04 AM EDT 8/18/23    CHIEF COMPLAINT       Chief Complaint   Patient presents with    Shortness of Breath     Shortness of breath that started yesterday and getting worse thou the night       HISTORY OF PRESENT ILLNESS: 1 or more Elements   History From: Patient and wife    Limitations to history : None    Hung Pickard is a 69 y.o. male who presents with concern for worsening weakness, fatigue, shortness of breath. He does have a necrotic infection of his right foot that they are aware of, he is supposed to get it amputated but he does not want that done. He is on Eliquis. The shortness of breath and weakness are worsened with exertion, he has not been ambulating much, no nausea, vomiting, diarrhea, constipation, dysuria or hematuria. No fevers or chills at home. No other associated complaints. Nursing Notes were all reviewed and agreed with or any disagreements were addressed in the HPI. REVIEW OF SYSTEMS :      Positives and Pertinent negatives as per HPI.      SURGICAL HISTORY     Past Surgical History:   Procedure Laterality Date    CARDIAC SURGERY  2004    double bypass    COLONOSCOPY      FOOT SURGERY Left     multiple    NERVE BLOCK Bilateral 02/07/2022    #1 BILATERAL LUMBAR MEDIAL BRANCH NERVE BLOCK UNDER FLUOROSCOPIC GUIDANCE AT L3, L4, AND L5 DORSAL RAMI performed by Terra Almonte MD at [address] Bilateral 03/07/2022    BILATERAL LUMBAR MEDIAL BRANCH NERVE BLOCK UNDER FLUORO AT L3 L4 L5 DORSAL RAMI performed by Terra Almonte MD at [address] [place] [address]  03/10/2017    left foot application integra graft, application of wound vac, delayed primax closure    OTHER SURGICAL 22,290 (*)     All other components within normal limits   TROPONIN - Abnormal; Notable for the following components:    Troponin, High Sensitivity 128 (*)     All other components within normal limits   CULTURE, BLOOD 1   CULTURE, BLOOD 2   CULTURE, ANAEROBIC   CULTURE, WOUND   LACTATE, SEPSIS   LACTATE, SEPSIS       As interpreted by me, the above displayed labs are abnormal. All other labs obtained during this visit were within normal range or not returned as of this dictation. RADIOLOGY:   Non-plain film images such as CT, Ultrasound and MRI are read by the radiologist. Plain radiographic images are visualized and preliminarily interpreted by the ED Provider with the below findings:    Interpretation per the Radiologist below, if available at the time of this note:    XR TIBIA FIBULA RIGHT (2 VIEWS)   Final Result   1. No acute fracture or subluxation      2. Areas of mild subcutaneous edema. Dense atherosclerotic related arterial   calcifications. XR FOOT RIGHT (MIN 3 VIEWS)   Final Result   1. Chronic deformities and postop changes of the forefoot, no new bone   destruction to confirm osteomyelitis. No deep soft tissue gas to suggest gas   gangrene. MRI would be more sensitive. XR CHEST PORTABLE   Final Result   1. Mild cardiomegaly. There are no findings of failure or pneumonia. XR ANKLE RIGHT (MIN 3 VIEWS)   Final Result   1. No acute fracture or subluxation      2. Areas of mild subcutaneous edema. Dense atherosclerotic related arterial   calcifications. No results found. No results found. Is this patient to be included in the SEP-1 core measure due to severe sepsis or septic shock?  No Exclusion criteria - the patient is NOT to be included for SEP-1 Core Measure due to: 2+ SIRS criteria are not met    PROCEDURES   Unless otherwise noted below, none    PAST MEDICAL HISTORY/Chronic Conditions Affecting Care      has a past medical history of CAD (coronary

## 2023-08-18 NOTE — CARE COORDINATION
Social Work/Transition of Care:         Hospice Consult noted. SW met with pt and spouse at bedside introduced self and role. Pt has a necrotic infection of his right foot that they he does not want amputated, pt refuses any surgical intervention. Pt and spouse request information on Hospice Services. SW provided freedom of choice list and pt agreeable with [facility]. SW called Referral line and spoke with Elidia, In house liason will be contacted in order to speak with pt and family.     Electronically signed by Rosie Barrow on 0/72/9687 at 11:13 AM

## 2023-08-18 NOTE — PROGRESS NOTES
HOSPICE [facility]    Consult received. Chart reviewed. Met with patient and wife Jill at bedside. Patient alert and oriented sitting up in bed. The hospice benefit and philosophy were explained including that hospice is end of life care in which, per Medicare, a patient has a terminal diagnosis that life expectancy would be 6 months or less. Explained that once in hospice care, all aggressive treatments would be stopped and allow nature to takes its course with focus on comfort care for the patient. Explained hospice services at home, at [facility] with room/board private pay unless patient has Medicaid and the [facility] for short-term symptom management and respite. Patient states that he is not going to have his foot amputated. He and his wife would like to go home with HOT services. Answered all hospice questions. Wife jill would like to drive patient home. Will need discharge order and DNR cc order. CM and physician updated. Thank you for the consult.          Electronically signed by Adolfo Murray RN on 8/18/2023 at 1:36 PM  [phone]; [phone]

## 2023-08-19 NOTE — TELEPHONE ENCOUNTER
Patient enrolling in hospice services. Prescriptions for comfort kit sent to hospice pharmacy.     Young Ruiz MD  8/18/2023

## 2023-08-20 LAB
MICROORGANISM SPEC CULT: ABNORMAL
MICROORGANISM SPEC CULT: NORMAL
MICROORGANISM/AGENT SPEC: ABNORMAL
SERVICE CMNT-IMP: NORMAL
SERVICE CMNT-IMP: NORMAL
SPECIMEN DESCRIPTION: ABNORMAL
SPECIMEN DESCRIPTION: NORMAL

## 2023-08-21 RX ORDER — HYDRALAZINE HYDROCHLORIDE 25 MG/1
TABLET, FILM COATED ORAL
Qty: 270 TABLET | Refills: 1 | OUTPATIENT
Start: 2023-08-21

## 2023-08-21 NOTE — DISCHARGE INSTRUCTIONS
Visit Discharge/Physician Orders     Discharge condition: Stable     Assessment of pain at discharge: yes     Anesthetic used: 4% lidocaine solution     Discharge to: Home     Left via:Private automobile     Accompanied by: spouse     ECF/HHA: supplies from [email or web address]     Dressing Orders: RIGHT GREAT TOE/LATERAL FOOT/HEEL- Paint with betadine to blackened areas, apply  silver calcium alginate open areas, dry dressing and secure. Change daily. Place aquacel between 1st and 2nd toes and gauze between the rest.     Guaze between toes to keep toes . Treatment Orders:Eat a diet high in protein and vitamin C. Take a multiple vitamin daily unless contraindicated. Wear offloading boot. Take antibiotics as prescribed     Tissue culture obtained at [address] [place] [address] today 8-9-23     [address] [place] [address] followup visit : 2 weeks___________________________  (Please note your next appointment above and if you are unable to keep, kindly give a 24 hour notice. Thank you.)     Physician signature:__________________________     If you experience any of the following, please call the [email or web address] during business hours:     * Increase in Pain  * Temperature over 101  * Increase in drainage from your wound  * Drainage with a foul odor  * Bleeding  * Increase in swelling  * Need for compression bandage changes due to slippage, breakthrough drainage. If you need medical attention outside of the business hours of the [email or web address] please contact your PCP or go to the nearest emergency room.

## 2023-08-22 RX ORDER — LANCETS 28 GAUGE
EACH MISCELLANEOUS
Refills: 5 | OUTPATIENT
Start: 2023-08-22

## 2023-08-22 RX ORDER — APIXABAN 2.5 MG/1
TABLET, FILM COATED ORAL
Qty: 60 TABLET | Refills: 5 | OUTPATIENT
Start: 2023-08-22

## 2023-08-22 RX ORDER — PETROLATUM 420 MG/G
OINTMENT TOPICAL
Qty: 100 G | OUTPATIENT
Start: 2023-08-22

## 2023-08-23 ENCOUNTER — HOSPITAL ENCOUNTER (OUTPATIENT)
Dept: WOUND CARE | Age: 78
Discharge: HOME | End: 2023-08-23

## 2023-10-07 DIAGNOSIS — Z79.4 DRUG OR CHEMICAL INDUCED DIABETES MELLITUS WITH HYPERGLYCEMIA, WITH LONG-TERM CURRENT USE OF INSULIN (HCC): ICD-10-CM

## 2023-10-07 DIAGNOSIS — E09.65 DRUG OR CHEMICAL INDUCED DIABETES MELLITUS WITH HYPERGLYCEMIA, WITH LONG-TERM CURRENT USE OF INSULIN (HCC): ICD-10-CM

## 2023-10-07 DIAGNOSIS — Z79.899 MEDICATION DOSE INCREASED: ICD-10-CM

## 2023-10-07 DIAGNOSIS — I50.9 ACUTE DECOMPENSATED HEART FAILURE (HCC): ICD-10-CM

## 2023-10-09 RX ORDER — SACUBITRIL AND VALSARTAN 49; 51 MG/1; MG/1
TABLET, FILM COATED ORAL
Qty: 180 TABLET | Refills: 3 | OUTPATIENT
Start: 2023-10-09

## 2023-10-09 RX ORDER — SITAGLIPTIN 100 MG/1
TABLET, FILM COATED ORAL
Qty: 90 TABLET | Refills: 3 | OUTPATIENT
Start: 2023-10-09

## 2023-12-28 DIAGNOSIS — Z51.5 HOSPICE CARE PATIENT: Primary | ICD-10-CM

## 2023-12-28 DIAGNOSIS — R52 PAIN: ICD-10-CM

## 2023-12-28 RX ORDER — TRAMADOL HYDROCHLORIDE 50 MG/1
50 TABLET ORAL EVERY 6 HOURS PRN
Qty: 30 TABLET | Refills: 3 | Status: SHIPPED | OUTPATIENT
Start: 2023-12-28 | End: 2024-01-27

## 2024-02-06 DIAGNOSIS — Z51.5 HOSPICE CARE PATIENT: Primary | ICD-10-CM

## 2024-02-06 DIAGNOSIS — R52 PAIN: ICD-10-CM

## 2024-02-06 RX ORDER — TRAMADOL HYDROCHLORIDE 50 MG/1
50 TABLET ORAL EVERY 6 HOURS PRN
Qty: 60 TABLET | Refills: 3 | Status: SHIPPED | OUTPATIENT
Start: 2024-02-06 | End: 2024-04-06

## 2024-04-13 DIAGNOSIS — R52 PAIN: ICD-10-CM

## 2024-04-13 DIAGNOSIS — Z51.5 HOSPICE CARE PATIENT: Primary | ICD-10-CM

## 2024-04-13 RX ORDER — TRAMADOL HYDROCHLORIDE 50 MG/1
50 TABLET ORAL EVERY 6 HOURS PRN
Qty: 30 TABLET | Refills: 3 | Status: SHIPPED | OUTPATIENT
Start: 2024-04-13 | End: 2024-05-13

## 2024-06-13 DIAGNOSIS — R52 PAIN: ICD-10-CM

## 2024-06-13 DIAGNOSIS — Z51.5 HOSPICE CARE PATIENT: Primary | ICD-10-CM

## 2024-06-13 RX ORDER — MORPHINE SULFATE 100 %
POWDER (GRAM) MISCELLANEOUS
Qty: 1 EACH | Refills: 0 | Status: SHIPPED | OUTPATIENT
Start: 2024-06-13 | End: 2024-07-13

## 2024-06-19 RX ORDER — ROSUVASTATIN CALCIUM 10 MG/1
TABLET, COATED ORAL
Qty: 90 TABLET | Refills: 0 | Status: SHIPPED | OUTPATIENT
Start: 2024-06-19

## 2024-07-10 DIAGNOSIS — R52 PAIN: ICD-10-CM

## 2024-07-10 DIAGNOSIS — Z51.5 HOSPICE CARE PATIENT: ICD-10-CM

## 2024-07-10 RX ORDER — MORPHINE SULFATE 100 %
POWDER (GRAM) MISCELLANEOUS
Qty: 1 EACH | Refills: 0 | Status: SHIPPED | OUTPATIENT
Start: 2024-07-10 | End: 2024-08-09

## 2024-07-30 DIAGNOSIS — Z51.5 HOSPICE CARE PATIENT: Primary | ICD-10-CM

## 2024-07-30 DIAGNOSIS — R52 PAIN: ICD-10-CM

## 2024-07-30 RX ORDER — TRAMADOL HYDROCHLORIDE 50 MG/1
50 TABLET ORAL EVERY 6 HOURS PRN
Qty: 30 TABLET | Refills: 5 | Status: SHIPPED | OUTPATIENT
Start: 2024-07-30 | End: 2024-09-13

## 2024-08-09 DIAGNOSIS — R52 PAIN: ICD-10-CM

## 2024-08-09 DIAGNOSIS — Z51.5 HOSPICE CARE PATIENT: ICD-10-CM

## 2024-08-09 RX ORDER — MORPHINE SULFATE 100 %
POWDER (GRAM) MISCELLANEOUS
Qty: 1 EACH | Refills: 0 | Status: SHIPPED | OUTPATIENT
Start: 2024-08-09 | End: 2024-09-08

## 2024-08-21 RX ORDER — ROSUVASTATIN CALCIUM 10 MG/1
TABLET, COATED ORAL
Qty: 90 TABLET | Refills: 3 | Status: SHIPPED | OUTPATIENT
Start: 2024-08-21

## 2024-09-25 DIAGNOSIS — Z51.5 HOSPICE CARE PATIENT: ICD-10-CM

## 2024-09-25 DIAGNOSIS — R52 PAIN: ICD-10-CM

## 2024-09-25 RX ORDER — MORPHINE SULFATE 100 %
POWDER (GRAM) MISCELLANEOUS
Qty: 1 EACH | Refills: 0 | Status: SHIPPED | OUTPATIENT
Start: 2024-09-25 | End: 2024-10-25

## 2024-11-01 DIAGNOSIS — Z51.5 HOSPICE CARE PATIENT: ICD-10-CM

## 2024-11-01 DIAGNOSIS — R52 PAIN: ICD-10-CM

## 2024-11-01 RX ORDER — MORPHINE SULFATE 100 %
POWDER (GRAM) MISCELLANEOUS
Qty: 1 EACH | Refills: 0 | Status: SHIPPED | OUTPATIENT
Start: 2024-11-01 | End: 2024-12-01

## 2024-11-04 DIAGNOSIS — R52 PAIN: ICD-10-CM

## 2024-11-04 DIAGNOSIS — Z51.5 HOSPICE CARE PATIENT: ICD-10-CM

## 2024-11-04 RX ORDER — TRAMADOL HYDROCHLORIDE 50 MG/1
50 TABLET ORAL EVERY 6 HOURS PRN
Qty: 30 TABLET | Refills: 5 | Status: SHIPPED | OUTPATIENT
Start: 2024-11-04 | End: 2024-12-19

## 2024-12-18 DIAGNOSIS — R52 PAIN: ICD-10-CM

## 2024-12-18 DIAGNOSIS — Z51.5 HOSPICE CARE PATIENT: ICD-10-CM

## 2024-12-18 RX ORDER — MORPHINE SULFATE 100 %
1 POWDER (GRAM) MISCELLANEOUS PRN
Qty: 1 EACH | Refills: 0 | Status: SHIPPED | OUTPATIENT
Start: 2024-12-18 | End: 2025-01-17

## 2025-04-23 DIAGNOSIS — Z51.5 HOSPICE CARE: Primary | ICD-10-CM

## 2025-07-03 RX ORDER — ROSUVASTATIN CALCIUM 10 MG/1
10 TABLET, COATED ORAL DAILY
Qty: 90 TABLET | Refills: 3 | OUTPATIENT
Start: 2025-07-03

## (undated) DEVICE — CATHETER IV 14GA L1.25IN TEF STR HUB INTROCAN SFTY

## (undated) DEVICE — SYRINGE, LUER LOCK, 5ML: Brand: MEDLINE

## (undated) DEVICE — GAUZE,SPONGE,4"X4",12PLY,STERILE,LF,2'S: Brand: MEDLINE

## (undated) DEVICE — COVER,TABLE,60X90,STERILE: Brand: MEDLINE

## (undated) DEVICE — TUBING SUCT 12FR MAL ALUM SHFT FN CAP VENT UNIV CONN W/ OBT

## (undated) DEVICE — 6 ML SYRINGE LUER-LOCK TIP: Brand: MONOJECT

## (undated) DEVICE — Z DISCONTINUED APPLICATOR SURG PREP 0.35OZ 2% CHG 70% ISO ALC W/ HI LT

## (undated) DEVICE — GLOVE ORANGE PI 7 1/2   MSG9075

## (undated) DEVICE — BANDAGE ADH W1XL3IN NAT FAB WVN FLX DURABLE N ADH PD SEAL

## (undated) DEVICE — DRIP REDUCTION MANIFOLD

## (undated) DEVICE — NEEDLE SPNL L3.5IN PNK HUB S STL REG WALL FIT STYL W/ QNCKE

## (undated) DEVICE — KIT EVAC 400CC DIA1/8IN H PAT 12.5IN 3 SPR RND SHP PVC DRN

## (undated) DEVICE — 12 ML SYRINGE,LUER-LOCK TIP: Brand: MONOJECT

## (undated) DEVICE — GLOVE SURG SZ 85 L12IN FNGR THK13MIL WHT ISOLEX POLYISOPRENE

## (undated) DEVICE — SURGICAL PROCEDURE PACK LAMINECTOMY LUMBAR

## (undated) DEVICE — NEEDLE HYPO 18GA L1.5IN PNK POLYPR HUB S STL THN WALL FILL

## (undated) DEVICE — Device: Brand: PORTEX

## (undated) DEVICE — 3M™ RED DOT™ MONITORING ELECTRODE WITH FOAM TAPE AND STICKY GEL 2560, 50/BAG, 20/CASE, 72/PLT: Brand: RED DOT™

## (undated) DEVICE — YANKAUER,OPEN TIP,W/O VENT,STERILE: Brand: MEDLINE INDUSTRIES, INC.

## (undated) DEVICE — WILSON FRAME KIT: Brand: MEDLINE INDUSTRIES, INC.

## (undated) DEVICE — SOLUTION IV IRRIG WATER 1000ML POUR BRL 2F7114

## (undated) DEVICE — INTENDED FOR TISSUE SEPARATION, AND OTHER PROCEDURES THAT REQUIRE A SHARP SURGICAL BLADE TO PUNCTURE OR CUT.: Brand: BARD-PARKER ® STAINLESS STEEL BLADES

## (undated) DEVICE — SYRINGE 20ML LL S/C 50

## (undated) DEVICE — CODMAN® SURGICAL PATTIES 1/2" X 1" (1.27CM X 2.54CM): Brand: CODMAN®

## (undated) DEVICE — E-Z CLEAN, NON-STICK, PTFE COATED, ELECTROSURGICAL BLADE ELECTRODE, 6.5 INCH (16.5 CM): Brand: MEGADYNE

## (undated) DEVICE — 3M™ IOBAN™ 2 ANTIMICROBIAL INCISE DRAPE 6650EZ: Brand: IOBAN™ 2

## (undated) DEVICE — NEEDLE HYPO 25GA L1.5IN BLU POLYPR HUB S STL REG BVL STR

## (undated) DEVICE — DRESSING PETRO W3XL8IN OIL EMUL N ADH GZ KNIT IMPREG CELOS

## (undated) DEVICE — GAUZE,SPONGE,4"X4",16PLY,XRAY,STRL,LF: Brand: MEDLINE

## (undated) DEVICE — GOWN,BREATHABLE SLV,AURORA,XLG,STRL: Brand: MEDLINE

## (undated) DEVICE — NON-DEHP CATHETER EXTENSION SET, MALE LUER LOCK ADAPTER

## (undated) DEVICE — THE MILL DISPOSABLE - MEDIUM

## (undated) DEVICE — PACK,LAPAROTOMY,NO GOWNS: Brand: MEDLINE

## (undated) DEVICE — DOUBLE BASIN SET: Brand: MEDLINE INDUSTRIES, INC.

## (undated) DEVICE — SPECIMEN CUP W/LID: Brand: DEROYAL

## (undated) DEVICE — APPLICATOR PREP 26ML 0.7% IOD POVACRYLEX 74% ISO ALC ST

## (undated) DEVICE — 5.5MM EGG

## (undated) DEVICE — TOTAL TRAY, DB, 100% SILI FOLEY, 16FR 10: Brand: MEDLINE

## (undated) DEVICE — HANDPIECE SET WITH BONE CLEANING TIP AND SUCTION TUBE: Brand: INTERPULSE

## (undated) DEVICE — 1000 S-DRAPE TOWEL DRAPE 10/BX: Brand: STERI-DRAPE™

## (undated) DEVICE — BOWL MED L 32OZ PLAS W/ MOLD GRAD EZ OPN PEEL PCH